# Patient Record
Sex: FEMALE | Race: WHITE | Employment: OTHER | URBAN - METROPOLITAN AREA
[De-identification: names, ages, dates, MRNs, and addresses within clinical notes are randomized per-mention and may not be internally consistent; named-entity substitution may affect disease eponyms.]

---

## 2018-03-20 ENCOUNTER — HOSPITAL ENCOUNTER (INPATIENT)
Age: 83
LOS: 7 days | Discharge: HOME | DRG: 871 | End: 2018-03-27
Attending: EMERGENCY MEDICINE | Admitting: INTERNAL MEDICINE
Payer: MEDICARE

## 2018-03-20 ENCOUNTER — APPOINTMENT (OUTPATIENT)
Dept: GENERAL RADIOLOGY | Age: 83
DRG: 871 | End: 2018-03-20
Payer: MEDICARE

## 2018-03-20 DIAGNOSIS — R06.03 RESPIRATORY DISTRESS: Primary | ICD-10-CM

## 2018-03-20 DIAGNOSIS — L03.119 CELLULITIS OF LOWER EXTREMITY, UNSPECIFIED LATERALITY: ICD-10-CM

## 2018-03-20 DIAGNOSIS — R09.02 HYPOXIA: ICD-10-CM

## 2018-03-20 PROBLEM — J96.01 ACUTE RESPIRATORY FAILURE WITH HYPOXIA (HCC): Status: ACTIVE | Noted: 2018-03-20

## 2018-03-20 PROBLEM — I50.9 CHF (CONGESTIVE HEART FAILURE) (HCC): Status: ACTIVE | Noted: 2018-03-20

## 2018-03-20 PROBLEM — L03.90 CELLULITIS: Status: ACTIVE | Noted: 2018-03-20

## 2018-03-20 PROBLEM — J18.9 PNEUMONIA: Status: ACTIVE | Noted: 2018-03-20

## 2018-03-20 LAB
ALBUMIN SERPL-MCNC: 3.3 G/DL (ref 3.5–5.2)
ALP BLD-CCNC: 79 U/L (ref 35–104)
ALT SERPL-CCNC: 12 U/L (ref 0–32)
ANION GAP SERPL CALCULATED.3IONS-SCNC: 17 MMOL/L (ref 7–16)
AST SERPL-CCNC: 19 U/L (ref 0–31)
BACTERIA: ABNORMAL /HPF
BASOPHILS ABSOLUTE: 0.04 E9/L (ref 0–0.2)
BASOPHILS RELATIVE PERCENT: 0.2 % (ref 0–2)
BILIRUB SERPL-MCNC: 0.2 MG/DL (ref 0–1.2)
BILIRUBIN URINE: NEGATIVE
BLOOD, URINE: ABNORMAL
BUN BLDV-MCNC: 15 MG/DL (ref 8–23)
CALCIUM SERPL-MCNC: 9.3 MG/DL (ref 8.6–10.2)
CHLORIDE BLD-SCNC: 102 MMOL/L (ref 98–107)
CLARITY: CLEAR
CO2: 18 MMOL/L (ref 22–29)
COLOR: YELLOW
CREAT SERPL-MCNC: 0.5 MG/DL (ref 0.5–1)
D DIMER: 609 NG/ML DDU
EKG ATRIAL RATE: 111 BPM
EKG P AXIS: 85 DEGREES
EKG P-R INTERVAL: 166 MS
EKG Q-T INTERVAL: 332 MS
EKG QRS DURATION: 76 MS
EKG QTC CALCULATION (BAZETT): 451 MS
EKG R AXIS: 56 DEGREES
EKG T AXIS: -2 DEGREES
EKG VENTRICULAR RATE: 111 BPM
EOSINOPHILS ABSOLUTE: 0.08 E9/L (ref 0.05–0.5)
EOSINOPHILS RELATIVE PERCENT: 0.5 % (ref 0–6)
FILM ARRAY ADENOVIRUS: NORMAL
FILM ARRAY BORDETELLA PERTUSSIS: NORMAL
FILM ARRAY CHLAMYDOPHILIA PNEUMONIAE: NORMAL
FILM ARRAY CORONAVIRUS 229E: NORMAL
FILM ARRAY CORONAVIRUS HKU1: NORMAL
FILM ARRAY CORONAVIRUS NL63: NORMAL
FILM ARRAY CORONAVIRUS OC43: NORMAL
FILM ARRAY INFLUENZA A VIRUS 09H1: NORMAL
FILM ARRAY INFLUENZA A VIRUS H1: NORMAL
FILM ARRAY INFLUENZA A VIRUS H3: NORMAL
FILM ARRAY INFLUENZA A VIRUS: NORMAL
FILM ARRAY INFLUENZA B: NORMAL
FILM ARRAY METAPNEUMOVIRUS: NORMAL
FILM ARRAY MYCOPLASMA PNEUMONIAE: NORMAL
FILM ARRAY PARAINFLUENZA VIRUS 1: NORMAL
FILM ARRAY PARAINFLUENZA VIRUS 2: NORMAL
FILM ARRAY PARAINFLUENZA VIRUS 3: NORMAL
FILM ARRAY PARAINFLUENZA VIRUS 4: NORMAL
FILM ARRAY RESPIRATORY SYNCITIAL VIRUS: NORMAL
FILM ARRAY RHINOVIRUS/ENTEROVIRUS: NORMAL
FOLATE: 14.3 NG/ML (ref 4.8–24.2)
GFR AFRICAN AMERICAN: >60
GFR NON-AFRICAN AMERICAN: >60 ML/MIN/1.73
GLUCOSE BLD-MCNC: 158 MG/DL (ref 74–109)
GLUCOSE URINE: NEGATIVE MG/DL
HBA1C MFR BLD: 5.4 % (ref 4.8–5.9)
HCT VFR BLD CALC: 35.5 % (ref 34–48)
HEMOGLOBIN: 10.6 G/DL (ref 11.5–15.5)
IMMATURE GRANULOCYTES #: 0.15 E9/L
IMMATURE GRANULOCYTES %: 0.9 % (ref 0–5)
INFLUENZA A BY PCR: NOT DETECTED
INFLUENZA B BY PCR: NOT DETECTED
KETONES, URINE: NEGATIVE MG/DL
LACTIC ACID, SEPSIS: 1.5 MMOL/L (ref 0.5–1.9)
LACTIC ACID: 1.2 MMOL/L (ref 0.5–2.2)
LEUKOCYTE ESTERASE, URINE: ABNORMAL
LV EF: 55 %
LVEF MODALITY: NORMAL
LYMPHOCYTES ABSOLUTE: 1.23 E9/L (ref 1.5–4)
LYMPHOCYTES RELATIVE PERCENT: 7.4 % (ref 20–42)
MCH RBC QN AUTO: 23.8 PG (ref 26–35)
MCHC RBC AUTO-ENTMCNC: 29.9 % (ref 32–34.5)
MCV RBC AUTO: 79.6 FL (ref 80–99.9)
MONOCYTES ABSOLUTE: 0.78 E9/L (ref 0.1–0.95)
MONOCYTES RELATIVE PERCENT: 4.7 % (ref 2–12)
NEUTROPHILS ABSOLUTE: 14.3 E9/L (ref 1.8–7.3)
NEUTROPHILS RELATIVE PERCENT: 86.3 % (ref 43–80)
NITRITE, URINE: NEGATIVE
PDW BLD-RTO: 16.7 FL (ref 11.5–15)
PH UA: 5.5 (ref 5–9)
PHOSPHORUS: 2.8 MG/DL (ref 2.5–4.5)
PLATELET # BLD: 359 E9/L (ref 130–450)
PMV BLD AUTO: 10.7 FL (ref 7–12)
POTASSIUM SERPL-SCNC: 4.3 MMOL/L (ref 3.5–5)
PRO-BNP: 1834 PG/ML (ref 0–450)
PROTEIN UA: NEGATIVE MG/DL
RBC # BLD: 4.46 E12/L (ref 3.5–5.5)
RBC UA: ABNORMAL /HPF (ref 0–2)
SODIUM BLD-SCNC: 137 MMOL/L (ref 132–146)
SPECIFIC GRAVITY UA: 1.01 (ref 1–1.03)
TOTAL PROTEIN: 7.8 G/DL (ref 6.4–8.3)
TROPONIN: 0.02 NG/ML (ref 0–0.03)
TROPONIN: 0.15 NG/ML (ref 0–0.03)
TROPONIN: 0.16 NG/ML (ref 0–0.03)
UROBILINOGEN, URINE: 0.2 E.U./DL
VITAMIN B-12: 259 PG/ML (ref 211–946)
WBC # BLD: 16.6 E9/L (ref 4.5–11.5)
WBC UA: ABNORMAL /HPF (ref 0–5)

## 2018-03-20 PROCEDURE — 82607 VITAMIN B-12: CPT

## 2018-03-20 PROCEDURE — 6360000002 HC RX W HCPCS: Performed by: INTERNAL MEDICINE

## 2018-03-20 PROCEDURE — 87070 CULTURE OTHR SPECIMN AEROBIC: CPT

## 2018-03-20 PROCEDURE — 87040 BLOOD CULTURE FOR BACTERIA: CPT

## 2018-03-20 PROCEDURE — 94760 N-INVAS EAR/PLS OXIMETRY 1: CPT

## 2018-03-20 PROCEDURE — 85025 COMPLETE CBC W/AUTO DIFF WBC: CPT

## 2018-03-20 PROCEDURE — 94664 DEMO&/EVAL PT USE INHALER: CPT

## 2018-03-20 PROCEDURE — 87186 SC STD MICRODIL/AGAR DIL: CPT

## 2018-03-20 PROCEDURE — 87798 DETECT AGENT NOS DNA AMP: CPT

## 2018-03-20 PROCEDURE — 93306 TTE W/DOPPLER COMPLETE: CPT

## 2018-03-20 PROCEDURE — 93005 ELECTROCARDIOGRAM TRACING: CPT | Performed by: PHYSICIAN ASSISTANT

## 2018-03-20 PROCEDURE — 2060000000 HC ICU INTERMEDIATE R&B

## 2018-03-20 PROCEDURE — 87501 INFLUENZA DNA AMP PROB 1+: CPT

## 2018-03-20 PROCEDURE — 96375 TX/PRO/DX INJ NEW DRUG ADDON: CPT

## 2018-03-20 PROCEDURE — 83880 ASSAY OF NATRIURETIC PEPTIDE: CPT

## 2018-03-20 PROCEDURE — 2580000003 HC RX 258: Performed by: PHYSICIAN ASSISTANT

## 2018-03-20 PROCEDURE — 87486 CHLMYD PNEUM DNA AMP PROBE: CPT

## 2018-03-20 PROCEDURE — 87502 INFLUENZA DNA AMP PROBE: CPT

## 2018-03-20 PROCEDURE — 51702 INSERT TEMP BLADDER CATH: CPT

## 2018-03-20 PROCEDURE — 87088 URINE BACTERIA CULTURE: CPT

## 2018-03-20 PROCEDURE — 6360000002 HC RX W HCPCS: Performed by: PHYSICIAN ASSISTANT

## 2018-03-20 PROCEDURE — 6370000000 HC RX 637 (ALT 250 FOR IP): Performed by: INTERNAL MEDICINE

## 2018-03-20 PROCEDURE — 87581 M.PNEUMON DNA AMP PROBE: CPT

## 2018-03-20 PROCEDURE — 2580000003 HC RX 258: Performed by: INTERNAL MEDICINE

## 2018-03-20 PROCEDURE — 82746 ASSAY OF FOLIC ACID SERUM: CPT

## 2018-03-20 PROCEDURE — 2700000000 HC OXYGEN THERAPY PER DAY

## 2018-03-20 PROCEDURE — 36415 COLL VENOUS BLD VENIPUNCTURE: CPT

## 2018-03-20 PROCEDURE — 84484 ASSAY OF TROPONIN QUANT: CPT

## 2018-03-20 PROCEDURE — 87503 INFLUENZA DNA AMP PROB ADDL: CPT

## 2018-03-20 PROCEDURE — 6370000000 HC RX 637 (ALT 250 FOR IP): Performed by: PHYSICIAN ASSISTANT

## 2018-03-20 PROCEDURE — 85378 FIBRIN DEGRADE SEMIQUANT: CPT

## 2018-03-20 PROCEDURE — 83036 HEMOGLOBIN GLYCOSYLATED A1C: CPT

## 2018-03-20 PROCEDURE — 87450 HC DIRECT STREP B ANTIGEN: CPT

## 2018-03-20 PROCEDURE — 71045 X-RAY EXAM CHEST 1 VIEW: CPT

## 2018-03-20 PROCEDURE — 94640 AIRWAY INHALATION TREATMENT: CPT

## 2018-03-20 PROCEDURE — 81001 URINALYSIS AUTO W/SCOPE: CPT

## 2018-03-20 PROCEDURE — 83605 ASSAY OF LACTIC ACID: CPT

## 2018-03-20 PROCEDURE — 84100 ASSAY OF PHOSPHORUS: CPT

## 2018-03-20 PROCEDURE — 80053 COMPREHEN METABOLIC PANEL: CPT

## 2018-03-20 PROCEDURE — 87147 CULTURE TYPE IMMUNOLOGIC: CPT

## 2018-03-20 PROCEDURE — 99285 EMERGENCY DEPT VISIT HI MDM: CPT

## 2018-03-20 PROCEDURE — 96365 THER/PROPH/DIAG IV INF INIT: CPT

## 2018-03-20 RX ORDER — BUDESONIDE 0.25 MG/2ML
250 INHALANT ORAL 2 TIMES DAILY
Status: DISCONTINUED | OUTPATIENT
Start: 2018-03-20 | End: 2018-03-27 | Stop reason: HOSPADM

## 2018-03-20 RX ORDER — TORSEMIDE 10 MG/1
10 TABLET ORAL DAILY
Status: DISCONTINUED | OUTPATIENT
Start: 2018-03-21 | End: 2018-03-27

## 2018-03-20 RX ORDER — ENALAPRIL MALEATE AND HYDROCHLOROTHIAZIDE 10; 25 MG/1; MG/1
1 TABLET ORAL EVERY MORNING
Status: ON HOLD | COMMUNITY
End: 2020-01-01 | Stop reason: HOSPADM

## 2018-03-20 RX ORDER — IPRATROPIUM BROMIDE AND ALBUTEROL SULFATE 2.5; .5 MG/3ML; MG/3ML
1 SOLUTION RESPIRATORY (INHALATION) ONCE
Status: COMPLETED | OUTPATIENT
Start: 2018-03-20 | End: 2018-03-20

## 2018-03-20 RX ORDER — PANTOPRAZOLE SODIUM 40 MG/1
40 TABLET, DELAYED RELEASE ORAL
Status: DISCONTINUED | OUTPATIENT
Start: 2018-03-21 | End: 2018-03-27 | Stop reason: HOSPADM

## 2018-03-20 RX ORDER — ENALAPRIL MALEATE 10 MG/1
10 TABLET ORAL EVERY MORNING
Status: DISCONTINUED | OUTPATIENT
Start: 2018-03-21 | End: 2018-03-27 | Stop reason: HOSPADM

## 2018-03-20 RX ORDER — SODIUM CHLORIDE 0.9 % (FLUSH) 0.9 %
10 SYRINGE (ML) INJECTION EVERY 12 HOURS SCHEDULED
Status: DISCONTINUED | OUTPATIENT
Start: 2018-03-20 | End: 2018-03-27 | Stop reason: HOSPADM

## 2018-03-20 RX ORDER — HYDROCHLOROTHIAZIDE 25 MG/1
25 TABLET ORAL EVERY MORNING
Status: DISCONTINUED | OUTPATIENT
Start: 2018-03-21 | End: 2018-03-20

## 2018-03-20 RX ORDER — FORMOTEROL FUMARATE 20 UG/2ML
20 SOLUTION RESPIRATORY (INHALATION) 2 TIMES DAILY
Status: DISCONTINUED | OUTPATIENT
Start: 2018-03-20 | End: 2018-03-27 | Stop reason: HOSPADM

## 2018-03-20 RX ORDER — SODIUM CHLORIDE 9 MG/ML
INJECTION, SOLUTION INTRAVENOUS CONTINUOUS
Status: DISCONTINUED | OUTPATIENT
Start: 2018-03-20 | End: 2018-03-27 | Stop reason: HOSPADM

## 2018-03-20 RX ORDER — CODEINE PHOSPHATE AND GUAIFENESIN 10; 100 MG/5ML; MG/5ML
5 SOLUTION ORAL EVERY 4 HOURS PRN
Status: DISCONTINUED | OUTPATIENT
Start: 2018-03-20 | End: 2018-03-27 | Stop reason: HOSPADM

## 2018-03-20 RX ORDER — FUROSEMIDE 10 MG/ML
40 INJECTION INTRAMUSCULAR; INTRAVENOUS ONCE
Status: COMPLETED | OUTPATIENT
Start: 2018-03-20 | End: 2018-03-20

## 2018-03-20 RX ORDER — NIFEDIPINE 30 MG/1
30 TABLET, EXTENDED RELEASE ORAL EVERY MORNING
Status: DISCONTINUED | OUTPATIENT
Start: 2018-03-21 | End: 2018-03-20

## 2018-03-20 RX ORDER — ENALAPRIL MALEATE AND HYDROCHLOROTHIAZIDE 10; 25 MG/1; MG/1
1 TABLET ORAL EVERY MORNING
Status: DISCONTINUED | OUTPATIENT
Start: 2018-03-21 | End: 2018-03-20 | Stop reason: CLARIF

## 2018-03-20 RX ORDER — AMLODIPINE BESYLATE 5 MG/1
5 TABLET ORAL DAILY
Status: DISCONTINUED | OUTPATIENT
Start: 2018-03-21 | End: 2018-03-27

## 2018-03-20 RX ORDER — NIFEDIPINE 30 MG/1
30 TABLET, EXTENDED RELEASE ORAL EVERY MORNING
Status: ON HOLD | COMMUNITY
End: 2020-01-01

## 2018-03-20 RX ORDER — SODIUM CHLORIDE 0.9 % (FLUSH) 0.9 %
10 SYRINGE (ML) INJECTION PRN
Status: DISCONTINUED | OUTPATIENT
Start: 2018-03-20 | End: 2018-03-27 | Stop reason: HOSPADM

## 2018-03-20 RX ORDER — ONDANSETRON 2 MG/ML
4 INJECTION INTRAMUSCULAR; INTRAVENOUS EVERY 6 HOURS PRN
Status: DISCONTINUED | OUTPATIENT
Start: 2018-03-20 | End: 2018-03-27 | Stop reason: HOSPADM

## 2018-03-20 RX ORDER — SPIRONOLACTONE 25 MG/1
25 TABLET ORAL DAILY
Status: DISCONTINUED | OUTPATIENT
Start: 2018-03-21 | End: 2018-03-27 | Stop reason: HOSPADM

## 2018-03-20 RX ORDER — IPRATROPIUM BROMIDE AND ALBUTEROL SULFATE 2.5; .5 MG/3ML; MG/3ML
1 SOLUTION RESPIRATORY (INHALATION)
Status: DISCONTINUED | OUTPATIENT
Start: 2018-03-20 | End: 2018-03-27 | Stop reason: HOSPADM

## 2018-03-20 RX ADMIN — PIPERACILLIN SODIUM AND TAZOBACTAM SODIUM 3.38 G: 3; .375 INJECTION, POWDER, LYOPHILIZED, FOR SOLUTION INTRAVENOUS at 09:09

## 2018-03-20 RX ADMIN — FUROSEMIDE 40 MG: 10 INJECTION, SOLUTION INTRAMUSCULAR; INTRAVENOUS at 10:11

## 2018-03-20 RX ADMIN — FORMOTEROL FUMARATE DIHYDRATE 20 MCG: 20 SOLUTION RESPIRATORY (INHALATION) at 18:42

## 2018-03-20 RX ADMIN — SODIUM CHLORIDE: 9 INJECTION, SOLUTION INTRAVENOUS at 16:37

## 2018-03-20 RX ADMIN — CEFTRIAXONE SODIUM 1 G: 1 INJECTION, POWDER, FOR SOLUTION INTRAMUSCULAR; INTRAVENOUS at 20:47

## 2018-03-20 RX ADMIN — IPRATROPIUM BROMIDE AND ALBUTEROL SULFATE 1 AMPULE: 2.5; .5 SOLUTION RESPIRATORY (INHALATION) at 09:20

## 2018-03-20 RX ADMIN — ENOXAPARIN SODIUM 40 MG: 40 INJECTION SUBCUTANEOUS at 16:37

## 2018-03-20 RX ADMIN — VANCOMYCIN HYDROCHLORIDE 1500 MG: 10 INJECTION, POWDER, LYOPHILIZED, FOR SOLUTION INTRAVENOUS at 11:23

## 2018-03-20 RX ADMIN — IPRATROPIUM BROMIDE AND ALBUTEROL SULFATE 1 AMPULE: 2.5; .5 SOLUTION RESPIRATORY (INHALATION) at 18:42

## 2018-03-20 RX ADMIN — BUDESONIDE 250 MCG: 0.25 SUSPENSION RESPIRATORY (INHALATION) at 18:44

## 2018-03-20 ASSESSMENT — PAIN SCALES - GENERAL: PAINLEVEL_OUTOF10: 0

## 2018-03-20 NOTE — PROGRESS NOTES
Call out to  [facility] via answering service regarding nursing request for salinas catheter-patient is incontinent, urine lab work ordered, with administration of Lasix earlier today. Floor awaiting call back/any new orders at this time.

## 2018-03-20 NOTE — CONSULTS
CHEST PORTABLE NUMBER OF IMAGES:  1 INDICATION: Shortness of breath Technique: AP view of the chest obtained portably on 3/20/2018 8:45 AM Comparison:  No prior studies available for comparison. Findings: The cardiomediastinal silhouette is enlarged with calcified lesions of the aortic arch. There are bilateral pleural effusions with diffuse airspace disease involving the left mid to lower lung field and right lower lobe. There is no pneumothorax. The visualized osseous structures demonstrate degenerative changes. There is an age-indeterminate dislocation of the right shoulder with medial displacement of the right humeral head with respect to the glenoid fossa. Lines and Tubes: None. Bilateral pleural effusions with diffuse airspace disease involving the left mid to lower lung field and right lower lobe. Recommend clinical correlation. Assessment:    Principal Problem:    Acute respiratory failure with hypoxia (HCC)  Active Problems:    Pneumonia  Resolved Problems:    * No resolved hospital problems. *      Plan: Will obtain 2D-ECHO and assess significance of valvular disease as well as left ventricular systolic/diastolic function and adjust cardiac medications accordingly. Anion gap may be due to low flow state secondary to poor forward cardiac output. Carefully monitor renal function. Utilize DVT prophylaxis. I have spent more than 45 minutes face to face with Yolette Tavares reviewing notes and laboratory data with greater than 50% of this time instructing and counseling the patient regarding my findings and recommendations and I have answered all questions as posed to me by Ms. Lucius Simon. Thank you, Freddy Hunter MD for allowing me to consult in the care of this patient. Daryl Harper DO, FACP, FACC, FSCAI    NOTE:  This report was transcribed using voice recognition software.   Every effort was made to ensure accuracy; however, inadvertent computerized transcription errors may be present.

## 2018-03-20 NOTE — ED PROVIDER NOTES
ED Attending  CC: No       Department of Emergency Medicine   ED  Provider Note  Admit Date/Time: 3/20/2018  8:27 AM  ED Bed: 23/23   MRN: [id number]  Chief Complaint:   Shortness of Breath (for a couple of days)       History of Present Illness   Source of history provided by:  patient. History/Exam Limitations: none. Elias Fothergill is a 81 y.o. female who has a past medical history of:   Past Medical History:   Diagnosis Date    Hypertension     presents to the ED by private vehicle, ambulatory and accompanied by family member son for shortness of breath, beginning several hours ago and are unchanged since that time. The complaint has been persistent, severe in severity. Patient states several hours ago she became short of breath. States she has had a cough for the last several days. Denies any fever, chills, nausea, vomiting, chest pain. States she does have chronic wounds to the bilateral lower extremities. States increasing drainage from the area. Does not follow with wound care. Denies any medical history. ROS   Pertinent positives and negatives are stated within HPI, all other systems reviewed and are negative. History reviewed. No pertinent surgical history. Social History:  reports that she has never smoked. She has never used smokeless tobacco. She reports that she does not drink alcohol. Family History: family history is not on file. Allergies: Patient has no known allergies. Physical Exam Section   Oxygen Saturation Interpretation: Improved after treatment. ED Triage Vitals [03/20/18 0828]   BP Temp Temp Source Pulse Resp SpO2 Height Weight   (!) 151/97 97.6 °F (36.4 °C) Oral 119 24 (!) 77 % -- --       Physical Exam  · Constitutional/General: Alert and oriented x3, ill appearing. Moderate distress  · HEENT:  NC/NT. PERRLA,  Airway patent. · Neck: Supple, full ROM, non tender to palpation in the midline, no stridor, no crepitus, no meningeal signs  · Respiratory:  Moderate Consultations:             Sera Farley HOSPITALIST  Dr. Dyana Horn spoke with Dr. Savanah Grimes and discussed patient case. He will accept the patient. Procedures:   none    MDM:  Lab work, EKG, CXR ordered. Placed on non re breather mask. O2 sat improved from 77% to 94%. Dr. Dyana Horn evaluated the patient. Patient states she felt mildly improved. CXR shows bilateral pleural effusions. BNP elevated at 1,834. Treated with lasix. Due to the wounds on her legs and leukocytosis, started on vancomycin and zosyn. Patient will be admitted to the hospital.     Counseling:   I have spoken with the patient and discussed todays results, in addition to providing specific details for the plan of care and counseling regarding the diagnosis and prognosis and are agreeable with the plan. Assessment      1. Respiratory distress    2. Hypoxia    3. Cellulitis of lower extremity, unspecified laterality      This patient's ED course included: multiple bedside re-evaluations  This patient has improved during their ED course. Plan   Admit to telemetry. Patient condition is stable. New Medications     New Prescriptions    No medications on file     Electronically signed by BEVERLEY Gandhi   DD: 3/20/18  **This report was transcribed using voice recognition software. Every effort was made to ensure accuracy; however, inadvertent computerized transcription errors may be present.   END OF PROVIDER NOTE       Pako Gandhi  03/20/18 1107

## 2018-03-20 NOTE — PROGRESS NOTES
History and Physical      CHIEF COMPLAINT:  Short of breath    History of Present Illness: 79-year-old female, states she does not ever recall being in the hospital except for childbirth. She states her only significant problem and life has been sinus difficulties. Patient has been short of breath the last 2 weeks became profoundly short of breath yesterday and this morning. She presented to the ED; bilateral pleural effusions with diffuse airspace disease left and right lung. On presentation, oxygen saturation 77%. ProBNP 1834; troponin 0.02. WBC 16.6 hemoglobin 10.6. Glucose 158 calcium 9.3 protein 7.8 albumin 3.3. Urinalysis with trace leukocyte esterase. Influenza PCR done in the ER, negative. --I reviewed her old charts, she did have outpatient 2-D echo 9/26/14 showed 55-60% ejection fraction stage I diastolic dysfunction moderate mitral regurgitation and moderate aortic stenosis moderate aortic regurgitation. Despite that, she's not seen a cardiologist.  --Has never had a colonoscopy; never had EGD. Does not recall any stress test. No pulmonary difficulties; sinus feeling difficulty. --She has had ischemic type ulcers, both lower legs and 30 x 10 cm each. She states that's been there for many years never had any attention drawn to it. Past Medical History:   Diagnosis Date    Acute respiratory failure with hypoxia ([facility] [address] 3/20/2018    Cellulitis 3/20/2018    Right and left lower extremity    CHF (congestive heart failure) ([facility] [address] 3/20/2018    Hypertension          History reviewed. No pertinent surgical history. Medications Prior to Admission:    Prescriptions Prior to Admission: NIFEdipine (NIFEDICAL XL) 30 MG extended release tablet, Take 30 mg by mouth every morning  enalapril-hydrochlorothiazide (VASERETIC) 10-25 MG per tablet, Take 1 tablet by mouth every morning    Allergies:    Patient has no known allergies. Social History:    reports that she has never smoked.  She has never used smokeless tobacco. She reports that she does not drink alcohol. Family History:   family history is not on file. REVIEW OF SYSTEMS:  As above in the HPI, otherwise negative    PHYSICAL EXAM:    VS: BP (!) 149/78   Pulse 104   Temp 98.7 °F (37.1 °C) (Oral)   Resp 18   Ht 5' 1\" (1.549 m)   Wt 220 lb (99.8 kg)   SpO2 95%   BMI 41.57 kg/m²     General appearance: Alert, Awake, Oriented times 3, no distress  Skin: Warm and dry ; no rashes  Head: Normocephalic. No masses, lesions or tenderness noted  Eyes: Conjunctivae pink, sclera white. PERRL,EOM-I  Ears: External ears normal  Nose/Sinuses: Nares normal. Septum midline. Mucosa normal. No drainage  Oropharynx: Oropharynx clear with no exudate seen  Neck: Supple. No jugular venous distension, lymphadenopathy or thyromegaly Trachea midline  Lungs:Intense wheeze rhonchi bilaterally worse on the left. Heart: S1 S2  Regular rate and rhythm. No rub or gallop; grade 2/6 systolic murmur, 2nd right intercostal space grade 1/6 systolic murmur 3rd 4th left intercostal space  Abdomen: Soft, non-tender. BS normal. No masses, organomegaly; no rebound or guarding  Extremities: Erythematous changes both feet. 10 cm x 30 cm ulcerated areas in her surface lower legs extending from mid calf to the medial malleolus bilaterally. Musculoskeletal: Muscular strength appears intact. Neuro:  No focal motor defects ; II-XII grossly intact .  HAY equally  Breast: deferred  Rectal: deferred  Genitalia:  deferred    LABS:  CBC:   Lab Results   Component Value Date    WBC 16.6 03/20/2018    RBC 4.46 03/20/2018    HGB 10.6 03/20/2018    HCT 35.5 03/20/2018    MCV 79.6 03/20/2018    MCH 23.8 03/20/2018    MCHC 29.9 03/20/2018    RDW 16.7 03/20/2018     03/20/2018    MPV 10.7 03/20/2018     CBC with Differential:    Lab Results   Component Value Date    WBC 16.6 03/20/2018    RBC 4.46 03/20/2018    HGB 10.6 03/20/2018    HCT 35.5 03/20/2018     03/20/2018    MCV 79.6 03/20/2018    MCH 23.8 03/20/2018    MCHC 29.9 03/20/2018    RDW 16.7 03/20/2018    LYMPHOPCT 7.4 03/20/2018    MONOPCT 4.7 03/20/2018    BASOPCT 0.2 03/20/2018    MONOSABS 0.78 03/20/2018    LYMPHSABS 1.23 03/20/2018    EOSABS 0.08 03/20/2018    BASOSABS 0.04 03/20/2018     Hemoglobin/Hematocrit:    Lab Results   Component Value Date    HGB 10.6 03/20/2018    HCT 35.5 03/20/2018     CMP:    Lab Results   Component Value Date     03/20/2018    K 4.3 03/20/2018     03/20/2018    CO2 18 03/20/2018    BUN 15 03/20/2018    CREATININE 0.5 03/20/2018    GFRAA >60 03/20/2018    LABGLOM >60 03/20/2018    GLUCOSE 158 03/20/2018    PROT 7.8 03/20/2018    LABALBU 3.3 03/20/2018    CALCIUM 9.3 03/20/2018    BILITOT 0.2 03/20/2018    ALKPHOS 79 03/20/2018    AST 19 03/20/2018    ALT 12 03/20/2018     BMP:    Lab Results   Component Value Date     03/20/2018    K 4.3 03/20/2018     03/20/2018    CO2 18 03/20/2018    BUN 15 03/20/2018    LABALBU 3.3 03/20/2018    CREATININE 0.5 03/20/2018    CALCIUM 9.3 03/20/2018    GFRAA >60 03/20/2018    LABGLOM >60 03/20/2018    GLUCOSE 158 03/20/2018     Magnesium:  No results found for: MG  Phosphorus:  No results found for: PHOS  PT/INR:  No results found for: PROTIME, INR  Warfarin PT/INR:  No components found for: PTPATWAR, PTINRWAR  PTT:  No results found for: APTT, PTT[APTT}  Troponin:    Lab Results   Component Value Date    TROPONINI 0.02 03/20/2018     Last 3 Troponin:    Lab Results   Component Value Date    TROPONINI 0.02 03/20/2018     U/A:    Lab Results   Component Value Date    COLORU Yellow 03/20/2018    PROTEINU Negative 03/20/2018    PHUR 5.5 03/20/2018    WBCUA 1-3 03/20/2018    RBCUA 1-3 03/20/2018    BACTERIA RARE 03/20/2018    CLARITYU Clear 03/20/2018    SPECGRAV 1.010 03/20/2018    LEUKOCYTESUR TRACE 03/20/2018    UROBILINOGEN 0.2 03/20/2018    BILIRUBINUR Negative 03/20/2018    BLOODU TRACE 03/20/2018    GLUCOSEU Negative 03/20/2018     HgBA1c:

## 2018-03-21 PROBLEM — I50.33 ACUTE ON CHRONIC DIASTOLIC CONGESTIVE HEART FAILURE (HCC): Status: ACTIVE | Noted: 2018-03-20

## 2018-03-21 PROBLEM — I35.0 AORTIC STENOSIS, SEVERE: Status: ACTIVE | Noted: 2018-03-01

## 2018-03-21 PROBLEM — I34.0 MITRAL REGURGITATION: Status: ACTIVE | Noted: 2018-03-01

## 2018-03-21 PROBLEM — D62 ACUTE BLOOD LOSS ANEMIA: Status: ACTIVE | Noted: 2018-03-21

## 2018-03-21 PROBLEM — E87.6 HYPOKALEMIA: Status: ACTIVE | Noted: 2018-03-21

## 2018-03-21 PROBLEM — I35.1 MODERATE AORTIC REGURGITATION: Status: ACTIVE | Noted: 2018-03-01

## 2018-03-21 LAB
ABO/RH: NORMAL
ALBUMIN SERPL-MCNC: 3.2 G/DL (ref 3.5–5.2)
ALP BLD-CCNC: 64 U/L (ref 35–104)
ALT SERPL-CCNC: 9 U/L (ref 0–32)
ANION GAP SERPL CALCULATED.3IONS-SCNC: 14 MMOL/L (ref 7–16)
ANTIBODY SCREEN: NORMAL
AST SERPL-CCNC: 16 U/L (ref 0–31)
BASOPHILS ABSOLUTE: 0.01 E9/L (ref 0–0.2)
BASOPHILS RELATIVE PERCENT: 0.1 % (ref 0–2)
BILIRUB SERPL-MCNC: 0.3 MG/DL (ref 0–1.2)
BUN BLDV-MCNC: 13 MG/DL (ref 8–23)
C-REACTIVE PROTEIN: 4.2 MG/DL (ref 0–0.4)
CALCIUM SERPL-MCNC: 8.8 MG/DL (ref 8.6–10.2)
CHLORIDE BLD-SCNC: 101 MMOL/L (ref 98–107)
CHOLESTEROL, TOTAL: 161 MG/DL (ref 0–199)
CO2: 25 MMOL/L (ref 22–29)
CREAT SERPL-MCNC: 0.7 MG/DL (ref 0.5–1)
EOSINOPHILS ABSOLUTE: 0.05 E9/L (ref 0.05–0.5)
EOSINOPHILS RELATIVE PERCENT: 0.6 % (ref 0–6)
FERRITIN: 78 NG/ML
GFR AFRICAN AMERICAN: >60
GFR NON-AFRICAN AMERICAN: >60 ML/MIN/1.73
GLUCOSE BLD-MCNC: 93 MG/DL (ref 74–109)
HCT VFR BLD CALC: 27.8 % (ref 34–48)
HCT VFR BLD CALC: 31.2 % (ref 34–48)
HDLC SERPL-MCNC: 45 MG/DL
HEMOGLOBIN: 8.1 G/DL (ref 11.5–15.5)
IMMATURE GRANULOCYTES #: 0.05 E9/L
IMMATURE GRANULOCYTES %: 0.6 % (ref 0–5)
IMMATURE RETIC FRACT: 27.6 % (ref 3–15.9)
IRON SATURATION: 12 % (ref 15–50)
IRON: 33 MCG/DL (ref 37–145)
L. PNEUMOPHILA SEROGP 1 UR AG: NORMAL
LDL CHOLESTEROL CALCULATED: 85 MG/DL (ref 0–99)
LYMPHOCYTES ABSOLUTE: 1.39 E9/L (ref 1.5–4)
LYMPHOCYTES RELATIVE PERCENT: 16.3 % (ref 20–42)
MAGNESIUM: 1.6 MG/DL (ref 1.6–2.6)
MCH RBC QN AUTO: 22.9 PG (ref 26–35)
MCHC RBC AUTO-ENTMCNC: 29.1 % (ref 32–34.5)
MCV RBC AUTO: 78.5 FL (ref 80–99.9)
MONOCYTES ABSOLUTE: 0.85 E9/L (ref 0.1–0.95)
MONOCYTES RELATIVE PERCENT: 10 % (ref 2–12)
NEUTROPHILS ABSOLUTE: 6.18 E9/L (ref 1.8–7.3)
NEUTROPHILS RELATIVE PERCENT: 72.4 % (ref 43–80)
PDW BLD-RTO: 16.3 FL (ref 11.5–15)
PLATELET # BLD: 395 E9/L (ref 130–450)
PMV BLD AUTO: 10 FL (ref 7–12)
POTASSIUM SERPL-SCNC: 3.2 MMOL/L (ref 3.5–5)
PRO-BNP: 6102 PG/ML (ref 0–450)
PROCALCITONIN: 0.14 NG/ML (ref 0–0.08)
RBC # BLD: 3.54 E12/L (ref 3.5–5.5)
RETIC HGB EQUIVALENT: 26.7 PG (ref 28.2–36.6)
RETICULOCYTE ABSOLUTE COUNT: 0.08 E12/L
RETICULOCYTE COUNT PCT: 2 % (ref 0.4–1.9)
SODIUM BLD-SCNC: 140 MMOL/L (ref 132–146)
STREP PNEUMONIAE ANTIGEN, URINE: NORMAL
TOTAL IRON BINDING CAPACITY: 270 MCG/DL (ref 250–450)
TOTAL PROTEIN: 6.5 G/DL (ref 6.4–8.3)
TRIGL SERPL-MCNC: 157 MG/DL (ref 0–149)
TSH SERPL DL<=0.05 MIU/L-ACNC: 1.29 UIU/ML (ref 0.27–4.2)
VLDLC SERPL CALC-MCNC: 31 MG/DL
WBC # BLD: 8.5 E9/L (ref 4.5–11.5)

## 2018-03-21 PROCEDURE — 86901 BLOOD TYPING SEROLOGIC RH(D): CPT

## 2018-03-21 PROCEDURE — 85045 AUTOMATED RETICULOCYTE COUNT: CPT

## 2018-03-21 PROCEDURE — 80061 LIPID PANEL: CPT

## 2018-03-21 PROCEDURE — 2500000003 HC RX 250 WO HCPCS: Performed by: INTERNAL MEDICINE

## 2018-03-21 PROCEDURE — 83880 ASSAY OF NATRIURETIC PEPTIDE: CPT

## 2018-03-21 PROCEDURE — 85025 COMPLETE CBC W/AUTO DIFF WBC: CPT

## 2018-03-21 PROCEDURE — 82728 ASSAY OF FERRITIN: CPT

## 2018-03-21 PROCEDURE — 97535 SELF CARE MNGMENT TRAINING: CPT

## 2018-03-21 PROCEDURE — 36415 COLL VENOUS BLD VENIPUNCTURE: CPT

## 2018-03-21 PROCEDURE — 83540 ASSAY OF IRON: CPT

## 2018-03-21 PROCEDURE — 6370000000 HC RX 637 (ALT 250 FOR IP): Performed by: INTERNAL MEDICINE

## 2018-03-21 PROCEDURE — 84443 ASSAY THYROID STIM HORMONE: CPT

## 2018-03-21 PROCEDURE — 84145 PROCALCITONIN (PCT): CPT

## 2018-03-21 PROCEDURE — 86900 BLOOD TYPING SEROLOGIC ABO: CPT

## 2018-03-21 PROCEDURE — G8988 SELF CARE GOAL STATUS: HCPCS

## 2018-03-21 PROCEDURE — 86920 COMPATIBILITY TEST SPIN: CPT

## 2018-03-21 PROCEDURE — G8987 SELF CARE CURRENT STATUS: HCPCS

## 2018-03-21 PROCEDURE — 97165 OT EVAL LOW COMPLEX 30 MIN: CPT

## 2018-03-21 PROCEDURE — 2580000003 HC RX 258: Performed by: INTERNAL MEDICINE

## 2018-03-21 PROCEDURE — 83550 IRON BINDING TEST: CPT

## 2018-03-21 PROCEDURE — 83735 ASSAY OF MAGNESIUM: CPT

## 2018-03-21 PROCEDURE — 6360000002 HC RX W HCPCS: Performed by: INTERNAL MEDICINE

## 2018-03-21 PROCEDURE — 80053 COMPREHEN METABOLIC PANEL: CPT

## 2018-03-21 PROCEDURE — 2700000000 HC OXYGEN THERAPY PER DAY

## 2018-03-21 PROCEDURE — 94640 AIRWAY INHALATION TREATMENT: CPT

## 2018-03-21 PROCEDURE — 2060000000 HC ICU INTERMEDIATE R&B

## 2018-03-21 PROCEDURE — 2580000003 HC RX 258: Performed by: PHYSICIAN ASSISTANT

## 2018-03-21 PROCEDURE — 94760 N-INVAS EAR/PLS OXIMETRY 1: CPT

## 2018-03-21 PROCEDURE — 86850 RBC ANTIBODY SCREEN: CPT

## 2018-03-21 PROCEDURE — 86140 C-REACTIVE PROTEIN: CPT

## 2018-03-21 RX ORDER — MAGNESIUM SULFATE IN WATER 40 MG/ML
2 INJECTION, SOLUTION INTRAVENOUS ONCE
Status: COMPLETED | OUTPATIENT
Start: 2018-03-21 | End: 2018-03-21

## 2018-03-21 RX ADMIN — ENALAPRIL MALEATE 10 MG: 10 TABLET ORAL at 08:41

## 2018-03-21 RX ADMIN — BUDESONIDE 250 MCG: 0.25 SUSPENSION RESPIRATORY (INHALATION) at 21:37

## 2018-03-21 RX ADMIN — POTASSIUM PHOSPHATE, MONOBASIC AND POTASSIUM PHOSPHATE, DIBASIC 15 MMOL: 224; 236 INJECTION, SOLUTION INTRAVENOUS at 11:15

## 2018-03-21 RX ADMIN — AMLODIPINE BESYLATE 5 MG: 5 TABLET ORAL at 12:43

## 2018-03-21 RX ADMIN — IPRATROPIUM BROMIDE AND ALBUTEROL SULFATE 1 AMPULE: 2.5; .5 SOLUTION RESPIRATORY (INHALATION) at 11:21

## 2018-03-21 RX ADMIN — ENOXAPARIN SODIUM 40 MG: 40 INJECTION SUBCUTANEOUS at 08:41

## 2018-03-21 RX ADMIN — MAGNESIUM SULFATE HEPTAHYDRATE 2 G: 40 INJECTION, SOLUTION INTRAVENOUS at 08:44

## 2018-03-21 RX ADMIN — FORMOTEROL FUMARATE DIHYDRATE 20 MCG: 20 SOLUTION RESPIRATORY (INHALATION) at 21:35

## 2018-03-21 RX ADMIN — SODIUM CHLORIDE: 9 INJECTION, SOLUTION INTRAVENOUS at 19:55

## 2018-03-21 RX ADMIN — CEFEPIME HYDROCHLORIDE 1 G: 1 INJECTION, POWDER, FOR SOLUTION INTRAMUSCULAR; INTRAVENOUS at 18:27

## 2018-03-21 RX ADMIN — SPIRONOLACTONE 25 MG: 25 TABLET ORAL at 12:44

## 2018-03-21 RX ADMIN — SODIUM CHLORIDE: 9 INJECTION, SOLUTION INTRAVENOUS at 18:27

## 2018-03-21 RX ADMIN — IPRATROPIUM BROMIDE AND ALBUTEROL SULFATE 1 AMPULE: 2.5; .5 SOLUTION RESPIRATORY (INHALATION) at 17:50

## 2018-03-21 RX ADMIN — BUDESONIDE 250 MCG: 0.25 SUSPENSION RESPIRATORY (INHALATION) at 11:21

## 2018-03-21 RX ADMIN — Medication 10 ML: at 08:42

## 2018-03-21 RX ADMIN — TORSEMIDE 10 MG: 10 TABLET ORAL at 12:43

## 2018-03-21 ASSESSMENT — PAIN SCALES - GENERAL
PAINLEVEL_OUTOF10: 0

## 2018-03-21 NOTE — PROGRESS NOTES
Nutrition Assessment    Type and Reason for Visit: Initial, Positive Nutrition Screen    Nutrition Recommendations: Continue current diet, Start Sim BID    Malnutrition Assessment:  · Malnutrition Status: No malnutrition  · Context: Acute illness or injury  · Findings of the 6 clinical characteristics of malnutrition (Minimum of 2 out of 6 clinical characteristics is required to make the diagnosis of moderate or severe Protein Calorie Malnutrition based on AND/ASPEN Guidelines):  1. Energy Intake-Greater than 75%,  (x1 day)    2. Weight Loss-Unable to assess, unable to assess  3. Fat Loss-No significant subcutaneous fat loss    4. Muscle Loss-No significant muscle mass loss    5. Fluid Accumulation-No significant fluid accumulation    6.  Strength-Not measured    Nutrition Diagnosis:   · Problem: Increased nutrient needs  · Etiology: related to Increased demand for energy/nutrients due to (wound healing)     Signs and symptoms:  as evidenced by Presence of wounds    Nutrition Assessment:  · Nutrition-Focused Physical Findings: pt alert, missing teeth, abd WDL, +1 edema, -I/Os  · Wound Type:  Wound Consult Pending (noted areas to BLE)  · Current Nutrition Therapies:  · Oral Diet Orders: No Added Salt (3-4gm)   · Oral Diet intake: %  · Oral Nutrition Supplement (ONS) Orders: None  · Anthropometric Measures:  · Ht: 5' 1\" (154.9 cm)   · Current Body Wt: 220 lb (99.8 kg) (stated, UTO updated wt pt seated at bedside)  · Usual Body Wt: 220 lb (99.8 kg) (stated per pt, no wt hx per EMR)  · % Weight Change: UTO wt changes at this time, pt reports stable wt     · Ideal Body Wt: 105 lb (47.6 kg), % Ideal Body 210%  · Adjusted Body Wt: 134 lb (60.8 kg), body weight adjusted for Obesity  · BMI Classification: BMI > or equal to 40.0 Obese Class III  · Comparative Standards (Estimated Nutrition Needs):  · Estimated Daily Total Kcal:  (MSJ 1408 x 1.1 SF)  · Estimated Daily Protein (g): 75-85    Estimated

## 2018-03-21 NOTE — PROGRESS NOTES
Occupational Therapy  Occupational Therapy Initial Assessment    Date:3/21/2018  Patient Name: Sammie Tinajero  MRN: [id number]  : 1935  ROOM #: [id number]       AM-[facility] Daily Activity Inpatient   How much help for putting on and taking off regular lower body clothing?: A Little  How much help for Bathing?: A Little  How much help for Toileting?: A Little  How much help for putting on and taking off regular upper body clothing?: None  How much help for taking care of personal grooming?: None  How much help for eating meals?: None  AM-[facility] Inpatient Daily Activity Raw Score: 21  AM-[facility] Inpatient ADL T-Scale Score : 44.27  ADL Inpatient CMS 0-100% Score: 32.79  ADL Inpatient CMS G-Code Modifier : CJ      Diagnosis / Problem List:   1. Respiratory distress    2. Hypoxia    3. Cellulitis of lower extremity, unspecified laterality       Patient Active Problem List   Diagnosis    Pneumonia    Acute respiratory failure with hypoxia ([address]    Acute on chronic diastolic congestive heart failure (HCC)    Cellulitis    Hypokalemia    Acute blood loss anemia    Aortic stenosis, severe    Moderate aortic regurgitation    Mitral regurgitation      Past Medical History:   Past Medical History:   Diagnosis Date    Acute blood loss anemia 3/21/2018    Acute on chronic diastolic congestive heart failure ([address] 3/20/2018    Acute respiratory failure with hypoxia ([address] 3/20/2018    Aortic stenosis, severe 2018    Cellulitis 3/20/2018    Right and left lower extremity    Hypertension     Hypokalemia 3/21/2018    Mitral regurgitation 2018    Moderate aortic regurgitation 2018         The admitting diagnosis and active problem list as listed above have been reviewed prior to the initiation of this evaluation. Nursing cleared the patient for evaluation. Patient is agreeable to evaluation.     Precautions: falls 02 6 L / min   Pain Scale: Rate:  No pain   Social history: with family ( daughter  Who works in

## 2018-03-21 NOTE — PROGRESS NOTES
25   BUN  15  13   CREATININE  0.5  0.7   LABGLOM  >60  >60     ABGs: No results found for: PH, PO2, PCO2  INR: No results for input(s): INR in the last 72 hours. PRO-BNP:   Lab Results   Component Value Date    PROBNP 6,102 (H) 2018    PROBNP 1,834 (H) 2018      TSH:   Lab Results   Component Value Date    TSH 1.290 2018      Cardiac Injury Profile: Recent Labs      18   0855  18   1700  18   2019   TROPONINI  0.02  0.16*  0.15*      Lipid Profile: Lab Results   Component Value Date    TRIG 157 2018    HDL 45 2018    LDLCALC 85 2018    CHOL 161 2018      Hemoglobin A1C: No components found for: HGBA1C     RAD:   Xr Chest Portable    Result Date: 3/20/2018  Patient MRN:  [id number] : 1935 Age: 81 years Gender: Female Order Date:  3/20/2018 8:45 AM EXAM: XR CHEST PORTABLE NUMBER OF IMAGES:  1 INDICATION: Shortness of breath Technique: AP view of the chest obtained portably on 3/20/2018 8:45 AM Comparison:  No prior studies available for comparison. Findings: The cardiomediastinal silhouette is enlarged with calcified lesions of the aortic arch. There are bilateral pleural effusions with diffuse airspace disease involving the left mid to lower lung field and right lower lobe. There is no pneumothorax. The visualized osseous structures demonstrate degenerative changes. There is an age-indeterminate dislocation of the right shoulder with medial displacement of the right humeral head with respect to the glenoid fossa. Lines and Tubes: None. Bilateral pleural effusions with diffuse airspace disease involving the left mid to lower lung field and right lower lobe. Recommend clinical correlation.       EKG: See Report  Echo: See Report      IMPRESSIONS:  Principal Problem:    Acute respiratory failure with hypoxia (HCC)  Active Problems:    Pneumonia    Acute on chronic diastolic congestive heart failure (HCC)    Cellulitis    Hypokalemia    Acute blood

## 2018-03-21 NOTE — PROGRESS NOTES
PROGRESS  NOTE --                                                          INTERNAL  MEDICINE                                                                              I  PERSONALLY SAW , EXAMINED, AND CARED [id number] [facility], 3/76/2060     LABS, XRAY ,CHART, AND MEDICATIONS  REVIEWED BY ME .        SUBJECTIVE: Jose E Alvarado is alert awake oriented ×3 sitting in bedside chair. Family present. She states she feels much better; was having a hard time laying in bed. Much less coughing; dyspnea less. No chest pain appetite good. 2-D echo reviewed with patient and family, ejection fraction 55%; mild mitral regurgitation mild to moderate aortic regurgitation moderate to severe aortic stenosis. I advised her she will need the aortic stenosis followed closely, may need procedure in the future, possible TAVR. The valvular problems may have contributed to this admission. Viral respiratory panel negative. Blood cultures negative thus far urine culture negative; wound culture pending. She believes her legs are feeling and looking better. I agree. Still has significant atrophy left ankle more than the right from loss of tissue. However today, the area is much less inflamed and currently try compared to weeping yesterday. Sodium 140 potassium 3.2 chloride 101 CO2 25 BUN 13 creatinine 0.7 magnesium 1.6 glucose 93 calcium 8.8 protein 6.5 albumin 3.2. Pro-calcitonin 0.14. C-reactive protein 4.2 liver functions normal. LDL 85; proBNP 6100, 1800 yesterday. Thyroid normal. Hemoglobin 8.1 (had been 10.6 yesterday). WBC 8.5 platelets 290,519. ROS:  Negative to a 10 system review except that mentioned in the HPI.           Objective:     PHYSICAL EXAM:    VS: /64   Pulse 95   Temp 98.9 °F (37.2 °C) (Temporal)   Resp 20   Ht 5' 1\" (1.549 m)   Wt 220 lb (99.8 kg)   SpO2 95%   BMI 41.57 kg/m²   General appearance: Alert, Awake, Oriented Hypokalemia [E87.6]     Priority: High     Class: Acute    Acute blood loss anemia [D62]     Priority: High     Class: Acute    Acute respiratory failure with hypoxia (HCC) [J96.01]     Priority: High     Class: Acute    Acute on chronic diastolic congestive heart failure (HCC) [I50.33]     Priority: High     Class: Acute    Cellulitis [L03.90]     Priority: High     Class: Chronic    Pneumonia [J18.9]    Aortic stenosis, severe [I35.0]    Moderate aortic regurgitation [I35.1]    Mitral regurgitation [I34.0]                 ------------  INFORMATION  -----------      DIET:DIET NO SALT ADDED (3-4 GM);       No Known Allergies      MEDICATION SIDE EFFECTS:none       SCHEDULED MEDS:                                 Current Facility-Administered Medications   Medication Dose Route Frequency Provider Last Rate Last Dose    potassium phosphate 15 mmol in dextrose 5 % 250 mL IVPB  15 mmol Intravenous Once Isidoro Romero, DO 62.5 mL/hr at 03/21/18 1115 15 mmol at 03/21/18 1115    sodium chloride flush 0.9 % injection 10 mL  10 mL Intravenous 2 times per day BEVERLEY Montes   10 mL at 03/21/18 0842    sodium chloride flush 0.9 % injection 10 mL  10 mL Intravenous PRN BEVERLEY Montes        0.9 % sodium chloride infusion   Intravenous Continuous Merlyn Workman DO 50 mL/hr at 03/20/18 1637      enoxaparin (LOVENOX) injection 40 mg  40 mg Subcutaneous Daily Ghassan Workman DO   40 mg at 03/21/18 0841    guaiFENesin-codeine (GUAIFENESIN AC) 100-10 MG/5ML liquid 5 mL  5 mL Oral Q4H PRN Merlyn Workman, DO        sodium chloride (OCEAN, BABY AYR) 0.65 % nasal spray 1 spray  1 spray Each Nare PRN Merlyn Workman, DO        phenylephrine (KUNAL-SYNEPHRINE) 1 % nasal spray 1 spray  1 spray Each Nare Q6H PRN Merlyn Workman, DO        pantoprazole (PROTONIX) tablet 40 mg  40 mg Oral QAM AC Ghassan Workman DO        ondansetron (ZOFRAN) injection 4 mg  4 mg Intravenous Q6H PRN Merlyn Workman, DO        16.3*   MONOPCT  4.7  10.0   BASOPCT  0.2  0.1   MONOSABS  0.78  0.85   LYMPHSABS  1.23*  1.39*   EOSABS  0.08  0.05   BASOSABS  0.04  0.01          H & H :  Recent Labs      03/20/18   0855  03/21/18   0505   HGB  10.6*  8.1*       TSH:  Recent Labs      03/21/18   0505   TSH  1.290       GLUCOSE:No results for input(s): POCGLU in the last 72 hours. CMP:  Recent Labs      03/20/18   0855  03/21/18   0505   NA  137  140   K  4.3  3.2*   CL  102  101   CO2  18*  25   BUN  15  13   CREATININE  0.5  0.7   GFRAA  >60  >60   LABGLOM  >60  >60   GLUCOSE  158*  93   PROT  7.8  6.5   LABALBU  3.3*  3.2*   CALCIUM  9.3  8.8   BILITOT  0.2  0.3   ALKPHOS  79  64   AST  19  16   ALT  12  9        BNP:No results found for: BNP    PROTIME/INR:No results for input(s): PROTIME, INR in the last 72 hours. CRP:   Recent Labs      03/21/18   0505   CRP  4.2*       ESR:No results for input(s): SEDRATE in the last 72 hours. LIPASE , AMYLASE:  No results found for: LIPASE   No results found for: AMYLASE    ABGs: No results found for: PH, PO2, PCO2    CARDIAC: Recent Labs      03/20/18   0855  03/20/18   1700  03/20/18   2019   TROPONINI  0.02  0.16*  0.15*       Lipid Profile: Lab Results   Component Value Date    TRIG 157 03/21/2018    HDL 45 03/21/2018    LDLCALC 85 03/21/2018    CHOL 161 03/21/2018        Lab Results   Component Value Date    LABA1C 5.4 03/20/2018            RADIOLOGY: REVIEWED AVAILABLE REPORT  XR CHEST PORTABLE   Final Result   Bilateral pleural effusions with diffuse airspace disease   involving the left mid to lower lung field and right lower lobe. Recommend clinical correlation.             Active Hospital Problems    Diagnosis    Hypokalemia [E87.6]     Priority: High     Class: Acute    Acute blood loss anemia [D62]     Priority: High     Class: Acute    Acute respiratory failure with hypoxia (HCC) [J96.01]     Priority: High     Class: Acute    Acute on chronic diastolic congestive heart failure ([facility] [I50.33]     Priority: High     Class: Acute    Cellulitis [L03.90]     Priority: High     Class: Chronic    Pneumonia [J18.9]    Aortic stenosis, severe [I35.0]    Moderate aortic regurgitation [I35.1]    Mitral regurgitation [I34.0]         Janet Workman DO   12:47 PM     3/21/2018

## 2018-03-22 ENCOUNTER — APPOINTMENT (OUTPATIENT)
Dept: GENERAL RADIOLOGY | Age: 83
DRG: 871 | End: 2018-03-22
Payer: MEDICARE

## 2018-03-22 PROBLEM — A41.01 SEPSIS DUE TO METHICILLIN SUSCEPTIBLE STAPHYLOCOCCUS AUREUS (HCC): Status: ACTIVE | Noted: 2018-03-22

## 2018-03-22 LAB
ALBUMIN SERPL-MCNC: 3.2 G/DL (ref 3.5–5.2)
ALP BLD-CCNC: 60 U/L (ref 35–104)
ALT SERPL-CCNC: 10 U/L (ref 0–32)
ANION GAP SERPL CALCULATED.3IONS-SCNC: 18 MMOL/L (ref 7–16)
AST SERPL-CCNC: 12 U/L (ref 0–31)
BILIRUB SERPL-MCNC: 0.3 MG/DL (ref 0–1.2)
BLOOD BANK DISPENSE STATUS: NORMAL
BLOOD BANK PRODUCT CODE: NORMAL
BPU ID: NORMAL
BUN BLDV-MCNC: 13 MG/DL (ref 8–23)
CALCIUM SERPL-MCNC: 8.7 MG/DL (ref 8.6–10.2)
CHLORIDE BLD-SCNC: 102 MMOL/L (ref 98–107)
CO2: 21 MMOL/L (ref 22–29)
CREAT SERPL-MCNC: 0.7 MG/DL (ref 0.5–1)
DESCRIPTION BLOOD BANK: NORMAL
GFR AFRICAN AMERICAN: >60
GFR NON-AFRICAN AMERICAN: >60 ML/MIN/1.73
GLUCOSE BLD-MCNC: 108 MG/DL (ref 74–109)
HCT VFR BLD CALC: 26.2 % (ref 34–48)
HEMOGLOBIN: 7.9 G/DL (ref 11.5–15.5)
MAGNESIUM: 2 MG/DL (ref 1.6–2.6)
PHOSPHORUS: 3.2 MG/DL (ref 2.5–4.5)
POTASSIUM SERPL-SCNC: 3.6 MMOL/L (ref 3.5–5)
SODIUM BLD-SCNC: 141 MMOL/L (ref 132–146)
TOTAL PROTEIN: 6.7 G/DL (ref 6.4–8.3)
URINE CULTURE, ROUTINE: NORMAL

## 2018-03-22 PROCEDURE — 6370000000 HC RX 637 (ALT 250 FOR IP): Performed by: INTERNAL MEDICINE

## 2018-03-22 PROCEDURE — P9016 RBC LEUKOCYTES REDUCED: HCPCS

## 2018-03-22 PROCEDURE — 2580000003 HC RX 258: Performed by: INTERNAL MEDICINE

## 2018-03-22 PROCEDURE — 6360000002 HC RX W HCPCS: Performed by: INTERNAL MEDICINE

## 2018-03-22 PROCEDURE — 85018 HEMOGLOBIN: CPT

## 2018-03-22 PROCEDURE — 97530 THERAPEUTIC ACTIVITIES: CPT

## 2018-03-22 PROCEDURE — G0328 FECAL BLOOD SCRN IMMUNOASSAY: HCPCS

## 2018-03-22 PROCEDURE — 80053 COMPREHEN METABOLIC PANEL: CPT

## 2018-03-22 PROCEDURE — 2700000000 HC OXYGEN THERAPY PER DAY

## 2018-03-22 PROCEDURE — 83735 ASSAY OF MAGNESIUM: CPT

## 2018-03-22 PROCEDURE — 84100 ASSAY OF PHOSPHORUS: CPT

## 2018-03-22 PROCEDURE — 36430 TRANSFUSION BLD/BLD COMPNT: CPT

## 2018-03-22 PROCEDURE — 2580000003 HC RX 258: Performed by: PHYSICIAN ASSISTANT

## 2018-03-22 PROCEDURE — 85014 HEMATOCRIT: CPT

## 2018-03-22 PROCEDURE — 97162 PT EVAL MOD COMPLEX 30 MIN: CPT

## 2018-03-22 PROCEDURE — 94640 AIRWAY INHALATION TREATMENT: CPT

## 2018-03-22 PROCEDURE — 36415 COLL VENOUS BLD VENIPUNCTURE: CPT

## 2018-03-22 PROCEDURE — 71045 X-RAY EXAM CHEST 1 VIEW: CPT

## 2018-03-22 PROCEDURE — 2060000000 HC ICU INTERMEDIATE R&B

## 2018-03-22 RX ORDER — PETROLATUM 42 G/100G
OINTMENT TOPICAL DAILY
Status: DISCONTINUED | OUTPATIENT
Start: 2018-03-22 | End: 2018-03-27 | Stop reason: HOSPADM

## 2018-03-22 RX ORDER — FUROSEMIDE 10 MG/ML
20 INJECTION INTRAMUSCULAR; INTRAVENOUS ONCE
Status: COMPLETED | OUTPATIENT
Start: 2018-03-22 | End: 2018-03-22

## 2018-03-22 RX ORDER — 0.9 % SODIUM CHLORIDE 0.9 %
250 INTRAVENOUS SOLUTION INTRAVENOUS ONCE
Status: COMPLETED | OUTPATIENT
Start: 2018-03-22 | End: 2018-03-23

## 2018-03-22 RX ORDER — ACETAMINOPHEN 325 MG/1
650 TABLET ORAL EVERY 4 HOURS PRN
Status: DISCONTINUED | OUTPATIENT
Start: 2018-03-22 | End: 2018-03-27 | Stop reason: HOSPADM

## 2018-03-22 RX ORDER — PETROLATUM 42 G/100G
OINTMENT TOPICAL 3 TIMES DAILY PRN
Status: DISCONTINUED | OUTPATIENT
Start: 2018-03-22 | End: 2018-03-27 | Stop reason: HOSPADM

## 2018-03-22 RX ORDER — DOXYCYCLINE HYCLATE 100 MG/1
100 CAPSULE ORAL EVERY 12 HOURS SCHEDULED
Status: DISCONTINUED | OUTPATIENT
Start: 2018-03-22 | End: 2018-03-23

## 2018-03-22 RX ADMIN — SODIUM CHLORIDE: 9 INJECTION, SOLUTION INTRAVENOUS at 14:19

## 2018-03-22 RX ADMIN — AMLODIPINE BESYLATE 5 MG: 5 TABLET ORAL at 09:18

## 2018-03-22 RX ADMIN — CEFEPIME HYDROCHLORIDE 1 G: 1 INJECTION, POWDER, FOR SOLUTION INTRAMUSCULAR; INTRAVENOUS at 01:36

## 2018-03-22 RX ADMIN — TORSEMIDE 10 MG: 10 TABLET ORAL at 09:17

## 2018-03-22 RX ADMIN — ACETAMINOPHEN 650 MG: 325 TABLET, FILM COATED ORAL at 13:50

## 2018-03-22 RX ADMIN — FORMOTEROL FUMARATE DIHYDRATE 20 MCG: 20 SOLUTION RESPIRATORY (INHALATION) at 08:18

## 2018-03-22 RX ADMIN — BUDESONIDE 250 MCG: 0.25 SUSPENSION RESPIRATORY (INHALATION) at 08:18

## 2018-03-22 RX ADMIN — BUDESONIDE 250 MCG: 0.25 SUSPENSION RESPIRATORY (INHALATION) at 20:27

## 2018-03-22 RX ADMIN — PETROLATUM: 42 OINTMENT TOPICAL at 13:50

## 2018-03-22 RX ADMIN — ACETAMINOPHEN 650 MG: 325 TABLET, FILM COATED ORAL at 19:56

## 2018-03-22 RX ADMIN — CEFEPIME HYDROCHLORIDE 1 G: 1 INJECTION, POWDER, FOR SOLUTION INTRAMUSCULAR; INTRAVENOUS at 09:20

## 2018-03-22 RX ADMIN — SPIRONOLACTONE 25 MG: 25 TABLET ORAL at 09:18

## 2018-03-22 RX ADMIN — FUROSEMIDE 20 MG: 10 INJECTION, SOLUTION INTRAVENOUS at 19:51

## 2018-03-22 RX ADMIN — CEFEPIME HYDROCHLORIDE 1 G: 1 INJECTION, POWDER, FOR SOLUTION INTRAMUSCULAR; INTRAVENOUS at 19:17

## 2018-03-22 RX ADMIN — SODIUM CHLORIDE 250 ML: 9 INJECTION, SOLUTION INTRAVENOUS at 16:39

## 2018-03-22 RX ADMIN — PANTOPRAZOLE SODIUM 40 MG: 40 TABLET, DELAYED RELEASE ORAL at 06:09

## 2018-03-22 RX ADMIN — IPRATROPIUM BROMIDE AND ALBUTEROL SULFATE 1 AMPULE: 2.5; .5 SOLUTION RESPIRATORY (INHALATION) at 12:11

## 2018-03-22 RX ADMIN — DOXYCYCLINE HYCLATE 100 MG: 100 CAPSULE, GELATIN COATED ORAL at 19:51

## 2018-03-22 RX ADMIN — FORMOTEROL FUMARATE DIHYDRATE 20 MCG: 20 SOLUTION RESPIRATORY (INHALATION) at 20:27

## 2018-03-22 RX ADMIN — IPRATROPIUM BROMIDE AND ALBUTEROL SULFATE 1 AMPULE: 2.5; .5 SOLUTION RESPIRATORY (INHALATION) at 16:48

## 2018-03-22 RX ADMIN — ENOXAPARIN SODIUM 40 MG: 40 INJECTION SUBCUTANEOUS at 09:17

## 2018-03-22 RX ADMIN — ENALAPRIL MALEATE 10 MG: 10 TABLET ORAL at 09:17

## 2018-03-22 RX ADMIN — Medication 10 ML: at 19:50

## 2018-03-22 ASSESSMENT — PAIN SCALES - GENERAL
PAINLEVEL_OUTOF10: 6
PAINLEVEL_OUTOF10: 0

## 2018-03-22 ASSESSMENT — PAIN DESCRIPTION - PAIN TYPE: TYPE: CHRONIC PAIN

## 2018-03-22 ASSESSMENT — PAIN DESCRIPTION - LOCATION: LOCATION: KNEE

## 2018-03-22 ASSESSMENT — PAIN DESCRIPTION - FREQUENCY: FREQUENCY: INTERMITTENT

## 2018-03-22 ASSESSMENT — PAIN DESCRIPTION - ORIENTATION: ORIENTATION: RIGHT

## 2018-03-22 ASSESSMENT — PAIN DESCRIPTION - DESCRIPTORS: DESCRIPTORS: ACHING;DISCOMFORT

## 2018-03-22 NOTE — PROGRESS NOTES
CBC:   Recent Labs      18   0855  18   0505  18   1352  18   0504   WBC  16.6*  8.5   --    --    HGB  10.6*  8.1*   --   7.9*   HCT  35.5  27.8*  31.2*  26.2*   PLT  359  395   --    --      BMP:  Recent Labs      18   0855  18   0505  18   0504   NA  137  140  141   K  4.3  3.2*  3.6   CL  102  101  102   CO2  18*  25  21*   BUN  15  13  13   CREATININE  0.5  0.7  0.7   LABGLOM  >60  >60  >60     ABGs: No results found for: PH, PO2, PCO2  INR: No results for input(s): INR in the last 72 hours. PRO-BNP:   Lab Results   Component Value Date    PROBNP 6,102 (H) 2018    PROBNP 1,834 (H) 2018      TSH:   Lab Results   Component Value Date    TSH 1.290 2018      Cardiac Injury Profile:   Recent Labs      18   0855  18   1700  18   2019   TROPONINI  0.02  0.16*  0.15*      Lipid Profile:   Lab Results   Component Value Date    TRIG 157 2018    HDL 45 2018    LDLCALC 85 2018    CHOL 161 2018      Hemoglobin A1C: No components found for: HGBA1C     RAD:   Xr Chest Portable    Result Date: 3/20/2018  Patient MRN:  [id number] : 1935 Age: 81 years Gender: Female Order Date:  3/20/2018 8:45 AM EXAM: XR CHEST PORTABLE NUMBER OF IMAGES:  1 INDICATION: Shortness of breath Technique: AP view of the chest obtained portably on 3/20/2018 8:45 AM Comparison:  No prior studies available for comparison. Findings: The cardiomediastinal silhouette is enlarged with calcified lesions of the aortic arch. There are bilateral pleural effusions with diffuse airspace disease involving the left mid to lower lung field and right lower lobe. There is no pneumothorax. The visualized osseous structures demonstrate degenerative changes. There is an age-indeterminate dislocation of the right shoulder with medial displacement of the right humeral head with respect to the glenoid fossa. Lines and Tubes: None.      Bilateral pleural effusions with diffuse airspace disease involving the left mid to lower lung field and right lower lobe. Recommend clinical correlation. EKG: See Report  Echo: See Report      IMPRESSIONS:  Principal Problem:    Acute respiratory failure with hypoxia (HCC)  Active Problems:    Pneumonia    Acute on chronic diastolic congestive heart failure (HCC)    Cellulitis    Hypokalemia    Acute blood loss anemia    Aortic stenosis, severe    Moderate aortic regurgitation    Mitral regurgitation    Sepsis due to methicillin susceptible Staphylococcus aureus ([address]    Class 3 obesity in adult  Resolved Problems:    * No resolved hospital problems. *      RECOMMENDATIONS:  It appears that Mrs. Dominic Leigh has active gastrointestinal bleeding and perhaps secondary to angiodysplasia is associated with underlying bone dynamically significant aortic stenosis. She is currently being transfused with a unit of packed RBCs. Will follow. Aortic stenosis is hemodynamically significant but not quite critical up to this point. 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 May need to intervene with aortic valve sooner rather than later. I have spent more than 25 minutes face to face with Татьяна Juarez and reviewing notes and laboratory data, with greater than 50% of this time instructing and counseling the patient and her daughter face to face regarding my findings and recommendations and I have answered all questions as posed to me by Ms. Dominic Leigh. Tara Ortiz, DO FACP,FACC,FSCAI      NOTE:  This report was transcribed using voice recognition software.   Every effort was made to ensure accuracy; however, inadvertent computerized transcription errors may be present

## 2018-03-22 NOTE — PROGRESS NOTES
CC- sob    Subjective: The patient is awake and alert. Tolerating medications. Reports no side effects. Afebrile. RLE feels better  10 ROS otherwise negative unless otherwise specified above. Objective:    Vitals:    03/22/18 0745   BP: 110/74   Pulse: 102   Resp: 20   Temp: 99.4 °F (37.4 °C)   SpO2: 97%       General Appearance:    Awake, alert , no acute distress.    HEENT:    Normocephalic,PERRL,neck supple, no JVD, mucosa moist, no thrush   Lungs:     Clear to auscultation bilaterally, no wheeze , crackles   Heart:    Regular rate and rhythm, no murmur   Abdomen:     Soft, non-tender, not distended  bowel sounds present,     Extremities:  B/L LE venous stasis ulcer, R LE ulcers with more yellow drainage and exudate at the base of shallow ulcers than left   Pulses:   2+ and symmetric all extremities   Skin:   Skin color, texture, turgor normal, no rashes or lesions         CBC+dif:  Reviewed and trend followed     Radiology:  Noted     Microbiology:  Wound cx- GNB OX +, Staph aureus     Assessment:  · Complicated right lower extremity cellulitis and infected venous stasis ulcers  · Sepsis from above     Plan:    · cont IV cefepime, add doxycycline  · Wound cultures  · Monitor labs  · Will follow with you          Electronically signed by Brendon Hatch MD on 3/22/2018 at 1:14 PM

## 2018-03-22 NOTE — PROGRESS NOTES
Physical Therapy  Initial Assessment     Name: Ally Fonseca  :   MRN: [id number]    Date of Service: 3/22/2018    Evaluating PT:  Princesseduardo Vidal, PT [id number]    Room #:  0858/7379-T  Diagnosis:  pneumonia  PMHx:        Diagnosis Date    Acute blood loss anemia 3/21/2018    Acute on chronic diastolic congestive heart failure ([facility] [address] 3/20/2018    Acute respiratory failure with hypoxia ([facility] [address] 3/20/2018    Aortic stenosis, severe 2018    Cellulitis 3/20/2018    Right and left lower extremity    Hypertension     Hypokalemia 3/21/2018    Mitral regurgitation 2018    Moderate aortic regurgitation 2018     Precautions:  Skin integrity, fall risk, O2 4L/min  Equipment Needs: [facility] may need fww depending on progress. Pt lives with daughter who works in [employer] in a 2 story home with 5 stairs to enter and no rail(contemplating rail installation). .  Bed is on 1 floor and bath is on 1 floor. Pt ambulated with Ind PTA. Initial Evaluation  Date: 3/22/18 Treatment Short Term/ Long Term   Goals   AM-PAC 6 Clicks      Was pt agreeable to Eval/treatment? yes     Does pt have pain? Yes R knee pain this date. Bed Mobility  Rolling: NT  Supine to sit: NT  Sit to supine: NT  Scooting: NT  Was up in bedside chair already. Rolling: ind  Supine to sit: Ind  Sit to supine: Ind  Scooting: Ind   Transfers Sit to stand: Min  A  Stand to sit: min A  Stand pivot: Min A  Declined from previus date status she feels due to feeling stiff and R knee pain. Sit to stand: ind  Stand to sit: Ind  Stand pivot: Ind   Ambulation    3 feet with fww  CGA. Reported R knee pain \"stiffness\"  20 feet with no device or fww if needed Mod Ind   Stair negotiation: ascended and descended  NT  4 steps with 1 rail Min A   ROM BUE:  WFL  BLE:  Decreased R knee due to discomfort. Strength BUE:  WFl  BLE:  WFL  5/5   Balance Sitting EOB:  Ind  Dynamic Standing:  SBA  Sitting EOB:  Ind  Dynamic Standing:   Mod Ind

## 2018-03-22 NOTE — PROGRESS NOTES
space  Abdomen: Soft, non-tender. BS normal. No masses, organomegaly; no rebound or guarding  Extremities: Dressing on both legs from toes to the knee area. Musculoskeletal: Muscular strength appears intact. Neuro:  No focal motor defects ; II-XII grossly intact . HAY equally    TELEMETRY: REVIEWED--Telemetry: Sinus    ASSESSMENT:   Principal Problem:    Acute respiratory failure with hypoxia (HCC)  Active Problems:    Acute on chronic diastolic congestive heart failure (HCC)    Cellulitis    Hypokalemia    Acute blood loss anemia    Pneumonia    Aortic stenosis, severe    Moderate aortic regurgitation    Mitral regurgitation    SEPSIS DUE TO STAPH    OBESITY  Resolved Problems:    * No resolved hospital problems. *      PLAN:  SEE ORDERS      RE  CHANGES AND FINDINGS   Medications reviewed with patient  GI prophylaxis  DVT prophylaxis  Replace potassium IV bolus 10 mEq  Recheck labs in a.m. Magnesium sulfate bolus 2 g  Stool for occult blood  Ferritin iron reticulocyte count TIBC  Type and screen  Seaman catheter  Wound care consult  Continue ceftriaxone for now  Transfuse 1 unit packed red cells  20 mg Lasix IV after transfusion  Stop nifedipine in view of leg edema lymphedema  Ceftriaxone has been stopped  Cefepime IV started  Oral doxycycline  Follow H&H  Continue Lovenox for now  I spoke with patient's son, Amalia Lopez (radiologist). Abhijeet Nickerson He is concerned-- patient has been complaining of worsening knee pain for the last 5 years seldom gets out of the house. He is convinced she will not follow up outpatient with orthopedics; he would like orthopedic notified and consulted. I spoke with orthopedics regarding same        Discussed with patient and family and nursing.       Eliceo Troy Jacinta       2:23 PM     3/22/2018    TIME >35 MINUTES    >  50 %  OF  TIME  DISCUSSION     Active Hospital Problems    Diagnosis    Hypokalemia [E87.6]     Priority: High     Class: Acute    Acute blood loss anemia [D62]     Priority: Temperature:  Current - Temp: 99.4 °F (37.4 °C); Max - Temp  Av.1 °F (37.3 °C)  Min: 97.8 °F (36.6 °C)  Max: 100.1 °F (37.8 °C)    Respiratory Rate : Resp  Av.3  Min: 18  Max: 20    Pulse Range: Pulse  Av.5  Min: 81  Max: 110    Blood Presuure Range:  Systolic (04GRQ), YVB:127 , Min:110 , SDO:817   ; Diastolic (43XXU), BYS:55, Min:49, Max:82      Pulse ox Range: SpO2  Av.7 %  Min: 96 %  Max: 100 %    Patient Vitals for the past 8 hrs:   BP Temp Pulse Resp SpO2   18 0745 110/74 99.4 °F (37.4 °C) 102 20 97 %         Intake/Output Summary (Last 24 hours) at 18 1423  Last data filed at 18 1419   Gross per 24 hour   Intake           2341.2 ml   Output              750 ml   Net           1591.2 ml       I/O last 3 completed shifts: In: 1853.2 [P.O.:480; I.V.:1173.2; IV Piggyback:200]  Out: 750 [Urine:750]    I/O this shift:  In: 1108 [P. O.:600;  I.V.:508]  Out: -     Wt Readings from Last 3 Encounters:   18 220 lb (99.8 kg)       Labs:   CBC:   Lab Results   Component Value Date    WBC 8.5 2018    RBC 3.54 2018    HGB 7.9 2018    HCT 26.2 2018    MCV 78.5 2018    MCH 22.9 2018    MCHC 29.1 2018    RDW 16.3 2018     2018    MPV 10.0 2018     CBC with Differential:    Lab Results   Component Value Date    WBC 8.5 2018    RBC 3.54 2018    HGB 7.9 2018    HCT 26.2 2018     2018    MCV 78.5 2018    MCH 22.9 2018    MCHC 29.1 2018    RDW 16.3 2018    LYMPHOPCT 16.3 2018    MONOPCT 10.0 2018    BASOPCT 0.1 2018    MONOSABS 0.85 2018    LYMPHSABS 1.39 2018    EOSABS 0.05 2018    BASOSABS 0.01 2018     Hemoglobin/Hematocrit:    Lab Results   Component Value Date    HGB 7.9 2018    HCT 26.2 2018     CMP:    Lab Results   Component Value Date     2018    K 3.6 2018     2018 CO2 21 03/22/2018    BUN 13 03/22/2018    CREATININE 0.7 03/22/2018    GFRAA >60 03/22/2018    LABGLOM >60 03/22/2018    GLUCOSE 108 03/22/2018    PROT 6.7 03/22/2018    LABALBU 3.2 03/22/2018    CALCIUM 8.7 03/22/2018    BILITOT 0.3 03/22/2018    ALKPHOS 60 03/22/2018    AST 12 03/22/2018    ALT 10 03/22/2018     BMP:    Lab Results   Component Value Date     03/22/2018    K 3.6 03/22/2018     03/22/2018    CO2 21 03/22/2018    BUN 13 03/22/2018    LABALBU 3.2 03/22/2018    CREATININE 0.7 03/22/2018    CALCIUM 8.7 03/22/2018    GFRAA >60 03/22/2018    LABGLOM >60 03/22/2018    GLUCOSE 108 03/22/2018     Magnesium:    Lab Results   Component Value Date    MG 2.0 03/22/2018     Phosphorus:    Lab Results   Component Value Date    PHOS 3.2 03/22/2018     PT/INR:  No results found for: PROTIME, INR  Warfarin PT/INR:  No components found for: PTPATWAR, PTINRWAR  PTT:  No results found for: APTT, PTT[APTT}  Troponin:    Lab Results   Component Value Date    TROPONINI 0.15 03/20/2018     Last 3 Troponin:    Lab Results   Component Value Date    TROPONINI 0.15 03/20/2018    TROPONINI 0.16 03/20/2018    TROPONINI 0.02 03/20/2018     U/A:    Lab Results   Component Value Date    COLORU Yellow 03/20/2018    PROTEINU Negative 03/20/2018    PHUR 5.5 03/20/2018    WBCUA 1-3 03/20/2018    RBCUA 1-3 03/20/2018    BACTERIA RARE 03/20/2018    CLARITYU Clear 03/20/2018    SPECGRAV 1.010 03/20/2018    LEUKOCYTESUR TRACE 03/20/2018    UROBILINOGEN 0.2 03/20/2018    BILIRUBINUR Negative 03/20/2018    BLOODU TRACE 03/20/2018    GLUCOSEU Negative 03/20/2018     HgBA1c:    Lab Results   Component Value Date    LABA1C 5.4 03/20/2018     FLP:    Lab Results   Component Value Date    TRIG 157 03/21/2018    HDL 45 03/21/2018    LDLCALC 85 03/21/2018    LABVLDL 31 03/21/2018     TSH:    Lab Results   Component Value Date    TSH 1.290 03/21/2018     VITAMIN B12: No components found for: B12  FOLATE:    Lab Results   Component Value Lab Results   Component Value Date    LABA1C 5.4 03/20/2018            RADIOLOGY: REVIEWED AVAILABLE REPORT  XR CHEST PORTABLE   Final Result   Bilateral pleural effusions with diffuse airspace disease   involving the left mid to lower lung field and right lower lobe. Recommend clinical correlation.                   Jhonatan Nicholas  DO   2:23 PM     3/22/2018

## 2018-03-22 NOTE — CARE COORDINATION
Spoke with patient and daughter, who was at bedside. We discussed transition of care and patient states she plans to return home on discharge. She lives with her daughter. Daughter also agrees plan is home. We discussed hhc and neither feel it is necessary at this time. Patient states she is likely going to be going to the wound clinic after discharge and isn't sure she will be home enough for home therapy. Explained that if they decide they want it let sw know and we can ask for orders.

## 2018-03-22 NOTE — FLOWSHEET NOTE
Inpatient Wound Care(Initial [id number]    Admit Date: 3/20/2018  8:27 AM    Reason for consult:  BLE    Significant history:  Admitted from home for SOB. Acute respiratory failure with hypoxia (HCC)    Cellulitis        CHF (congestive heart failure) ([facility] [address]    Hypertension       Findings:       03/22/18 0926   Wound 03/20/18 Leg Right;Medial   Date First Assessed/Time First Assessed: 03/20/18 1022   Location: Leg  Wound Location Orientation: Right;Medial  Pre-existing: Yes   Wound Type Wound   Dressing/Treatment Xeroform;4x4;Other (Comment)  (kerlix)   Wound Cleansed Rinsed/Irrigated with saline   Wound Length (cm) 12.8 cm   Wound Width (cm) 10 cm   Wound Depth (cm)  0.2   Calculated Wound Size (cm^2) (l*w) 128 cm^2   Wound Assessment Pink;Yellow   Drainage Amount None   Marisela-wound Assessment Other (Comment)  (dry crusred areas,vascular discoloration)   Non-staged Wound Description Partial thickness   Pink%Wound Bed 50   Yellow%Wound Bed 50   Wound 03/22/18 Leg Lower;Medial;Left   Date First Assessed/Time First Assessed: 03/22/18 4144   Location: Leg  Wound Location Orientation: Lower;Medial;Left  Pre-existing: Yes   Wound Type Wound   Dressing/Treatment Xeroform;4x4;Other (Comment)  (kerlix)   Wound Cleansed Rinsed/Irrigated with saline   Wound Length (cm) 11 cm   Wound Width (cm) 10 cm   Wound Depth (cm)  0.2   Calculated Wound Size (cm^2) (l*w) 110 cm^2   Wound Assessment Dry;Pink;Yellow   Drainage Amount None   Marisela-wound Assessment Fragile; Other (Comment)  (dry crusted areas,vascular discoloration)   Non-staged Wound Description Partial thickness   Pink%Wound Bed 50   Yellow%Wound Bed 50        03/22/18 0937   Skin Integrity Site 2   Skin Integrity Location 2 Bruising   Location 2 BUE  (left > right)   Skin Integrity Site 3   Skin Integrity Location 3 Other (Comment)  (blanchable erythema)   Location 3 bilateral heels   Skin Integrity Site 4   Skin Integrity Location 4 Other (Comment)  (intact free of

## 2018-03-23 ENCOUNTER — APPOINTMENT (OUTPATIENT)
Dept: GENERAL RADIOLOGY | Age: 83
DRG: 871 | End: 2018-03-23
Payer: MEDICARE

## 2018-03-23 LAB
HCT VFR BLD CALC: 28.2 % (ref 34–48)
HEMOGLOBIN: 8.7 G/DL (ref 11.5–15.5)
OCCULT BLOOD SCREENING: NORMAL
ORGANISM: ABNORMAL
WOUND/ABSCESS: ABNORMAL
WOUND/ABSCESS: ABNORMAL

## 2018-03-23 PROCEDURE — 6370000000 HC RX 637 (ALT 250 FOR IP): Performed by: INTERNAL MEDICINE

## 2018-03-23 PROCEDURE — 85018 HEMOGLOBIN: CPT

## 2018-03-23 PROCEDURE — 2060000000 HC ICU INTERMEDIATE R&B

## 2018-03-23 PROCEDURE — 94760 N-INVAS EAR/PLS OXIMETRY 1: CPT

## 2018-03-23 PROCEDURE — 6360000002 HC RX W HCPCS: Performed by: INTERNAL MEDICINE

## 2018-03-23 PROCEDURE — 85014 HEMATOCRIT: CPT

## 2018-03-23 PROCEDURE — 94640 AIRWAY INHALATION TREATMENT: CPT

## 2018-03-23 PROCEDURE — 99221 1ST HOSP IP/OBS SF/LOW 40: CPT | Performed by: ORTHOPAEDIC SURGERY

## 2018-03-23 PROCEDURE — 36415 COLL VENOUS BLD VENIPUNCTURE: CPT

## 2018-03-23 PROCEDURE — 2580000003 HC RX 258: Performed by: INTERNAL MEDICINE

## 2018-03-23 PROCEDURE — 2700000000 HC OXYGEN THERAPY PER DAY

## 2018-03-23 PROCEDURE — 73562 X-RAY EXAM OF KNEE 3: CPT

## 2018-03-23 RX ADMIN — TORSEMIDE 10 MG: 10 TABLET ORAL at 10:06

## 2018-03-23 RX ADMIN — FORMOTEROL FUMARATE DIHYDRATE 20 MCG: 20 SOLUTION RESPIRATORY (INHALATION) at 20:00

## 2018-03-23 RX ADMIN — DOXYCYCLINE HYCLATE 100 MG: 100 CAPSULE, GELATIN COATED ORAL at 10:06

## 2018-03-23 RX ADMIN — AMLODIPINE BESYLATE 5 MG: 5 TABLET ORAL at 10:04

## 2018-03-23 RX ADMIN — FORMOTEROL FUMARATE DIHYDRATE 20 MCG: 20 SOLUTION RESPIRATORY (INHALATION) at 11:40

## 2018-03-23 RX ADMIN — ENOXAPARIN SODIUM 40 MG: 40 INJECTION SUBCUTANEOUS at 10:07

## 2018-03-23 RX ADMIN — SODIUM CHLORIDE: 9 INJECTION, SOLUTION INTRAVENOUS at 17:26

## 2018-03-23 RX ADMIN — IPRATROPIUM BROMIDE AND ALBUTEROL SULFATE 1 AMPULE: 2.5; .5 SOLUTION RESPIRATORY (INHALATION) at 14:54

## 2018-03-23 RX ADMIN — CEFEPIME HYDROCHLORIDE 1 G: 1 INJECTION, POWDER, FOR SOLUTION INTRAMUSCULAR; INTRAVENOUS at 02:23

## 2018-03-23 RX ADMIN — SODIUM CHLORIDE: 9 INJECTION, SOLUTION INTRAVENOUS at 02:23

## 2018-03-23 RX ADMIN — BUDESONIDE 250 MCG: 0.25 SUSPENSION RESPIRATORY (INHALATION) at 20:00

## 2018-03-23 RX ADMIN — CEFEPIME HYDROCHLORIDE 1 G: 1 INJECTION, POWDER, FOR SOLUTION INTRAMUSCULAR; INTRAVENOUS at 10:24

## 2018-03-23 RX ADMIN — SPIRONOLACTONE 25 MG: 25 TABLET ORAL at 10:06

## 2018-03-23 RX ADMIN — PETROLATUM: 42 OINTMENT TOPICAL at 10:21

## 2018-03-23 RX ADMIN — ENALAPRIL MALEATE 10 MG: 10 TABLET ORAL at 10:05

## 2018-03-23 RX ADMIN — BUDESONIDE 250 MCG: 0.25 SUSPENSION RESPIRATORY (INHALATION) at 11:44

## 2018-03-23 RX ADMIN — CEFEPIME HYDROCHLORIDE 1 G: 1 INJECTION, POWDER, FOR SOLUTION INTRAMUSCULAR; INTRAVENOUS at 17:26

## 2018-03-23 ASSESSMENT — PAIN SCALES - GENERAL
PAINLEVEL_OUTOF10: 0

## 2018-03-23 NOTE — PROGRESS NOTES
PROGRESS  NOTE --                                                          INTERNAL  MEDICINE                                                                              I  PERSONALLY SAW , EXAMINED, AND CARED [id number] [facility] [place], [id number]69     LABS, XRAY ,CHART, AND MEDICATIONS  REVIEWED BY ME .        SUBJECTIVE: Milon Boeck is alert awake oriented ×3 sitting in bedside chair. Family present. She states she feels much better; was having a hard time laying in bed. Much less coughing; dyspnea less. No chest pain appetite good. 2-D echo reviewed with patient and family, ejection fraction 55%; mild mitral regurgitation mild to moderate aortic regurgitation moderate to severe aortic stenosis. I advised her she will need the aortic stenosis followed closely, may need procedure in the future, possible TAVR. The valvular problems may have contributed to this admission. Viral respiratory panel negative. Blood cultures negative thus far urine culture negative; wound culture pending. She believes her legs are feeling and looking better. I agree. Still has significant atrophy left ankle more than the right from loss of tissue. However today, the area is much less inflamed and currently try compared to weeping yesterday. Sodium 140 potassium 3.2 chloride 101 CO2 25 BUN 13 creatinine 0.7 magnesium 1.6 glucose 93 calcium 8.8 protein 6.5 albumin 3.2. Pro-calcitonin 0.14. C-reactive protein 4.2 liver functions normal. LDL 85; proBNP 6100, 1800 yesterday. Thyroid normal. Hemoglobin 8.1 (had been 10.6 yesterday). WBC 8.5 platelets 792,211.    3/22/18- infectious disease believes the patient did have sepsis due to leg wounds; patient sitting in bedside chair; family present. She was seen by wound care, her legs are now wrapped. She states it feels tight but not unbearable.  Discussion regarding need for the above; help reduce lymphedema etc. I spoke with infectious disease, definite staph aureus; infectious disease believes MRSA. Doxycycline has been added. 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 I discussed the above with patient and family; she eventually may need endoscopies but not at this time all the other problems. However, should she continue to bleed and may need done sooner rather than later. Patient agrees to blood transfusion. Stool positive for occult blood. Breathing much improved no chest pain appetite good. Highest temperature last 24 hours 100.1 Fahrenheit. She requested acetaminophen for minor aches and pains; she states is helping. Sodium 141 potassium 3.6 chloride 102 CO2 21 BUN 13 creatinine 0.7 magnesium 2.0 glucose 108 calcium 8.7 phosphorus 3.2 protein 6.7 albumin 3.2 liver functions normal. Hemoglobin 7.9. Hematocrit 26.2. Ferritin low at 78 iron low at 33 iron saturation low at 12% TIBC 270. Immature reticulocytes elevated mature reticulocytes suppressed; reticulocyte count elevated 2.0    3/23/18-patient sitting in bedside chair; daughter present through the exam.  I spoke with nursing, they report therapy believes patient needs rehabilitation; very weak upon standing. Patient admits she was very weak yesterday, does not know about today. I discussed the above; daughter thinks patient belongs in therapy. I suggest acute rehabilitation, patient agrees. She received packed red cell transfusion last evening with no difficulty. Family requests handicap parking sticker; she will be visiting wound care center. I spoke with wound care nurse, the above has been set up for the patient upon discharge. Breathing much improved no chest pain appetite good. Hemoglobin today 8. 7. Patient was seen by orthopedic this morning; x-rays done; right knee with degenerative changes unlikely but cannot exclude avulsion fracture.       ROS: intake/output data recorded.     Wt Readings from Last 3 Encounters:   03/20/18 220 lb (99.8 kg)       Labs:   CBC:   Lab Results   Component Value Date    WBC 8.5 03/21/2018    RBC 3.54 03/21/2018    HGB 8.7 03/23/2018    HCT 28.2 03/23/2018    MCV 78.5 03/21/2018    MCH 22.9 03/21/2018    MCHC 29.1 03/21/2018    RDW 16.3 03/21/2018     03/21/2018    MPV 10.0 03/21/2018     CBC with Differential:    Lab Results   Component Value Date    WBC 8.5 03/21/2018    RBC 3.54 03/21/2018    HGB 8.7 03/23/2018    HCT 28.2 03/23/2018     03/21/2018    MCV 78.5 03/21/2018    MCH 22.9 03/21/2018    MCHC 29.1 03/21/2018    RDW 16.3 03/21/2018    LYMPHOPCT 16.3 03/21/2018    MONOPCT 10.0 03/21/2018    BASOPCT 0.1 03/21/2018    MONOSABS 0.85 03/21/2018    LYMPHSABS 1.39 03/21/2018    EOSABS 0.05 03/21/2018    BASOSABS 0.01 03/21/2018     Hemoglobin/Hematocrit:    Lab Results   Component Value Date    HGB 8.7 03/23/2018    HCT 28.2 03/23/2018     CMP:    Lab Results   Component Value Date     03/22/2018    K 3.6 03/22/2018     03/22/2018    CO2 21 03/22/2018    BUN 13 03/22/2018    CREATININE 0.7 03/22/2018    GFRAA >60 03/22/2018    LABGLOM >60 03/22/2018    GLUCOSE 108 03/22/2018    PROT 6.7 03/22/2018    LABALBU 3.2 03/22/2018    CALCIUM 8.7 03/22/2018    BILITOT 0.3 03/22/2018    ALKPHOS 60 03/22/2018    AST 12 03/22/2018    ALT 10 03/22/2018     BMP:    Lab Results   Component Value Date     03/22/2018    K 3.6 03/22/2018     03/22/2018    CO2 21 03/22/2018    BUN 13 03/22/2018    LABALBU 3.2 03/22/2018    CREATININE 0.7 03/22/2018    CALCIUM 8.7 03/22/2018    GFRAA >60 03/22/2018    LABGLOM >60 03/22/2018    GLUCOSE 108 03/22/2018     Magnesium:    Lab Results   Component Value Date    MG 2.0 03/22/2018     Phosphorus:    Lab Results   Component Value Date    PHOS 3.2 03/22/2018     PT/INR:  No results found for: PROTIME, INR  Warfarin PT/INR:  No components found for: PTPATWAR, 72 hours. CMP:  Recent Labs      03/21/18   0505  03/22/18   0504   NA  140  141   K  3.2*  3.6   CL  101  102   CO2  25  21*   BUN  13  13   CREATININE  0.7  0.7   GFRAA  >60  >60   LABGLOM  >60  >60   GLUCOSE  93  108   PROT  6.5  6.7   LABALBU  3.2*  3.2*   CALCIUM  8.8  8.7   BILITOT  0.3  0.3   ALKPHOS  64  60   AST  16  12   ALT  9  10        BNP:No results found for: BNP    PROTIME/INR:No results for input(s): PROTIME, INR in the last 72 hours. CRP:   Recent Labs      03/21/18   0505   CRP  4.2*       ESR:No results for input(s): SEDRATE in the last 72 hours. LIPASE , AMYLASE:  No results found for: LIPASE   No results found for: AMYLASE    ABGs: No results found for: PH, PO2, PCO2    CARDIAC:   Recent Labs      03/20/18   1700  03/20/18   2019   TROPONINI  0.16*  0.15*       Lipid Profile:   Lab Results   Component Value Date    TRIG 157 03/21/2018    HDL 45 03/21/2018    LDLCALC 85 03/21/2018    CHOL 161 03/21/2018        Lab Results   Component Value Date    LABA1C 5.4 03/20/2018            RADIOLOGY: REVIEWED AVAILABLE REPORT  XR KNEE RIGHT (3 VIEWS)   Final Result   No displaced fracture or dislocation. Prominence of the tibial eminence, incompletely visualized. This may   be a chronic finding, but in the absence of old studies for   comparison, acute avulsion injury be entirely excluded. Degenerative changes. Small joint effusion. XR CHEST PORTABLE   Final Result   Marked regression signs of vascular congestion or decompensated   congestive heart failure since March 20 but not fully resolved. XR CHEST PORTABLE   Final Result   Bilateral pleural effusions with diffuse airspace disease   involving the left mid to lower lung field and right lower lobe. Recommend clinical correlation.                   [address]   10:33 AM     3/23/2018

## 2018-03-23 NOTE — CONSULTS
Neurologic is negative. PHYSICAL EXAMINATION  GENERAL:  Morbidly obese white female in no acute distress. HEAD:  Normocephalic, atraumatic. EYES. Pupils equal, round, and reactive to light. PHARYNX:  Normal.  LUNGS:  Few scattered rales. HEART:  Regular rate and rhythm. S1, S2 normal.  Grade 3 systolic murmur. ABDOMEN:  Bowel sounds normal, soft, nontender. No masses. EXTREMITIES:  There is edema both legs with wraps. MENTAL STATUS:  Alert and oriented. SENSATION:  Intact. NEUROMUSCULAR:  4+/5 in both uppers, 4- in the lowers. PROBLEM LIST:  1.  Osteoarthritis of the right knee. 2.  Cellulitis of both legs. 3.  Aortic stenosis. 4.  Hypertension. 5.  Congestive heart failure. 6.  Recent sepsis. 7.  Morbid obesity. RECOMMENDATIONS:  At this point the patient really seems to be able to  function within the limits of all of her other problems from what I see  from the therapist's notes. She would not meet criteria for acute rehab  because she is she is able to do all of her basic activities. She needs to  build up her endurance to be ready for surgery and also it is going to take  some time before she is recovered enough in terms of the infections. She  might be more an appropriate candidate for LTAC or skilled nursing  facility.         Myles Gillespie MD    D: 03/23/2018 14:11:15       T: 03/23/2018 14:15:47     [email or web address]  Job#: [id number]     Doc#: [id number]    CC:

## 2018-03-24 LAB
ANION GAP SERPL CALCULATED.3IONS-SCNC: 17 MMOL/L (ref 7–16)
BUN BLDV-MCNC: 27 MG/DL (ref 8–23)
CALCIUM SERPL-MCNC: 9 MG/DL (ref 8.6–10.2)
CHLORIDE BLD-SCNC: 103 MMOL/L (ref 98–107)
CO2: 20 MMOL/L (ref 22–29)
CREAT SERPL-MCNC: 0.8 MG/DL (ref 0.5–1)
GFR AFRICAN AMERICAN: >60
GFR NON-AFRICAN AMERICAN: >60 ML/MIN/1.73
GLUCOSE BLD-MCNC: 103 MG/DL (ref 74–109)
HCT VFR BLD CALC: 27.3 % (ref 34–48)
HEMOGLOBIN: 8.5 G/DL (ref 11.5–15.5)
POTASSIUM SERPL-SCNC: 3.8 MMOL/L (ref 3.5–5)
SODIUM BLD-SCNC: 140 MMOL/L (ref 132–146)

## 2018-03-24 PROCEDURE — 2580000003 HC RX 258: Performed by: INTERNAL MEDICINE

## 2018-03-24 PROCEDURE — 6360000002 HC RX W HCPCS: Performed by: INTERNAL MEDICINE

## 2018-03-24 PROCEDURE — 94640 AIRWAY INHALATION TREATMENT: CPT

## 2018-03-24 PROCEDURE — 36415 COLL VENOUS BLD VENIPUNCTURE: CPT

## 2018-03-24 PROCEDURE — 85018 HEMOGLOBIN: CPT

## 2018-03-24 PROCEDURE — 6370000000 HC RX 637 (ALT 250 FOR IP): Performed by: INTERNAL MEDICINE

## 2018-03-24 PROCEDURE — 80048 BASIC METABOLIC PNL TOTAL CA: CPT

## 2018-03-24 PROCEDURE — 2700000000 HC OXYGEN THERAPY PER DAY

## 2018-03-24 PROCEDURE — 2060000000 HC ICU INTERMEDIATE R&B

## 2018-03-24 PROCEDURE — 85014 HEMATOCRIT: CPT

## 2018-03-24 RX ORDER — COLCHICINE 0.6 MG/1
0.6 TABLET ORAL DAILY
Status: DISCONTINUED | OUTPATIENT
Start: 2018-03-24 | End: 2018-03-27 | Stop reason: HOSPADM

## 2018-03-24 RX ORDER — METHYLPREDNISOLONE SODIUM SUCCINATE 125 MG/2ML
125 INJECTION, POWDER, LYOPHILIZED, FOR SOLUTION INTRAMUSCULAR; INTRAVENOUS ONCE
Status: COMPLETED | OUTPATIENT
Start: 2018-03-24 | End: 2018-03-24

## 2018-03-24 RX ORDER — METOCLOPRAMIDE 5 MG/1
5 TABLET ORAL
Status: DISCONTINUED | OUTPATIENT
Start: 2018-03-24 | End: 2018-03-27 | Stop reason: HOSPADM

## 2018-03-24 RX ORDER — TRAMADOL HYDROCHLORIDE 50 MG/1
50 TABLET ORAL EVERY 6 HOURS PRN
Status: DISCONTINUED | OUTPATIENT
Start: 2018-03-24 | End: 2018-03-27 | Stop reason: HOSPADM

## 2018-03-24 RX ORDER — TRAMADOL HYDROCHLORIDE 50 MG/1
100 TABLET ORAL EVERY 6 HOURS PRN
Status: DISCONTINUED | OUTPATIENT
Start: 2018-03-24 | End: 2018-03-27 | Stop reason: HOSPADM

## 2018-03-24 RX ADMIN — TORSEMIDE 10 MG: 10 TABLET ORAL at 08:18

## 2018-03-24 RX ADMIN — CEFEPIME HYDROCHLORIDE 1 G: 1 INJECTION, POWDER, FOR SOLUTION INTRAMUSCULAR; INTRAVENOUS at 18:18

## 2018-03-24 RX ADMIN — COLCHICINE 0.6 MG: 0.6 TABLET, FILM COATED ORAL at 13:41

## 2018-03-24 RX ADMIN — FORMOTEROL FUMARATE DIHYDRATE 20 MCG: 20 SOLUTION RESPIRATORY (INHALATION) at 21:51

## 2018-03-24 RX ADMIN — BUDESONIDE 250 MCG: 0.25 SUSPENSION RESPIRATORY (INHALATION) at 09:43

## 2018-03-24 RX ADMIN — CEFEPIME HYDROCHLORIDE 1 G: 1 INJECTION, POWDER, FOR SOLUTION INTRAMUSCULAR; INTRAVENOUS at 01:48

## 2018-03-24 RX ADMIN — ENALAPRIL MALEATE 10 MG: 10 TABLET ORAL at 08:18

## 2018-03-24 RX ADMIN — FORMOTEROL FUMARATE DIHYDRATE 20 MCG: 20 SOLUTION RESPIRATORY (INHALATION) at 09:43

## 2018-03-24 RX ADMIN — METHYLPREDNISOLONE SODIUM SUCCINATE 125 MG: 125 INJECTION, POWDER, FOR SOLUTION INTRAMUSCULAR; INTRAVENOUS at 13:41

## 2018-03-24 RX ADMIN — AMLODIPINE BESYLATE 5 MG: 5 TABLET ORAL at 08:18

## 2018-03-24 RX ADMIN — BUDESONIDE 250 MCG: 0.25 SUSPENSION RESPIRATORY (INHALATION) at 21:55

## 2018-03-24 RX ADMIN — SPIRONOLACTONE 25 MG: 25 TABLET ORAL at 08:18

## 2018-03-24 RX ADMIN — PANTOPRAZOLE SODIUM 40 MG: 40 TABLET, DELAYED RELEASE ORAL at 05:54

## 2018-03-24 RX ADMIN — ENOXAPARIN SODIUM 40 MG: 40 INJECTION SUBCUTANEOUS at 08:18

## 2018-03-24 RX ADMIN — METOCLOPRAMIDE HYDROCHLORIDE 5 MG: 5 TABLET ORAL at 18:01

## 2018-03-24 RX ADMIN — SODIUM CHLORIDE: 9 INJECTION, SOLUTION INTRAVENOUS at 19:32

## 2018-03-24 RX ADMIN — PETROLATUM: 42 OINTMENT TOPICAL at 08:23

## 2018-03-24 ASSESSMENT — PAIN SCALES - GENERAL
PAINLEVEL_OUTOF10: 0

## 2018-03-24 NOTE — PROGRESS NOTES
PROGRESS  NOTE --                                                          INTERNAL  MEDICINE                                                                              I  PERSONALLY SAW , EXAMINED, AND CARED [id number] [facility] [place], [id number]     LABS, XRAY ,CHART, AND MEDICATIONS  REVIEWED BY ME .        SUBJECTIVE: Nicole Domingo is alert awake oriented ×3 sitting in bedside chair. Family present. She states she feels much better; was having a hard time laying in bed. Much less coughing; dyspnea less. No chest pain appetite good. 2-D echo reviewed with patient and family, ejection fraction 55%; mild mitral regurgitation mild to moderate aortic regurgitation moderate to severe aortic stenosis. I advised her she will need the aortic stenosis followed closely, may need procedure in the future, possible TAVR. The valvular problems may have contributed to this admission. Viral respiratory panel negative. Blood cultures negative thus far urine culture negative; wound culture pending. She believes her legs are feeling and looking better. I agree. Still has significant atrophy left ankle more than the right from loss of tissue. However today, the area is much less inflamed and currently try compared to weeping yesterday. Sodium 140 potassium 3.2 chloride 101 CO2 25 BUN 13 creatinine 0.7 magnesium 1.6 glucose 93 calcium 8.8 protein 6.5 albumin 3.2. Pro-calcitonin 0.14. C-reactive protein 4.2 liver functions normal. LDL 85; proBNP 6100, 1800 yesterday. Thyroid normal. Hemoglobin 8.1 (had been 10.6 yesterday). WBC 8.5 platelets 280,201.    3/22/18- infectious disease believes the patient did have sepsis due to leg wounds; patient sitting in bedside chair; family present. She was seen by wound care, her legs are now wrapped. She states it feels tight but not unbearable.  Discussion regarding need for the above; help reduce lymphedema etc. I spoke blood loss anemia    Pneumonia    Aortic stenosis, severe    Moderate aortic regurgitation    Mitral regurgitation    SEPSIS DUE TO STAPH    OBESITY    Chondrocalcinosis right knee, chronic    Chondrocalcinosis right hand right wrist left hand, acute  Resolved Problems:    * No resolved hospital problems. *      PLAN:  SEE ORDERS      RE  CHANGES AND FINDINGS   Medications reviewed with patient  GI prophylaxis  DVT prophylaxis  Replace potassium IV bolus 10 mEq  Recheck labs in a.m. Magnesium sulfate bolus 2 g  Stool for occult blood  Ferritin iron reticulocyte count TIBC  Type and screen  Seaman catheter  Wound care consult  Continue ceftriaxone for now  Transfuse 1 unit packed red cells  20 mg Lasix IV after transfusion  Stop nifedipine in view of leg edema lymphedema  Ceftriaxone has been stopped  Cefepime IV started  Oral doxycycline  Follow H&H  Continue Lovenox for now  I spoke with patient's son, Ana M Grande (radiologist). Benjamen Lies He is concerned-- patient has been complaining of worsening knee pain for the last 5 years seldom gets out of the house. He is convinced she will not follow up outpatient with orthopedics; he would like orthopedic notified and consulted. I spoke with orthopedics regarding same  Continue aerosol treatments  Continue Maxipime IV  Disability handicap parking permit completed  Physical medicine consult acute rehabilitation  Doxycycline has been stopped  Continue cefepime  Solu-Medrol 125 mg IV once  Colchicine 0.6 daily  Tramadol as needed for pain  Reglan 5 mg 3 times a day for nausea  Patient has been declined for acute rehab        Discussed with patient and family and nursing.       Ebony Workman  DO     11:15 AM     3/24/2018    TIME >25 MINUTES    >  50 %  OF  TIME  DISCUSSION     Active Hospital Problems    Diagnosis    Hypokalemia [E87.6]     Priority: High     Class: Acute    Acute blood loss anemia [D62]     Priority: High     Class: Acute    Acute respiratory failure with hypoxia 1 spray Each Nare Q6H PRN Ladell Nipper Mihok, DO        pantoprazole (PROTONIX) tablet 40 mg  40 mg Oral QAM AC Ghassan A Mihok, DO   40 mg at 18 0554    ondansetron (ZOFRAN) injection 4 mg  4 mg Intravenous Q6H PRN Ladell Nipper Mihok, DO        budesonide (PULMICORT) nebulizer suspension 250 mcg  250 mcg Nebulization BID Ladell Nipper Mihok, DO   250 mcg at 18 9016    magnesium hydroxide (MILK OF MAGNESIA) 400 MG/5ML suspension 30 mL  30 mL Oral Daily PRN Ladell Nipper Mihok, DO        ipratropium-albuterol (DUONEB) nebulizer solution 1 ampule  1 ampule Inhalation Q4H WA Ghassan A Mihok, DO   1 ampule at 18 1454    formoterol (PERFOROMIST) nebulizer solution 20 mcg  20 mcg Nebulization BID Ladell Nipper Mihok, DO   20 mcg at 18 0943    enalapril (VASOTEC) tablet 10 mg  10 mg Oral QAM Ghassan A Mihok, DO   10 mg at 18 0818    amLODIPine (NORVASC) tablet 5 mg  5 mg Oral Daily Roetta Don, DO   5 mg at 18 0818    torsemide (DEMADEX) tablet 10 mg  10 mg Oral Daily Roetta Don, DO   10 mg at 18 0818    spironolactone (ALDACTONE) tablet 25 mg  25 mg Oral Daily Roetta Don, DO   25 mg at 18 0818     Scheduled Meds:   mineral oil-hydrophilic petrolatum   Topical Daily    cefepime  1 g Intravenous Q8H    sodium chloride flush  10 mL Intravenous 2 times per day    enoxaparin  40 mg Subcutaneous Daily    pantoprazole  40 mg Oral QAM AC    budesonide  250 mcg Nebulization BID    ipratropium-albuterol  1 ampule Inhalation Q4H WA    formoterol  20 mcg Nebulization BID    enalapril  10 mg Oral QAM    amLODIPine  5 mg Oral Daily    torsemide  10 mg Oral Daily    spironolactone  25 mg Oral Daily       Continuous Infusions:   sodium chloride 50 mL/hr at 18 1726         Data:   Temperature:  Current - Temp: 98.5 °F (36.9 °C);  Max - Temp  Av.3 °F (37.4 °C)  Min: 98.5 °F (36.9 °C)  Max: 99.8 °F (37.7 °C)    Respiratory Rate : Resp  Av.3  Min: 18  Max: 20    Pulse Range: Pulse  Av  Min: 91  Max: 100    Blood Presuure Range:  Systolic (97XMU), CIB:193 , Min:100 , UDU:992   ; Diastolic (86LWM), HFP:52, Min:52, Max:76      Pulse ox Range: SpO2  Av %  Min: 95 %  Max: 100 %    Patient Vitals for the past 8 hrs:   BP Temp Temp src Pulse Resp SpO2   18 0744 134/60 98.5 °F (36.9 °C) Temporal 92 18 95 %   18 0345 (!) 106/52 98.8 °F (37.1 °C) Oral 91 18 -         Intake/Output Summary (Last 24 hours) at 18 1115  Last data filed at 18 0602   Gross per 24 hour   Intake             1942 ml   Output             1500 ml   Net              442 ml       I/O last 3 completed shifts: In: 2302 [P. O.:960; I.V.:1142; IV Piggyback:200]  Out: 1500 [Urine:1500]    No intake/output data recorded.     Wt Readings from Last 3 Encounters:   18 220 lb (99.8 kg)       Labs:   CBC:   Lab Results   Component Value Date    WBC 8.5 2018    RBC 3.54 2018    HGB 8.5 2018    HCT 27.3 2018    MCV 78.5 2018    MCH 22.9 2018    MCHC 29.1 2018    RDW 16.3 2018     2018    MPV 10.0 2018     CBC with Differential:    Lab Results   Component Value Date    WBC 8.5 2018    RBC 3.54 2018    HGB 8.5 2018    HCT 27.3 2018     2018    MCV 78.5 2018    MCH 22.9 2018    MCHC 29.1 2018    RDW 16.3 2018    LYMPHOPCT 16.3 2018    MONOPCT 10.0 2018    BASOPCT 0.1 2018    MONOSABS 0.85 2018    LYMPHSABS 1.39 2018    EOSABS 0.05 2018    BASOSABS 0.01 2018     Hemoglobin/Hematocrit:    Lab Results   Component Value Date    HGB 8.5 2018    HCT 27.3 2018     CMP:    Lab Results   Component Value Date     2018    K 3.8 2018     2018    CO2 20 2018    BUN 27 2018    CREATININE 0.8 2018    GFRAA >60 2018    LABGLOM >60 2018    GLUCOSE 103 2018    PROT 6.7 03/22/2018    LABALBU 3.2 03/22/2018    CALCIUM 9.0 03/24/2018    BILITOT 0.3 03/22/2018    ALKPHOS 60 03/22/2018    AST 12 03/22/2018    ALT 10 03/22/2018     BMP:    Lab Results   Component Value Date     03/24/2018    K 3.8 03/24/2018     03/24/2018    CO2 20 03/24/2018    BUN 27 03/24/2018    LABALBU 3.2 03/22/2018    CREATININE 0.8 03/24/2018    CALCIUM 9.0 03/24/2018    GFRAA >60 03/24/2018    LABGLOM >60 03/24/2018    GLUCOSE 103 03/24/2018     Magnesium:    Lab Results   Component Value Date    MG 2.0 03/22/2018     Phosphorus:    Lab Results   Component Value Date    PHOS 3.2 03/22/2018     PT/INR:  No results found for: PROTIME, INR  Warfarin PT/INR:  No components found for: PTPATWAR, PTINRWAR  PTT:  No results found for: APTT, PTT[APTT}  Troponin:    Lab Results   Component Value Date    TROPONINI 0.15 03/20/2018     Last 3 Troponin:    Lab Results   Component Value Date    TROPONINI 0.15 03/20/2018    TROPONINI 0.16 03/20/2018    TROPONINI 0.02 03/20/2018     U/A:    Lab Results   Component Value Date    COLORU Yellow 03/20/2018    PROTEINU Negative 03/20/2018    PHUR 5.5 03/20/2018    WBCUA 1-3 03/20/2018    RBCUA 1-3 03/20/2018    BACTERIA RARE 03/20/2018    CLARITYU Clear 03/20/2018    SPECGRAV 1.010 03/20/2018    LEUKOCYTESUR TRACE 03/20/2018    UROBILINOGEN 0.2 03/20/2018    BILIRUBINUR Negative 03/20/2018    BLOODU TRACE 03/20/2018    GLUCOSEU Negative 03/20/2018     HgBA1c:    Lab Results   Component Value Date    LABA1C 5.4 03/20/2018     FLP:    Lab Results   Component Value Date    TRIG 157 03/21/2018    HDL 45 03/21/2018    LDLCALC 85 03/21/2018    LABVLDL 31 03/21/2018     TSH:    Lab Results   Component Value Date    TSH 1.290 03/21/2018     VITAMIN B12: No components found for: B12  FOLATE:    Lab Results   Component Value Date    FOLATE 14.3 03/20/2018        CBC:  Recent Labs      03/22/18   0504  03/23/18   0631  03/24/18   0617   HGB  7.9*  8.7*  8.5*   HCT  26.2*  28.2* 27.3*          H & H :  Recent Labs      03/22/18   0504  03/23/18   0631  03/24/18   0617   HGB  7.9*  8.7*  8.5*       TSH:  No results for input(s): TSH in the last 72 hours. GLUCOSE:No results for input(s): POCGLU in the last 72 hours. CMP:  Recent Labs      03/22/18   0504  03/24/18   0617   NA  141  140   K  3.6  3.8   CL  102  103   CO2  21*  20*   BUN  13  27*   CREATININE  0.7  0.8   GFRAA  >60  >60   LABGLOM  >60  >60   GLUCOSE  108  103   PROT  6.7   --    LABALBU  3.2*   --    CALCIUM  8.7  9.0   BILITOT  0.3   --    ALKPHOS  60   --    AST  12   --    ALT  10   --         BNP:No results found for: BNP    PROTIME/INR:No results for input(s): PROTIME, INR in the last 72 hours. CRP:   No results for input(s): CRP in the last 72 hours. ESR:No results for input(s): SEDRATE in the last 72 hours. LIPASE , AMYLASE:  No results found for: LIPASE   No results found for: AMYLASE    ABGs: No results found for: PH, PO2, PCO2    CARDIAC:   No results for input(s): CKTOTAL, CKMB, CKMBINDEX, TROPONINI in the last 72 hours. Lipid Profile:   Lab Results   Component Value Date    TRIG 157 03/21/2018    HDL 45 03/21/2018    LDLCALC 85 03/21/2018    CHOL 161 03/21/2018        Lab Results   Component Value Date    LABA1C 5.4 03/20/2018            RADIOLOGY: REVIEWED AVAILABLE REPORT  XR KNEE RIGHT (3 VIEWS)   Final Result   No displaced fracture or dislocation. Prominence of the tibial eminence, incompletely visualized. This may   be a chronic finding, but in the absence of old studies for   comparison, acute avulsion injury be entirely excluded. Degenerative changes. Small joint effusion. XR CHEST PORTABLE   Final Result   Marked regression signs of vascular congestion or decompensated   congestive heart failure since March 20 but not fully resolved.       XR CHEST PORTABLE   Final Result   Bilateral pleural effusions with diffuse airspace disease   involving the left mid to lower lung field and

## 2018-03-24 NOTE — PLAN OF CARE
Problem: Falls - Risk of  Goal: Absence of falls  Outcome: Met This Shift      Problem: Breathing Pattern - Ineffective:  Goal: Ability to achieve and maintain a regular respiratory rate will improve  Ability to achieve and maintain a regular respiratory rate will improve   Outcome: Met This Shift

## 2018-03-25 ENCOUNTER — APPOINTMENT (OUTPATIENT)
Dept: GENERAL RADIOLOGY | Age: 83
DRG: 871 | End: 2018-03-25
Payer: MEDICARE

## 2018-03-25 LAB
BLOOD CULTURE, ROUTINE: NORMAL
CULTURE, BLOOD 2: NORMAL
HCT VFR BLD CALC: 30.4 % (ref 34–48)
HEMOGLOBIN: 9.2 G/DL (ref 11.5–15.5)

## 2018-03-25 PROCEDURE — 2580000003 HC RX 258: Performed by: INTERNAL MEDICINE

## 2018-03-25 PROCEDURE — 71045 X-RAY EXAM CHEST 1 VIEW: CPT

## 2018-03-25 PROCEDURE — 85018 HEMOGLOBIN: CPT

## 2018-03-25 PROCEDURE — 2700000000 HC OXYGEN THERAPY PER DAY

## 2018-03-25 PROCEDURE — 2060000000 HC ICU INTERMEDIATE R&B

## 2018-03-25 PROCEDURE — 94760 N-INVAS EAR/PLS OXIMETRY 1: CPT

## 2018-03-25 PROCEDURE — 6360000002 HC RX W HCPCS: Performed by: INTERNAL MEDICINE

## 2018-03-25 PROCEDURE — 6370000000 HC RX 637 (ALT 250 FOR IP): Performed by: INTERNAL MEDICINE

## 2018-03-25 PROCEDURE — 94640 AIRWAY INHALATION TREATMENT: CPT

## 2018-03-25 PROCEDURE — 85014 HEMATOCRIT: CPT

## 2018-03-25 PROCEDURE — 36415 COLL VENOUS BLD VENIPUNCTURE: CPT

## 2018-03-25 RX ORDER — METHYLPREDNISOLONE SODIUM SUCCINATE 125 MG/2ML
125 INJECTION, POWDER, LYOPHILIZED, FOR SOLUTION INTRAMUSCULAR; INTRAVENOUS ONCE
Status: COMPLETED | OUTPATIENT
Start: 2018-03-25 | End: 2018-03-25

## 2018-03-25 RX ADMIN — TORSEMIDE 10 MG: 10 TABLET ORAL at 08:45

## 2018-03-25 RX ADMIN — METOCLOPRAMIDE HYDROCHLORIDE 5 MG: 5 TABLET ORAL at 08:46

## 2018-03-25 RX ADMIN — AMLODIPINE BESYLATE 5 MG: 5 TABLET ORAL at 08:45

## 2018-03-25 RX ADMIN — PANTOPRAZOLE SODIUM 40 MG: 40 TABLET, DELAYED RELEASE ORAL at 05:52

## 2018-03-25 RX ADMIN — COLCHICINE 0.6 MG: 0.6 TABLET, FILM COATED ORAL at 08:45

## 2018-03-25 RX ADMIN — CEFEPIME HYDROCHLORIDE 1 G: 1 INJECTION, POWDER, FOR SOLUTION INTRAMUSCULAR; INTRAVENOUS at 17:17

## 2018-03-25 RX ADMIN — ENALAPRIL MALEATE 10 MG: 10 TABLET ORAL at 08:46

## 2018-03-25 RX ADMIN — BUDESONIDE 250 MCG: 0.25 SUSPENSION RESPIRATORY (INHALATION) at 11:41

## 2018-03-25 RX ADMIN — SPIRONOLACTONE 25 MG: 25 TABLET ORAL at 08:47

## 2018-03-25 RX ADMIN — METHYLPREDNISOLONE SODIUM SUCCINATE 125 MG: 125 INJECTION, POWDER, FOR SOLUTION INTRAMUSCULAR; INTRAVENOUS at 17:15

## 2018-03-25 RX ADMIN — IPRATROPIUM BROMIDE AND ALBUTEROL SULFATE 1 AMPULE: 2.5; .5 SOLUTION RESPIRATORY (INHALATION) at 18:05

## 2018-03-25 RX ADMIN — ACETAMINOPHEN 650 MG: 325 TABLET, FILM COATED ORAL at 22:02

## 2018-03-25 RX ADMIN — METOCLOPRAMIDE HYDROCHLORIDE 5 MG: 5 TABLET ORAL at 17:14

## 2018-03-25 RX ADMIN — PETROLATUM: 42 OINTMENT TOPICAL at 08:47

## 2018-03-25 RX ADMIN — METOCLOPRAMIDE HYDROCHLORIDE 5 MG: 5 TABLET ORAL at 12:39

## 2018-03-25 RX ADMIN — FORMOTEROL FUMARATE DIHYDRATE 20 MCG: 20 SOLUTION RESPIRATORY (INHALATION) at 21:20

## 2018-03-25 RX ADMIN — CEFEPIME HYDROCHLORIDE 1 G: 1 INJECTION, POWDER, FOR SOLUTION INTRAMUSCULAR; INTRAVENOUS at 02:03

## 2018-03-25 RX ADMIN — BUDESONIDE 250 MCG: 0.25 SUSPENSION RESPIRATORY (INHALATION) at 21:23

## 2018-03-25 RX ADMIN — ENOXAPARIN SODIUM 40 MG: 40 INJECTION SUBCUTANEOUS at 08:46

## 2018-03-25 RX ADMIN — IPRATROPIUM BROMIDE AND ALBUTEROL SULFATE 1 AMPULE: 2.5; .5 SOLUTION RESPIRATORY (INHALATION) at 11:41

## 2018-03-25 ASSESSMENT — PAIN SCALES - GENERAL
PAINLEVEL_OUTOF10: 0
PAINLEVEL_OUTOF10: 0
PAINLEVEL_OUTOF10: 5
PAINLEVEL_OUTOF10: 0
PAINLEVEL_OUTOF10: 0

## 2018-03-25 NOTE — PROGRESS NOTES
PROGRESS NOTE       PATIENT PROBLEM LIST:  Principal Problem:    Acute respiratory failure with hypoxia (HCC)  Active Problems:    Pneumonia    Acute on chronic diastolic congestive heart failure (HCC)    Cellulitis    Hypokalemia    Acute blood loss anemia    Aortic stenosis, severe    Moderate aortic regurgitation    Mitral regurgitation    Sepsis due to methicillin susceptible Staphylococcus aureus ([facility])    Class 3 obesity in adult    Primary osteoarthritis of right knee  Resolved Problems:    * No resolved hospital problems. *      SUBJECTIVE:  Xavier Reddy states She became suddenly nauseated and weak after ingestion of colchicine and potential steroid. She denies any chest discomfort or significant shortness of breath presently. REVIEW OF SYSTEMS:  General ROS: positive for fatigue  Psychological ROS: negative for - anxiety or depression  Ophthalmic ROS: positive for - decreased vision and utilizes corrective lenses for visual acuity. ENT ROS: negative  Allergy and Immunology ROS: negative  Hematological and Lymphatic ROS: negative  Endocrine ROS: negative  Respiratory ROS: positive for - shortness of breath  Cardiovascular ROS: positive for - dyspnea on exertion, edema and murmur, no claudication. Gastrointestinal ROS: no abdominal pain, change in bowel habits, or black or bloody stools  Genito-Urinary ROS: no dysuria, trouble voiding, or hematuria  Musculoskeletal ROS: negative  Neurological ROS: no TIA or stroke symptoms otherwise no significant change in symptoms or problems since yesterday as documented in previous progress notes.     SCHEDULED MEDICATIONS:   metoclopramide  5 mg Oral TID AC    colchicine  0.6 mg Oral Daily    mineral oil-hydrophilic petrolatum   Topical Daily    cefepime  1 g Intravenous Q8H    sodium chloride flush  10 mL Intravenous 2 times per day    enoxaparin  40 mg Subcutaneous Daily    pantoprazole  40 mg Oral QAM AC    budesonide  250 mcg Nebulization BID   

## 2018-03-25 NOTE — PROGRESS NOTES
CC- sob    Subjective: The patient is awake and alert. Tolerating medications. Reports no side effects. Some cough,   Afebrile. Joints feel ebtter      Objective:    Vitals:    03/25/18 0838   BP: (!) 165/72   Pulse: 67   Resp: 18   Temp: 97.4 °F (36.3 °C)   SpO2: 100%       General Appearance:    Awake, alert , no acute distress.    HEENT:    Normocephalic,PERRL,neck supple, no JVD, mucosa moist, no thrush   Lungs:     Clear to auscultation bilaterally, no wheeze , crackles   Heart:    Regular rate and rhythm, no murmur   Abdomen:     Soft, non-tender, not distended  bowel sounds present,     Extremities:  B/L LE venous stasis ulcer, R LE ulcers with more yellow drainage and exudate at the base of shallow ulcers than left   Pulses:   2+ and symmetric all extremities   Skin:   Skin color, texture, turgor normal, no rashes or lesions         CBC+dif:  Reviewed and trend followed     Radiology:  Noted     Microbiology:  Wound cx- GNB OX +, mssa   Assessment:  · Complicated right lower extremity cellulitis and infected venous stasis ulcers  · Sepsis from above  · plyarthralgia-? OA      Plan:    · cont IV cefepime  · For discharge can switch to po levaquin,Doxy  · f/u CXR  · Monitor labs  · Will follow with you          Electronically signed by Veronica Roque MD on 3/25/2018 at 1:15 PM

## 2018-03-25 NOTE — PROGRESS NOTES
LESS Erythema swelling right wrist right hand minimal left hand  Musculoskeletal: Muscular strength appears intact. Neuro:  No focal motor defects ; II-XII grossly intact . HAY equally    TELEMETRY: REVIEWED--Telemetry: Sinus    ASSESSMENT:   Principal Problem:    Acute respiratory failure with hypoxia (HCC)  Active Problems:    Acute on chronic diastolic congestive heart failure (HCC)    Cellulitis    Hypokalemia    Acute blood loss anemia    Pneumonia    Aortic stenosis, severe    Moderate aortic regurgitation    Mitral regurgitation    SEPSIS DUE TO STAPH    OBESITY    Chondrocalcinosis right knee, chronic    Chondrocalcinosis right hand right wrist left hand, acute  Resolved Problems:    * No resolved hospital problems. *      PLAN:  SEE ORDERS      RE  CHANGES AND FINDINGS   Medications reviewed with patient  GI prophylaxis  DVT prophylaxis  Replace potassium IV bolus 10 mEq  Recheck labs in a.m. Magnesium sulfate bolus 2 g  Stool for occult blood  Ferritin iron reticulocyte count TIBC  Type and screen  Seaman catheter  Wound care consult  Continue ceftriaxone for now  Transfuse 1 unit packed red cells  20 mg Lasix IV after transfusion  Stop nifedipine in view of leg edema lymphedema  Ceftriaxone has been stopped  Cefepime IV started  Oral doxycycline  Follow H&H  Continue Lovenox for now  I spoke with patient's son, Taniya Hair (radiologist). Sal Mac He is concerned-- patient has been complaining of worsening knee pain for the last 5 years seldom gets out of the house. He is convinced she will not follow up outpatient with orthopedics; he would like orthopedic notified and consulted.  I spoke with orthopedics regarding same  Continue aerosol treatments  Continue Maxipime IV  Disability handicap parking permit completed  Physical medicine consult acute rehabilitation  Doxycycline has been stopped  Continue cefepime  Solu-Medrol 125 mg IV once  Colchicine 0.6 daily  Tramadol as needed for pain  Reglan 5 mg 3 times a 78.5 03/21/2018    MCH 22.9 03/21/2018    MCHC 29.1 03/21/2018    RDW 16.3 03/21/2018     03/21/2018    MPV 10.0 03/21/2018     CBC with Differential:    Lab Results   Component Value Date    WBC 8.5 03/21/2018    RBC 3.54 03/21/2018    HGB 9.2 03/25/2018    HCT 30.4 03/25/2018     03/21/2018    MCV 78.5 03/21/2018    MCH 22.9 03/21/2018    MCHC 29.1 03/21/2018    RDW 16.3 03/21/2018    LYMPHOPCT 16.3 03/21/2018    MONOPCT 10.0 03/21/2018    BASOPCT 0.1 03/21/2018    MONOSABS 0.85 03/21/2018    LYMPHSABS 1.39 03/21/2018    EOSABS 0.05 03/21/2018    BASOSABS 0.01 03/21/2018     Hemoglobin/Hematocrit:    Lab Results   Component Value Date    HGB 9.2 03/25/2018    HCT 30.4 03/25/2018     CMP:    Lab Results   Component Value Date     03/24/2018    K 3.8 03/24/2018     03/24/2018    CO2 20 03/24/2018    BUN 27 03/24/2018    CREATININE 0.8 03/24/2018    GFRAA >60 03/24/2018    LABGLOM >60 03/24/2018    GLUCOSE 103 03/24/2018    PROT 6.7 03/22/2018    LABALBU 3.2 03/22/2018    CALCIUM 9.0 03/24/2018    BILITOT 0.3 03/22/2018    ALKPHOS 60 03/22/2018    AST 12 03/22/2018    ALT 10 03/22/2018     BMP:    Lab Results   Component Value Date     03/24/2018    K 3.8 03/24/2018     03/24/2018    CO2 20 03/24/2018    BUN 27 03/24/2018    LABALBU 3.2 03/22/2018    CREATININE 0.8 03/24/2018    CALCIUM 9.0 03/24/2018    GFRAA >60 03/24/2018    LABGLOM >60 03/24/2018    GLUCOSE 103 03/24/2018     Magnesium:    Lab Results   Component Value Date    MG 2.0 03/22/2018     Phosphorus:    Lab Results   Component Value Date    PHOS 3.2 03/22/2018     PT/INR:  No results found for: PROTIME, INR  Warfarin PT/INR:  No components found for: PTPATWAR, PTINRWAR  PTT:  No results found for: APTT, PTT[APTT}  Troponin:    Lab Results   Component Value Date    TROPONINI 0.15 03/20/2018     Last 3 Troponin:    Lab Results   Component Value Date    TROPONINI 0.15 03/20/2018    TROPONINI 0.16 03/20/2018

## 2018-03-26 LAB
HCT VFR BLD CALC: 28.2 % (ref 34–48)
HEMOGLOBIN: 8.4 G/DL (ref 11.5–15.5)

## 2018-03-26 PROCEDURE — 6360000002 HC RX W HCPCS: Performed by: INTERNAL MEDICINE

## 2018-03-26 PROCEDURE — 2580000003 HC RX 258: Performed by: INTERNAL MEDICINE

## 2018-03-26 PROCEDURE — 6370000000 HC RX 637 (ALT 250 FOR IP): Performed by: INTERNAL MEDICINE

## 2018-03-26 PROCEDURE — 94640 AIRWAY INHALATION TREATMENT: CPT

## 2018-03-26 PROCEDURE — 2700000000 HC OXYGEN THERAPY PER DAY

## 2018-03-26 PROCEDURE — 36415 COLL VENOUS BLD VENIPUNCTURE: CPT

## 2018-03-26 PROCEDURE — 94760 N-INVAS EAR/PLS OXIMETRY 1: CPT

## 2018-03-26 PROCEDURE — 2580000003 HC RX 258

## 2018-03-26 PROCEDURE — 85018 HEMOGLOBIN: CPT

## 2018-03-26 PROCEDURE — 85014 HEMATOCRIT: CPT

## 2018-03-26 PROCEDURE — 2060000000 HC ICU INTERMEDIATE R&B

## 2018-03-26 RX ORDER — LEVOFLOXACIN 500 MG/1
500 TABLET, FILM COATED ORAL DAILY
Status: DISCONTINUED | OUTPATIENT
Start: 2018-03-26 | End: 2018-03-27 | Stop reason: HOSPADM

## 2018-03-26 RX ORDER — DOXYCYCLINE HYCLATE 100 MG/1
100 CAPSULE ORAL EVERY 12 HOURS SCHEDULED
Status: DISCONTINUED | OUTPATIENT
Start: 2018-03-26 | End: 2018-03-27 | Stop reason: HOSPADM

## 2018-03-26 RX ADMIN — METOCLOPRAMIDE HYDROCHLORIDE 5 MG: 5 TABLET ORAL at 15:09

## 2018-03-26 RX ADMIN — TORSEMIDE 10 MG: 10 TABLET ORAL at 08:02

## 2018-03-26 RX ADMIN — BUDESONIDE 250 MCG: 0.25 SUSPENSION RESPIRATORY (INHALATION) at 23:00

## 2018-03-26 RX ADMIN — IPRATROPIUM BROMIDE AND ALBUTEROL SULFATE 1 AMPULE: 2.5; .5 SOLUTION RESPIRATORY (INHALATION) at 15:39

## 2018-03-26 RX ADMIN — SPIRONOLACTONE 25 MG: 25 TABLET ORAL at 08:02

## 2018-03-26 RX ADMIN — AMLODIPINE BESYLATE 5 MG: 5 TABLET ORAL at 08:02

## 2018-03-26 RX ADMIN — COLCHICINE 0.6 MG: 0.6 TABLET, FILM COATED ORAL at 08:02

## 2018-03-26 RX ADMIN — ENALAPRIL MALEATE 10 MG: 10 TABLET ORAL at 08:02

## 2018-03-26 RX ADMIN — DOXYCYCLINE HYCLATE 100 MG: 100 CAPSULE, GELATIN COATED ORAL at 20:43

## 2018-03-26 RX ADMIN — ENOXAPARIN SODIUM 40 MG: 40 INJECTION SUBCUTANEOUS at 08:02

## 2018-03-26 RX ADMIN — ONDANSETRON 4 MG: 2 INJECTION INTRAMUSCULAR; INTRAVENOUS at 15:09

## 2018-03-26 RX ADMIN — CEFEPIME HYDROCHLORIDE 1 G: 1 INJECTION, POWDER, FOR SOLUTION INTRAMUSCULAR; INTRAVENOUS at 09:37

## 2018-03-26 RX ADMIN — METOCLOPRAMIDE HYDROCHLORIDE 5 MG: 5 TABLET ORAL at 10:46

## 2018-03-26 RX ADMIN — LEVOFLOXACIN 500 MG: 500 TABLET, FILM COATED ORAL at 15:09

## 2018-03-26 RX ADMIN — SODIUM CHLORIDE: 9 INJECTION, SOLUTION INTRAVENOUS at 09:36

## 2018-03-26 RX ADMIN — PANTOPRAZOLE SODIUM 40 MG: 40 TABLET, DELAYED RELEASE ORAL at 05:22

## 2018-03-26 RX ADMIN — PETROLATUM: 42 OINTMENT TOPICAL at 08:04

## 2018-03-26 RX ADMIN — METOCLOPRAMIDE HYDROCHLORIDE 5 MG: 5 TABLET ORAL at 05:22

## 2018-03-26 RX ADMIN — FORMOTEROL FUMARATE DIHYDRATE 20 MCG: 20 SOLUTION RESPIRATORY (INHALATION) at 22:56

## 2018-03-26 ASSESSMENT — PAIN SCALES - GENERAL
PAINLEVEL_OUTOF10: 0

## 2018-03-26 NOTE — PROGRESS NOTES
with infectious disease, definite staph aureus; infectious disease believes MRSA. Doxycycline has been added. 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 I discussed the above with patient and family; she eventually may need endoscopies but not at this time all the other problems. However, should she continue to bleed and may need done sooner rather than later. Patient agrees to blood transfusion. Stool positive for occult blood. Breathing much improved no chest pain appetite good. Highest temperature last 24 hours 100.1 Fahrenheit. She requested acetaminophen for minor aches and pains; she states is helping. Sodium 141 potassium 3.6 chloride 102 CO2 21 BUN 13 creatinine 0.7 magnesium 2.0 glucose 108 calcium 8.7 phosphorus 3.2 protein 6.7 albumin 3.2 liver functions normal. Hemoglobin 7.9. Hematocrit 26.2. Ferritin low at 78 iron low at 33 iron saturation low at 12% TIBC 270. Immature reticulocytes elevated mature reticulocytes suppressed; reticulocyte count elevated 2.0    3/23/18-patient sitting in bedside chair; daughter present through the exam.  I spoke with nursing, they report therapy believes patient needs rehabilitation; very weak upon standing. Patient admits she was very weak yesterday, does not know about today. I discussed the above; daughter thinks patient belongs in therapy. I suggest acute rehabilitation, patient agrees. She received packed red cell transfusion last evening with no difficulty. Family requests handicap parking sticker; she will be visiting wound care center. I spoke with wound care nurse, the above has been set up for the patient upon discharge. Breathing much improved no chest pain appetite good. Hemoglobin today 8. 7. Patient was seen by orthopedic this morning; x-rays done; right knee with degenerative changes unlikely but cannot exclude avulsion fracture. 3/24/18-patient sitting in bedside chair, states she feels miserable. Starting last evening, severe pain and swelling of right wrist somewhat right hand now beginning in the left hand. X-rays of right knee revealed chondrocalcinosis; likely what's happening in the other joints. Breathing stable; no dyspnea no chest pains appetite poor, ongoing nausea. Zofran does help but does not last very long. Sodium 140 potassium 3.8 chloride 103 CO2 20 BUN 27 creatinine 0.8 glucose 103 calcium 9.0 hemoglobin 8.5.      3/25/18-patient having slightly more cough, more productive. No dyspnea no chest pain. Chondrocalcinosis symptoms of right and left wrist much improved but not resolved. Nausea better but not gone. She has been eating better today. No diarrhea. She did notice her strength was much improved today, she couldn't get out of bed by herself. Still declines any home health care, home physical therapy etc. Hemoglobin 9.2.    3/26/18-patient upset today, during the night IV became disconnected she became so started to sugar. She feels much better this time. No change in breathing difficulty; has been standing walking around the room without any dyspnea. She has been afebrile last 24 hours. Her chondrocalcinosis symptoms, right wrist right hand left wrist have all dramatically improved and virtually gone. Nausea has resolved. Chest x-ray reveals air space disease, does not appear significantly different since 3/22/18. Sodium 140 potassium 3.8 chloride 103 CO2 20 BUN 27 creatinine 0.8 glucose 103 calcium 9.0. Hemoglobin stable, 8.4.      ROS:  Negative to a 10 system review except that mentioned in the HPI. Objective:     PHYSICAL EXAM:    VS: BP (!) 187/80   Pulse 81   Temp 97.4 °F (36.3 °C) (Oral)   Resp 18   Ht 5' 1\" (1.549 m)   Wt 220 lb (99.8 kg)   SpO2 100%   BMI 41.57 kg/m²   General appearance: Alert, Awake, Oriented times 3, no distress  Skin: Warm and dry; see below.   Head: Normocephalic. No masses, lesions or tenderness noted  Eyes: Conjunctivae pale sclera white. PERRL,EOM-I  Ears: External ears normal  Nose/Sinuses: Nares normal. Septum midline. Mucosa normal. No drainage  Oropharynx: Oropharynx clear with no exudate seen  Neck: Supple. No jugular venous distension, lymphadenopathy or thyromegaly Trachea midline  Lungs: Nearly clear  Heart: S1 S2  Regular rate and rhythm. No rub or gallop; grade 2/6 systolic murmur 2nd right intercostal space  Abdomen: Soft, non-tender. BS normal. No masses, organomegaly; no rebound or guarding  Extremities: Dressing on both legs from toes to the knee area. Erythema of wrist and hands resolved  Musculoskeletal: Muscular strength appears intact. Neuro:  No focal motor defects ; II-XII grossly intact . HAY equally    TELEMETRY: REVIEWED--Telemetry: Sinus    ASSESSMENT:   Principal Problem:    Acute respiratory failure with hypoxia (HCC)  Active Problems:    Acute on chronic diastolic congestive heart failure (HCC)    Cellulitis    Hypokalemia    Acute blood loss anemia    Pneumonia    Aortic stenosis, severe    Moderate aortic regurgitation    Mitral regurgitation    SEPSIS DUE TO STAPH    OBESITY    Chondrocalcinosis right knee, chronic    Chondrocalcinosis right hand right wrist left hand, acute  Resolved Problems:    * No resolved hospital problems. *      PLAN:  SEE ORDERS      RE  CHANGES AND FINDINGS   Medications reviewed with patient  GI prophylaxis  DVT prophylaxis  Replace potassium IV bolus 10 mEq  Recheck labs in a.m.   Magnesium sulfate bolus 2 g  Stool for occult blood  Ferritin iron reticulocyte count TIBC  Type and screen  Seaman catheter  Wound care consult  Continue ceftriaxone for now  Transfuse 1 unit packed red cells  20 mg Lasix IV after transfusion  Stop nifedipine in view of leg edema lymphedema  Ceftriaxone has been stopped  Cefepime IV started  Oral doxycycline  Follow H&H  Continue Lovenox for now  I spoke with 03/25/18   0513  03/26/18   0947   HGB  8.5*  9.2*  8.4*       TSH:  No results for input(s): TSH in the last 72 hours. GLUCOSE:No results for input(s): POCGLU in the last 72 hours. CMP:  Recent Labs      03/24/18   0617   NA  140   K  3.8   CL  103   CO2  20*   BUN  27*   CREATININE  0.8   GFRAA  >60   LABGLOM  >60   GLUCOSE  103   CALCIUM  9.0        BNP:No results found for: BNP    PROTIME/INR:No results for input(s): PROTIME, INR in the last 72 hours. CRP:   No results for input(s): CRP in the last 72 hours. ESR:No results for input(s): SEDRATE in the last 72 hours. LIPASE , AMYLASE:  No results found for: LIPASE   No results found for: AMYLASE    ABGs: No results found for: PH, PO2, PCO2    CARDIAC:   No results for input(s): CKTOTAL, CKMB, CKMBINDEX, TROPONINI in the last 72 hours. Lipid Profile:   Lab Results   Component Value Date    TRIG 157 03/21/2018    HDL 45 03/21/2018    LDLCALC 85 03/21/2018    CHOL 161 03/21/2018        Lab Results   Component Value Date    LABA1C 5.4 03/20/2018            RADIOLOGY: REVIEWED AVAILABLE REPORT  XR CHEST PORTABLE   Final Result   Tortuous ectatic aorta   Cardiomegaly    Airspace disease compatible with atelectasis or pneumonia   There are patchy infiltrates seen throughout both the lung fields. Underlying edema could also have this appearance   The chest does not appear to be significantly changed in the interval               XR KNEE RIGHT (3 VIEWS)   Final Result   No displaced fracture or dislocation. Prominence of the tibial eminence, incompletely visualized. This may   be a chronic finding, but in the absence of old studies for   comparison, acute avulsion injury be entirely excluded. Degenerative changes. Small joint effusion. XR CHEST PORTABLE   Final Result   Marked regression signs of vascular congestion or decompensated   congestive heart failure since March 20 but not fully resolved.       XR CHEST PORTABLE   Final Result

## 2018-03-27 VITALS
TEMPERATURE: 98.3 F | BODY MASS INDEX: 41.54 KG/M2 | HEIGHT: 61 IN | WEIGHT: 220 LBS | DIASTOLIC BLOOD PRESSURE: 55 MMHG | OXYGEN SATURATION: 98 % | RESPIRATION RATE: 16 BRPM | SYSTOLIC BLOOD PRESSURE: 107 MMHG | HEART RATE: 91 BPM

## 2018-03-27 LAB
ANION GAP SERPL CALCULATED.3IONS-SCNC: 17 MMOL/L (ref 7–16)
BUN BLDV-MCNC: 47 MG/DL (ref 8–23)
CALCIUM SERPL-MCNC: 9 MG/DL (ref 8.6–10.2)
CHLORIDE BLD-SCNC: 105 MMOL/L (ref 98–107)
CO2: 20 MMOL/L (ref 22–29)
CREAT SERPL-MCNC: 0.8 MG/DL (ref 0.5–1)
GFR AFRICAN AMERICAN: >60
GFR NON-AFRICAN AMERICAN: >60 ML/MIN/1.73
GLUCOSE BLD-MCNC: 86 MG/DL (ref 74–109)
HCT VFR BLD CALC: 28.8 % (ref 34–48)
HEMOGLOBIN: 8.8 G/DL (ref 11.5–15.5)
MAGNESIUM: 1.9 MG/DL (ref 1.6–2.6)
PHOSPHORUS: 2.2 MG/DL (ref 2.5–4.5)
POTASSIUM SERPL-SCNC: 3.6 MMOL/L (ref 3.5–5)
PRO-BNP: 6550 PG/ML (ref 0–450)
SODIUM BLD-SCNC: 142 MMOL/L (ref 132–146)

## 2018-03-27 PROCEDURE — 83880 ASSAY OF NATRIURETIC PEPTIDE: CPT

## 2018-03-27 PROCEDURE — 85018 HEMOGLOBIN: CPT

## 2018-03-27 PROCEDURE — 6370000000 HC RX 637 (ALT 250 FOR IP): Performed by: INTERNAL MEDICINE

## 2018-03-27 PROCEDURE — 2700000000 HC OXYGEN THERAPY PER DAY

## 2018-03-27 PROCEDURE — 84100 ASSAY OF PHOSPHORUS: CPT

## 2018-03-27 PROCEDURE — 94640 AIRWAY INHALATION TREATMENT: CPT

## 2018-03-27 PROCEDURE — 2580000003 HC RX 258: Performed by: INTERNAL MEDICINE

## 2018-03-27 PROCEDURE — 36415 COLL VENOUS BLD VENIPUNCTURE: CPT

## 2018-03-27 PROCEDURE — 94760 N-INVAS EAR/PLS OXIMETRY 1: CPT

## 2018-03-27 PROCEDURE — 6360000002 HC RX W HCPCS: Performed by: INTERNAL MEDICINE

## 2018-03-27 PROCEDURE — 83735 ASSAY OF MAGNESIUM: CPT

## 2018-03-27 PROCEDURE — 85014 HEMATOCRIT: CPT

## 2018-03-27 PROCEDURE — 80048 BASIC METABOLIC PNL TOTAL CA: CPT

## 2018-03-27 RX ORDER — BUMETANIDE 0.25 MG/ML
0.5 INJECTION, SOLUTION INTRAMUSCULAR; INTRAVENOUS ONCE
Status: DISCONTINUED | OUTPATIENT
Start: 2018-03-27 | End: 2018-03-27

## 2018-03-27 RX ORDER — SPIRONOLACTONE 25 MG/1
25 TABLET ORAL DAILY
Qty: 30 TABLET | Refills: 3 | Status: ON HOLD | OUTPATIENT
Start: 2018-03-28 | End: 2020-01-01

## 2018-03-27 RX ORDER — TORSEMIDE 20 MG/1
20 TABLET ORAL DAILY
Qty: 30 TABLET | Refills: 3 | Status: SHIPPED | OUTPATIENT
Start: 2018-03-28

## 2018-03-27 RX ORDER — METOCLOPRAMIDE 5 MG/1
5 TABLET ORAL
Qty: 120 TABLET | Refills: 3 | Status: SHIPPED | OUTPATIENT
Start: 2018-03-27 | End: 2018-04-11 | Stop reason: ALTCHOICE

## 2018-03-27 RX ORDER — LANOLIN ALCOHOL/MO/W.PET/CERES
800 CREAM (GRAM) TOPICAL ONCE
Status: COMPLETED | OUTPATIENT
Start: 2018-03-27 | End: 2018-03-27

## 2018-03-27 RX ORDER — LEVOFLOXACIN 500 MG/1
500 TABLET, FILM COATED ORAL DAILY
Qty: 10 TABLET | Refills: 0 | Status: SHIPPED | OUTPATIENT
Start: 2018-03-28 | End: 2018-04-07

## 2018-03-27 RX ORDER — TORSEMIDE 20 MG/1
20 TABLET ORAL DAILY
Status: DISCONTINUED | OUTPATIENT
Start: 2018-03-28 | End: 2018-03-27 | Stop reason: HOSPADM

## 2018-03-27 RX ORDER — DOXYCYCLINE HYCLATE 100 MG/1
100 CAPSULE ORAL EVERY 12 HOURS SCHEDULED
Qty: 20 CAPSULE | Refills: 0 | Status: SHIPPED | OUTPATIENT
Start: 2018-03-27 | End: 2018-04-06

## 2018-03-27 RX ORDER — AMLODIPINE BESYLATE 2.5 MG/1
2.5 TABLET ORAL DAILY
Status: DISCONTINUED | OUTPATIENT
Start: 2018-03-28 | End: 2018-03-27 | Stop reason: HOSPADM

## 2018-03-27 RX ORDER — PETROLATUM 42 G/100G
OINTMENT TOPICAL
Qty: 1 TUBE | Refills: 2 | Status: SHIPPED | OUTPATIENT
Start: 2018-03-28 | End: 2018-04-11 | Stop reason: ALTCHOICE

## 2018-03-27 RX ORDER — PANTOPRAZOLE SODIUM 40 MG/1
40 TABLET, DELAYED RELEASE ORAL
Qty: 30 TABLET | Refills: 3 | Status: SHIPPED | OUTPATIENT
Start: 2018-03-28 | End: 2018-06-27 | Stop reason: ALTCHOICE

## 2018-03-27 RX ADMIN — DOXYCYCLINE HYCLATE 100 MG: 100 CAPSULE, GELATIN COATED ORAL at 08:59

## 2018-03-27 RX ADMIN — ACETAMINOPHEN 650 MG: 325 TABLET, FILM COATED ORAL at 00:36

## 2018-03-27 RX ADMIN — COLCHICINE 0.6 MG: 0.6 TABLET, FILM COATED ORAL at 08:59

## 2018-03-27 RX ADMIN — IPRATROPIUM BROMIDE AND ALBUTEROL SULFATE 1 AMPULE: 2.5; .5 SOLUTION RESPIRATORY (INHALATION) at 12:59

## 2018-03-27 RX ADMIN — IPRATROPIUM BROMIDE AND ALBUTEROL SULFATE 1 AMPULE: 2.5; .5 SOLUTION RESPIRATORY (INHALATION) at 17:24

## 2018-03-27 RX ADMIN — TORSEMIDE 10 MG: 10 TABLET ORAL at 09:00

## 2018-03-27 RX ADMIN — LEVOFLOXACIN 500 MG: 500 TABLET, FILM COATED ORAL at 14:26

## 2018-03-27 RX ADMIN — ENALAPRIL MALEATE 10 MG: 10 TABLET ORAL at 08:59

## 2018-03-27 RX ADMIN — SPIRONOLACTONE 25 MG: 25 TABLET ORAL at 09:00

## 2018-03-27 RX ADMIN — AMLODIPINE BESYLATE 5 MG: 5 TABLET ORAL at 08:59

## 2018-03-27 RX ADMIN — ENOXAPARIN SODIUM 40 MG: 40 INJECTION SUBCUTANEOUS at 08:58

## 2018-03-27 RX ADMIN — SODIUM CHLORIDE: 9 INJECTION, SOLUTION INTRAVENOUS at 06:16

## 2018-03-27 RX ADMIN — PANTOPRAZOLE SODIUM 40 MG: 40 TABLET, DELAYED RELEASE ORAL at 06:22

## 2018-03-27 RX ADMIN — BUDESONIDE 250 MCG: 0.25 SUSPENSION RESPIRATORY (INHALATION) at 12:58

## 2018-03-27 RX ADMIN — METOCLOPRAMIDE HYDROCHLORIDE 5 MG: 5 TABLET ORAL at 11:28

## 2018-03-27 RX ADMIN — ONDANSETRON 4 MG: 2 INJECTION INTRAMUSCULAR; INTRAVENOUS at 00:36

## 2018-03-27 RX ADMIN — PETROLATUM: 42 OINTMENT TOPICAL at 09:01

## 2018-03-27 RX ADMIN — MAGNESIUM GLUCONATE 500 MG ORAL TABLET 800 MG: 500 TABLET ORAL at 17:28

## 2018-03-27 RX ADMIN — METOCLOPRAMIDE HYDROCHLORIDE 5 MG: 5 TABLET ORAL at 17:28

## 2018-03-27 RX ADMIN — FORMOTEROL FUMARATE DIHYDRATE 20 MCG: 20 SOLUTION RESPIRATORY (INHALATION) at 12:57

## 2018-03-27 RX ADMIN — METOCLOPRAMIDE HYDROCHLORIDE 5 MG: 5 TABLET ORAL at 06:22

## 2018-03-27 ASSESSMENT — PAIN SCALES - GENERAL
PAINLEVEL_OUTOF10: 0

## 2018-03-27 NOTE — PROGRESS NOTES
ABGs: No results found for: PH, PO2, PCO2  INR: No results for input(s): INR in the last 72 hours. PRO-BNP:   Lab Results   Component Value Date    PROBNP 6,102 (H) 2018    PROBNP 1,834 (H) 2018      TSH:   Lab Results   Component Value Date    TSH 1.290 2018      Cardiac Injury Profile: No results for input(s): CKTOTAL, CKMB, TROPONINI in the last 72 hours. Lipid Profile:   Lab Results   Component Value Date    TRIG 157 2018    HDL 45 2018    LDLCALC 85 2018    CHOL 161 2018      Hemoglobin A1C: No components found for: HGBA1C     RAD:   Xr Knee Right (3 Views)    Result Date: 3/23/2018  Patient MRN:  [id number] : 1935 Age: 81 years Gender: Female Order Date:  3/23/2018 7:00 AM Exam: XR KNEE RIGHT (3 VIEWS) Number of views:  4 views of the right knee Indication: Acute on chronic knee pain. Comparison: No prior study is available for comparison. FINDINGS: No obvious displaced fracture is seen. No dislocation is seen. There is mild femorotibial and moderate patellofemoral joint space narrowing. Enthesopathic changes are noted in the patella. Mild chondrocalcinosis is noted in the femorotibial joint space. The tibial eminence appears prominent and is not completely visualized at its superior aspect. There is a small joint effusion. No displaced fracture or dislocation. Prominence of the tibial eminence, incompletely visualized. This may be a chronic finding, but in the absence of old studies for comparison, acute avulsion injury be entirely excluded. Degenerative changes. Small joint effusion. Xr Chest Portable    Result Date: 3/22/2018  Patient MRN:  [id number] : 1935 Age: 81 years Gender: Female Order Date:  3/22/2018 3:15 PM TECHNIQUE/NUMBER OF IMAGES/COMPARISON/CLINICAL HISTORY: Chest. AP. NUMBER OF IMAGES/VIEWS: One image, one view. COMPARISON STUDY: . HISTORY: Pneumonia and congestive heart failure.  FINDINGS: Significant regression of regurgitation   Physiologic and/or trace pulmonic regurgitation present.   Technically good quality study. IMPRESSIONS:  Principal Problem:    Acute respiratory failure with hypoxia (HCC)  Active Problems:    Pneumonia    Acute on chronic diastolic congestive heart failure (HCC)    Cellulitis    Hypokalemia    Acute blood loss anemia    Aortic stenosis, severe    Moderate aortic regurgitation    Mitral regurgitation    Sepsis due to methicillin susceptible Staphylococcus aureus (HCC)    Class 3 obesity in adult    Primary osteoarthritis of right knee  Resolved Problems:    * No resolved hospital problems. *      RECOMMENDATIONS:   notes that she once again has become suddenly nauseated Apparently after swallowing a few pills. Thus would consider holding colchicine and continue utilizing steroid for her acute gout. Continue to carefully monitor electrolytes, renal function At it just results as needed. PureI have spent more than 25 minutes face to face with Renard Xie and reviewing notes and laboratory data, with greater than 50% of this time instructing and counseling the patient And her daughter face to face regarding my findings and recommendations and I have answered all questions as posed to me by Ms. Gwyn Johnson and her daughter. Teodora Valderrama, DO FACP,FACC,FSCAI      NOTE:  This report was transcribed using voice recognition software.   Every effort was made to ensure accuracy; however, inadvertent computerized transcription errors may be present

## 2018-03-27 NOTE — PROGRESS NOTES
fracture. 3/24/18-patient sitting in bedside chair, states she feels miserable. Starting last evening, severe pain and swelling of right wrist somewhat right hand now beginning in the left hand. X-rays of right knee revealed chondrocalcinosis; likely what's happening in the other joints. Breathing stable; no dyspnea no chest pains appetite poor, ongoing nausea. Zofran does help but does not last very long. Sodium 140 potassium 3.8 chloride 103 CO2 20 BUN 27 creatinine 0.8 glucose 103 calcium 9.0 hemoglobin 8.5.      3/25/18-patient having slightly more cough, more productive. No dyspnea no chest pain. Chondrocalcinosis symptoms of right and left wrist much improved but not resolved. Nausea better but not gone. She has been eating better today. No diarrhea. She did notice her strength was much improved today, she couldn't get out of bed by herself. Still declines any home health care, home physical therapy etc. Hemoglobin 9.2.    3/26/18-patient upset today, during the night IV became disconnected she became so started to sugar. She feels much better this time. No change in breathing difficulty; has been standing walking around the room without any dyspnea. She has been afebrile last 24 hours. Her chondrocalcinosis symptoms, right wrist right hand left wrist have all dramatically improved and virtually gone. Nausea has resolved. Chest x-ray reveals air space disease, does not appear significantly different since 3/22/18. Sodium 140 potassium 3.8 chloride 103 CO2 20 BUN 27 creatinine 0.8 glucose 103 calcium 9.0. Hemoglobin stable, 8.4.    3/27/18-patient sitting in bedside chair, states she feels good. She is hoping for discharge today. Infectious disease stopped IV antibiotics, currently oral antibiotics. Patient advised again, needs to follow-up wound care center regarding her legs. SPO2 has been 92 or above last 24 hours. She is currently off oxygen, 98% saturation. Chondrocalcinosis completely resolved.  No further nausea. Sodium 142 potassium 3.6 chloride 105 CO2 20 BUN 47 creatinine 0.8 magnesium 1.9 phosphorus 2.2 glucose 86 proBNP 6500. Hemoglobin 8.8 hematocrit 28.8, stable      ROS:  Negative to a 10 system review except that mentioned in the HPI. Objective:     PHYSICAL EXAM:    VS: BP (!) 144/72   Pulse 100   Temp 98.1 °F (36.7 °C) (Oral)   Resp 24   Ht 5' 1\" (1.549 m)   Wt 220 lb (99.8 kg)   SpO2 92%   BMI 41.57 kg/m²   General appearance: Alert, Awake, Oriented times 3, no distress  Skin: Warm and dry; see below. Head: Normocephalic. No masses, lesions or tenderness noted  Eyes: Conjunctivae pale sclera white. PERRL,EOM-I  Ears: External ears normal  Nose/Sinuses: Nares normal. Septum midline. Mucosa normal. No drainage  Oropharynx: Oropharynx clear with no exudate seen  Neck: Supple. No jugular venous distension, lymphadenopathy or thyromegaly Trachea midline  Lungs: Rare rhonchi mostly clear  Heart: S1 S2  Regular rate and rhythm. No rub or gallop; grade 2/6 systolic murmur 2nd right intercostal space  Abdomen: Soft, non-tender. BS normal. No masses, organomegaly; no rebound or guarding  Extremities: Dressing on both legs from toes to the knee area. Erythema of wrist and hands resolved  Musculoskeletal: Muscular strength appears intact. Neuro:  No focal motor defects ; II-XII grossly intact . HAY equally    TELEMETRY: REVIEWED--Telemetry: Sinus    ASSESSMENT:   Principal Problem:    Acute respiratory failure with hypoxia (HCC)  Active Problems:    Acute on chronic diastolic congestive heart failure (HCC)    Cellulitis    Hypokalemia    Acute blood loss anemia    Pneumonia    Aortic stenosis, severe    Moderate aortic regurgitation    Mitral regurgitation    SEPSIS DUE TO STAPH    OBESITY    Chondrocalcinosis right knee, chronic    Chondrocalcinosis right hand right wrist left hand, acute  Resolved Problems:    * No resolved hospital problems.  *      PLAN:  SEE ORDERS      RE CHANGES AND FINDINGS   Medications reviewed with patient  GI prophylaxis  DVT prophylaxis  Replace potassium IV bolus 10 mEq  Recheck labs in a.m. Magnesium sulfate bolus 2 g  Stool for occult blood  Ferritin iron reticulocyte count TIBC  Type and screen  Seaman catheter  Wound care consult  Continue ceftriaxone for now  Transfuse 1 unit packed red cells  20 mg Lasix IV after transfusion  Stop nifedipine in view of leg edema lymphedema  Ceftriaxone has been stopped  Cefepime IV started  Oral doxycycline  Follow H&H  Continue Lovenox for now  I spoke with patient's son, Soraya Matos (radiologist). Leighann Trevino He is concerned-- patient has been complaining of worsening knee pain for the last 5 years seldom gets out of the house. He is convinced she will not follow up outpatient with orthopedics; he would like orthopedic notified and consulted. I spoke with orthopedics regarding same  Continue aerosol treatments  Continue Maxipime IV  Disability handicap parking permit completed  Physical medicine consult acute rehabilitation  Doxycycline has been stopped  Continue cefepime  Solu-Medrol 125 mg IV once  Colchicine 0.6 daily  Tramadol as needed for pain  Reglan 5 mg 3 times a day for nausea  Patient has been declined for acute rehab  Continue oral Reglan  Follow H&H  Recheck chest x-ray  Repeat Solu-Medrol 125 mg IV today  Continue physical therapy  Continue colchicine  Continue aerosol treatments  Continue Reglan  Continue oral Demadex  Continue Aldactone  Continue Maxipime  I hopefully patient will be ready for discharge, home, in the a.m. She still declines rehab as well as home health care  Recheck labs in a.m. Stop colchicine  Oral Levaquin  Oral doxycycline  Med reconciliation completed  Medications discussed with patient  Discharge home, she refuses home health  Wound care appointment  PCP 1 week          Discussed with patient and nursing.       Edgar Workman  DO     9:48 AM     3/27/2018    TIME >25 MINUTES    >  50 %  OF  TIME 0.9 % injection 10 mL  10 mL Intravenous 2 times per day BEVERLEY Hart   10 mL at 03/22/18 1950    sodium chloride flush 0.9 % injection 10 mL  10 mL Intravenous PRN BEVERLEY Hart        0.9 % sodium chloride infusion   Intravenous Continuous Christine Workman, DO 50 mL/hr at 03/27/18 0616      enoxaparin (LOVENOX) injection 40 mg  40 mg Subcutaneous Daily Christine Workman, DO   40 mg at 03/27/18 0858    guaiFENesin-codeine (GUAIFENESIN AC) 100-10 MG/5ML liquid 5 mL  5 mL Oral Q4H PRN Christine Shulztk, DO        sodium chloride (OCEAN, BABY AYR) 0.65 % nasal spray 1 spray  1 spray Each Nare PRN Christine Workman, DO        phenylephrine (KUNAL-SYNEPHRINE) 1 % nasal spray 1 spray  1 spray Each Nare Q6H PRN Christine Workman, DO        pantoprazole (PROTONIX) tablet 40 mg  40 mg Oral QAM AC Ghassan Workman, DO   40 mg at 03/27/18 0622    ondansetron (ZOFRAN) injection 4 mg  4 mg Intravenous Q6H PRN Christine Workman, DO   4 mg at 03/27/18 0036    budesonide (PULMICORT) nebulizer suspension 250 mcg  250 mcg Nebulization BID Christine Workman, DO   250 mcg at 03/26/18 2300    magnesium hydroxide (MILK OF MAGNESIA) 400 MG/5ML suspension 30 mL  30 mL Oral Daily PRN Christine Workman, DO        ipratropium-albuterol (DUONEB) nebulizer solution 1 ampule  1 ampule Inhalation Q4H WA Ghassan Workman, DO   1 ampule at 03/26/18 1539    formoterol (PERFOROMIST) nebulizer solution 20 mcg  20 mcg Nebulization BID Christine Workman, DO   20 mcg at 03/26/18 2256    enalapril (VASOTEC) tablet 10 mg  10 mg Oral QAM Ghassan Workman, DO   10 mg at 03/27/18 0859    amLODIPine (NORVASC) tablet 5 mg  5 mg Oral Daily Ada Guillaume, DO   5 mg at 03/27/18 0859    torsemide (DEMADEX) tablet 10 mg  10 mg Oral Daily Ada Guillaume, DO   10 mg at 03/27/18 0900    spironolactone (ALDACTONE) tablet 25 mg  25 mg Oral Daily Ada Guillaume, DO   25 mg at 03/27/18 0900     Scheduled Meds:   levofloxacin  500 mg Oral Daily    doxycycline hyclate  100 mg Oral 2 times per day    metoclopramide  5 mg Oral TID AC    colchicine  0.6 mg Oral Daily    mineral oil-hydrophilic petrolatum   Topical Daily    sodium chloride flush  10 mL Intravenous 2 times per day    enoxaparin  40 mg Subcutaneous Daily    pantoprazole  40 mg Oral QAM AC    budesonide  250 mcg Nebulization BID    ipratropium-albuterol  1 ampule Inhalation Q4H WA    formoterol  20 mcg Nebulization BID    enalapril  10 mg Oral QAM    amLODIPine  5 mg Oral Daily    torsemide  10 mg Oral Daily    spironolactone  25 mg Oral Daily       Continuous Infusions:   sodium chloride 50 mL/hr at 18 0616         Data:   Temperature:  Current - Temp: 98.1 °F (36.7 °C); Max - Temp  Av.9 °F (36.6 °C)  Min: 97.7 °F (36.5 °C)  Max: 98.1 °F (36.7 °C)    Respiratory Rate : Resp  Av.2  Min: 18  Max: 24    Pulse Range: Pulse  Av.8  Min: 75  Max: 100    Blood Presuure Range:  Systolic (28VMW), KVS:328 , Min:109 , XF   ; Diastolic (02HDH), ZVW:30, Min:65, Max:75      Pulse ox Range: SpO2  Av.2 %  Min: 92 %  Max: 100 %    Patient Vitals for the past 8 hrs:   BP Temp Temp src Pulse Resp SpO2   18 0815 (!) 144/72 98.1 °F (36.7 °C) Oral 100 24 92 %         Intake/Output Summary (Last 24 hours) at 18 0948  Last data filed at 18 3697   Gross per 24 hour   Intake          1796.83 ml   Output             1750 ml   Net            46.83 ml       I/O last 3 completed shifts: In: 1976.8 [P.O.:640; I.V.:1336.8]  Out: 1750 [Urine:1450; Emesis/NG output:300]    No intake/output data recorded.     Wt Readings from Last 3 Encounters:   18 220 lb (99.8 kg)       Labs:   CBC:   Lab Results   Component Value Date    WBC 8.5 2018    RBC 3.54 2018    HGB 8.8 2018    HCT 28.8 2018    MCV 78.5 2018    MCH 22.9 2018    MCHC 29.1 2018    RDW 16.3 2018     2018    MPV 10.0 2018     CBC with Differential:    Lab

## 2018-03-27 NOTE — PROGRESS NOTES
signs of volume overload is observed since . The findings are not resolved. Still signs for vascular congestion towards the base. Minimal pleural effusion is observed on the left. Marked regression signs of vascular congestion or decompensated congestive heart failure since  but not fully resolved. Xr Chest Portable    Result Date: 3/20/2018  Patient MRN:  [id number] : 1935 Age: 81 years Gender: Female Order Date:  3/20/2018 8:45 AM EXAM: XR CHEST PORTABLE NUMBER OF IMAGES:  1 INDICATION: Shortness of breath Technique: AP view of the chest obtained portably on 3/20/2018 8:45 AM Comparison:  No prior studies available for comparison. Findings: The cardiomediastinal silhouette is enlarged with calcified lesions of the aortic arch. There are bilateral pleural effusions with diffuse airspace disease involving the left mid to lower lung field and right lower lobe. There is no pneumothorax. The visualized osseous structures demonstrate degenerative changes. There is an age-indeterminate dislocation of the right shoulder with medial displacement of the right humeral head with respect to the glenoid fossa. Lines and Tubes: None. Bilateral pleural effusions with diffuse airspace disease involving the left mid to lower lung field and right lower lobe. Recommend clinical correlation.       EKG: See Report  Echo: 3/20/2018  Summary   Left ventricle mildly dilated.   Proximal septal thickening.   Normal LV segmental wall motion.   Estimated left ventricular ejection fraction is 55±5%.   <50% criteria for diastolic dysfunction.   The LAESV Index is <34ml/m2.   Normal right ventricular size and function.   Mild mitral regurgitation is present.   Mild - moderate aortic regurgitation is noted.   Moderate to severe aortic stenosis is present.   The aortic valve dimensionless index is 0.31 .   Unable to accurately assess RV systolic pressure.   Physiologic and/or trace tricuspid regurgitation   Physiologic and/or trace pulmonic regurgitation present.   Technically good quality study. IMPRESSIONS:  Principal Problem:    Acute respiratory failure with hypoxia (HCC)  Active Problems:    Pneumonia    Acute on chronic diastolic congestive heart failure (HCC)    Cellulitis    Hypokalemia    Acute blood loss anemia    Aortic stenosis, severe    Moderate aortic regurgitation    Mitral regurgitation    Sepsis due to methicillin susceptible Staphylococcus aureus (HCC)    Class 3 obesity in adult    Primary osteoarthritis of right knee  Resolved Problems:    * No resolved hospital problems. *      RECOMMENDATIONS:   notes that she once again has become suddenly nauseated Apparently after swallowing a few pills. Thus would consider holding colchicine and continue utilizing steroid for her acute gout. Continue to carefully monitor electrolytes, renal function At it just results as needed. PureI have spent more than 25 minutes face to face with Oneita Liner and reviewing notes and laboratory data, with greater than 50% of this time instructing and counseling the patient And her daughter face to face regarding my findings and recommendations and I have answered all questions as posed to me by Ms. Corona Gonzalez and her daughter. Dewayne Christiansen, DO FACP,FACC,FSCAI      NOTE:  This report was transcribed using voice recognition software.   Every effort was made to ensure accuracy; however, inadvertent computerized transcription errors may be present

## 2018-03-28 NOTE — DISCHARGE SUMMARY
DISCHARGE SUMMARY  Patient ID:  Nikita Benson  [id number]  81 y.o.  1935    Admit date: 3/20/2018      Discharge date and time: 3/27/2018  6:31 PM    Admission Diagnoses: Pneumonia [J18.9]  Pneumonia [J18.9]    Discharge Diagnosis  Principal Problem:    Acute respiratory failure with hypoxia ([address]  Active Problems:    Acute on chronic diastolic congestive heart failure (HCC)    Cellulitis    Hypokalemia    Acute blood loss anemia    Sepsis due to methicillin susceptible Staphylococcus aureus (HCC)    Class 3 obesity in adult    Pneumonia    Aortic stenosis, severe    Moderate aortic regurgitation    Mitral regurgitation    Primary osteoarthritis of right knee  Resolved Problems:    * No resolved hospital problems. [facility] Course: 52-year-old female presented to the ED complaints of worsening shortness of breath x2 weeks, worse the day of admission. Bilateral pleural effusions as well as diffuse airspace disease right and left lung on chest x-ray. Oxygen saturation 77%, pro BNP 1800, WBC 16.6. Ischemic ulcers lower medial surface of both legs from mid calf to the medial malleolus with significant tissue wasting. Patient seen by wound care with appropriate dressings applied cultures obtained. 2-D echo revealed ejection fraction 55% and mild mitral regurgitation mild to moderate aortic regurgitation and moderate to severe aortic stenosis. She was advised she needs to follow with cardiology, may need aortic valve repair in the future. Viral respiratory panel negative blood cultures negative. Hemoglobin dropped to 8.1 on 3/21/18. Culture from leg wounds grew methicillin sensitive staph aureus placed on cefepime and doxycycline until final results were available. Hemoglobin continued to drop, stool was positive for blood. She agrees to transfusion as such was carried out. Family requested orthopedic consultation regarding chronic right knee pain. Chondrocalcinosis degenerative changes found.   On 03/21/2018    MONOPCT 10.0 03/21/2018    BASOPCT 0.1 03/21/2018    MONOSABS 0.85 03/21/2018    LYMPHSABS 1.39 03/21/2018    EOSABS 0.05 03/21/2018    BASOSABS 0.01 03/21/2018     Hemoglobin/Hematocrit:    Lab Results   Component Value Date    HGB 8.8 03/27/2018    HCT 28.8 03/27/2018     CMP:    Lab Results   Component Value Date     03/27/2018    K 3.6 03/27/2018     03/27/2018    CO2 20 03/27/2018    BUN 47 03/27/2018    CREATININE 0.8 03/27/2018    GFRAA >60 03/27/2018    LABGLOM >60 03/27/2018    GLUCOSE 86 03/27/2018    PROT 6.7 03/22/2018    LABALBU 3.2 03/22/2018    CALCIUM 9.0 03/27/2018    BILITOT 0.3 03/22/2018    ALKPHOS 60 03/22/2018    AST 12 03/22/2018    ALT 10 03/22/2018     BMP:    Lab Results   Component Value Date     03/27/2018    K 3.6 03/27/2018     03/27/2018    CO2 20 03/27/2018    BUN 47 03/27/2018    LABALBU 3.2 03/22/2018    CREATININE 0.8 03/27/2018    CALCIUM 9.0 03/27/2018    GFRAA >60 03/27/2018    LABGLOM >60 03/27/2018    GLUCOSE 86 03/27/2018     Magnesium:    Lab Results   Component Value Date    MG 1.9 03/27/2018     Phosphorus:    Lab Results   Component Value Date    PHOS 2.2 03/27/2018     LDH:  No results found for: LDH  Uric Acid:  No results found for: LABURIC, URICACID  PT/INR:  No results found for: PROTIME, INR  Warfarin PT/INR:  No components found for: PTPATWAR, PTINRWAR  PTT:  No results found for: APTT, PTT[APTT}  Troponin:    Lab Results   Component Value Date    TROPONINI 0.15 03/20/2018     Last 3 Troponin:    Lab Results   Component Value Date    TROPONINI 0.15 03/20/2018    TROPONINI 0.16 03/20/2018    TROPONINI 0.02 03/20/2018     U/A:    Lab Results   Component Value Date    COLORU Yellow 03/20/2018    PROTEINU Negative 03/20/2018    PHUR 5.5 03/20/2018    WBCUA 1-3 03/20/2018    RBCUA 1-3 03/20/2018    BACTERIA RARE 03/20/2018    CLARITYU Clear 03/20/2018    SPECGRAV 1.010 03/20/2018    LEUKOCYTESUR TRACE 03/20/2018    UROBILINOGEN 0.2 2018    BILIRUBINUR Negative 2018    BLOODU TRACE 2018    GLUCOSEU Negative 2018     ABG:  No results found for: PH, PCO2, PO2, HCO3, BE, THGB, TCO2, O2SAT  HgBA1c:    Lab Results   Component Value Date    LABA1C 5.4 2018     FLP:    Lab Results   Component Value Date    TRIG 157 2018    HDL 45 2018    LDLCALC 85 2018    LABVLDL 31 2018     TSH:    Lab Results   Component Value Date    TSH 1.290 2018     VITAMIN B12: No components found for: B12  FOLATE:    Lab Results   Component Value Date    FOLATE 14.3 2018          CBC:  Recent Labs      18   0947  18   0524   HGB  8.4*  8.8*   HCT  28.2*  28.8*          H & H :  Recent Labs      18   0947  18   0524   HGB  8.4*  8.8*       TSH:No results for input(s): TSH in the last 72 hours. GLUCOSE:No results for input(s): POCGLU in the last 72 hours. CMP:  Recent Labs      18   0524   NA  142   K  3.6   CL  105   CO2  20*   BUN  47*   CREATININE  0.8   GFRAA  >60   LABGLOM  >60   GLUCOSE  86   CALCIUM  9.0         BNP:No results found for: BNP    PROTIME/INR:No results for input(s): PROTIME, INR in the last 72 hours. CRP: No results for input(s): CRP in the last 72 hours. ESR:No results for input(s): SEDRATE in the last 72 hours. LIPASE , AMYLASE:  No results found for: LIPASE   No results found for: AMYLASE    ABGs: No results found for: PHART, PO2ART, OPP3XXG    CARDIAC: No results for input(s): CKTOTAL, CKMB, CKMBINDEX, TROPONINI in the last 72 hours. Lipid Profile:   Lab Results   Component Value Date    TRIG 157 2018    HDL 45 2018    LDLCALC 85 2018    CHOL 161 2018        Xr Knee Right (3 Views)    Result Date: 3/23/2018  Patient MRN:  [id number] : 1935 Age: 81 years Gender: Female Order Date:  3/23/2018 7:00 AM Exam: XR KNEE RIGHT (3 VIEWS) Number of views:  4 views of the right knee Indication: Acute on chronic knee pain. observed on the left. Marked regression signs of vascular congestion or decompensated congestive heart failure since  but not fully resolved. Xr Chest Portable    Result Date: 3/20/2018  Patient MRN:  [id number] : 1935 Age: 81 years Gender: Female Order Date:  3/20/2018 8:45 AM EXAM: XR CHEST PORTABLE NUMBER OF IMAGES:  1 INDICATION: Shortness of breath Technique: AP view of the chest obtained portably on 3/20/2018 8:45 AM Comparison:  No prior studies available for comparison. Findings: The cardiomediastinal silhouette is enlarged with calcified lesions of the aortic arch. There are bilateral pleural effusions with diffuse airspace disease involving the left mid to lower lung field and right lower lobe. There is no pneumothorax. The visualized osseous structures demonstrate degenerative changes. There is an age-indeterminate dislocation of the right shoulder with medial displacement of the right humeral head with respect to the glenoid fossa. Lines and Tubes: None. Bilateral pleural effusions with diffuse airspace disease involving the left mid to lower lung field and right lower lobe. Recommend clinical correlation. Discharge Exam:  See progress note from today    Discharge Medication List as of 3/27/2018  5:21 PM      START taking these medications    Details   mineral oil-hydrophilic petrolatum (HYDROPHOR) ointment Apply topically as needed. LEGS, Disp-1 Tube, R-2, Normal      spironolactone (ALDACTONE) 25 MG tablet Take 1 tablet by mouth daily, Disp-30 tablet, R-3Normal      torsemide (DEMADEX) 20 MG tablet Take 1 tablet by mouth daily, Disp-30 tablet, R-3Normal      metoclopramide (REGLAN) 5 MG tablet Take 1 tablet by mouth 3 times daily (before meals), Disp-120 tablet, R-3Normal      doxycycline hyclate (VIBRAMYCIN) 100 MG capsule Take 1 capsule by mouth every 12 hours for 10 days, Disp-20 capsule, R-0Normal      pantoprazole (PROTONIX) 40 MG tablet Take 1 tablet by mouth every

## 2018-04-03 ENCOUNTER — HOSPITAL ENCOUNTER (OUTPATIENT)
Dept: WOUND CARE | Age: 83
Discharge: HOME | End: 2018-04-03
Payer: MEDICARE

## 2018-04-03 VITALS
TEMPERATURE: 97.8 F | BODY MASS INDEX: 41.54 KG/M2 | SYSTOLIC BLOOD PRESSURE: 114 MMHG | HEART RATE: 80 BPM | WEIGHT: 220 LBS | DIASTOLIC BLOOD PRESSURE: 60 MMHG | RESPIRATION RATE: 16 BRPM | HEIGHT: 61 IN

## 2018-04-03 PROCEDURE — 99204 OFFICE O/P NEW MOD 45 MIN: CPT

## 2018-04-03 PROCEDURE — 11045 DBRDMT SUBQ TISS EACH ADDL: CPT

## 2018-04-03 PROCEDURE — 11042 DBRDMT SUBQ TIS 1ST 20SQCM/<: CPT

## 2018-04-09 NOTE — PROGRESS NOTES
S:  Marek is here with her  to discuss her irregular cycles and fertility. She does not get regular cycles, they are around 34 days or longer apart and she will get a positive OPK on day 19-24. They have been trying for over a year and are wanting to know what they can do to increase their chances. Her  had a SA but she has not had any blood work done or a pelvic US done yet. Neither of them have ever had children before. Neither are smokers or use drugs. They have not traveled to any known ZIKA infested location.    O:  MA notes reviewed and agree.  Visit Vitals  BP 98/72   Wt 65.4 kg   LMP 03/26/2018 (Exact Date)   BMI 29.12 kg/m²   General: No apparent distress, well-kept, good historian, alert and orientated x 3.  Reviewed allergies, medical, surgical, family, obstetrical, and social hx.   Husbands total motile normal count is 1.2 million    A:  Irregular cycles  Pre-conceptual counseling    P:  US-day 3-5  Day 3 labs  Letrozole ordered for next cycle providing labs look okay.  Decide on if they want to do IUI. The process was discussed  Discussed SA results.  All of their questions were answered.  They will contact us on Day 1 of next cycle to schedule US and let Lana know what they want to do this cycle.  Also discussed using Pre-seed.  Greater than 50% of the visit was spent counseling regarding above issues; total time spent 30 minutes.         Stool hematests positive for occult blood

## 2018-04-11 ENCOUNTER — HOSPITAL ENCOUNTER (OUTPATIENT)
Dept: WOUND CARE | Age: 83
Discharge: HOME | End: 2018-04-11
Payer: MEDICARE

## 2018-04-11 VITALS
HEIGHT: 61 IN | TEMPERATURE: 98.4 F | SYSTOLIC BLOOD PRESSURE: 130 MMHG | WEIGHT: 180 LBS | BODY MASS INDEX: 33.99 KG/M2 | DIASTOLIC BLOOD PRESSURE: 56 MMHG | RESPIRATION RATE: 24 BRPM | HEART RATE: 84 BPM

## 2018-04-11 DIAGNOSIS — L97.312 NON-PRESSURE CHRONIC ULCER OF RIGHT ANKLE WITH FAT LAYER EXPOSED (HCC): Chronic | ICD-10-CM

## 2018-04-11 DIAGNOSIS — L97.322 NON-PRESSURE CHRONIC ULCER OF LEFT ANKLE WITH FAT LAYER EXPOSED (HCC): Chronic | ICD-10-CM

## 2018-04-11 DIAGNOSIS — I89.0 LYMPHEDEMA OF BOTH LOWER EXTREMITIES: Chronic | ICD-10-CM

## 2018-04-11 PROCEDURE — 11045 DBRDMT SUBQ TISS EACH ADDL: CPT | Performed by: SURGERY

## 2018-04-11 PROCEDURE — 11045 DBRDMT SUBQ TISS EACH ADDL: CPT

## 2018-04-11 PROCEDURE — 11042 DBRDMT SUBQ TIS 1ST 20SQCM/<: CPT

## 2018-04-11 PROCEDURE — 11042 DBRDMT SUBQ TIS 1ST 20SQCM/<: CPT | Performed by: SURGERY

## 2018-04-11 RX ORDER — GENTAMICIN SULFATE 1 MG/G
OINTMENT TOPICAL
Qty: 30 G | Refills: 1 | Status: SHIPPED | OUTPATIENT
Start: 2018-04-11 | End: 2018-04-18

## 2018-04-18 ENCOUNTER — HOSPITAL ENCOUNTER (OUTPATIENT)
Dept: WOUND CARE | Age: 83
Discharge: HOME | End: 2018-04-18
Payer: MEDICARE

## 2018-04-18 VITALS
DIASTOLIC BLOOD PRESSURE: 72 MMHG | RESPIRATION RATE: 20 BRPM | HEIGHT: 61 IN | BODY MASS INDEX: 33.99 KG/M2 | TEMPERATURE: 98 F | HEART RATE: 84 BPM | SYSTOLIC BLOOD PRESSURE: 128 MMHG | WEIGHT: 180 LBS

## 2018-04-18 PROCEDURE — 11045 DBRDMT SUBQ TISS EACH ADDL: CPT

## 2018-04-18 PROCEDURE — 11042 DBRDMT SUBQ TIS 1ST 20SQCM/<: CPT

## 2018-04-18 PROCEDURE — 11045 DBRDMT SUBQ TISS EACH ADDL: CPT | Performed by: SURGERY

## 2018-04-18 PROCEDURE — 11042 DBRDMT SUBQ TIS 1ST 20SQCM/<: CPT | Performed by: SURGERY

## 2018-04-25 ENCOUNTER — HOSPITAL ENCOUNTER (OUTPATIENT)
Dept: WOUND CARE | Age: 83
Discharge: HOME | End: 2018-04-25
Payer: MEDICARE

## 2018-04-25 VITALS
BODY MASS INDEX: 33.99 KG/M2 | HEART RATE: 88 BPM | HEIGHT: 61 IN | WEIGHT: 180 LBS | DIASTOLIC BLOOD PRESSURE: 72 MMHG | SYSTOLIC BLOOD PRESSURE: 110 MMHG | RESPIRATION RATE: 16 BRPM | TEMPERATURE: 98.6 F

## 2018-04-25 PROCEDURE — 11045 DBRDMT SUBQ TISS EACH ADDL: CPT | Performed by: SURGERY

## 2018-04-25 PROCEDURE — 11042 DBRDMT SUBQ TIS 1ST 20SQCM/<: CPT

## 2018-04-25 PROCEDURE — 11042 DBRDMT SUBQ TIS 1ST 20SQCM/<: CPT | Performed by: SURGERY

## 2018-04-25 PROCEDURE — 11045 DBRDMT SUBQ TISS EACH ADDL: CPT

## 2018-05-02 ENCOUNTER — HOSPITAL ENCOUNTER (OUTPATIENT)
Dept: WOUND CARE | Age: 83
Discharge: HOME | End: 2018-05-02
Payer: MEDICARE

## 2018-05-02 VITALS
HEIGHT: 61 IN | RESPIRATION RATE: 20 BRPM | HEART RATE: 80 BPM | TEMPERATURE: 98.2 F | DIASTOLIC BLOOD PRESSURE: 58 MMHG | SYSTOLIC BLOOD PRESSURE: 102 MMHG | BODY MASS INDEX: 33.99 KG/M2 | WEIGHT: 180 LBS

## 2018-05-02 PROCEDURE — 11045 DBRDMT SUBQ TISS EACH ADDL: CPT | Performed by: SURGERY

## 2018-05-02 PROCEDURE — 11042 DBRDMT SUBQ TIS 1ST 20SQCM/<: CPT

## 2018-05-02 PROCEDURE — 11042 DBRDMT SUBQ TIS 1ST 20SQCM/<: CPT | Performed by: SURGERY

## 2018-05-02 PROCEDURE — 11045 DBRDMT SUBQ TISS EACH ADDL: CPT

## 2018-05-09 ENCOUNTER — HOSPITAL ENCOUNTER (OUTPATIENT)
Dept: WOUND CARE | Age: 83
Discharge: HOME | End: 2018-05-09
Payer: MEDICARE

## 2018-05-09 VITALS
RESPIRATION RATE: 18 BRPM | HEART RATE: 88 BPM | DIASTOLIC BLOOD PRESSURE: 50 MMHG | TEMPERATURE: 98.5 F | WEIGHT: 180 LBS | SYSTOLIC BLOOD PRESSURE: 120 MMHG | BODY MASS INDEX: 33.99 KG/M2 | HEIGHT: 61 IN

## 2018-05-09 PROCEDURE — 11042 DBRDMT SUBQ TIS 1ST 20SQCM/<: CPT | Performed by: NURSE PRACTITIONER

## 2018-05-09 PROCEDURE — 11045 DBRDMT SUBQ TISS EACH ADDL: CPT

## 2018-05-09 PROCEDURE — 11045 DBRDMT SUBQ TISS EACH ADDL: CPT | Performed by: NURSE PRACTITIONER

## 2018-05-09 PROCEDURE — 11042 DBRDMT SUBQ TIS 1ST 20SQCM/<: CPT

## 2018-05-16 ENCOUNTER — HOSPITAL ENCOUNTER (OUTPATIENT)
Dept: WOUND CARE | Age: 83
Discharge: HOME | End: 2018-05-16
Payer: MEDICARE

## 2018-05-16 VITALS
WEIGHT: 180 LBS | TEMPERATURE: 97.7 F | HEART RATE: 72 BPM | BODY MASS INDEX: 33.99 KG/M2 | RESPIRATION RATE: 16 BRPM | HEIGHT: 61 IN | SYSTOLIC BLOOD PRESSURE: 134 MMHG | DIASTOLIC BLOOD PRESSURE: 56 MMHG

## 2018-05-16 PROCEDURE — 11042 DBRDMT SUBQ TIS 1ST 20SQCM/<: CPT | Performed by: SURGERY

## 2018-05-16 PROCEDURE — 11042 DBRDMT SUBQ TIS 1ST 20SQCM/<: CPT

## 2018-05-16 PROCEDURE — 11045 DBRDMT SUBQ TISS EACH ADDL: CPT

## 2018-05-16 PROCEDURE — 11045 DBRDMT SUBQ TISS EACH ADDL: CPT | Performed by: SURGERY

## 2018-05-23 ENCOUNTER — HOSPITAL ENCOUNTER (OUTPATIENT)
Dept: WOUND CARE | Age: 83
Discharge: HOME | End: 2018-05-23
Payer: MEDICARE

## 2018-05-23 VITALS
HEART RATE: 74 BPM | SYSTOLIC BLOOD PRESSURE: 130 MMHG | HEIGHT: 61 IN | BODY MASS INDEX: 33.99 KG/M2 | DIASTOLIC BLOOD PRESSURE: 62 MMHG | TEMPERATURE: 98.2 F | RESPIRATION RATE: 16 BRPM | WEIGHT: 180 LBS

## 2018-05-23 PROCEDURE — 11042 DBRDMT SUBQ TIS 1ST 20SQCM/<: CPT

## 2018-05-23 PROCEDURE — 11042 DBRDMT SUBQ TIS 1ST 20SQCM/<: CPT | Performed by: NURSE PRACTITIONER

## 2018-05-23 PROCEDURE — 11045 DBRDMT SUBQ TISS EACH ADDL: CPT

## 2018-05-23 PROCEDURE — 11045 DBRDMT SUBQ TISS EACH ADDL: CPT | Performed by: NURSE PRACTITIONER

## 2018-05-23 ASSESSMENT — PAIN SCALES - GENERAL: PAINLEVEL_OUTOF10: 0

## 2018-06-06 ENCOUNTER — HOSPITAL ENCOUNTER (OUTPATIENT)
Dept: WOUND CARE | Age: 83
Discharge: HOME | End: 2018-06-06
Payer: MEDICARE

## 2018-06-06 VITALS
DIASTOLIC BLOOD PRESSURE: 68 MMHG | WEIGHT: 180 LBS | SYSTOLIC BLOOD PRESSURE: 126 MMHG | HEART RATE: 74 BPM | RESPIRATION RATE: 16 BRPM | HEIGHT: 61 IN | TEMPERATURE: 98 F | BODY MASS INDEX: 33.99 KG/M2

## 2018-06-06 DIAGNOSIS — I83.029 VENOUS ULCERS OF BOTH LOWER EXTREMITIES (HCC): ICD-10-CM

## 2018-06-06 DIAGNOSIS — L97.929 VENOUS ULCERS OF BOTH LOWER EXTREMITIES (HCC): ICD-10-CM

## 2018-06-06 DIAGNOSIS — I83.019 VENOUS ULCERS OF BOTH LOWER EXTREMITIES (HCC): ICD-10-CM

## 2018-06-06 DIAGNOSIS — L97.919 VENOUS ULCERS OF BOTH LOWER EXTREMITIES (HCC): ICD-10-CM

## 2018-06-06 PROCEDURE — 11042 DBRDMT SUBQ TIS 1ST 20SQCM/<: CPT | Performed by: NURSE PRACTITIONER

## 2018-06-06 PROCEDURE — 11042 DBRDMT SUBQ TIS 1ST 20SQCM/<: CPT

## 2018-06-06 PROCEDURE — 11045 DBRDMT SUBQ TISS EACH ADDL: CPT

## 2018-06-06 PROCEDURE — 11045 DBRDMT SUBQ TISS EACH ADDL: CPT | Performed by: NURSE PRACTITIONER

## 2018-06-20 ENCOUNTER — HOSPITAL ENCOUNTER (OUTPATIENT)
Dept: WOUND CARE | Age: 83
Discharge: HOME | End: 2018-06-20
Payer: MEDICARE

## 2018-06-20 VITALS
DIASTOLIC BLOOD PRESSURE: 50 MMHG | HEIGHT: 61 IN | SYSTOLIC BLOOD PRESSURE: 124 MMHG | WEIGHT: 180 LBS | HEART RATE: 72 BPM | RESPIRATION RATE: 16 BRPM | TEMPERATURE: 98.1 F | BODY MASS INDEX: 33.99 KG/M2

## 2018-06-20 DIAGNOSIS — L97.929 VENOUS ULCERS OF BOTH LOWER EXTREMITIES (HCC): ICD-10-CM

## 2018-06-20 DIAGNOSIS — L97.919 VENOUS ULCERS OF BOTH LOWER EXTREMITIES (HCC): ICD-10-CM

## 2018-06-20 DIAGNOSIS — I83.019 VENOUS ULCERS OF BOTH LOWER EXTREMITIES (HCC): ICD-10-CM

## 2018-06-20 DIAGNOSIS — I83.029 VENOUS ULCERS OF BOTH LOWER EXTREMITIES (HCC): ICD-10-CM

## 2018-06-20 PROCEDURE — 11042 DBRDMT SUBQ TIS 1ST 20SQCM/<: CPT | Performed by: SURGERY

## 2018-06-20 PROCEDURE — 11045 DBRDMT SUBQ TISS EACH ADDL: CPT | Performed by: SURGERY

## 2018-06-20 PROCEDURE — 11045 DBRDMT SUBQ TISS EACH ADDL: CPT

## 2018-06-20 PROCEDURE — 11042 DBRDMT SUBQ TIS 1ST 20SQCM/<: CPT

## 2018-06-20 RX ORDER — GENTAMICIN SULFATE 1 MG/G
OINTMENT TOPICAL
Qty: 15 G | Refills: 2 | Status: SHIPPED | OUTPATIENT
Start: 2018-06-20 | End: 2018-06-27

## 2018-06-20 RX ORDER — DOXYCYCLINE HYCLATE 100 MG
100 TABLET ORAL 2 TIMES DAILY
Qty: 20 TABLET | Refills: 0 | Status: SHIPPED | OUTPATIENT
Start: 2018-06-20 | End: 2018-06-30

## 2018-06-27 ENCOUNTER — HOSPITAL ENCOUNTER (OUTPATIENT)
Dept: WOUND CARE | Age: 83
Discharge: HOME | End: 2018-06-27
Payer: MEDICARE

## 2018-06-27 VITALS
BODY MASS INDEX: 33.99 KG/M2 | TEMPERATURE: 97.6 F | HEART RATE: 80 BPM | HEIGHT: 61 IN | SYSTOLIC BLOOD PRESSURE: 136 MMHG | RESPIRATION RATE: 18 BRPM | DIASTOLIC BLOOD PRESSURE: 66 MMHG | WEIGHT: 180 LBS

## 2018-06-27 PROCEDURE — 11045 DBRDMT SUBQ TISS EACH ADDL: CPT | Performed by: NURSE PRACTITIONER

## 2018-06-27 PROCEDURE — 11045 DBRDMT SUBQ TISS EACH ADDL: CPT

## 2018-06-27 PROCEDURE — 11042 DBRDMT SUBQ TIS 1ST 20SQCM/<: CPT | Performed by: NURSE PRACTITIONER

## 2018-06-27 PROCEDURE — 11042 DBRDMT SUBQ TIS 1ST 20SQCM/<: CPT

## 2018-07-18 ENCOUNTER — HOSPITAL ENCOUNTER (OUTPATIENT)
Dept: WOUND CARE | Age: 83
Discharge: HOME | End: 2018-07-18
Payer: MEDICARE

## 2018-07-18 VITALS
HEIGHT: 61 IN | RESPIRATION RATE: 18 BRPM | TEMPERATURE: 98 F | WEIGHT: 180 LBS | BODY MASS INDEX: 33.99 KG/M2 | DIASTOLIC BLOOD PRESSURE: 66 MMHG | HEART RATE: 76 BPM | SYSTOLIC BLOOD PRESSURE: 122 MMHG

## 2018-07-18 PROCEDURE — 11045 DBRDMT SUBQ TISS EACH ADDL: CPT | Performed by: NURSE PRACTITIONER

## 2018-07-18 PROCEDURE — 11045 DBRDMT SUBQ TISS EACH ADDL: CPT

## 2018-07-18 PROCEDURE — 11042 DBRDMT SUBQ TIS 1ST 20SQCM/<: CPT | Performed by: NURSE PRACTITIONER

## 2018-07-18 PROCEDURE — 11042 DBRDMT SUBQ TIS 1ST 20SQCM/<: CPT

## 2018-07-18 RX ORDER — GENTAMICIN SULFATE 1 MG/G
OINTMENT TOPICAL
Qty: 30 G | Refills: 2 | Status: SHIPPED | OUTPATIENT
Start: 2018-07-18 | End: 2018-07-25

## 2018-07-18 NOTE — PROGRESS NOTES
[facility] Followup Visit Note    Referring Physician : MD Andra العلي [id number] RECORD NUMBER:  [id number]  AGE: 81 y.o. GENDER: female  : 1935  EPISODE DATE:  2018    Subjective:     Chief Complaint   Patient presents with    Wound Check     bilateral lower legs      HISTORY of PRESENT ILLNESS HPI   Nikolay Taylor is a 81 y.o. female who presents today for wound/ulcer evaluation. History of Wound Context:  The patient has had wounds of the bilateral medial ankles since approximately 2017. She has a long history of recurrent venous stasis ulcerations.      Wound/Ulcer Pain Timing/Severity: intermittent  Quality of pain: aching, burning  Severity:  3 / 10   Modifying Factors: Pain worsens with debridement  Associated Signs/Symptoms: drainage and pain     Ulcer Identification:  Ulcer Type: venous  Contributing Factors: lymphedema     Diabetic/Pressure/Non Pressure Ulcers only:  Ulcer: Non-Pressure ulcer, fat layer exposed     Wound: N/A        PAST MEDICAL HISTORY      Diagnosis Date    Acute blood loss anemia 3/21/2018    Acute on chronic diastolic congestive heart failure ([address] 3/20/2018    Acute respiratory failure with hypoxia ([address] 3/20/2018    Aortic stenosis, severe 2018    Cellulitis 3/20/2018    Right and left lower extremity    Class 3 obesity in adult 3/22/2018    History of blood transfusion     Hypertension     Hypokalemia 3/21/2018    Mitral regurgitation 2018    Moderate aortic regurgitation 2018    Sepsis due to methicillin susceptible Staphylococcus aureus ([address] 3/22/2018     No past surgical history on file. No family history on file.   Social History   Substance Use Topics    Smoking status: Never Smoker    Smokeless tobacco: Never Used    Alcohol use No     No Known Allergies  Current Outpatient Prescriptions on File Prior to Encounter   Medication Sig Dispense Refill    spironolactone (ALDACTONE) 25 MG tablet Take 1 signature:__________________________        If you experience any of the following, please call the [address] during business hours:     * Increase in Pain  * Temperature over 101  * Increase in drainage from your wound  * Drainage with a foul odor  * Bleeding  * Increase in swelling  * Need for compression bandage changes due to slippage, breakthrough drainage.     If you need medical attention outside of the business hours of the [address] please contact your PCP or go to the nearest emergency room.         Electronically signed by KULDIP Lau CNP on 7/18/2018 at 3:51 PM

## 2018-08-01 ENCOUNTER — HOSPITAL ENCOUNTER (OUTPATIENT)
Dept: WOUND CARE | Age: 83
Discharge: HOME | End: 2018-08-01
Payer: MEDICARE

## 2018-08-08 ENCOUNTER — HOSPITAL ENCOUNTER (OUTPATIENT)
Dept: WOUND CARE | Age: 83
Discharge: HOME | End: 2018-08-08
Payer: MEDICARE

## 2018-08-08 VITALS
DIASTOLIC BLOOD PRESSURE: 60 MMHG | HEIGHT: 61 IN | TEMPERATURE: 98.1 F | BODY MASS INDEX: 33.99 KG/M2 | SYSTOLIC BLOOD PRESSURE: 112 MMHG | WEIGHT: 180 LBS | RESPIRATION RATE: 20 BRPM | HEART RATE: 82 BPM

## 2018-08-08 DIAGNOSIS — L97.929 VENOUS ULCERS OF BOTH LOWER EXTREMITIES (HCC): ICD-10-CM

## 2018-08-08 DIAGNOSIS — L97.919 VENOUS ULCERS OF BOTH LOWER EXTREMITIES (HCC): ICD-10-CM

## 2018-08-08 DIAGNOSIS — I83.019 VENOUS ULCERS OF BOTH LOWER EXTREMITIES (HCC): ICD-10-CM

## 2018-08-08 DIAGNOSIS — I83.029 VENOUS ULCERS OF BOTH LOWER EXTREMITIES (HCC): ICD-10-CM

## 2018-08-08 PROBLEM — I87.2 VENOUS ULCER OF LEFT LOWER EXTREMITY WITHOUT VARICOSE VEINS (HCC): Status: ACTIVE | Noted: 2018-06-06

## 2018-08-08 PROCEDURE — 11042 DBRDMT SUBQ TIS 1ST 20SQCM/<: CPT | Performed by: SURGERY

## 2018-08-08 PROCEDURE — 11045 DBRDMT SUBQ TISS EACH ADDL: CPT

## 2018-08-08 PROCEDURE — 11045 DBRDMT SUBQ TISS EACH ADDL: CPT | Performed by: SURGERY

## 2018-08-08 PROCEDURE — 11042 DBRDMT SUBQ TIS 1ST 20SQCM/<: CPT

## 2018-08-08 ASSESSMENT — PAIN SCALES - GENERAL: PAINLEVEL_OUTOF10: 0

## 2018-08-08 NOTE — PROGRESS NOTES
[facility]aling Center Followup Visit Note    Referring Physician : MD Andra Woods [id number] RECORD NUMBER:  [id number]  AGE: 81 y.o. GENDER: female  : 1935  EPISODE DATE:  2018    Subjective:     Chief Complaint   Patient presents with    Wound Check     MULTI      HISTORY of PRESENT ILLNESS HPI   Nahed Estes is a 81 y.o. female who presents today for wound/ulcer evaluation. History of Wound Context:  edema     Wound/Ulcer Pain Timing/Severity: intermittent  Quality of pain: burning  Severity:  2 / 10   Modifying Factors: Pain worsens with walking and Pain is relieved/improved with rest  Associated Signs/Symptoms: edema    Ulcer Identification:  Ulcer Type: venous and lymphedema  Contributing Factors: decreased mobility    Diabetic/Pressure/Non Pressure Ulcers only:  Ulcer: Non-Pressure ulcer, fat layer exposed    Wound: N/A        PAST MEDICAL HISTORY      Diagnosis Date    Acute blood loss anemia 3/21/2018    Acute on chronic diastolic congestive heart failure ([address] 3/20/2018    Acute respiratory failure with hypoxia ([address] 3/20/2018    Aortic stenosis, severe 2018    Cellulitis 3/20/2018    Right and left lower extremity    Class 3 obesity in adult 3/22/2018    History of blood transfusion     Hypertension     Hypokalemia 3/21/2018    Mitral regurgitation 2018    Moderate aortic regurgitation 2018    Sepsis due to methicillin susceptible Staphylococcus aureus ([address] 3/22/2018     History reviewed. No pertinent surgical history. History reviewed. No pertinent family history.   Social History   Substance Use Topics    Smoking status: Never Smoker    Smokeless tobacco: Never Used    Alcohol use No     No Known Allergies  Current Outpatient Prescriptions on File Prior to Encounter   Medication Sig Dispense Refill    spironolactone (ALDACTONE) 25 MG tablet Take 1 tablet by mouth daily 30 tablet 3    torsemide (DEMADEX) 20 MG tablet Take 1 tablet by mouth daily 30 tablet 3    NIFEdipine (NIFEDICAL XL) 30 MG extended release tablet Take 30 mg by mouth every morning      enalapril-hydrochlorothiazide (VASERETIC) 10-25 MG per tablet Take 1 tablet by mouth every morning       No current facility-administered medications on file prior to encounter. REVIEW OF SYSTEMS See HPI    Objective:    /60   Pulse 82   Temp 98.1 °F (36.7 °C) (Oral)   Resp 20   Ht 5' 1\" (1.549 m)   Wt 180 lb (81.6 kg)   BMI 34.01 kg/m²   Wt Readings from Last 3 Encounters:   08/08/18 180 lb (81.6 kg)   07/18/18 180 lb (81.6 kg)   06/27/18 180 lb (81.6 kg)     PHYSICAL EXAM  CONSTITUTIONAL:   Awake, alert, cooperative   EYES:  lids and lashes normal   ENT: external ears and nose without lesions   NECK:  supple, symmetrical, trachea midline   SKIN:  Open wound Present    Assessment:      Active Hospital Problems    Diagnosis    Venous ulcer of left lower extremity without varicose veins (HCC) [I87.2, L97.929]    Non-pressure chronic ulcer of left ankle with fat layer exposed ([address] [L97.322]    Non-pressure chronic ulcer of right ankle with fat layer exposed ([address] [L97.312]     Procedure Note  Indications:  Based on my examination of this patient's wound(s)/ulcer(s) today, debridement is required to promote healing and evaluate the wound base. Performed by: Jessica Negron MD    Consent obtained:  Yes    Time out taken:  Yes    Pain Control: Anesthetic  Anesthetic: 2% Lidocaine Gel Topical     Debridement:Excisional Debridement    Using curette the wound(s)/ulcer(s) was/were sharply debrided down through and including the removal of subcutaneous tissue.         Devitalized Tissue Debrided:  fibrin and biofilm to stimulate bleeding to promote healing, post debridement good bleeding base and wound edges noted    Pre Debridement Measurements:  Are located in the Wound/Ulcer Documentation Flow Sheet    Wound/Ulcer #: 1 and 2    Post Debridement Measurements:  Wound/Ulcer Descriptions are Pre Debridement except measurements:    Wound 03/20/18 Leg Right;Medial (Active)   Number of days: 141       Wound 03/22/18 Leg Lower;Medial;Left (Active)   Number of days: 139       Wound 04/03/18 Venous ulcer Leg Right;Medial wound # 1 acquired 12/15/17 (Active)   Wound Image   7/18/2018  3:34 PM   Wound Type Wound 4/3/2018  2:04 PM   Wound Venous 4/3/2018  2:04 PM   Dressing Status Clean;Dry; Intact 6/27/2018  4:29 PM   Dressing Changed Changed/New 6/27/2018  4:29 PM   Dressing/Treatment ABD; Alginate 6/27/2018  4:29 PM   Wound Cleansed Rinsed/Irrigated with saline; Wound cleanser 6/27/2018  4:29 PM   Wound Length (cm) 2.5 cm 8/8/2018  3:49 PM   Wound Width (cm) 3.6 cm 8/8/2018  3:49 PM   Wound Depth (cm)  0.1 8/8/2018  3:49 PM   Calculated Wound Size (cm^2) (l*w) 9 cm^2 8/8/2018  3:49 PM   Change in Wound Size % (l*w) 93.33 8/8/2018  3:49 PM   Wound Assessment Pink;Tan;Yellow 8/8/2018  3:34 PM   Drainage Amount Moderate 8/8/2018  3:34 PM   Drainage Description Serous; Yellow 8/8/2018  3:34 PM   Odor None 8/8/2018  3:34 PM   Marisela-wound Assessment Intact 8/8/2018  3:34 PM   Time out Yes 8/8/2018  3:49 PM   Procedural Pain 5 6/6/2018  4:15 PM   Post procedural Pain 0 6/6/2018  4:15 PM   Number of days: 127       Wound 04/03/18 Venous ulcer Leg Left;Medial wound # 2 acquired 12/15/17 (Active)   Wound Image   7/18/2018  3:34 PM   Wound Type Wound 4/3/2018  2:04 PM   Wound Venous 4/3/2018  2:04 PM   Dressing Status Clean;Dry; Intact 6/27/2018  4:29 PM   Dressing Changed Changed/New 6/27/2018  4:29 PM   Dressing/Treatment ABD; Alginate 6/27/2018  4:29 PM   Wound Cleansed Wound cleanser;Rinsed/Irrigated with saline 6/27/2018  4:29 PM   Wound Length (cm) 7.2 cm 8/8/2018  3:49 PM   Wound Width (cm) 4.5 cm 8/8/2018  3:49 PM   Wound Depth (cm)  0.2 8/8/2018  3:49 PM   Calculated Wound Size (cm^2) (l*w) 32.4 cm^2 8/8/2018  3:49 PM   Change in Wound Size % (l*w) 73.93 8/8/2018  3:49 PM   Wound Assessment Pink;Tan;Yellow 8/8/2018  3:34 PM   Drainage Amount Moderate 8/8/2018  3:34 PM   Drainage Description Serous; Yellow 8/8/2018  3:34 PM   Odor None 8/8/2018  3:34 PM   Marisela-wound Assessment Calloused 8/8/2018  3:34 PM   Time out Yes 8/8/2018  3:49 PM   Procedural Pain 6 6/6/2018  4:15 PM   Post procedural Pain 0 6/6/2018  4:15 PM   Number of days: 127     Percent of Wound/Ulcer Debrided: 100%    Total Surface Area Debrided:  40 sq cm     Estimated Blood Loss:  None  Hemostasis Achieved:  by pressure    Procedural Pain:  3  / 10   Post Procedural Pain:  1 / 10     Response to treatment:  Well tolerated by patient. Plan:   Treatment Note please see attached Discharge Instructions    Written patient dismissal instructions given to patient and signed by patient or POA. Discharge Instructions         Visit Discharge/Physician Orders     Discharge condition: Stable     Assessment of pain at discharge: NONE     Anesthetic used: 2% LIDOCAINE      Discharge to: Home     Left via:Private automobile     Accompanied by: DAUGHTER     [facility]F/[facility]: [facility]     Dressing Orders:  RUBY LOWER LEGS: CLEANSE WOUND WITH NORMAL STERILE SALINE.  APPLY GENTAMYCIN OINTMENT, COVER WITH ALGINATE AND DRY DRESSING,  PAD WELL AND CHANGE DAILY. APPLY DOUBLE TUBIGRIP OVER SECURED DRESSING, WEAR DURING THE DAY AND MAY REMOVE AT NIGHT. ELEVATE LEGS OVER THE LEVEL OF THE HEART AS MUCH AS POSSIBLE.        Treatment Orders: FOLLOW A NUTRITIOUS DIET HIGH IN PROTEIN AND VITAMIN C TO PROMOTE WOUND HEALING. TAKE A MULTIVITAMIN DAILY.     ELEVATE LEGS ABOVE THE LEVEL OF THE HEART AS MUCH AS POSSIBLE.     KEEP PRESSURE OFF WOUNDS AT ALL TIMES     OK TO SHOWER     START DOCYCYCLINE     SUPPLIES FOR WOUND CARE ORDERED FROM [employer], TO BE DELIVERED WITHIN 3 DAYS. CAN Via [employer] ([phone]) OR [employer] ([phone]) WITH ANY PROBLEMS OR IF SUPPLIES NOT RECEIVED.            [facility] followup visit __________2 WEEK _________________  (Please note

## 2018-08-22 ENCOUNTER — HOSPITAL ENCOUNTER (OUTPATIENT)
Dept: WOUND CARE | Age: 83
Discharge: HOME | End: 2018-08-22
Payer: MEDICARE

## 2018-08-22 VITALS
HEIGHT: 61 IN | BODY MASS INDEX: 33.99 KG/M2 | DIASTOLIC BLOOD PRESSURE: 70 MMHG | WEIGHT: 180 LBS | TEMPERATURE: 98 F | RESPIRATION RATE: 18 BRPM | HEART RATE: 76 BPM | SYSTOLIC BLOOD PRESSURE: 128 MMHG

## 2018-08-22 PROCEDURE — 11042 DBRDMT SUBQ TIS 1ST 20SQCM/<: CPT | Performed by: SURGERY

## 2018-08-22 PROCEDURE — 11042 DBRDMT SUBQ TIS 1ST 20SQCM/<: CPT

## 2018-08-22 NOTE — PROGRESS NOTES
[facility]aling Center Followup Visit Note    Referring Physician : MD Andra Goff [id number] RECORD NUMBER:  [id number]  AGE: 81 y.o. GENDER: female  : 1935  EPISODE DATE:  2018    Subjective:     Chief Complaint   Patient presents with    Wound Check     right and left lower leg      HISTORY of PRESENT ILLNESS HPI   Zain Bhakta is a 81 y.o. female who presents today for wound/ulcer evaluation. History of Wound Context:  edema     Wound/Ulcer Pain Timing/Severity: intermittent  Quality of pain: burning  Severity:  3 / 10   Modifying Factors: Pain worsens with walking  Associated Signs/Symptoms: none    Ulcer Identification:  Ulcer Type: lymphedema  Contributing Factors: edema    Diabetic/Pressure/Non Pressure Ulcers only:  Ulcer: Non-Pressure ulcer, fat layer exposed    Wound: N/A        PAST MEDICAL HISTORY      Diagnosis Date    Acute blood loss anemia 3/21/2018    Acute on chronic diastolic congestive heart failure ([address] 3/20/2018    Acute respiratory failure with hypoxia ([address] 3/20/2018    Aortic stenosis, severe 2018    Cellulitis 3/20/2018    Right and left lower extremity    Class 3 obesity in adult 3/22/2018    History of blood transfusion     Hypertension     Hypokalemia 3/21/2018    Mitral regurgitation 2018    Moderate aortic regurgitation 2018    Sepsis due to methicillin susceptible Staphylococcus aureus ([address] 3/22/2018     History reviewed. No pertinent surgical history. History reviewed. No pertinent family history.   Social History   Substance Use Topics    Smoking status: Never Smoker    Smokeless tobacco: Never Used    Alcohol use No     No Known Allergies  Current Outpatient Prescriptions on File Prior to Encounter   Medication Sig Dispense Refill    spironolactone (ALDACTONE) 25 MG tablet Take 1 tablet by mouth daily 30 tablet 3    torsemide (DEMADEX) 20 MG tablet Take 1 tablet by mouth daily 30 tablet 3    NIFEdipine (NIFEDICAL XL) 30 MG extended release tablet Take 30 mg by mouth every morning      enalapril-hydrochlorothiazide (VASERETIC) 10-25 MG per tablet Take 1 tablet by mouth every morning       No current facility-administered medications on file prior to encounter. REVIEW OF SYSTEMS See HPI    Objective:    /70   Pulse 76   Temp 98 °F (36.7 °C) (Oral)   Resp 18   Ht 5' 1\" (1.549 m)   Wt 180 lb (81.6 kg)   BMI 34.01 kg/m²   Wt Readings from Last 3 Encounters:   08/22/18 180 lb (81.6 kg)   08/08/18 180 lb (81.6 kg)   07/18/18 180 lb (81.6 kg)     PHYSICAL EXAM  CONSTITUTIONAL:   Awake, alert, cooperative   EYES:  lids and lashes normal   ENT: external ears and nose without lesions   NECK:  supple, symmetrical, trachea midline   SKIN:  Open wound Present    Assessment:      Active Hospital Problems    Diagnosis    Non-pressure chronic ulcer of right ankle with fat layer exposed ([address] [L97.312]    Non-pressure chronic ulcer of left ankle with fat layer exposed ([address] [L97.322]     Procedure Note  Indications:  Based on my examination of this patient's wound(s)/ulcer(s) today, debridement is required to promote healing and evaluate the wound base. Performed by: Hassie Goldmann, MD    Consent obtained:  Yes    Time out taken:  Yes    Pain Control: Anesthetic  Anesthetic: 2% Lidocaine Gel Topical     Debridement:Excisional Debridement    Using curette the wound(s)/ulcer(s) was/were sharply debrided down through and including the removal of subcutaneous tissue.         Devitalized Tissue Debrided:  fibrin and biofilm to stimulate bleeding to promote healing, post debridement good bleeding base and wound edges noted    Pre Debridement Measurements:  Are located in the [place]  Documentation Flow Sheet    Wound/Ulcer #: 1 and 2    Post Debridement Measurements:  Wound/Ulcer Descriptions are Pre Debridement except measurements:    Wound 03/20/18 Leg Right;Medial (Active)   Number of days: 155       Wound 03/22/18 please call the [facility] during business hours:     * Increase in Pain  * Temperature over 101  * Increase in drainage from your wound  * Drainage with a foul odor  * Bleeding  * Increase in swelling  * Need for compression bandage changes due to slippage, breakthrough drainage.     If you need medical attention outside of the business hours of the [facility] please contact your PCP or go to the nearest emergency room.         Electronically signed by Janna Carranza MD on 8/22/2018 at 4:13 PM

## 2018-09-12 ENCOUNTER — HOSPITAL ENCOUNTER (OUTPATIENT)
Dept: WOUND CARE | Age: 83
Discharge: HOME | End: 2018-09-12
Payer: MEDICARE

## 2018-09-12 VITALS
WEIGHT: 180 LBS | BODY MASS INDEX: 33.99 KG/M2 | HEIGHT: 61 IN | RESPIRATION RATE: 18 BRPM | DIASTOLIC BLOOD PRESSURE: 70 MMHG | HEART RATE: 72 BPM | TEMPERATURE: 98.7 F | SYSTOLIC BLOOD PRESSURE: 104 MMHG

## 2018-09-12 DIAGNOSIS — L97.929 VENOUS ULCER OF LEFT LOWER EXTREMITY WITHOUT VARICOSE VEINS (HCC): ICD-10-CM

## 2018-09-12 DIAGNOSIS — I87.2 VENOUS ULCER OF LEFT LOWER EXTREMITY WITHOUT VARICOSE VEINS (HCC): ICD-10-CM

## 2018-09-12 PROCEDURE — 11042 DBRDMT SUBQ TIS 1ST 20SQCM/<: CPT | Performed by: SURGERY

## 2018-09-12 PROCEDURE — 11042 DBRDMT SUBQ TIS 1ST 20SQCM/<: CPT

## 2018-10-03 ENCOUNTER — HOSPITAL ENCOUNTER (OUTPATIENT)
Dept: WOUND CARE | Age: 83
Discharge: HOME | End: 2018-10-03
Payer: MEDICARE

## 2018-10-03 VITALS
TEMPERATURE: 98.6 F | SYSTOLIC BLOOD PRESSURE: 112 MMHG | HEART RATE: 72 BPM | RESPIRATION RATE: 20 BRPM | DIASTOLIC BLOOD PRESSURE: 60 MMHG

## 2018-10-03 PROCEDURE — 11042 DBRDMT SUBQ TIS 1ST 20SQCM/<: CPT | Performed by: SURGERY

## 2018-10-03 PROCEDURE — 11042 DBRDMT SUBQ TIS 1ST 20SQCM/<: CPT

## 2018-10-03 RX ORDER — GENTAMICIN SULFATE 1 MG/G
OINTMENT TOPICAL
Qty: 2 TUBE | Refills: 1 | Status: SHIPPED | OUTPATIENT
Start: 2018-10-03 | End: 2018-10-10

## 2018-10-03 ASSESSMENT — PAIN SCALES - GENERAL: PAINLEVEL_OUTOF10: 0

## 2018-10-03 NOTE — PROGRESS NOTES
Wound Healing Center Followup Visit Note    Referring Physician : MD Andra Dunham 6802 RECORD NUMBER:  [id number]  AGE: 81 y.o. GENDER: female  : 1935  EPISODE DATE:  10/3/2018    Subjective:     Chief Complaint   Patient presents with    Wound Check      HISTORY of PRESENT ILLNESS REJI Flores is a 81 y.o. female who presents today for wound/ulcer evaluation. History of Wound Context:  edema     Wound/Ulcer Pain Timing/Severity: intermittent  Quality of pain: burning  Severity:  5 / 10   Modifying Factors: Pain worsens with debridement  Associated Signs/Symptoms: edema    Ulcer Identification:  Ulcer Type: venous and arterial  Contributing Factors: edema    Diabetic/Pressure/Non Pressure Ulcers only:  Ulcer: Non-Pressure ulcer, fat layer exposed    Wound: N/A        PAST MEDICAL HISTORY      Diagnosis Date    Acute blood loss anemia 3/21/2018    Acute on chronic diastolic congestive heart failure ([address] 3/20/2018    Acute respiratory failure with hypoxia ([address] 3/20/2018    Aortic stenosis, severe 2018    Cellulitis 3/20/2018    Right and left lower extremity    Class 3 obesity in adult 3/22/2018    History of blood transfusion     Hypertension     Hypokalemia 3/21/2018    Mitral regurgitation 2018    Moderate aortic regurgitation 2018    Sepsis due to methicillin susceptible Staphylococcus aureus ([address] 3/22/2018     No past surgical history on file. No family history on file.   Social History   Substance Use Topics    Smoking status: Never Smoker    Smokeless tobacco: Never Used    Alcohol use No     No Known Allergies  Current Outpatient Prescriptions on File Prior to Encounter   Medication Sig Dispense Refill    spironolactone (ALDACTONE) 25 MG tablet Take 1 tablet by mouth daily 30 tablet 3    torsemide (DEMADEX) 20 MG tablet Take 1 tablet by mouth daily 30 tablet 3    NIFEdipine (NIFEDICAL XL) 30 MG extended release tablet Take 30 mg by mouth every morning      enalapril-hydrochlorothiazide (VASERETIC) 10-25 MG per tablet Take 1 tablet by mouth every morning       No current facility-administered medications on file prior to encounter. REVIEW OF SYSTEMS See HPI    Objective:    /60   Pulse 72   Temp 98.6 °F (37 °C) (Oral)   Resp 20   Wt Readings from Last 3 Encounters:   09/12/18 180 lb (81.6 kg)   08/22/18 180 lb (81.6 kg)   08/08/18 180 lb (81.6 kg)     PHYSICAL EXAM  CONSTITUTIONAL:   Awake, alert, cooperative   EYES:  lids and lashes normal   ENT: external ears and nose without lesions   NECK:  supple, symmetrical, trachea midline   SKIN:  Open wound Present    Assessment:      Active Hospital Problems    Diagnosis    Non-pressure chronic ulcer of right ankle with fat layer exposed ([address] [L97.312]    Non-pressure chronic ulcer of left ankle with fat layer exposed ([address] [L97.322]     Procedure Note  Indications:  Based on my examination of this patient's wound(s)/ulcer(s) today, debridement is required to promote healing and evaluate the wound base. Performed by: Nahomy Redman MD    Consent obtained:  Yes    Time out taken:  Yes    Pain Control: Anesthetic  Anesthetic: 2% Lidocaine Gel Topical     Debridement:Excisional Debridement    Using curette the wound(s)/ulcer(s) was/were sharply debrided down through and including the removal of subcutaneous tissue.         Devitalized Tissue Debrided:  fibrin and biofilm to stimulate bleeding to promote healing, post debridement good bleeding base and wound edges noted    Pre Debridement Measurements:  Are located in the [place]  Documentation Flow Sheet    Wound/Ulcer #: 1 and 2    Post Debridement Measurements:  Wound/Ulcer Descriptions are Pre Debridement except measurements:    Wound 03/20/18 Leg Right;Medial (Active)   Number of days: 197       Wound 03/22/18 Leg Lower;Medial;Left (Active)   Number of days: 195       Wound 04/03/18 Venous ulcer Leg Right;Medial wound # 1 Procedural Pain 6 6/6/2018  4:15 PM   Post procedural Pain 0 6/6/2018  4:15 PM   Number of days: 183     Percent of Wound/Ulcer Debrided: 90%    Total Surface Area Debrided:  20 sq cm     Estimated Blood Loss:  None  Hemostasis Achieved:  by pressure    Procedural Pain:  7  / 10   Post Procedural Pain:  2 / 10     Response to treatment:  Well tolerated by patient. Plan:   Treatment Note please see attached Discharge Instructions    Written patient dismissal instructions given to patient and signed by patient or POA. Discharge Instructions       Visit Discharge/Physician Orders     Discharge condition: Stable     Assessment of pain at discharge: NONE     Anesthetic used: 2% LIDOCAINE      Discharge to: Home     Left via:Private automobile     Accompanied by: DAUGHTER     ECF/HHA: na     Dressing Orders:  RUBY LOWER LEGS: CLEANSE WOUND WITH NORMAL STERILE SALINE.  APPLY GENTAMYCIN OINTMENT, COVER WITH ALGINATE AND DRY DRESSING,  PAD WELL AND CHANGE DAILY. APPLY DOUBLE TUBIGRIP OVER SECURED DRESSING, WEAR DURING THE DAY AND MAY REMOVE AT NIGHT. ELEVATE LEGS OVER THE LEVEL OF THE HEART AS MUCH AS POSSIBLE.        Treatment Orders: FOLLOW A NUTRITIOUS DIET HIGH IN PROTEIN AND VITAMIN C TO PROMOTE WOUND HEALING. TAKE A MULTIVITAMIN DAILY.     ELEVATE LEGS ABOVE THE LEVEL OF THE HEART AS MUCH AS POSSIBLE.     KEEP PRESSURE OFF WOUNDS AT ALL TIMES     OK TO SHOWER        SUPPLIES FOR WOUND CARE ORDERED FROM [facility], TO BE DELIVERED WITHIN 3 DAYS. CAN [address] ([phone]) OR [employer] ([phone]) WITH ANY PROBLEMS OR IF SUPPLIES NOT RECEIVED.          [facility] followup visit __________2 WEEK _________________  (Please note your next appointment above and if you are unable to keep, kindly give a 24 hour notice.  Thank you.)     Physician signature:__________________________        If you experience any of the following, please call the [address] during business hours:     * Increase in

## 2018-10-17 ENCOUNTER — HOSPITAL ENCOUNTER (OUTPATIENT)
Dept: WOUND CARE | Age: 83
Discharge: HOME | End: 2018-10-17
Payer: MEDICARE

## 2018-10-17 VITALS
RESPIRATION RATE: 18 BRPM | WEIGHT: 180 LBS | TEMPERATURE: 97.5 F | BODY MASS INDEX: 33.99 KG/M2 | DIASTOLIC BLOOD PRESSURE: 70 MMHG | HEART RATE: 80 BPM | HEIGHT: 61 IN | SYSTOLIC BLOOD PRESSURE: 146 MMHG

## 2018-10-17 PROCEDURE — 11042 DBRDMT SUBQ TIS 1ST 20SQCM/<: CPT | Performed by: NURSE PRACTITIONER

## 2018-10-17 PROCEDURE — 11042 DBRDMT SUBQ TIS 1ST 20SQCM/<: CPT

## 2018-10-17 PROCEDURE — 11045 DBRDMT SUBQ TISS EACH ADDL: CPT | Performed by: NURSE PRACTITIONER

## 2018-10-17 PROCEDURE — 11045 DBRDMT SUBQ TISS EACH ADDL: CPT

## 2018-10-31 ENCOUNTER — HOSPITAL ENCOUNTER (OUTPATIENT)
Dept: WOUND CARE | Age: 83
Discharge: HOME | End: 2018-10-31
Payer: MEDICARE

## 2018-10-31 VITALS
TEMPERATURE: 98.1 F | SYSTOLIC BLOOD PRESSURE: 136 MMHG | DIASTOLIC BLOOD PRESSURE: 70 MMHG | BODY MASS INDEX: 33.99 KG/M2 | HEART RATE: 80 BPM | WEIGHT: 180 LBS | HEIGHT: 61 IN | RESPIRATION RATE: 16 BRPM

## 2018-10-31 PROCEDURE — 11042 DBRDMT SUBQ TIS 1ST 20SQCM/<: CPT | Performed by: SURGERY

## 2018-10-31 PROCEDURE — 11042 DBRDMT SUBQ TIS 1ST 20SQCM/<: CPT

## 2018-11-14 ENCOUNTER — HOSPITAL ENCOUNTER (OUTPATIENT)
Dept: WOUND CARE | Age: 83
Discharge: HOME | End: 2018-11-14
Payer: MEDICARE

## 2018-11-21 ENCOUNTER — HOSPITAL ENCOUNTER (OUTPATIENT)
Dept: WOUND CARE | Age: 83
Discharge: HOME | End: 2018-11-21
Payer: MEDICARE

## 2018-11-28 ENCOUNTER — HOSPITAL ENCOUNTER (OUTPATIENT)
Dept: WOUND CARE | Age: 83
Discharge: HOME | End: 2018-11-28
Payer: MEDICARE

## 2018-11-28 VITALS
TEMPERATURE: 97.9 F | SYSTOLIC BLOOD PRESSURE: 110 MMHG | WEIGHT: 180 LBS | HEIGHT: 61 IN | BODY MASS INDEX: 33.99 KG/M2 | RESPIRATION RATE: 18 BRPM | DIASTOLIC BLOOD PRESSURE: 60 MMHG | HEART RATE: 86 BPM

## 2018-11-28 DIAGNOSIS — L97.929 VENOUS ULCER OF LEFT LOWER EXTREMITY WITHOUT VARICOSE VEINS (HCC): ICD-10-CM

## 2018-11-28 DIAGNOSIS — I87.2 VENOUS ULCER OF LEFT LOWER EXTREMITY WITHOUT VARICOSE VEINS (HCC): ICD-10-CM

## 2018-11-28 PROCEDURE — 11042 DBRDMT SUBQ TIS 1ST 20SQCM/<: CPT | Performed by: SURGERY

## 2018-11-28 PROCEDURE — 11042 DBRDMT SUBQ TIS 1ST 20SQCM/<: CPT

## 2018-11-28 NOTE — PROGRESS NOTES
you.)     Physician signature:__________________________        If you experience any of the following, please call the [facility] [address] during business hours:     * Increase in Pain  * Temperature over 101  * Increase in drainage from your wound  * Drainage with a foul odor  * Bleeding  * Increase in swelling  * Need for compression bandage changes due to slippage, breakthrough drainage.     If you need medical attention outside of the business hours of the [facility] [address] please contact your PCP or go to the nearest emergency room.              Electronically signed by Whitney Pérez MD on 11/28/2018 at 4:21 PM No

## 2018-12-12 ENCOUNTER — HOSPITAL ENCOUNTER (OUTPATIENT)
Dept: WOUND CARE | Age: 83
Discharge: HOME | End: 2018-12-12
Payer: MEDICARE

## 2018-12-12 VITALS
TEMPERATURE: 97 F | HEIGHT: 61 IN | DIASTOLIC BLOOD PRESSURE: 80 MMHG | BODY MASS INDEX: 33.99 KG/M2 | SYSTOLIC BLOOD PRESSURE: 120 MMHG | HEART RATE: 80 BPM | RESPIRATION RATE: 20 BRPM | WEIGHT: 180 LBS

## 2018-12-12 DIAGNOSIS — I87.2 VENOUS ULCER OF LEFT LOWER EXTREMITY WITHOUT VARICOSE VEINS (HCC): ICD-10-CM

## 2018-12-12 DIAGNOSIS — L97.929 VENOUS ULCER OF LEFT LOWER EXTREMITY WITHOUT VARICOSE VEINS (HCC): ICD-10-CM

## 2018-12-12 PROCEDURE — 11042 DBRDMT SUBQ TIS 1ST 20SQCM/<: CPT | Performed by: SURGERY

## 2018-12-12 PROCEDURE — 11042 DBRDMT SUBQ TIS 1ST 20SQCM/<: CPT

## 2018-12-12 ASSESSMENT — PAIN SCALES - GENERAL: PAINLEVEL_OUTOF10: 0

## 2018-12-12 NOTE — PROGRESS NOTES
morning      enalapril-hydrochlorothiazide (VASERETIC) 10-25 MG per tablet Take 1 tablet by mouth every morning       No current facility-administered medications on file prior to encounter. REVIEW OF SYSTEMS See HPI    Objective:    /80   Pulse 80   Temp 97 °F (36.1 °C) (Oral)   Resp 20   Ht 5' 1\" (1.549 m)   Wt 180 lb (81.6 kg)   BMI 34.01 kg/m²   Wt Readings from Last 3 Encounters:   12/12/18 180 lb (81.6 kg)   11/28/18 180 lb (81.6 kg)   10/31/18 180 lb (81.6 kg)     PHYSICAL EXAM  CONSTITUTIONAL:   Awake, alert, cooperative   EYES:  lids and lashes normal   ENT: external ears and nose without lesions   NECK:  supple, symmetrical, trachea midline   SKIN:  Open wound Present    Assessment:      Active Hospital Problems    Diagnosis    Non-pressure chronic ulcer of right ankle with fat layer exposed ([address] [L97.312]    Non-pressure chronic ulcer of left ankle with fat layer exposed ([address] [L97.322]     Procedure Note  Indications:  Based on my examination of this patient's wound(s)/ulcer(s) today, debridement is required to promote healing and evaluate the wound base. Performed by: Kerline Liu MD    Consent obtained:  Yes    Time out taken:  Yes    Pain Control: Anesthetic  Anesthetic: 2% Lidocaine Gel Topical     Debridement:Excisional Debridement    Using curette the wound(s)/ulcer(s) was/were sharply debrided down through and including the removal of subcutaneous tissue.         Devitalized Tissue Debrided:  slough to stimulate bleeding to promote healing, post debridement good bleeding base and wound edges noted    Pre Debridement Measurements:  Are located in the [place]  Documentation Flow Sheet    Wound/Ulcer #: 1 and 2    Post Debridement Measurements:  Wound/Ulcer Descriptions are Pre Debridement except measurements:    Wound 03/20/18 Leg Right;Medial (Active)   Number of days: 267       Wound 03/22/18 Leg Lower;Medial;Left (Active)   Number of days: 265       Wound 04/13/18

## 2018-12-14 ENCOUNTER — APPOINTMENT (OUTPATIENT)
Dept: GENERAL RADIOLOGY | Age: 83
End: 2018-12-14
Payer: MEDICARE

## 2018-12-14 ENCOUNTER — APPOINTMENT (OUTPATIENT)
Dept: CT IMAGING | Age: 83
End: 2018-12-14
Payer: MEDICARE

## 2018-12-14 ENCOUNTER — HOSPITAL ENCOUNTER (EMERGENCY)
Age: 83
Discharge: HOME | End: 2018-12-14
Attending: EMERGENCY MEDICINE
Payer: MEDICARE

## 2018-12-14 VITALS
TEMPERATURE: 97.6 F | WEIGHT: 180 LBS | OXYGEN SATURATION: 99 % | HEIGHT: 65 IN | RESPIRATION RATE: 15 BRPM | SYSTOLIC BLOOD PRESSURE: 139 MMHG | DIASTOLIC BLOOD PRESSURE: 70 MMHG | BODY MASS INDEX: 29.99 KG/M2 | HEART RATE: 82 BPM

## 2018-12-14 DIAGNOSIS — R10.9 FLANK PAIN: Primary | ICD-10-CM

## 2018-12-14 LAB
ALBUMIN SERPL-MCNC: 4.2 G/DL (ref 3.5–5.2)
ALP BLD-CCNC: 80 U/L (ref 35–104)
ALT SERPL-CCNC: 8 U/L (ref 0–32)
ANION GAP SERPL CALCULATED.3IONS-SCNC: 15 MMOL/L (ref 7–16)
AST SERPL-CCNC: 12 U/L (ref 0–31)
BACTERIA: ABNORMAL /HPF
BASOPHILS ABSOLUTE: 0.04 E9/L (ref 0–0.2)
BASOPHILS RELATIVE PERCENT: 0.5 % (ref 0–2)
BILIRUB SERPL-MCNC: 0.3 MG/DL (ref 0–1.2)
BILIRUBIN URINE: NEGATIVE
BLOOD, URINE: NEGATIVE
BUN BLDV-MCNC: 32 MG/DL (ref 8–23)
CALCIUM SERPL-MCNC: 10 MG/DL (ref 8.6–10.2)
CASTS: ABNORMAL /LPF
CHLORIDE BLD-SCNC: 100 MMOL/L (ref 98–107)
CLARITY: CLEAR
CO2: 23 MMOL/L (ref 22–29)
COLOR: YELLOW
CREAT SERPL-MCNC: 1.1 MG/DL (ref 0.5–1)
EKG ATRIAL RATE: 86 BPM
EKG P AXIS: 42 DEGREES
EKG P-R INTERVAL: 180 MS
EKG Q-T INTERVAL: 370 MS
EKG QRS DURATION: 76 MS
EKG QTC CALCULATION (BAZETT): 442 MS
EKG R AXIS: 37 DEGREES
EKG T AXIS: 76 DEGREES
EKG VENTRICULAR RATE: 86 BPM
EOSINOPHILS ABSOLUTE: 0.08 E9/L (ref 0.05–0.5)
EOSINOPHILS RELATIVE PERCENT: 1 % (ref 0–6)
GFR AFRICAN AMERICAN: 57
GFR NON-AFRICAN AMERICAN: 47 ML/MIN/1.73
GLUCOSE BLD-MCNC: 102 MG/DL (ref 74–99)
GLUCOSE URINE: NEGATIVE MG/DL
HCT VFR BLD CALC: 34.5 % (ref 34–48)
HEMOGLOBIN: 10.8 G/DL (ref 11.5–15.5)
IMMATURE GRANULOCYTES #: 0.04 E9/L
IMMATURE GRANULOCYTES %: 0.5 % (ref 0–5)
KETONES, URINE: ABNORMAL MG/DL
LACTIC ACID: 1.1 MMOL/L (ref 0.5–2.2)
LEUKOCYTE ESTERASE, URINE: ABNORMAL
LYMPHOCYTES ABSOLUTE: 1.44 E9/L (ref 1.5–4)
LYMPHOCYTES RELATIVE PERCENT: 18.3 % (ref 20–42)
MCH RBC QN AUTO: 27.6 PG (ref 26–35)
MCHC RBC AUTO-ENTMCNC: 31.3 % (ref 32–34.5)
MCV RBC AUTO: 88 FL (ref 80–99.9)
MONOCYTES ABSOLUTE: 0.63 E9/L (ref 0.1–0.95)
MONOCYTES RELATIVE PERCENT: 8 % (ref 2–12)
NEUTROPHILS ABSOLUTE: 5.63 E9/L (ref 1.8–7.3)
NEUTROPHILS RELATIVE PERCENT: 71.7 % (ref 43–80)
NITRITE, URINE: NEGATIVE
PDW BLD-RTO: 14.2 FL (ref 11.5–15)
PH UA: 5 (ref 5–9)
PLATELET # BLD: 333 E9/L (ref 130–450)
PMV BLD AUTO: 10.3 FL (ref 7–12)
POTASSIUM SERPL-SCNC: 4.2 MMOL/L (ref 3.5–5)
PROTEIN UA: NEGATIVE MG/DL
RBC # BLD: 3.92 E12/L (ref 3.5–5.5)
RBC UA: ABNORMAL /HPF (ref 0–2)
SODIUM BLD-SCNC: 138 MMOL/L (ref 132–146)
SPECIFIC GRAVITY UA: 1.02 (ref 1–1.03)
TOTAL PROTEIN: 7.8 G/DL (ref 6.4–8.3)
TROPONIN: <0.01 NG/ML (ref 0–0.03)
UROBILINOGEN, URINE: 0.2 E.U./DL
WBC # BLD: 7.9 E9/L (ref 4.5–11.5)
WBC UA: ABNORMAL /HPF (ref 0–5)

## 2018-12-14 PROCEDURE — 84484 ASSAY OF TROPONIN QUANT: CPT

## 2018-12-14 PROCEDURE — 87088 URINE BACTERIA CULTURE: CPT

## 2018-12-14 PROCEDURE — 6370000000 HC RX 637 (ALT 250 FOR IP): Performed by: EMERGENCY MEDICINE

## 2018-12-14 PROCEDURE — 83605 ASSAY OF LACTIC ACID: CPT

## 2018-12-14 PROCEDURE — 71046 X-RAY EXAM CHEST 2 VIEWS: CPT

## 2018-12-14 PROCEDURE — 85025 COMPLETE CBC W/AUTO DIFF WBC: CPT

## 2018-12-14 PROCEDURE — 81001 URINALYSIS AUTO W/SCOPE: CPT

## 2018-12-14 PROCEDURE — 99284 EMERGENCY DEPT VISIT MOD MDM: CPT

## 2018-12-14 PROCEDURE — 36415 COLL VENOUS BLD VENIPUNCTURE: CPT

## 2018-12-14 PROCEDURE — 80053 COMPREHEN METABOLIC PANEL: CPT

## 2018-12-14 PROCEDURE — 73502 X-RAY EXAM HIP UNI 2-3 VIEWS: CPT

## 2018-12-14 PROCEDURE — 74176 CT ABD & PELVIS W/O CONTRAST: CPT

## 2018-12-14 RX ORDER — HYDROCODONE BITARTRATE AND ACETAMINOPHEN 5; 325 MG/1; MG/1
1 TABLET ORAL EVERY 6 HOURS PRN
Qty: 12 TABLET | Refills: 0 | Status: SHIPPED | OUTPATIENT
Start: 2018-12-14 | End: 2018-12-17

## 2018-12-14 RX ORDER — CEPHALEXIN 250 MG/1
250 CAPSULE ORAL 3 TIMES DAILY
Qty: 15 CAPSULE | Refills: 0 | Status: SHIPPED | OUTPATIENT
Start: 2018-12-14 | End: 2018-12-14 | Stop reason: CLARIF

## 2018-12-14 RX ORDER — HYDROCODONE BITARTRATE AND ACETAMINOPHEN 5; 325 MG/1; MG/1
1 TABLET ORAL ONCE
Status: COMPLETED | OUTPATIENT
Start: 2018-12-14 | End: 2018-12-14

## 2018-12-14 RX ADMIN — HYDROCODONE BITARTRATE AND ACETAMINOPHEN 1 TABLET: 5; 325 TABLET ORAL at 19:17

## 2018-12-14 ASSESSMENT — PAIN DESCRIPTION - PAIN TYPE: TYPE: ACUTE PAIN

## 2018-12-14 ASSESSMENT — PAIN DESCRIPTION - LOCATION: LOCATION: FLANK

## 2018-12-14 ASSESSMENT — PAIN SCALES - GENERAL
PAINLEVEL_OUTOF10: 3
PAINLEVEL_OUTOF10: 1

## 2018-12-14 ASSESSMENT — PAIN DESCRIPTION - ORIENTATION: ORIENTATION: LEFT

## 2018-12-14 ASSESSMENT — PAIN DESCRIPTION - DESCRIPTORS: DESCRIPTORS: SHARP

## 2018-12-14 ASSESSMENT — PAIN DESCRIPTION - ONSET: ONSET: ON-GOING

## 2018-12-14 ASSESSMENT — PAIN DESCRIPTION - FREQUENCY: FREQUENCY: INTERMITTENT

## 2018-12-17 LAB — URINE CULTURE, ROUTINE: NORMAL

## 2018-12-19 ENCOUNTER — HOSPITAL ENCOUNTER (OUTPATIENT)
Dept: WOUND CARE | Age: 83
Discharge: HOME | End: 2018-12-19
Payer: MEDICARE

## 2018-12-19 VITALS
RESPIRATION RATE: 16 BRPM | SYSTOLIC BLOOD PRESSURE: 128 MMHG | WEIGHT: 180 LBS | HEIGHT: 65 IN | DIASTOLIC BLOOD PRESSURE: 66 MMHG | BODY MASS INDEX: 29.99 KG/M2 | HEART RATE: 76 BPM | TEMPERATURE: 98 F

## 2018-12-19 PROCEDURE — 11042 DBRDMT SUBQ TIS 1ST 20SQCM/<: CPT | Performed by: SURGERY

## 2018-12-19 PROCEDURE — 11042 DBRDMT SUBQ TIS 1ST 20SQCM/<: CPT

## 2018-12-19 NOTE — PROGRESS NOTES
tablet Take 30 mg by mouth every morning      enalapril-hydrochlorothiazide (VASERETIC) 10-25 MG per tablet Take 1 tablet by mouth every morning       No current facility-administered medications on file prior to encounter. REVIEW OF SYSTEMS See HPI    Objective:    /66   Pulse 76   Temp 98 °F (36.7 °C) (Oral)   Resp 16   Ht 5' 5\" (1.651 m)   Wt 180 lb (81.6 kg)   BMI 29.95 kg/m²   Wt Readings from Last 3 Encounters:   12/19/18 180 lb (81.6 kg)   12/14/18 180 lb (81.6 kg)   12/12/18 180 lb (81.6 kg)     PHYSICAL EXAM  CONSTITUTIONAL:   Awake, alert, cooperative   EYES:  lids and lashes normal   ENT: external ears and nose without lesions   NECK:  supple, symmetrical, trachea midline   SKIN:  Open wound Present    Assessment:      Active Hospital Problems    Diagnosis    Non-pressure chronic ulcer of right ankle with fat layer exposed ([address] [L97.312]    Non-pressure chronic ulcer of left ankle with fat layer exposed ([address] [L97.322]     Procedure Note  Indications:  Based on my examination of this patient's wound(s)/ulcer(s) today, debridement is required to promote healing and evaluate the wound base. Performed by: Frank Mendez MD    Consent obtained:  Yes    Time out taken:  Yes    Pain Control: Anesthetic  Anesthetic: 2% Lidocaine Gel Topical     Debridement:Excisional Debridement    Using curette the wound(s)/ulcer(s) was/were sharply debrided down through and including the removal of subcutaneous tissue.         Devitalized Tissue Debrided:  biofilm to stimulate bleeding to promote healing, post debridement good bleeding base and wound edges noted    Pre Debridement Measurements:  Are located in the [place]  Documentation Flow Sheet    Wound/Ulcer #: 1 and 2    Post Debridement Measurements:  Wound/Ulcer Descriptions are Pre Debridement except measurements:    Wound 04/13/18 Leg Medial;Lower;Right wound # 1 acquired 12/15/17 (Active)   Dressing/Treatment Alginate;Dry dressing Bleeding  * Increase in swelling  * Need for compression bandage changes due to slippage, breakthrough drainage.     If you need medical attention outside of the business hours of the [address] [place] [address] Road please contact your PCP or go to the nearest emergency room.              Electronically signed by Gianfranco Benitez MD on 12/19/2018 at 4:17 PM

## 2018-12-30 NOTE — ED PROVIDER NOTES
Supple, full ROM,   Pulmonary: Lungs clear to auscultation bilaterally, no wheezes, rales, or rhonchi. Not in respiratory distress  Cardiovascular:  Regular rate and rhythm, no murmurs, gallops, or rubs. 2+ distal pulses  Abdomen: Soft, left flank tender, non distended, no rebound or guarding. Back: No cva tenderness. No spinal tenderness. Extremities: Moves all extremities x 4. Warm and well perfused. Minimal tenderness left hip. Full AROM. Skin: warm and dry without rash  Neurologic: GCS 15,  Psych: Normal Affect      ------------------------------ ED COURSE/MEDICAL DECISION MAKING----------------------  Medications   HYDROcodone-acetaminophen (NORCO) 5-325 MG per tablet 1 tablet (1 tablet Oral Given 12/14/18 1917)         ED COURSE:       Medical Decision Making:    Patient has left flank pain which seems musculoskeletal. Also has chronic hip pain and does have some tenderness to palpation so imaging pursued but this is unrelated to the flank pain. Workup reassuring. Discussed importance of outpatient followup and signs and symptoms for which to return. Exam unchanged on reevaluation. Counseling: The emergency provider has spoken with the patient and family member patient and daughter and discussed todays results, in addition to providing specific details for the plan of care and counseling regarding the diagnosis and prognosis. Questions are answered at this time and they are agreeable with the plan.      --------------------------------- IMPRESSION AND DISPOSITION ---------------------------------    IMPRESSION  1. Flank pain        DISPOSITION  Disposition: Discharge to home  Patient condition is good      NOTE: This report was transcribed using voice recognition software.  Every effort was made to ensure accuracy; however, inadvertent computerized transcription errors may be present       Kendy Mc MD  12/30/18 [id number]

## 2019-01-02 ENCOUNTER — HOSPITAL ENCOUNTER (OUTPATIENT)
Dept: WOUND CARE | Age: 84
Discharge: HOME | End: 2019-01-02
Payer: MEDICARE

## 2019-01-09 ENCOUNTER — HOSPITAL ENCOUNTER (OUTPATIENT)
Dept: WOUND CARE | Age: 84
Discharge: HOME | End: 2019-01-09
Payer: MEDICARE

## 2019-01-09 VITALS
RESPIRATION RATE: 16 BRPM | TEMPERATURE: 98.1 F | DIASTOLIC BLOOD PRESSURE: 68 MMHG | HEART RATE: 84 BPM | SYSTOLIC BLOOD PRESSURE: 126 MMHG | HEIGHT: 65 IN | BODY MASS INDEX: 29.99 KG/M2 | WEIGHT: 180 LBS

## 2019-01-09 DIAGNOSIS — L97.929 VENOUS ULCER OF LEFT LOWER EXTREMITY WITHOUT VARICOSE VEINS (HCC): ICD-10-CM

## 2019-01-09 DIAGNOSIS — I87.2 VENOUS ULCER OF LEFT LOWER EXTREMITY WITHOUT VARICOSE VEINS (HCC): ICD-10-CM

## 2019-01-09 PROCEDURE — 11042 DBRDMT SUBQ TIS 1ST 20SQCM/<: CPT | Performed by: SURGERY

## 2019-01-09 PROCEDURE — 11042 DBRDMT SUBQ TIS 1ST 20SQCM/<: CPT

## 2019-01-30 ENCOUNTER — HOSPITAL ENCOUNTER (OUTPATIENT)
Dept: WOUND CARE | Age: 84
Discharge: HOME | End: 2019-01-30
Payer: MEDICARE

## 2019-02-06 ENCOUNTER — HOSPITAL ENCOUNTER (OUTPATIENT)
Dept: WOUND CARE | Age: 84
Discharge: HOME | End: 2019-02-06
Payer: MEDICARE

## 2019-02-06 VITALS
SYSTOLIC BLOOD PRESSURE: 130 MMHG | BODY MASS INDEX: 29.99 KG/M2 | WEIGHT: 180 LBS | DIASTOLIC BLOOD PRESSURE: 70 MMHG | RESPIRATION RATE: 18 BRPM | HEART RATE: 88 BPM | TEMPERATURE: 98.2 F | HEIGHT: 65 IN

## 2019-02-06 DIAGNOSIS — I87.2 VENOUS ULCER OF LEFT LOWER EXTREMITY WITHOUT VARICOSE VEINS (HCC): ICD-10-CM

## 2019-02-06 DIAGNOSIS — L97.929 VENOUS ULCER OF LEFT LOWER EXTREMITY WITHOUT VARICOSE VEINS (HCC): ICD-10-CM

## 2019-02-06 PROBLEM — L97.311 NON-PRESSURE CHRONIC ULCER OF RIGHT ANKLE, LIMITED TO BREAKDOWN OF SKIN (HCC): Status: ACTIVE | Noted: 2018-04-11

## 2019-02-06 PROCEDURE — 11042 DBRDMT SUBQ TIS 1ST 20SQCM/<: CPT | Performed by: SURGERY

## 2019-02-06 PROCEDURE — 11042 DBRDMT SUBQ TIS 1ST 20SQCM/<: CPT

## 2019-02-06 ASSESSMENT — PAIN SCALES - GENERAL: PAINLEVEL_OUTOF10: 0

## 2019-02-27 ENCOUNTER — HOSPITAL ENCOUNTER (OUTPATIENT)
Dept: WOUND CARE | Age: 84
Discharge: HOME | End: 2019-02-27
Payer: MEDICARE

## 2019-02-27 VITALS
RESPIRATION RATE: 18 BRPM | DIASTOLIC BLOOD PRESSURE: 70 MMHG | TEMPERATURE: 98.3 F | SYSTOLIC BLOOD PRESSURE: 122 MMHG | WEIGHT: 180 LBS | HEART RATE: 72 BPM | HEIGHT: 65 IN | BODY MASS INDEX: 29.99 KG/M2

## 2019-02-27 DIAGNOSIS — L97.929 VENOUS ULCER OF LEFT LOWER EXTREMITY WITHOUT VARICOSE VEINS (HCC): ICD-10-CM

## 2019-02-27 DIAGNOSIS — I87.2 VENOUS ULCER OF LEFT LOWER EXTREMITY WITHOUT VARICOSE VEINS (HCC): ICD-10-CM

## 2019-02-27 PROCEDURE — 11042 DBRDMT SUBQ TIS 1ST 20SQCM/<: CPT

## 2019-02-27 PROCEDURE — 11042 DBRDMT SUBQ TIS 1ST 20SQCM/<: CPT | Performed by: SURGERY

## 2019-02-27 RX ORDER — DOXYCYCLINE HYCLATE 100 MG
100 TABLET ORAL 2 TIMES DAILY
Qty: 20 TABLET | Refills: 0 | Status: SHIPPED | OUTPATIENT
Start: 2019-02-27 | End: 2019-03-09

## 2019-03-13 ENCOUNTER — HOSPITAL ENCOUNTER (OUTPATIENT)
Dept: WOUND CARE | Age: 84
Discharge: HOME | End: 2019-03-13
Payer: MEDICARE

## 2019-03-13 VITALS
DIASTOLIC BLOOD PRESSURE: 78 MMHG | HEART RATE: 60 BPM | WEIGHT: 187 LBS | TEMPERATURE: 98.7 F | SYSTOLIC BLOOD PRESSURE: 120 MMHG | HEIGHT: 65 IN | RESPIRATION RATE: 18 BRPM | BODY MASS INDEX: 31.16 KG/M2

## 2019-03-13 PROCEDURE — 11042 DBRDMT SUBQ TIS 1ST 20SQCM/<: CPT | Performed by: SURGERY

## 2019-03-13 PROCEDURE — 11042 DBRDMT SUBQ TIS 1ST 20SQCM/<: CPT

## 2019-03-27 ENCOUNTER — HOSPITAL ENCOUNTER (OUTPATIENT)
Dept: WOUND CARE | Age: 84
Discharge: HOME | End: 2019-03-27
Payer: MEDICARE

## 2019-04-17 ENCOUNTER — HOSPITAL ENCOUNTER (OUTPATIENT)
Dept: WOUND CARE | Age: 84
Discharge: HOME | End: 2019-04-17
Payer: MEDICARE

## 2019-04-17 VITALS
RESPIRATION RATE: 20 BRPM | SYSTOLIC BLOOD PRESSURE: 120 MMHG | WEIGHT: 183 LBS | TEMPERATURE: 98.1 F | HEIGHT: 65 IN | BODY MASS INDEX: 30.49 KG/M2 | HEART RATE: 72 BPM | DIASTOLIC BLOOD PRESSURE: 78 MMHG

## 2019-04-17 DIAGNOSIS — I87.2 VENOUS ULCER OF LEFT LOWER EXTREMITY WITHOUT VARICOSE VEINS (HCC): ICD-10-CM

## 2019-04-17 DIAGNOSIS — L97.929 VENOUS ULCER OF LEFT LOWER EXTREMITY WITHOUT VARICOSE VEINS (HCC): ICD-10-CM

## 2019-04-17 PROCEDURE — 11042 DBRDMT SUBQ TIS 1ST 20SQCM/<: CPT | Performed by: SURGERY

## 2019-04-17 PROCEDURE — 11042 DBRDMT SUBQ TIS 1ST 20SQCM/<: CPT

## 2019-04-17 RX ORDER — LIDOCAINE HYDROCHLORIDE 20 MG/ML
JELLY TOPICAL ONCE
Status: DISCONTINUED | OUTPATIENT
Start: 2019-04-17 | End: 2019-04-18 | Stop reason: HOSPADM

## 2019-04-17 NOTE — PROGRESS NOTES
Wound Healing Center Followup Visit Note    Referring Physician : MD Andra Bradshaw 6802 RECORD NUMBER:  [id number]  AGE: 81 y.o. GENDER: female  : 1935  EPISODE DATE:  2019    Subjective:     Chief Complaint   Patient presents with    Wound Check     lt leg       HISTORY of PRESENT ILLNESS HPI   Nav Spangler is a 81 y.o. female who presents today for wound/ulcer evaluation. History of Wound Context:  venous     Wound/Ulcer Pain Timing/Severity: constant  Quality of pain: burning  Severity:  4 / 10   Modifying Factors: Pain worsens with walking  Associated Signs/Symptoms: edema    Ulcer Identification:  Ulcer Type: venous  Contributing Factors: venous stasis    Diabetic/Pressure/Non Pressure Ulcers only:  Ulcer: Non-Pressure ulcer, fat layer exposed    Wound: N/A        PAST MEDICAL HISTORY      Diagnosis Date    Acute blood loss anemia 3/21/2018    Acute on chronic diastolic congestive heart failure ([address] 3/20/2018    Acute respiratory failure with hypoxia ([address] 3/20/2018    Aortic stenosis, severe 2018    Cellulitis 3/20/2018    Right and left lower extremity    Class 3 obesity in adult 3/22/2018    History of blood transfusion     Hypertension     Hypokalemia 3/21/2018    Mitral regurgitation 2018    Moderate aortic regurgitation 2018    Sepsis due to methicillin susceptible Staphylococcus aureus ([address] 3/22/2018     History reviewed. No pertinent surgical history. History reviewed. No pertinent family history.   Social History     Tobacco Use    Smoking status: Never Smoker    Smokeless tobacco: Never Used   Substance Use Topics    Alcohol use: No    Drug use: No     No Known Allergies  Current Outpatient Medications on File Prior to Encounter   Medication Sig Dispense Refill    spironolactone (ALDACTONE) 25 MG tablet Take 1 tablet by mouth daily 30 tablet 3    torsemide (DEMADEX) 20 MG tablet Take 1 tablet by mouth daily 30 tablet 3    NIFEdipine (NIFEDICAL XL) 30 MG extended release tablet Take 30 mg by mouth every morning      enalapril-hydrochlorothiazide (VASERETIC) 10-25 MG per tablet Take 1 tablet by mouth every morning       No current facility-administered medications on file prior to encounter. REVIEW OF SYSTEMS See HPI    Objective:    /78   Pulse 72   Temp 98.1 °F (36.7 °C) (Oral)   Resp 20   Ht 5' 5\" (1.651 m)   Wt 183 lb (83 kg)   BMI 30.45 kg/m²   Wt Readings from Last 3 Encounters:   04/17/19 183 lb (83 kg)   03/13/19 187 lb (84.8 kg)   02/27/19 180 lb (81.6 kg)     PHYSICAL EXAM  CONSTITUTIONAL:   Awake, alert, cooperative   EYES:  lids and lashes normal   ENT: external ears and nose without lesions   NECK:  supple, symmetrical, trachea midline   SKIN:  Open wound Present    Assessment:      Active Hospital Problems    Diagnosis    Venous ulcer of left lower extremity without varicose veins (HCC) [I87.2, L97.929]    Non-pressure chronic ulcer of left ankle with fat layer exposed ([address] [L97.322]     Procedure Note  Indications:  Based on my examination of this patient's wound(s)/ulcer(s) today, debridement is required to promote healing and evaluate the wound base. Performed by: Eleno Damon MD    Consent obtained:  Yes    Time out taken:  Yes    Pain Control:       Debridement:Excisional Debridement    Using curette the wound(s)/ulcer(s) was/were sharply debrided down through and including the removal of subcutaneous tissue. Devitalized Tissue Debrided:  fibrin to stimulate bleeding to promote healing, post debridement good bleeding base and wound edges noted    Pre Debridement Measurements:  Are located in the [place]  Documentation Flow Sheet    Wound/Ulcer #: 2    Post Debridement Measurements:  Wound/Ulcer Descriptions are Pre Debridement except measurements:    Wound 04/03/18 Leg Medial;Lower; Left wound # 2 acquired 12/15/17 (Active)   Dressing Status Clean;Dry; Intact 2/27/2019  3:33 PM Dressing Changed Changed/New 2/27/2019  3:33 PM   Dressing/Treatment Alginate;Dry Dressing 2/27/2019  3:33 PM   Wound Cleansed Rinsed/Irrigated with saline 2/27/2019  3:33 PM   Wound Length (cm) 4.6 cm 4/17/2019  3:05 PM   Wound Width (cm) 4 cm 4/17/2019  3:05 PM   Wound Depth (cm) 0.1 cm 4/17/2019  3:05 PM   Wound Surface Area (cm^2) 18.4 cm^2 4/17/2019  3:05 PM   Change in Wound Size % (l*w) 30.3 4/17/2019  3:05 PM   Wound Volume (cm^3) 1.84 cm^3 4/17/2019  3:05 PM   Wound Healing % 65 4/17/2019  3:05 PM   Post-Procedure Length (cm) 4.6 cm 4/17/2019  3:17 PM   Post-Procedure Width (cm) 4.2 cm 4/17/2019  3:17 PM   Post-Procedure Depth (cm) 0.1 cm 4/17/2019  3:17 PM   Post-Procedure Surface Area (cm^2) 19.32 cm^2 4/17/2019  3:17 PM   Post-Procedure Volume (cm^3) 1.93 cm^3 4/17/2019  3:17 PM   Wound Assessment Yellow;Pink;Red 4/17/2019  3:05 PM   Drainage Amount Moderate 4/17/2019  3:05 PM   Drainage Description Yellow;Serous 4/17/2019  3:05 PM   Odor None 4/17/2019  3:05 PM   Marisela-wound Assessment Dry; Intact 4/17/2019  3:05 PM   Number of days: 378        Percent of Wound/Ulcer Debrided: 100%    Total Surface Area Debrided:  18 sq cm     Estimated Blood Loss:  None  Hemostasis Achieved:  by pressure    Procedural Pain:  6  / 10   Post Procedural Pain:  3 / 10     Response to treatment:  Well tolerated by patient. Plan:   Treatment Note please see attached Discharge Instructions    Written patient dismissal instructions given to patient and signed by patient or POA. Discharge Instructions        Visit Discharge/Physician Orders     Discharge condition: Stable     Assessment of pain at discharge: NONE     Anesthetic used: 2% LIDOCAINE      Discharge to: Home     Left via:Private automobile     Accompanied by: DAUGHTER     ECF/HHA: na     Dressing Orders: LEFT LOWER LEG: CLEANSE WOUND WITH BETADINE , COVER WITH ALGINATE AND DRY DRESSING,  PAD WELL AND CHANGE DAILY.  APPLY DOUBLE TUBIGRIP OVER SECURED DRESSING, WEAR DURING THE DAY AND MAY REMOVE AT NIGHT. ELEVATE LEGS OVER THE LEVEL OF THE HEART AS MUCH AS POSSIBLE. CHANGE DAILY        Treatment Orders: FOLLOW A NUTRITIOUS DIET HIGH IN PROTEIN AND VITAMIN C TO PROMOTE WOUND HEALING. TAKE A MULTIVITAMIN DAILY.     ELEVATE LEGS ABOVE THE LEVEL OF THE HEART AS MUCH AS POSSIBLE.     KEEP PRESSURE OFF WOUNDS AT ALL TIMES     OK TO SHOWER        SUPPLIES FOR WOUND CARE ORDERED FROM [employer], TO BE DELIVERED WITHIN 3 DAYS. CAN Via [employer] ([phone]) OR [employer] ([phone]) WITH ANY PROBLEMS OR IF SUPPLIES NOT RECEIVED.          [facility] followup visit _________2 WEEK _________________  (Please note your next appointment above and if you are unable to keep, kindly give a 24 hour notice. Thank you.)     Physician signature:__________________________        If you experience any of the following, please call the [facility] during business hours:     * Increase in Pain  * Temperature over 101  * Increase in drainage from your wound  * Drainage with a foul odor  * Bleeding  * Increase in swelling  * Need for compression bandage changes due to slippage, breakthrough drainage.     If you need medical attention outside of the business hours of the [facility] please contact your PCP or go to the nearest emergency room.             Electronically signed by Kita Santiago MD on 4/17/2019 at 3:21 PM

## 2019-05-01 ENCOUNTER — HOSPITAL ENCOUNTER (OUTPATIENT)
Dept: WOUND CARE | Age: 84
Discharge: HOME | End: 2019-05-01
Payer: MEDICARE

## 2019-05-01 VITALS
TEMPERATURE: 97.9 F | SYSTOLIC BLOOD PRESSURE: 108 MMHG | HEART RATE: 72 BPM | DIASTOLIC BLOOD PRESSURE: 60 MMHG | RESPIRATION RATE: 18 BRPM

## 2019-05-01 PROCEDURE — 11042 DBRDMT SUBQ TIS 1ST 20SQCM/<: CPT

## 2019-05-01 PROCEDURE — 11042 DBRDMT SUBQ TIS 1ST 20SQCM/<: CPT | Performed by: SURGERY

## 2019-05-01 ASSESSMENT — PAIN SCALES - GENERAL: PAINLEVEL_OUTOF10: 0

## 2019-05-01 NOTE — PROGRESS NOTES
Wound Healing Center Followup Visit Note    Referring Physician : MD Ruth Kidds 6802 RECORD NUMBER:  [id number]  AGE: 81 y.o. GENDER: female  : 1935  EPISODE DATE:  2019    Subjective:     Chief Complaint   Patient presents with    Wound Check      HISTORY of PRESENT ILLNESS HPI   Quan Persaud is a 81 y.o. female who presents today for wound/ulcer evaluation. History of Wound Context:  Venous, edema     Wound/Ulcer Pain Timing/Severity: constant  Quality of pain: burning  Severity:  4 / 10   Modifying Factors: Pain worsens with walking  Associated Signs/Symptoms: edema    Ulcer Identification:  Ulcer Type: venous  Contributing Factors: edema    Diabetic/Pressure/Non Pressure Ulcers only:  Ulcer: Non-Pressure ulcer, fat layer exposed    Wound: N/A        PAST MEDICAL HISTORY      Diagnosis Date    Acute blood loss anemia 3/21/2018    Acute on chronic diastolic congestive heart failure ([address] 3/20/2018    Acute respiratory failure with hypoxia ([address] 3/20/2018    Aortic stenosis, severe 2018    Cellulitis 3/20/2018    Right and left lower extremity    Class 3 obesity in adult 3/22/2018    History of blood transfusion     Hypertension     Hypokalemia 3/21/2018    Mitral regurgitation 2018    Moderate aortic regurgitation 2018    Sepsis due to methicillin susceptible Staphylococcus aureus ([address] 3/22/2018     No past surgical history on file. No family history on file.   Social History     Tobacco Use    Smoking status: Never Smoker    Smokeless tobacco: Never Used   Substance Use Topics    Alcohol use: No    Drug use: No     No Known Allergies  Current Outpatient Medications on File Prior to Encounter   Medication Sig Dispense Refill    spironolactone (ALDACTONE) 25 MG tablet Take 1 tablet by mouth daily 30 tablet 3    torsemide (DEMADEX) 20 MG tablet Take 1 tablet by mouth daily 30 tablet 3    NIFEdipine (NIFEDICAL XL) 30 MG extended release tablet Take 30 mg by mouth every morning      enalapril-hydrochlorothiazide (VASERETIC) 10-25 MG per tablet Take 1 tablet by mouth every morning       No current facility-administered medications on file prior to encounter. REVIEW OF SYSTEMS See HPI    Objective:    /60   Pulse 72   Temp 97.9 °F (36.6 °C) (Oral)   Resp 18   Wt Readings from Last 3 Encounters:   04/17/19 183 lb (83 kg)   03/13/19 187 lb (84.8 kg)   02/27/19 180 lb (81.6 kg)     PHYSICAL EXAM  CONSTITUTIONAL:   Awake, alert, cooperative   EYES:  lids and lashes normal   ENT: external ears and nose without lesions   NECK:  supple, symmetrical, trachea midline   SKIN:  Open wound Present    Assessment:      Active Hospital Problems    Diagnosis    Non-pressure chronic ulcer of left ankle with fat layer exposed ([facility] [L97.322]     Procedure Note  Indications:  Based on my examination of this patient's wound(s)/ulcer(s) today, debridement is required to promote healing and evaluate the wound base. Performed by: Tabby Taveras MD    Consent obtained:  Yes    Time out taken:  Yes    Pain Control:       Debridement:Excisional Debridement    Using curette the wound(s)/ulcer(s) was/were sharply debrided down through and including the removal of subcutaneous tissue. Devitalized Tissue Debrided:  fibrin to stimulate bleeding to promote healing, post debridement good bleeding base and wound edges noted    Pre Debridement Measurements:  Are located in the [place]  Documentation Flow Sheet    Wound/Ulcer #: 2    Post Debridement Measurements:  Wound/Ulcer Descriptions are Pre Debridement except measurements:    Wound 04/03/18 Leg Medial;Lower; Left wound # 2 acquired 12/15/17 (Active)   Dressing Status Intact; New drainage 5/1/2019  3:06 PM   Dressing Changed Changed/New 5/1/2019  3:06 PM   Dressing/Treatment Alginate;Dry Dressing 4/17/2019  3:28 PM   Wound Cleansed Wound cleanser 5/1/2019  3:06 PM   Wound Length (cm) 4.5 cm 5/1/2019  3:06 PM   Wound Width (cm) 4 cm 5/1/2019  3:06 PM   Wound Depth (cm) 0.1 cm 5/1/2019  3:06 PM   Wound Surface Area (cm^2) 18 cm^2 5/1/2019  3:06 PM   Change in Wound Size % (l*w) 31.82 5/1/2019  3:06 PM   Wound Volume (cm^3) 1.8 cm^3 5/1/2019  3:06 PM   Wound Healing % 66 5/1/2019  3:06 PM   Post-Procedure Length (cm) 4.7 cm 5/1/2019  3:52 PM   Post-Procedure Width (cm) 4.1 cm 5/1/2019  3:52 PM   Post-Procedure Depth (cm) 0.1 cm 5/1/2019  3:52 PM   Post-Procedure Surface Area (cm^2) 19.27 cm^2 5/1/2019  3:52 PM   Post-Procedure Volume (cm^3) 1.93 cm^3 5/1/2019  3:52 PM   Wound Assessment Yellow;Pink; Tan 5/1/2019  3:06 PM   Drainage Amount Moderate 5/1/2019  3:06 PM   Drainage Description Green;Serosanguinous 5/1/2019  3:06 PM   Odor None 5/1/2019  3:06 PM   Marisela-wound Assessment Dry 5/1/2019  3:06 PM   Number of days: 393        Percent of Wound/Ulcer Debrided: 100%    Total Surface Area Debrided:  18 sq cm     Estimated Blood Loss:  None  Hemostasis Achieved:  by pressure    Procedural Pain:  5  / 10   Post Procedural Pain:  2 / 10     Response to treatment:  Well tolerated by patient. Plan:   Treatment Note please see attached Discharge Instructions    Written patient dismissal instructions given to patient and signed by patient or POA. Discharge Instructions        Visit Discharge/Physician Orders     Discharge condition: Stable     Assessment of pain at discharge: NONE     Anesthetic used: 2% LIDOCAINE      Discharge to: Home     Left via:Private automobile     Accompanied by: DAUGHTER     ECF/YOSEF: [facility]     Dressing Orders: LEFT LOWER LEG: CLEANSE WOUND WITH BETADINE , COVER WITH ALGINATE AND DRY DRESSING,  PAD WELL AND CHANGE DAILY. APPLY DOUBLE TUBIGRIP OVER SECURED DRESSING, WEAR DURING THE DAY AND MAY REMOVE AT NIGHT. ELEVATE LEGS OVER THE LEVEL OF THE HEART AS MUCH AS POSSIBLE. CHANGE DAILY        Treatment Orders: FOLLOW A NUTRITIOUS DIET HIGH IN PROTEIN AND VITAMIN C TO PROMOTE WOUND HEALING.  TAKE A MULTIVITAMIN DAILY.     ELEVATE LEGS ABOVE THE LEVEL OF THE HEART AS MUCH AS POSSIBLE.     KEEP PRESSURE OFF WOUNDS AT ALL TIMES     OK TO SHOWER        SUPPLIES FOR WOUND CARE ORDERED FROM [employer], TO BE DELIVERED WITHIN 3 DAYS. CAN Via [employer] ([phone]) OR [employer] ([phone]) WITH ANY PROBLEMS OR IF SUPPLIES NOT RECEIVED.          [facility] followup visit _________2 WEEK _________________  (Please note your next appointment above and if you are unable to keep, kindly give a 24 hour notice. Thank you.)     Physician signature:__________________________        If you experience any of the following, please call the [facility] during business hours:     * Increase in Pain  * Temperature over 101  * Increase in drainage from your wound  * Drainage with a foul odor  * Bleeding  * Increase in swelling  * Need for compression bandage changes due to slippage, breakthrough drainage.     If you need medical attention outside of the business hours of the [facility] please contact your PCP or go to the nearest emergency room.         Electronically signed by Mark Negron MD on 5/1/2019 at 3:53 PM

## 2019-05-22 ENCOUNTER — HOSPITAL ENCOUNTER (OUTPATIENT)
Dept: WOUND CARE | Age: 84
Discharge: HOME | End: 2019-05-22
Payer: MEDICARE

## 2019-05-22 VITALS
HEART RATE: 84 BPM | DIASTOLIC BLOOD PRESSURE: 56 MMHG | BODY MASS INDEX: 30.49 KG/M2 | RESPIRATION RATE: 20 BRPM | SYSTOLIC BLOOD PRESSURE: 108 MMHG | HEIGHT: 65 IN | WEIGHT: 183 LBS | TEMPERATURE: 97.9 F

## 2019-05-22 DIAGNOSIS — L97.929 VENOUS ULCER OF LEFT LOWER EXTREMITY WITHOUT VARICOSE VEINS (HCC): ICD-10-CM

## 2019-05-22 DIAGNOSIS — I87.2 VENOUS ULCER OF LEFT LOWER EXTREMITY WITHOUT VARICOSE VEINS (HCC): ICD-10-CM

## 2019-05-22 PROCEDURE — 11042 DBRDMT SUBQ TIS 1ST 20SQCM/<: CPT | Performed by: SURGERY

## 2019-05-22 PROCEDURE — 11042 DBRDMT SUBQ TIS 1ST 20SQCM/<: CPT

## 2019-05-22 RX ORDER — LIDOCAINE HYDROCHLORIDE 20 MG/ML
JELLY TOPICAL ONCE
Status: DISCONTINUED | OUTPATIENT
Start: 2019-05-22 | End: 2019-05-23 | Stop reason: HOSPADM

## 2019-05-22 NOTE — PROGRESS NOTES
Wound Healing Center Followup Visit Note    Referring Physician : MD Andra Davis 6802 RECORD NUMBER:  [id number]  AGE: 81 y.o. GENDER: female  : 1935  EPISODE DATE:  2019    Subjective:     Chief Complaint   Patient presents with    Wound Check     LT LEG      HISTORY of PRESENT ILLNESS HPI   Nelda Dudley is a 81 y.o. female who presents today for wound/ulcer evaluation. History of Wound Context:  venous     Wound/Ulcer Pain Timing/Severity: constant  Quality of pain: burning  Severity:  2 / 10   Modifying Factors: Pain worsens with walking  Associated Signs/Symptoms: edema    Ulcer Identification:  Ulcer Type: venous  Contributing Factors: edema    Diabetic/Pressure/Non Pressure Ulcers only:  Ulcer: Non-Pressure ulcer, fat layer exposed    Wound: N/A        PAST MEDICAL HISTORY      Diagnosis Date    Acute blood loss anemia 3/21/2018    Acute on chronic diastolic congestive heart failure ([address] 3/20/2018    Acute respiratory failure with hypoxia ([address] 3/20/2018    Aortic stenosis, severe 2018    Cellulitis 3/20/2018    Right and left lower extremity    Class 3 obesity in adult 3/22/2018    History of blood transfusion     Hypertension     Hypokalemia 3/21/2018    Mitral regurgitation 2018    Moderate aortic regurgitation 2018    Sepsis due to methicillin susceptible Staphylococcus aureus ([address] 3/22/2018     History reviewed. No pertinent surgical history. History reviewed. No pertinent family history.   Social History     Tobacco Use    Smoking status: Never Smoker    Smokeless tobacco: Never Used   Substance Use Topics    Alcohol use: No    Drug use: No     No Known Allergies  Current Outpatient Medications on File Prior to Encounter   Medication Sig Dispense Refill    spironolactone (ALDACTONE) 25 MG tablet Take 1 tablet by mouth daily 30 tablet 3    torsemide (DEMADEX) 20 MG tablet Take 1 tablet by mouth daily 30 tablet 3    NIFEdipine (NIFEDICAL XL) 30 MG extended release tablet Take 30 mg by mouth every morning      enalapril-hydrochlorothiazide (VASERETIC) 10-25 MG per tablet Take 1 tablet by mouth every morning       No current facility-administered medications on file prior to encounter. REVIEW OF SYSTEMS See HPI    Objective:    BP (!) 108/56   Pulse 84   Temp 97.9 °F (36.6 °C) (Oral)   Resp 20   Ht 5' 5\" (1.651 m)   Wt 183 lb (83 kg)   BMI 30.45 kg/m²   Wt Readings from Last 3 Encounters:   05/22/19 183 lb (83 kg)   04/17/19 183 lb (83 kg)   03/13/19 187 lb (84.8 kg)     PHYSICAL EXAM  CONSTITUTIONAL:   Awake, alert, cooperative   EYES:  lids and lashes normal   ENT: external ears and nose without lesions   NECK:  supple, symmetrical, trachea midline   SKIN:  Open wound Present    Assessment:      Active Hospital Problems    Diagnosis    Venous ulcer of left lower extremity without varicose veins (HCC) [I87.2, L97.929]    Non-pressure chronic ulcer of left ankle with fat layer exposed ([address] [L97.322]     Procedure Note  Indications:  Based on my examination of this patient's wound(s)/ulcer(s) today, debridement is required to promote healing and evaluate the wound base. Performed by: Jasper Olivo MD    Consent obtained:  Yes    Time out taken:  Yes    Pain Control: Anesthetic  Anesthetic: 2% Lidocaine Gel Topical     Debridement:Excisional Debridement    Using curette the wound(s)/ulcer(s) was/were sharply debrided down through and including the removal of subcutaneous tissue. Devitalized Tissue Debrided:  slough to stimulate bleeding to promote healing, post debridement good bleeding base and wound edges noted    Pre Debridement Measurements:  Are located in the [place]  Documentation Flow Sheet    Wound/Ulcer #: 2    Post Debridement Measurements:  Wound/Ulcer Descriptions are Pre Debridement except measurements:    Wound 04/03/18 Leg Medial;Lower; Left wound # 2 acquired 12/15/17 (Active)   Dressing TUBIGRIP OVER SECURED DRESSING, WEAR DURING THE DAY AND MAY REMOVE AT NIGHT. ELEVATE LEGS OVER THE LEVEL OF THE HEART AS MUCH AS POSSIBLE. CHANGE DAILY        Treatment Orders: FOLLOW A NUTRITIOUS DIET HIGH IN PROTEIN AND VITAMIN C TO PROMOTE WOUND HEALING. TAKE A MULTIVITAMIN DAILY.     ELEVATE LEGS ABOVE THE LEVEL OF THE HEART AS MUCH AS POSSIBLE.     KEEP PRESSURE OFF WOUNDS AT ALL TIMES     OK TO SHOWER        SUPPLIES FOR WOUND CARE ORDERED FROM [employer], TO BE DELIVERED WITHIN 3 DAYS. CAN Via [employer] ([phone]) OR [employer] ([phone]) WITH ANY PROBLEMS OR IF SUPPLIES NOT RECEIVED.          [facility] followup visit _________2 WEEK _________________  (Please note your next appointment above and if you are unable to keep, kindly give a 24 hour notice.  Thank you.)     Physician signature:__________________________        If you experience any of the following, please call the [facility] during business hours:     * Increase in Pain  * Temperature over 101  * Increase in drainage from your wound  * Drainage with a foul odor  * Bleeding  * Increase in swelling  * Need for compression bandage changes due to slippage, breakthrough drainage.     If you need medical attention outside of the business hours of the [facility] please contact your PCP or go to the nearest emergency room.                 Electronically signed by Olvin Mendez MD on 5/22/2019 at 3:18 PM

## 2019-06-05 ENCOUNTER — HOSPITAL ENCOUNTER (OUTPATIENT)
Dept: WOUND CARE | Age: 84
Discharge: HOME | End: 2019-06-05
Payer: MEDICARE

## 2019-06-12 ENCOUNTER — HOSPITAL ENCOUNTER (OUTPATIENT)
Dept: WOUND CARE | Age: 84
Discharge: HOME | End: 2019-06-12
Payer: MEDICARE

## 2019-06-12 VITALS
RESPIRATION RATE: 20 BRPM | BODY MASS INDEX: 30.49 KG/M2 | WEIGHT: 183 LBS | HEART RATE: 68 BPM | TEMPERATURE: 98 F | SYSTOLIC BLOOD PRESSURE: 142 MMHG | DIASTOLIC BLOOD PRESSURE: 62 MMHG | HEIGHT: 65 IN

## 2019-06-12 PROCEDURE — 11042 DBRDMT SUBQ TIS 1ST 20SQCM/<: CPT

## 2019-06-12 PROCEDURE — 11042 DBRDMT SUBQ TIS 1ST 20SQCM/<: CPT | Performed by: SURGERY

## 2019-06-12 RX ORDER — LIDOCAINE HYDROCHLORIDE 20 MG/ML
JELLY TOPICAL ONCE
Status: DISCONTINUED | OUTPATIENT
Start: 2019-06-12 | End: 2019-06-13 | Stop reason: HOSPADM

## 2019-06-12 NOTE — PROGRESS NOTES
[facility] Followup Visit Note    Referring Physician : MD Ruth Cabans [id number] RECORD NUMBER:  [id number]  AGE: 81 y.o. GENDER: female  : 1935  EPISODE DATE:  2019    Subjective:     Chief Complaint   Patient presents with    Wound Check     left lower leg      HISTORY of PRESENT ILLNESS HPI   Miki Tobias is a 81 y.o. female who presents today for wound/ulcer evaluation. History of Wound Context:  venous     Wound/Ulcer Pain Timing/Severity: intermittent  Quality of pain: burning  Severity:  1 / 10   Modifying Factors: Pain worsens with walking  Associated Signs/Symptoms: edema    Ulcer Identification:  Ulcer Type: dependency  Contributing Factors: edema    Diabetic/Pressure/Non Pressure Ulcers only:  Ulcer: Non-Pressure ulcer, fat layer exposed    Wound: N/A        PAST MEDICAL HISTORY      Diagnosis Date    Acute blood loss anemia 3/21/2018    Acute on chronic diastolic congestive heart failure ([address] 3/20/2018    Acute respiratory failure with hypoxia ([address] 3/20/2018    Aortic stenosis, severe 2018    Cellulitis 3/20/2018    Right and left lower extremity    Class 3 obesity in adult 3/22/2018    History of blood transfusion     Hypertension     Hypokalemia 3/21/2018    Mitral regurgitation 2018    Moderate aortic regurgitation 2018    Sepsis due to methicillin susceptible Staphylococcus aureus ([address] 3/22/2018     History reviewed. No pertinent surgical history. History reviewed. No pertinent family history.   Social History     Tobacco Use    Smoking status: Never Smoker    Smokeless tobacco: Never Used   Substance Use Topics    Alcohol use: No    Drug use: No     No Known Allergies  Current Outpatient Medications on File Prior to Encounter   Medication Sig Dispense Refill    spironolactone (ALDACTONE) 25 MG tablet Take 1 tablet by mouth daily 30 tablet 3    torsemide (DEMADEX) 20 MG tablet Take 1 tablet by mouth daily 30 tablet 3    NIFEdipine (NIFEDICAL XL) 30 MG extended release tablet Take 30 mg by mouth every morning      enalapril-hydrochlorothiazide (VASERETIC) 10-25 MG per tablet Take 1 tablet by mouth every morning       No current facility-administered medications on file prior to encounter. REVIEW OF SYSTEMS See HPI    Objective:    BP (!) 142/62   Pulse 68   Temp 98 °F (36.7 °C) (Oral)   Resp 20   Ht 5' 5\" (1.651 m)   Wt 183 lb (83 kg)   BMI 30.45 kg/m²   Wt Readings from Last 3 Encounters:   06/12/19 183 lb (83 kg)   05/22/19 183 lb (83 kg)   04/17/19 183 lb (83 kg)     PHYSICAL EXAM  CONSTITUTIONAL:   Awake, alert, cooperative   EYES:  lids and lashes normal   ENT: external ears and nose without lesions   NECK:  supple, symmetrical, trachea midline   SKIN:  Open wound Present    Assessment:      Active Hospital Problems    Diagnosis    Non-pressure chronic ulcer of left ankle with fat layer exposed ([facility] [L97.322]     Procedure Note  Indications:  Based on my examination of this patient's wound(s)/ulcer(s) today, debridement is required to promote healing and evaluate the wound base. Performed by: Matthew Rueda MD    Consent obtained:  Yes    Time out taken:  Yes    Pain Control: Anesthetic  Anesthetic: 2% Lidocaine Gel Topical     Debridement:Excisional Debridement    Using curette the wound(s)/ulcer(s) was/were sharply debrided down through and including the removal of subcutaneous tissue. Devitalized Tissue Debrided:  biofilm to stimulate bleeding to promote healing, post debridement good bleeding base and wound edges noted    Pre Debridement Measurements:  Are located in the [place]  Documentation Flow Sheet    Wound/Ulcer #: 2    Post Debridement Measurements:  Wound/Ulcer Descriptions are Pre Debridement except measurements:    Wound 04/03/18 Leg Medial;Lower; Left wound # 2 acquired 12/15/17 (Active)   Wound Image   6/12/2019  2:45 PM   Dressing Status Intact; New drainage 5/1/2019  3:06 PM DRESSING, WEAR DURING THE DAY AND MAY REMOVE AT NIGHT. ELEVATE LEGS OVER THE LEVEL OF THE HEART AS MUCH AS POSSIBLE. CHANGE DAILY        Treatment Orders: FOLLOW A NUTRITIOUS DIET HIGH IN PROTEIN AND VITAMIN C TO PROMOTE WOUND HEALING. TAKE A MULTIVITAMIN DAILY.     ELEVATE LEGS ABOVE THE LEVEL OF THE HEART AS MUCH AS POSSIBLE.     KEEP PRESSURE OFF WOUNDS AT ALL TIMES     OK TO SHOWER        SUPPLIES FOR WOUND CARE ORDERED FROM [employer], TO BE DELIVERED WITHIN 3 DAYS. CAN [facility] [employer] ([phone]) OR [employer] ([phone]) WITH ANY PROBLEMS OR IF SUPPLIES NOT RECEIVED.          [facility] followup visit _________2 WEEK _________________  (Please note your next appointment above and if you are unable to keep, kindly give a 24 hour notice.  Thank you.)     Physician signature:__________________________        If you experience any of the following, please call the [facility] during business hours:     * Increase in Pain  * Temperature over 101  * Increase in drainage from your wound  * Drainage with a foul odor  * Bleeding  * Increase in swelling  * Need for compression bandage changes due to slippage, breakthrough drainage.     If you need medical attention outside of the business hours of the [facility] please contact your PCP or go to the nearest emergency room.                                Electronically signed by Bill Cook MD on 6/12/2019 at 2:55 PM

## 2019-06-26 ENCOUNTER — HOSPITAL ENCOUNTER (OUTPATIENT)
Dept: WOUND CARE | Age: 84
Discharge: HOME | End: 2019-06-26
Payer: MEDICARE

## 2019-07-03 ENCOUNTER — HOSPITAL ENCOUNTER (OUTPATIENT)
Dept: WOUND CARE | Age: 84
Discharge: HOME | End: 2019-07-03
Payer: MEDICARE

## 2019-07-03 VITALS
HEART RATE: 72 BPM | TEMPERATURE: 97.6 F | SYSTOLIC BLOOD PRESSURE: 100 MMHG | WEIGHT: 183 LBS | DIASTOLIC BLOOD PRESSURE: 60 MMHG | BODY MASS INDEX: 30.45 KG/M2 | RESPIRATION RATE: 18 BRPM

## 2019-07-03 PROCEDURE — 97597 DBRDMT OPN WND 1ST 20 CM/<: CPT

## 2019-07-03 PROCEDURE — 97597 DBRDMT OPN WND 1ST 20 CM/<: CPT | Performed by: NURSE PRACTITIONER

## 2019-07-03 ASSESSMENT — PAIN SCALES - GENERAL: PAINLEVEL_OUTOF10: 0

## 2019-07-03 NOTE — PROGRESS NOTES
[place];Dry Dressing 6/12/2019  3:00 PM   Wound Cleansed Rinsed/Irrigated with saline 7/3/2019  1:53 PM   Wound Length (cm) 2.8 cm 7/3/2019  1:53 PM   Wound Width (cm) 1.8 cm 7/3/2019  1:53 PM   Wound Depth (cm) 0.1 cm 7/3/2019  1:53 PM   Wound Surface Area (cm^2) 5.04 cm^2 7/3/2019  1:53 PM   Change in Wound Size % (l*w) 80.91 7/3/2019  1:53 PM   Wound Volume (cm^3) 0.5 cm^3 7/3/2019  1:53 PM   Wound Healing % 91 7/3/2019  1:53 PM   Post-Procedure Length (cm) 2.9 cm 7/3/2019  2:54 PM   Post-Procedure Width (cm) 2 cm 7/3/2019  2:54 PM   Post-Procedure Depth (cm) 0.1 cm 7/3/2019  2:54 PM   Post-Procedure Surface Area (cm^2) 5.8 cm^2 7/3/2019  2:54 PM   Post-Procedure Volume (cm^3) 0.58 cm^3 7/3/2019  2:54 PM   Wound Assessment Pink;Yellow; White 7/3/2019  1:53 PM   Drainage Amount Moderate 7/3/2019  1:53 PM   Drainage Description Yellow 7/3/2019  1:53 PM   Odor None 7/3/2019  1:53 PM   Marisela-wound Assessment Pink; White 7/3/2019  1:53 PM   Number of days: 455     Percent of Wound/Ulcer Debrided: 90%    Total Surface Area Debrided:  4.5 sq cm     Estimated Blood Loss:  Minimal  Hemostasis Achieved:  by pressure    Procedural Pain:  3  / 10   Post Procedural Pain:  0 / 10     Response to treatment:  Well tolerated by patient. Plan:   Treatment Note please see attached Discharge Instructions    Written patient dismissal instructions given to patient and signed by patient or POA. Discharge Instructions          Discharge Instructions      Visit Discharge/Physician Orders     Discharge condition: Stable     Assessment of pain at discharge: NONE     Anesthetic used: 2% LIDOCAINE      Discharge to: Home     Left via:Private automobile     Accompanied by: DAUGHTER     ECF/HHA: na     Dressing Orders: LEFT LOWER LEG: CLEANSE WOUND WITH BETADINE , COVER WITH ALGINATE AND DRY DRESSING,  PAD WELL AND CHANGE DAILY. APPLY DOUBLE TUBIGRIP OVER SECURED DRESSING, WEAR DURING THE DAY AND MAY REMOVE AT NIGHT.  ELEVATE LEGS

## 2019-07-24 ENCOUNTER — HOSPITAL ENCOUNTER (OUTPATIENT)
Dept: WOUND CARE | Age: 84
Discharge: HOME | End: 2019-07-24
Payer: MEDICARE

## 2019-07-31 ENCOUNTER — HOSPITAL ENCOUNTER (OUTPATIENT)
Dept: WOUND CARE | Age: 84
Discharge: HOME | End: 2019-07-31
Payer: MEDICARE

## 2019-07-31 VITALS
SYSTOLIC BLOOD PRESSURE: 142 MMHG | TEMPERATURE: 98.5 F | HEIGHT: 65 IN | WEIGHT: 183 LBS | RESPIRATION RATE: 18 BRPM | DIASTOLIC BLOOD PRESSURE: 68 MMHG | BODY MASS INDEX: 30.49 KG/M2 | HEART RATE: 84 BPM

## 2019-07-31 PROBLEM — L97.321 NON-PRESSURE CHRONIC ULCER OF LEFT ANKLE, LIMITED TO BREAKDOWN OF SKIN (HCC): Status: ACTIVE | Noted: 2018-04-11

## 2019-07-31 PROCEDURE — 97597 DBRDMT OPN WND 1ST 20 CM/<: CPT | Performed by: SURGERY

## 2019-07-31 PROCEDURE — 97597 DBRDMT OPN WND 1ST 20 CM/<: CPT

## 2019-07-31 RX ORDER — LIDOCAINE HYDROCHLORIDE 20 MG/ML
JELLY TOPICAL ONCE
Status: DISCONTINUED | OUTPATIENT
Start: 2019-07-31 | End: 2019-08-01 | Stop reason: HOSPADM

## 2019-08-14 ENCOUNTER — HOSPITAL ENCOUNTER (OUTPATIENT)
Dept: WOUND CARE | Age: 84
Discharge: HOME | End: 2019-08-14
Payer: MEDICARE

## 2019-08-14 VITALS
TEMPERATURE: 98.2 F | HEART RATE: 88 BPM | BODY MASS INDEX: 30.49 KG/M2 | WEIGHT: 183 LBS | DIASTOLIC BLOOD PRESSURE: 78 MMHG | RESPIRATION RATE: 20 BRPM | HEIGHT: 65 IN | SYSTOLIC BLOOD PRESSURE: 132 MMHG

## 2019-08-14 PROCEDURE — 99212 OFFICE O/P EST SF 10 MIN: CPT | Performed by: NURSE PRACTITIONER

## 2019-08-14 PROCEDURE — 99212 OFFICE O/P EST SF 10 MIN: CPT

## 2020-01-01 ENCOUNTER — APPOINTMENT (OUTPATIENT)
Dept: GENERAL RADIOLOGY | Age: 85
DRG: 853 | End: 2020-01-01
Payer: MEDICARE

## 2020-01-01 ENCOUNTER — APPOINTMENT (OUTPATIENT)
Dept: CT IMAGING | Age: 85
DRG: 683 | End: 2020-01-01
Payer: MEDICARE

## 2020-01-01 ENCOUNTER — TELEPHONE (OUTPATIENT)
Dept: PRIMARY CARE CLINIC | Age: 85
End: 2020-01-01

## 2020-01-01 ENCOUNTER — HOSPITAL ENCOUNTER (INPATIENT)
Age: 85
LOS: 4 days | Discharge: HOME | DRG: 683 | End: 2020-03-08
Attending: EMERGENCY MEDICINE | Admitting: INTERNAL MEDICINE
Payer: MEDICARE

## 2020-01-01 ENCOUNTER — HOSPITAL ENCOUNTER (OUTPATIENT)
Age: 85
Discharge: HOME | End: 2020-07-10
Payer: MEDICARE

## 2020-01-01 ENCOUNTER — HOSPITAL ENCOUNTER (OUTPATIENT)
Age: 85
Discharge: HOME | End: 2020-07-28
Payer: MEDICARE

## 2020-01-01 ENCOUNTER — APPOINTMENT (OUTPATIENT)
Dept: NUCLEAR MEDICINE | Age: 85
DRG: 683 | End: 2020-01-01
Payer: MEDICARE

## 2020-01-01 ENCOUNTER — APPOINTMENT (OUTPATIENT)
Dept: CT IMAGING | Age: 85
DRG: 853 | End: 2020-01-01
Payer: MEDICARE

## 2020-01-01 ENCOUNTER — APPOINTMENT (OUTPATIENT)
Dept: GENERAL RADIOLOGY | Age: 85
DRG: 683 | End: 2020-01-01
Payer: MEDICARE

## 2020-01-01 ENCOUNTER — HOSPITAL ENCOUNTER (OUTPATIENT)
Age: 85
Discharge: HOME | End: 2020-07-17
Payer: MEDICARE

## 2020-01-01 ENCOUNTER — ANESTHESIA (OUTPATIENT)
Dept: OPERATING ROOM | Age: 85
DRG: 853 | End: 2020-01-01
Payer: MEDICARE

## 2020-01-01 ENCOUNTER — ANESTHESIA EVENT (OUTPATIENT)
Dept: ENDOSCOPY | Age: 85
DRG: 853 | End: 2020-01-01
Payer: MEDICARE

## 2020-01-01 ENCOUNTER — HOSPITAL ENCOUNTER (OUTPATIENT)
Age: 85
Discharge: HOME | End: 2020-06-10
Payer: MEDICARE

## 2020-01-01 ENCOUNTER — HOSPITAL ENCOUNTER (OUTPATIENT)
Age: 85
Discharge: HOME | End: 2020-06-26
Payer: MEDICARE

## 2020-01-01 ENCOUNTER — ANESTHESIA (OUTPATIENT)
Dept: ENDOSCOPY | Age: 85
DRG: 853 | End: 2020-01-01
Payer: MEDICARE

## 2020-01-01 ENCOUNTER — ANESTHESIA EVENT (OUTPATIENT)
Dept: OPERATING ROOM | Age: 85
DRG: 853 | End: 2020-01-01
Payer: MEDICARE

## 2020-01-01 ENCOUNTER — HOSPITAL ENCOUNTER (INPATIENT)
Age: 85
LOS: 20 days | Discharge: HOME HEALTH | DRG: 853 | End: 2020-08-22
Attending: EMERGENCY MEDICINE | Admitting: SURGERY
Payer: MEDICARE

## 2020-01-01 ENCOUNTER — HOSPITAL ENCOUNTER (OUTPATIENT)
Dept: CARDIAC CATH/INVASIVE PROCEDURES | Age: 85
Discharge: HOME | End: 2020-01-10
Payer: MEDICARE

## 2020-01-01 ENCOUNTER — HOSPITAL ENCOUNTER (INPATIENT)
Age: 85
LOS: 8 days | DRG: 853 | End: 2020-08-30
Attending: EMERGENCY MEDICINE | Admitting: INTERNAL MEDICINE
Payer: MEDICARE

## 2020-01-01 ENCOUNTER — HOSPITAL ENCOUNTER (OUTPATIENT)
Age: 85
Discharge: HOME | End: 2020-07-22
Payer: MEDICARE

## 2020-01-01 VITALS — SYSTOLIC BLOOD PRESSURE: 104 MMHG | OXYGEN SATURATION: 100 % | DIASTOLIC BLOOD PRESSURE: 83 MMHG | TEMPERATURE: 96.6 F

## 2020-01-01 VITALS
RESPIRATION RATE: 16 BRPM | SYSTOLIC BLOOD PRESSURE: 142 MMHG | HEIGHT: 65 IN | BODY MASS INDEX: 30.17 KG/M2 | DIASTOLIC BLOOD PRESSURE: 41 MMHG | HEART RATE: 114 BPM | TEMPERATURE: 95 F | WEIGHT: 181.1 LBS | OXYGEN SATURATION: 100 %

## 2020-01-01 VITALS
RESPIRATION RATE: 20 BRPM | TEMPERATURE: 99 F | HEART RATE: 99 BPM | DIASTOLIC BLOOD PRESSURE: 64 MMHG | WEIGHT: 162 LBS | OXYGEN SATURATION: 99 % | HEIGHT: 65 IN | BODY MASS INDEX: 26.99 KG/M2 | SYSTOLIC BLOOD PRESSURE: 131 MMHG

## 2020-01-01 VITALS
OXYGEN SATURATION: 100 % | SYSTOLIC BLOOD PRESSURE: 132 MMHG | RESPIRATION RATE: 28 BRPM | DIASTOLIC BLOOD PRESSURE: 61 MMHG

## 2020-01-01 VITALS
DIASTOLIC BLOOD PRESSURE: 77 MMHG | SYSTOLIC BLOOD PRESSURE: 178 MMHG | WEIGHT: 177 LBS | OXYGEN SATURATION: 94 % | BODY MASS INDEX: 29.49 KG/M2 | RESPIRATION RATE: 18 BRPM | HEIGHT: 65 IN | HEART RATE: 85 BPM | TEMPERATURE: 98.3 F

## 2020-01-01 VITALS — OXYGEN SATURATION: 100 % | TEMPERATURE: 93.4 F | RESPIRATION RATE: 3 BRPM

## 2020-01-01 VITALS
RESPIRATION RATE: 16 BRPM | HEART RATE: 91 BPM | BODY MASS INDEX: 29.54 KG/M2 | WEIGHT: 177.3 LBS | OXYGEN SATURATION: 99 % | SYSTOLIC BLOOD PRESSURE: 121 MMHG | HEIGHT: 65 IN | TEMPERATURE: 98 F | DIASTOLIC BLOOD PRESSURE: 58 MMHG

## 2020-01-01 LAB
AADO2: 351 MMHG
AADO2: 445.5 MMHG
AADO2: 546.3 MMHG
AADO2: 563.4 MMHG
AADO2: 565.7 MMHG
AADO2: 566.3 MMHG
AADO2: 573.4 MMHG
AADO2: 597.8 MMHG
ABO/RH: NORMAL
ACETAMINOPHEN LEVEL: <5 MCG/ML (ref 10–30)
ADDENDUM ELECTROPHORESIS URINE RANDOM: NORMAL
ALBUMIN SERPL-MCNC: 1.3 G/DL (ref 3.5–5.2)
ALBUMIN SERPL-MCNC: 1.4 G/DL (ref 3.5–5.2)
ALBUMIN SERPL-MCNC: 1.6 G/DL (ref 3.5–5.2)
ALBUMIN SERPL-MCNC: 1.6 G/DL (ref 3.5–5.2)
ALBUMIN SERPL-MCNC: 1.8 G/DL (ref 3.5–5.2)
ALBUMIN SERPL-MCNC: 1.8 G/DL (ref 3.5–5.2)
ALBUMIN SERPL-MCNC: 2.2 G/DL (ref 3.5–5.2)
ALBUMIN SERPL-MCNC: 2.3 G/DL (ref 3.5–5.2)
ALBUMIN SERPL-MCNC: 2.4 G/DL (ref 3.5–5.2)
ALBUMIN SERPL-MCNC: 2.5 G/DL (ref 3.5–5.2)
ALBUMIN SERPL-MCNC: 2.5 G/DL (ref 3.5–5.2)
ALBUMIN SERPL-MCNC: 2.6 G/DL (ref 3.5–5.2)
ALBUMIN SERPL-MCNC: 2.7 G/DL (ref 3.5–4.7)
ALBUMIN SERPL-MCNC: 2.7 G/DL (ref 3.5–5.2)
ALBUMIN SERPL-MCNC: 2.8 G/DL (ref 3.5–5.2)
ALBUMIN SERPL-MCNC: 2.9 G/DL (ref 3.5–5.2)
ALBUMIN SERPL-MCNC: 3.1 G/DL (ref 3.5–5.2)
ALBUMIN SERPL-MCNC: 3.9 G/DL (ref 3.5–5.2)
ALBUMIN SERPL-MCNC: 3.9 G/DL (ref 3.5–5.2)
ALP BLD-CCNC: 106 U/L (ref 35–104)
ALP BLD-CCNC: 109 U/L (ref 35–104)
ALP BLD-CCNC: 117 U/L (ref 35–104)
ALP BLD-CCNC: 145 U/L (ref 35–104)
ALP BLD-CCNC: 160 U/L (ref 35–104)
ALP BLD-CCNC: 180 U/L (ref 35–104)
ALP BLD-CCNC: 184 U/L (ref 35–104)
ALP BLD-CCNC: 188 U/L (ref 35–104)
ALP BLD-CCNC: 188 U/L (ref 35–104)
ALP BLD-CCNC: 218 U/L (ref 35–104)
ALP BLD-CCNC: 226 U/L (ref 35–104)
ALP BLD-CCNC: 234 U/L (ref 35–104)
ALP BLD-CCNC: 249 U/L (ref 35–104)
ALP BLD-CCNC: 250 U/L (ref 35–104)
ALP BLD-CCNC: 263 U/L (ref 35–104)
ALP BLD-CCNC: 273 U/L (ref 35–104)
ALP BLD-CCNC: 274 U/L (ref 35–104)
ALP BLD-CCNC: 278 U/L (ref 35–104)
ALP BLD-CCNC: 280 U/L (ref 35–104)
ALP BLD-CCNC: 287 U/L (ref 35–104)
ALP BLD-CCNC: 73 U/L (ref 35–104)
ALP BLD-CCNC: 77 U/L (ref 35–104)
ALP BLD-CCNC: 78 U/L (ref 35–104)
ALP BLD-CCNC: 81 U/L (ref 35–104)
ALP BLD-CCNC: 81 U/L (ref 35–104)
ALP BLD-CCNC: 83 U/L (ref 35–104)
ALP BLD-CCNC: 83 U/L (ref 35–104)
ALP BLD-CCNC: 84 U/L (ref 35–104)
ALP BLD-CCNC: 85 U/L (ref 35–104)
ALP BLD-CCNC: 88 U/L (ref 35–104)
ALP BLD-CCNC: 92 U/L (ref 35–104)
ALP BLD-CCNC: 94 U/L (ref 35–104)
ALP BLD-CCNC: 99 U/L (ref 35–104)
ALPHA-1-GLOBULIN: 0.4 G/DL (ref 0.2–0.4)
ALPHA-2-GLOBULIN: 1.2 G/FL (ref 0.5–1)
ALT SERPL-CCNC: 10 U/L (ref 0–32)
ALT SERPL-CCNC: 1036 U/L (ref 0–32)
ALT SERPL-CCNC: 1196 U/L (ref 0–32)
ALT SERPL-CCNC: 1453 U/L (ref 0–32)
ALT SERPL-CCNC: 1459 U/L (ref 0–32)
ALT SERPL-CCNC: 1470 U/L (ref 0–32)
ALT SERPL-CCNC: 1665 U/L (ref 0–32)
ALT SERPL-CCNC: 1698 U/L (ref 0–32)
ALT SERPL-CCNC: 17 U/L (ref 0–32)
ALT SERPL-CCNC: 1841 U/L (ref 0–32)
ALT SERPL-CCNC: 19 U/L (ref 0–32)
ALT SERPL-CCNC: 22 U/L (ref 0–32)
ALT SERPL-CCNC: 32 U/L (ref 0–32)
ALT SERPL-CCNC: 40 U/L (ref 0–32)
ALT SERPL-CCNC: 41 U/L (ref 0–32)
ALT SERPL-CCNC: 53 U/L (ref 0–32)
ALT SERPL-CCNC: 6 U/L (ref 0–32)
ALT SERPL-CCNC: 643 U/L (ref 0–32)
ALT SERPL-CCNC: 7 U/L (ref 0–32)
ALT SERPL-CCNC: 8 U/L (ref 0–32)
ALT SERPL-CCNC: 893 U/L (ref 0–32)
ALT SERPL-CCNC: 9 U/L (ref 0–32)
AMMONIA: 64 UMOL/L (ref 11–51)
ANAEROBIC CULTURE: ABNORMAL
ANGLE (CLOT STRENGTH): 81.1 DEGREE (ref 59–74)
ANION GAP SERPL CALCULATED.3IONS-SCNC: 10 MMOL/L (ref 7–16)
ANION GAP SERPL CALCULATED.3IONS-SCNC: 11 MMOL/L (ref 7–16)
ANION GAP SERPL CALCULATED.3IONS-SCNC: 12 MMOL/L (ref 7–16)
ANION GAP SERPL CALCULATED.3IONS-SCNC: 13 MMOL/L (ref 7–16)
ANION GAP SERPL CALCULATED.3IONS-SCNC: 14 MMOL/L (ref 7–16)
ANION GAP SERPL CALCULATED.3IONS-SCNC: 15 MMOL/L (ref 7–16)
ANION GAP SERPL CALCULATED.3IONS-SCNC: 16 MMOL/L (ref 7–16)
ANION GAP SERPL CALCULATED.3IONS-SCNC: 17 MMOL/L (ref 7–16)
ANION GAP SERPL CALCULATED.3IONS-SCNC: 18 MMOL/L (ref 7–16)
ANION GAP SERPL CALCULATED.3IONS-SCNC: 18 MMOL/L (ref 7–16)
ANION GAP SERPL CALCULATED.3IONS-SCNC: 21 MMOL/L (ref 7–16)
ANION GAP SERPL CALCULATED.3IONS-SCNC: 23 MMOL/L (ref 7–16)
ANION GAP SERPL CALCULATED.3IONS-SCNC: 24 MMOL/L (ref 7–16)
ANION GAP SERPL CALCULATED.3IONS-SCNC: 25 MMOL/L (ref 7–16)
ANION GAP SERPL CALCULATED.3IONS-SCNC: 27 MMOL/L (ref 7–16)
ANION GAP SERPL CALCULATED.3IONS-SCNC: 27 MMOL/L (ref 7–16)
ANION GAP SERPL CALCULATED.3IONS-SCNC: 28 MMOL/L (ref 7–16)
ANION GAP SERPL CALCULATED.3IONS-SCNC: 33 MMOL/L (ref 7–16)
ANION GAP SERPL CALCULATED.3IONS-SCNC: 33 MMOL/L (ref 7–16)
ANION GAP SERPL CALCULATED.3IONS-SCNC: 34 MMOL/L (ref 7–16)
ANION GAP SERPL CALCULATED.3IONS-SCNC: 35 MMOL/L (ref 7–16)
ANISOCYTOSIS: ABNORMAL
ANTIBODY SCREEN: NORMAL
APTT: 106.4 SEC (ref 24.5–35.1)
APTT: 118.1 SEC (ref 24.5–35.1)
APTT: 118.1 SEC (ref 24.5–35.1)
APTT: 125.9 SEC (ref 24.5–35.1)
APTT: 132.9 SEC (ref 24.5–35.1)
APTT: 175.7 SEC (ref 24.5–35.1)
APTT: 203.5 SEC (ref 24.5–35.1)
APTT: 28.8 SEC (ref 24.5–35.1)
APTT: 64.2 SEC (ref 24.5–35.1)
AST SERPL-CCNC: 10 U/L (ref 0–31)
AST SERPL-CCNC: 10 U/L (ref 0–31)
AST SERPL-CCNC: 104 U/L (ref 0–31)
AST SERPL-CCNC: 11 U/L (ref 0–31)
AST SERPL-CCNC: 12 U/L (ref 0–31)
AST SERPL-CCNC: 13 U/L (ref 0–31)
AST SERPL-CCNC: 14 U/L (ref 0–31)
AST SERPL-CCNC: 15 U/L (ref 0–31)
AST SERPL-CCNC: 16 U/L (ref 0–31)
AST SERPL-CCNC: 17 U/L (ref 0–31)
AST SERPL-CCNC: 19 U/L (ref 0–31)
AST SERPL-CCNC: 19 U/L (ref 0–31)
AST SERPL-CCNC: 1935 U/L (ref 0–31)
AST SERPL-CCNC: 1954 U/L (ref 0–31)
AST SERPL-CCNC: 2228 U/L (ref 0–31)
AST SERPL-CCNC: 2770 U/L (ref 0–31)
AST SERPL-CCNC: 3645 U/L (ref 0–31)
AST SERPL-CCNC: 42 U/L (ref 0–31)
AST SERPL-CCNC: 4269 U/L (ref 0–31)
AST SERPL-CCNC: 4320 U/L (ref 0–31)
AST SERPL-CCNC: 4582 U/L (ref 0–31)
AST SERPL-CCNC: 48 U/L (ref 0–31)
AST SERPL-CCNC: 5356 U/L (ref 0–31)
AST SERPL-CCNC: 5390 U/L (ref 0–31)
AST SERPL-CCNC: 54 U/L (ref 0–31)
AST SERPL-CCNC: 9 U/L (ref 0–31)
AST SERPL-CCNC: 9 U/L (ref 0–31)
B.E.: -11.3 MMOL/L (ref -3–3)
B.E.: -11.5 MMOL/L (ref -3–3)
B.E.: -13.6 MMOL/L (ref -3–0)
B.E.: -13.8 MMOL/L (ref -3–3)
B.E.: -13.8 MMOL/L (ref -3–3)
B.E.: -13.9 MMOL/L (ref -3–3)
B.E.: -14.9 MMOL/L (ref -3–3)
B.E.: -15.8 MMOL/L (ref -3–3)
B.E.: -16.3 MMOL/L (ref -3–3)
B.E.: -17.4 MMOL/L (ref -3–3)
B.E.: -18.4 MMOL/L (ref -3–0)
B.E.: -5.7 MMOL/L (ref -3–0)
B.E.: -6.9 MMOL/L (ref -3–3)
BACTERIA: ABNORMAL /HPF
BACTERIA: ABNORMAL /HPF
BASOPHILS ABSOLUTE: 0 E9/L (ref 0–0.2)
BASOPHILS ABSOLUTE: 0.01 E9/L (ref 0–0.2)
BASOPHILS ABSOLUTE: 0.02 E9/L (ref 0–0.2)
BASOPHILS ABSOLUTE: 0.03 E9/L (ref 0–0.2)
BASOPHILS ABSOLUTE: 0.04 E9/L (ref 0–0.2)
BASOPHILS ABSOLUTE: 0.05 E9/L (ref 0–0.2)
BASOPHILS ABSOLUTE: 0.05 E9/L (ref 0–0.2)
BASOPHILS ABSOLUTE: 0.06 E9/L (ref 0–0.2)
BASOPHILS ABSOLUTE: 0.11 E9/L (ref 0–0.2)
BASOPHILS RELATIVE PERCENT: 0 % (ref 0–2)
BASOPHILS RELATIVE PERCENT: 0.1 % (ref 0–2)
BASOPHILS RELATIVE PERCENT: 0.2 % (ref 0–2)
BASOPHILS RELATIVE PERCENT: 0.3 % (ref 0–2)
BASOPHILS RELATIVE PERCENT: 0.4 % (ref 0–2)
BASOPHILS RELATIVE PERCENT: 0.4 % (ref 0–2)
BASOPHILS RELATIVE PERCENT: 0.6 % (ref 0–2)
BASOPHILS RELATIVE PERCENT: 0.7 % (ref 0–2)
BETA GLOBULIN: 1.1 G/DL (ref 0.8–1.3)
BILIRUB SERPL-MCNC: 0.2 MG/DL (ref 0–1.2)
BILIRUB SERPL-MCNC: 0.3 MG/DL (ref 0–1.2)
BILIRUB SERPL-MCNC: 0.4 MG/DL (ref 0–1.2)
BILIRUB SERPL-MCNC: 0.4 MG/DL (ref 0–1.2)
BILIRUB SERPL-MCNC: 0.5 MG/DL (ref 0–1.2)
BILIRUB SERPL-MCNC: 0.5 MG/DL (ref 0–1.2)
BILIRUB SERPL-MCNC: 0.6 MG/DL (ref 0–1.2)
BILIRUB SERPL-MCNC: 0.7 MG/DL (ref 0–1.2)
BILIRUB SERPL-MCNC: 0.7 MG/DL (ref 0–1.2)
BILIRUB SERPL-MCNC: 1.2 MG/DL (ref 0–1.2)
BILIRUB SERPL-MCNC: 1.6 MG/DL (ref 0–1.2)
BILIRUB SERPL-MCNC: 2 MG/DL (ref 0–1.2)
BILIRUB SERPL-MCNC: 2.2 MG/DL (ref 0–1.2)
BILIRUB SERPL-MCNC: 2.3 MG/DL (ref 0–1.2)
BILIRUB SERPL-MCNC: 2.6 MG/DL (ref 0–1.2)
BILIRUB SERPL-MCNC: <0.2 MG/DL (ref 0–1.2)
BILIRUBIN URINE: NEGATIVE
BILIRUBIN URINE: NEGATIVE
BLOOD BANK DISPENSE STATUS: NORMAL
BLOOD BANK PRODUCT CODE: NORMAL
BLOOD CULTURE, ROUTINE: NORMAL
BLOOD, URINE: ABNORMAL
BLOOD, URINE: ABNORMAL
BPU ID: NORMAL
BUN BLDV-MCNC: 10 MG/DL (ref 8–23)
BUN BLDV-MCNC: 11 MG/DL (ref 8–23)
BUN BLDV-MCNC: 11 MG/DL (ref 8–23)
BUN BLDV-MCNC: 12 MG/DL (ref 8–23)
BUN BLDV-MCNC: 13 MG/DL (ref 8–23)
BUN BLDV-MCNC: 14 MG/DL (ref 8–23)
BUN BLDV-MCNC: 17 MG/DL (ref 8–23)
BUN BLDV-MCNC: 18 MG/DL (ref 8–23)
BUN BLDV-MCNC: 19 MG/DL (ref 8–23)
BUN BLDV-MCNC: 20 MG/DL (ref 8–23)
BUN BLDV-MCNC: 21 MG/DL (ref 8–23)
BUN BLDV-MCNC: 22 MG/DL (ref 8–23)
BUN BLDV-MCNC: 23 MG/DL (ref 8–23)
BUN BLDV-MCNC: 25 MG/DL (ref 8–23)
BUN BLDV-MCNC: 26 MG/DL (ref 8–23)
BUN BLDV-MCNC: 27 MG/DL (ref 8–23)
BUN BLDV-MCNC: 28 MG/DL (ref 8–23)
BUN BLDV-MCNC: 29 MG/DL (ref 8–23)
BUN BLDV-MCNC: 29 MG/DL (ref 8–23)
BUN BLDV-MCNC: 30 MG/DL (ref 8–23)
BUN BLDV-MCNC: 31 MG/DL (ref 8–23)
BUN BLDV-MCNC: 32 MG/DL (ref 8–23)
BUN BLDV-MCNC: 33 MG/DL (ref 8–23)
BUN BLDV-MCNC: 33 MG/DL (ref 8–23)
BUN BLDV-MCNC: 35 MG/DL (ref 8–23)
BUN BLDV-MCNC: 37 MG/DL (ref 8–23)
BUN BLDV-MCNC: 37 MG/DL (ref 8–23)
BUN BLDV-MCNC: 4 MG/DL (ref 8–23)
BUN BLDV-MCNC: 42 MG/DL (ref 8–23)
BUN BLDV-MCNC: 5 MG/DL (ref 8–23)
BUN BLDV-MCNC: 51 MG/DL (ref 8–23)
BUN BLDV-MCNC: 6 MG/DL (ref 8–23)
BUN BLDV-MCNC: 7 MG/DL (ref 8–23)
BUN BLDV-MCNC: 70 MG/DL (ref 8–23)
BUN BLDV-MCNC: 8 MG/DL (ref 8–23)
BUN BLDV-MCNC: 86 MG/DL (ref 8–23)
BUN BLDV-MCNC: 89 MG/DL (ref 8–23)
BURR CELLS: ABNORMAL
C-REACTIVE PROTEIN: 17.1 MG/DL (ref 0–0.4)
CALCIUM IONIZED: 0.94 MMOL/L (ref 1.15–1.33)
CALCIUM IONIZED: 1 MMOL/L (ref 1.15–1.33)
CALCIUM IONIZED: 1 MMOL/L (ref 1.15–1.33)
CALCIUM IONIZED: 1.07 MMOL/L (ref 1.15–1.33)
CALCIUM IONIZED: 1.09 MMOL/L (ref 1.15–1.33)
CALCIUM IONIZED: 1.1 MMOL/L (ref 1.15–1.33)
CALCIUM IONIZED: 1.11 MMOL/L (ref 1.15–1.33)
CALCIUM IONIZED: 1.12 MMOL/L (ref 1.15–1.33)
CALCIUM IONIZED: 1.18 MMOL/L (ref 1.15–1.33)
CALCIUM IONIZED: 1.22 MMOL/L (ref 1.15–1.33)
CALCIUM IONIZED: 1.28 MMOL/L (ref 1.15–1.33)
CALCIUM SERPL-MCNC: 10.1 MG/DL (ref 8.6–10.2)
CALCIUM SERPL-MCNC: 6.5 MG/DL (ref 8.6–10.2)
CALCIUM SERPL-MCNC: 6.7 MG/DL (ref 8.6–10.2)
CALCIUM SERPL-MCNC: 6.9 MG/DL (ref 8.6–10.2)
CALCIUM SERPL-MCNC: 7 MG/DL (ref 8.6–10.2)
CALCIUM SERPL-MCNC: 7.4 MG/DL (ref 8.6–10.2)
CALCIUM SERPL-MCNC: 7.5 MG/DL (ref 8.6–10.2)
CALCIUM SERPL-MCNC: 7.9 MG/DL (ref 8.6–10.2)
CALCIUM SERPL-MCNC: 8 MG/DL (ref 8.6–10.2)
CALCIUM SERPL-MCNC: 8.1 MG/DL (ref 8.6–10.2)
CALCIUM SERPL-MCNC: 8.2 MG/DL (ref 8.6–10.2)
CALCIUM SERPL-MCNC: 8.3 MG/DL (ref 8.6–10.2)
CALCIUM SERPL-MCNC: 8.4 MG/DL (ref 8.6–10.2)
CALCIUM SERPL-MCNC: 8.5 MG/DL (ref 8.6–10.2)
CALCIUM SERPL-MCNC: 8.6 MG/DL (ref 8.6–10.2)
CALCIUM SERPL-MCNC: 8.7 MG/DL (ref 8.6–10.2)
CALCIUM SERPL-MCNC: 8.8 MG/DL (ref 8.6–10.2)
CALCIUM SERPL-MCNC: 8.9 MG/DL (ref 8.6–10.2)
CALCIUM SERPL-MCNC: 8.9 MG/DL (ref 8.6–10.2)
CALCIUM SERPL-MCNC: 9 MG/DL (ref 8.6–10.2)
CALCIUM SERPL-MCNC: 9.1 MG/DL (ref 8.6–10.2)
CALCIUM SERPL-MCNC: 9.2 MG/DL (ref 8.6–10.2)
CALCIUM SERPL-MCNC: 9.4 MG/DL (ref 8.6–10.2)
CALCIUM SERPL-MCNC: 9.4 MG/DL (ref 8.6–10.2)
CALCIUM SERPL-MCNC: 9.6 MG/DL (ref 8.6–10.2)
CALCIUM SERPL-MCNC: 9.6 MG/DL (ref 8.6–10.2)
CARDIOPULMONARY BYPASS: NO
CARDIOPULMONARY BYPASS: NO
CARDIOPULMONARY BYPASS: YES
CHLORIDE BLD-SCNC: 100 MMOL/L (ref 98–107)
CHLORIDE BLD-SCNC: 101 MMOL/L (ref 98–107)
CHLORIDE BLD-SCNC: 102 MMOL/L (ref 98–107)
CHLORIDE BLD-SCNC: 103 MMOL/L (ref 98–107)
CHLORIDE BLD-SCNC: 104 MMOL/L (ref 98–107)
CHLORIDE BLD-SCNC: 105 MMOL/L (ref 98–107)
CHLORIDE BLD-SCNC: 106 MMOL/L (ref 98–107)
CHLORIDE BLD-SCNC: 106 MMOL/L (ref 98–107)
CHLORIDE BLD-SCNC: 107 MMOL/L (ref 98–107)
CHLORIDE BLD-SCNC: 108 MMOL/L (ref 98–107)
CHLORIDE BLD-SCNC: 109 MMOL/L (ref 98–107)
CHLORIDE BLD-SCNC: 110 MMOL/L (ref 98–107)
CHLORIDE BLD-SCNC: 111 MMOL/L (ref 98–107)
CHLORIDE BLD-SCNC: 111 MMOL/L (ref 98–107)
CHLORIDE BLD-SCNC: 90 MMOL/L (ref 98–107)
CHLORIDE BLD-SCNC: 91 MMOL/L (ref 98–107)
CHLORIDE BLD-SCNC: 92 MMOL/L (ref 98–107)
CHLORIDE BLD-SCNC: 93 MMOL/L (ref 98–107)
CHLORIDE BLD-SCNC: 94 MMOL/L (ref 98–107)
CHLORIDE BLD-SCNC: 95 MMOL/L (ref 98–107)
CHLORIDE BLD-SCNC: 96 MMOL/L (ref 98–107)
CHLORIDE BLD-SCNC: 97 MMOL/L (ref 98–107)
CHLORIDE BLD-SCNC: 98 MMOL/L (ref 98–107)
CLARITY: ABNORMAL
CLARITY: CLEAR
CO2: 12 MMOL/L (ref 22–29)
CO2: 13 MMOL/L (ref 22–29)
CO2: 14 MMOL/L (ref 22–29)
CO2: 15 MMOL/L (ref 22–29)
CO2: 16 MMOL/L (ref 22–29)
CO2: 17 MMOL/L (ref 22–29)
CO2: 18 MMOL/L (ref 22–29)
CO2: 18 MMOL/L (ref 22–29)
CO2: 19 MMOL/L (ref 22–29)
CO2: 20 MMOL/L (ref 22–29)
CO2: 21 MMOL/L (ref 22–29)
CO2: 22 MMOL/L (ref 22–29)
CO2: 23 MMOL/L (ref 22–29)
CO2: 23 MMOL/L (ref 22–29)
CO2: 24 MMOL/L (ref 22–29)
CO2: 24 MMOL/L (ref 22–29)
CO2: 25 MMOL/L (ref 22–29)
CO2: 26 MMOL/L (ref 22–29)
CO2: 27 MMOL/L (ref 22–29)
CO2: 27 MMOL/L (ref 22–29)
CO2: 29 MMOL/L (ref 22–29)
CO2: 8 MMOL/L (ref 22–29)
CO2: 9 MMOL/L (ref 22–29)
COHB: 0 % (ref 0–1.5)
COHB: 0.1 % (ref 0–1.5)
COHB: 0.3 % (ref 0–1.5)
COHB: 0.3 % (ref 0–1.5)
COHB: 0.5 % (ref 0–1.5)
COHB: 0.5 % (ref 0–1.5)
COHB: 0.6 % (ref 0–1.5)
COHB: 0.7 % (ref 0–1.5)
COHB: 0.8 % (ref 0–1.5)
COHB: 1 % (ref 0–1.5)
COLOR: YELLOW
COLOR: YELLOW
COMMENT: ABNORMAL
COMMENT: ABNORMAL
CORTISOL TOTAL: 90 MCG/DL (ref 2.68–18.4)
CREAT SERPL-MCNC: 0.4 MG/DL (ref 0.5–1)
CREAT SERPL-MCNC: 0.4 MG/DL (ref 0.5–1)
CREAT SERPL-MCNC: 0.5 MG/DL (ref 0.5–1)
CREAT SERPL-MCNC: 0.6 MG/DL (ref 0.5–1)
CREAT SERPL-MCNC: 0.7 MG/DL (ref 0.5–1)
CREAT SERPL-MCNC: 0.8 MG/DL (ref 0.5–1)
CREAT SERPL-MCNC: 0.9 MG/DL (ref 0.5–1)
CREAT SERPL-MCNC: 0.9 MG/DL (ref 0.5–1)
CREAT SERPL-MCNC: 1 MG/DL (ref 0.5–1)
CREAT SERPL-MCNC: 1.1 MG/DL (ref 0.5–1)
CREAT SERPL-MCNC: 1.3 MG/DL (ref 0.5–1)
CREAT SERPL-MCNC: 1.6 MG/DL (ref 0.5–1)
CREAT SERPL-MCNC: 1.7 MG/DL (ref 0.5–1)
CREAT SERPL-MCNC: 1.7 MG/DL (ref 0.5–1)
CREAT SERPL-MCNC: 2.1 MG/DL (ref 0.5–1)
CREAT SERPL-MCNC: 2.2 MG/DL (ref 0.5–1)
CRITICAL NOTIFICATION: YES
CRITICAL NOTIFICATION: YES
CRITICAL: ABNORMAL
CULTURE SURGICAL: ABNORMAL
CULTURE SURGICAL: ABNORMAL
CULTURE, BLOOD 2: NORMAL
DATE ANALYZED: ABNORMAL
DATE OF COLLECTION: ABNORMAL
DESCRIPTION BLOOD BANK: NORMAL
DEVICE: ABNORMAL
DOHLE BODIES: ABNORMAL
EKG ATRIAL RATE: 119 BPM
EKG ATRIAL RATE: 79 BPM
EKG ATRIAL RATE: 89 BPM
EKG ATRIAL RATE: 98 BPM
EKG P AXIS: 31 DEGREES
EKG P AXIS: 41 DEGREES
EKG P AXIS: 45 DEGREES
EKG P-R INTERVAL: 154 MS
EKG P-R INTERVAL: 166 MS
EKG P-R INTERVAL: 168 MS
EKG Q-T INTERVAL: 360 MS
EKG Q-T INTERVAL: 394 MS
EKG Q-T INTERVAL: 402 MS
EKG Q-T INTERVAL: 442 MS
EKG QRS DURATION: 138 MS
EKG QRS DURATION: 138 MS
EKG QRS DURATION: 144 MS
EKG QRS DURATION: 76 MS
EKG QTC CALCULATION (BAZETT): 438 MS
EKG QTC CALCULATION (BAZETT): 506 MS
EKG QTC CALCULATION (BAZETT): 513 MS
EKG QTC CALCULATION (BAZETT): 554 MS
EKG R AXIS: -7 DEGREES
EKG R AXIS: 26 DEGREES
EKG R AXIS: 26 DEGREES
EKG R AXIS: 68 DEGREES
EKG T AXIS: -146 DEGREES
EKG T AXIS: -83 DEGREES
EKG T AXIS: 112 DEGREES
EKG T AXIS: 84 DEGREES
EKG VENTRICULAR RATE: 119 BPM
EKG VENTRICULAR RATE: 79 BPM
EKG VENTRICULAR RATE: 89 BPM
EKG VENTRICULAR RATE: 98 BPM
ELECTROPHORESIS: ABNORMAL
EOSINOPHILS ABSOLUTE: 0 E9/L (ref 0.05–0.5)
EOSINOPHILS ABSOLUTE: 0.01 E9/L (ref 0.05–0.5)
EOSINOPHILS ABSOLUTE: 0.02 E9/L (ref 0.05–0.5)
EOSINOPHILS ABSOLUTE: 0.04 E9/L (ref 0.05–0.5)
EOSINOPHILS ABSOLUTE: 0.04 E9/L (ref 0.05–0.5)
EOSINOPHILS ABSOLUTE: 0.05 E9/L (ref 0.05–0.5)
EOSINOPHILS ABSOLUTE: 0.05 E9/L (ref 0.05–0.5)
EOSINOPHILS ABSOLUTE: 0.06 E9/L (ref 0.05–0.5)
EOSINOPHILS ABSOLUTE: 0.07 E9/L (ref 0.05–0.5)
EOSINOPHILS ABSOLUTE: 0.09 E9/L (ref 0.05–0.5)
EOSINOPHILS ABSOLUTE: 0.09 E9/L (ref 0.05–0.5)
EOSINOPHILS ABSOLUTE: 0.1 E9/L (ref 0.05–0.5)
EOSINOPHILS ABSOLUTE: 0.14 E9/L (ref 0.05–0.5)
EOSINOPHILS ABSOLUTE: 0.15 E9/L (ref 0.05–0.5)
EOSINOPHILS ABSOLUTE: 0.15 E9/L (ref 0.05–0.5)
EOSINOPHILS ABSOLUTE: 0.18 E9/L (ref 0.05–0.5)
EOSINOPHILS ABSOLUTE: 0.18 E9/L (ref 0.05–0.5)
EOSINOPHILS ABSOLUTE: 0.19 E9/L (ref 0.05–0.5)
EOSINOPHILS ABSOLUTE: 0.2 E9/L (ref 0.05–0.5)
EOSINOPHILS ABSOLUTE: 0.22 E9/L (ref 0.05–0.5)
EOSINOPHILS ABSOLUTE: 0.22 E9/L (ref 0.05–0.5)
EOSINOPHILS ABSOLUTE: 0.25 E9/L (ref 0.05–0.5)
EOSINOPHILS ABSOLUTE: 0.29 E9/L (ref 0.05–0.5)
EOSINOPHILS ABSOLUTE: 0.3 E9/L (ref 0.05–0.5)
EOSINOPHILS ABSOLUTE: 0.38 E9/L (ref 0.05–0.5)
EOSINOPHILS RELATIVE PERCENT: 0 % (ref 0–6)
EOSINOPHILS RELATIVE PERCENT: 0.1 % (ref 0–6)
EOSINOPHILS RELATIVE PERCENT: 0.2 % (ref 0–6)
EOSINOPHILS RELATIVE PERCENT: 0.3 % (ref 0–6)
EOSINOPHILS RELATIVE PERCENT: 0.5 % (ref 0–6)
EOSINOPHILS RELATIVE PERCENT: 0.6 % (ref 0–6)
EOSINOPHILS RELATIVE PERCENT: 0.6 % (ref 0–6)
EOSINOPHILS RELATIVE PERCENT: 0.7 % (ref 0–6)
EOSINOPHILS RELATIVE PERCENT: 0.7 % (ref 0–6)
EOSINOPHILS RELATIVE PERCENT: 0.8 % (ref 0–6)
EOSINOPHILS RELATIVE PERCENT: 0.8 % (ref 0–6)
EOSINOPHILS RELATIVE PERCENT: 1 % (ref 0–6)
EOSINOPHILS RELATIVE PERCENT: 1 % (ref 0–6)
EOSINOPHILS RELATIVE PERCENT: 1.2 % (ref 0–6)
EOSINOPHILS RELATIVE PERCENT: 1.4 % (ref 0–6)
EOSINOPHILS RELATIVE PERCENT: 1.5 % (ref 0–6)
EOSINOPHILS RELATIVE PERCENT: 1.8 % (ref 0–6)
EOSINOPHILS RELATIVE PERCENT: 2 % (ref 0–6)
EOSINOPHILS RELATIVE PERCENT: 2.1 % (ref 0–6)
EOSINOPHILS RELATIVE PERCENT: 2.2 % (ref 0–6)
EOSINOPHILS RELATIVE PERCENT: 2.4 % (ref 0–6)
EOSINOPHILS RELATIVE PERCENT: 2.6 % (ref 0–6)
EOSINOPHILS RELATIVE PERCENT: 2.7 % (ref 0–6)
EOSINOPHILS RELATIVE PERCENT: 2.9 % (ref 0–6)
EOSINOPHILS RELATIVE PERCENT: 3.4 % (ref 0–6)
EPITHELIAL CELLS, UA: ABNORMAL /HPF
EPL-TEG: 0 % (ref 0–15)
FERRITIN: 21 NG/ML
FIO2: 100 %
FIO2: 70 %
FOLATE: 8 NG/ML (ref 4.8–24.2)
G-TEG: 19.2 K D/SC (ref 4.5–11)
GAMMA GLOBULIN: 0.9 G/DL (ref 0.7–1.6)
GFR AFRICAN AMERICAN: 26
GFR AFRICAN AMERICAN: 27
GFR AFRICAN AMERICAN: 35
GFR AFRICAN AMERICAN: 35
GFR AFRICAN AMERICAN: 37
GFR AFRICAN AMERICAN: 47
GFR AFRICAN AMERICAN: 57
GFR AFRICAN AMERICAN: >60
GFR NON-AFRICAN AMERICAN: 21 ML/MIN/1.73
GFR NON-AFRICAN AMERICAN: 22 ML/MIN/1.73
GFR NON-AFRICAN AMERICAN: 29 ML/MIN/1.73
GFR NON-AFRICAN AMERICAN: 29 ML/MIN/1.73
GFR NON-AFRICAN AMERICAN: 31 ML/MIN/1.73
GFR NON-AFRICAN AMERICAN: 39 ML/MIN/1.73
GFR NON-AFRICAN AMERICAN: 47 ML/MIN/1.73
GFR NON-AFRICAN AMERICAN: 53 ML/MIN/1.73
GFR NON-AFRICAN AMERICAN: 60 ML/MIN/1.73
GFR NON-AFRICAN AMERICAN: 60 ML/MIN/1.73
GFR NON-AFRICAN AMERICAN: >60 ML/MIN/1.73
GLUCOSE BLD-MCNC: 103 MG/DL (ref 74–99)
GLUCOSE BLD-MCNC: 105 MG/DL (ref 74–99)
GLUCOSE BLD-MCNC: 105 MG/DL (ref 74–99)
GLUCOSE BLD-MCNC: 106 MG/DL (ref 74–99)
GLUCOSE BLD-MCNC: 107 MG/DL (ref 74–99)
GLUCOSE BLD-MCNC: 107 MG/DL (ref 74–99)
GLUCOSE BLD-MCNC: 109 MG/DL (ref 74–99)
GLUCOSE BLD-MCNC: 110 MG/DL (ref 74–99)
GLUCOSE BLD-MCNC: 116 MG/DL (ref 74–99)
GLUCOSE BLD-MCNC: 118 MG/DL (ref 74–99)
GLUCOSE BLD-MCNC: 121 MG/DL (ref 74–99)
GLUCOSE BLD-MCNC: 122 MG/DL (ref 74–99)
GLUCOSE BLD-MCNC: 13 MG/DL (ref 74–99)
GLUCOSE BLD-MCNC: 132 MG/DL (ref 74–99)
GLUCOSE BLD-MCNC: 132 MG/DL (ref 74–99)
GLUCOSE BLD-MCNC: 134 MG/DL (ref 74–99)
GLUCOSE BLD-MCNC: 134 MG/DL (ref 74–99)
GLUCOSE BLD-MCNC: 136 MG/DL (ref 74–99)
GLUCOSE BLD-MCNC: 138 MG/DL (ref 74–99)
GLUCOSE BLD-MCNC: 16 MG/DL (ref 74–99)
GLUCOSE BLD-MCNC: 161 MG/DL (ref 74–99)
GLUCOSE BLD-MCNC: 170 MG/DL (ref 74–99)
GLUCOSE BLD-MCNC: 173 MG/DL (ref 74–99)
GLUCOSE BLD-MCNC: 185 MG/DL (ref 74–99)
GLUCOSE BLD-MCNC: 204 MG/DL (ref 74–99)
GLUCOSE BLD-MCNC: 210 MG/DL (ref 74–99)
GLUCOSE BLD-MCNC: 230 MG/DL (ref 74–99)
GLUCOSE BLD-MCNC: 232 MG/DL (ref 74–99)
GLUCOSE BLD-MCNC: 250 MG/DL (ref 74–99)
GLUCOSE BLD-MCNC: 441 MG/DL (ref 74–99)
GLUCOSE BLD-MCNC: 62 MG/DL (ref 74–99)
GLUCOSE BLD-MCNC: 65 MG/DL (ref 74–99)
GLUCOSE BLD-MCNC: 673 MG/DL (ref 74–99)
GLUCOSE BLD-MCNC: 68 MG/DL (ref 74–99)
GLUCOSE BLD-MCNC: 70 MG/DL (ref 74–99)
GLUCOSE BLD-MCNC: 72 MG/DL (ref 74–99)
GLUCOSE BLD-MCNC: 75 MG/DL (ref 74–99)
GLUCOSE BLD-MCNC: 77 MG/DL (ref 74–99)
GLUCOSE BLD-MCNC: 77 MG/DL (ref 74–99)
GLUCOSE BLD-MCNC: 81 MG/DL (ref 74–99)
GLUCOSE BLD-MCNC: 81 MG/DL (ref 74–99)
GLUCOSE BLD-MCNC: 84 MG/DL (ref 74–99)
GLUCOSE BLD-MCNC: 85 MG/DL (ref 74–99)
GLUCOSE BLD-MCNC: 86 MG/DL (ref 74–99)
GLUCOSE BLD-MCNC: 87 MG/DL (ref 74–99)
GLUCOSE BLD-MCNC: 88 MG/DL (ref 74–99)
GLUCOSE BLD-MCNC: 89 MG/DL (ref 74–99)
GLUCOSE BLD-MCNC: 91 MG/DL (ref 74–99)
GLUCOSE BLD-MCNC: 93 MG/DL (ref 74–99)
GLUCOSE BLD-MCNC: 93 MG/DL (ref 74–99)
GLUCOSE BLD-MCNC: 96 MG/DL (ref 74–99)
GLUCOSE BLD-MCNC: 96 MG/DL (ref 74–99)
GLUCOSE URINE: NEGATIVE MG/DL
GLUCOSE URINE: NEGATIVE MG/DL
GRAM STAIN ORDERABLE: NORMAL
HAV IGM SER IA-ACNC: NORMAL
HCO3 ARTERIAL: 11.9 MMOL/L (ref 22–26)
HCO3 ARTERIAL: 15.8 MMOL/L (ref 22–26)
HCO3 ARTERIAL: 20.4 MMOL/L (ref 22–26)
HCO3: 11.1 MMOL/L (ref 22–26)
HCO3: 11.5 MMOL/L (ref 22–26)
HCO3: 11.8 MMOL/L (ref 22–26)
HCO3: 11.9 MMOL/L (ref 22–26)
HCO3: 12 MMOL/L (ref 22–26)
HCO3: 12.7 MMOL/L (ref 22–26)
HCO3: 14.1 MMOL/L (ref 22–26)
HCO3: 14.6 MMOL/L (ref 22–26)
HCO3: 18.6 MMOL/L (ref 22–26)
HCO3: 9.3 MMOL/L (ref 22–26)
HCT (EST): 22 % (ref 34–48)
HCT (EST): 25 % (ref 34–48)
HCT (EST): 28 % (ref 34–48)
HCT VFR BLD CALC: 19.3 % (ref 34–48)
HCT VFR BLD CALC: 21.2 % (ref 34–48)
HCT VFR BLD CALC: 21.8 % (ref 34–48)
HCT VFR BLD CALC: 21.9 % (ref 34–48)
HCT VFR BLD CALC: 22.1 % (ref 34–48)
HCT VFR BLD CALC: 22.6 % (ref 34–48)
HCT VFR BLD CALC: 22.8 % (ref 34–48)
HCT VFR BLD CALC: 23 % (ref 34–48)
HCT VFR BLD CALC: 23.1 % (ref 34–48)
HCT VFR BLD CALC: 23.1 % (ref 34–48)
HCT VFR BLD CALC: 23.4 % (ref 34–48)
HCT VFR BLD CALC: 23.4 % (ref 34–48)
HCT VFR BLD CALC: 23.5 % (ref 34–48)
HCT VFR BLD CALC: 23.6 % (ref 34–48)
HCT VFR BLD CALC: 23.8 % (ref 34–48)
HCT VFR BLD CALC: 23.8 % (ref 34–48)
HCT VFR BLD CALC: 23.9 % (ref 34–48)
HCT VFR BLD CALC: 24 % (ref 34–48)
HCT VFR BLD CALC: 24.2 % (ref 34–48)
HCT VFR BLD CALC: 24.3 % (ref 34–48)
HCT VFR BLD CALC: 24.4 % (ref 34–48)
HCT VFR BLD CALC: 24.5 % (ref 34–48)
HCT VFR BLD CALC: 24.5 % (ref 34–48)
HCT VFR BLD CALC: 24.6 % (ref 34–48)
HCT VFR BLD CALC: 24.8 % (ref 34–48)
HCT VFR BLD CALC: 24.8 % (ref 34–48)
HCT VFR BLD CALC: 24.9 % (ref 34–48)
HCT VFR BLD CALC: 25.1 % (ref 34–48)
HCT VFR BLD CALC: 25.1 % (ref 34–48)
HCT VFR BLD CALC: 25.2 % (ref 34–48)
HCT VFR BLD CALC: 25.4 % (ref 34–48)
HCT VFR BLD CALC: 25.5 % (ref 34–48)
HCT VFR BLD CALC: 25.5 % (ref 34–48)
HCT VFR BLD CALC: 25.6 % (ref 34–48)
HCT VFR BLD CALC: 25.7 % (ref 34–48)
HCT VFR BLD CALC: 26.2 % (ref 34–48)
HCT VFR BLD CALC: 26.3 % (ref 34–48)
HCT VFR BLD CALC: 26.3 % (ref 34–48)
HCT VFR BLD CALC: 26.4 % (ref 34–48)
HCT VFR BLD CALC: 26.7 % (ref 34–48)
HCT VFR BLD CALC: 26.8 % (ref 34–48)
HCT VFR BLD CALC: 27 % (ref 34–48)
HCT VFR BLD CALC: 27.1 % (ref 34–48)
HCT VFR BLD CALC: 28.3 % (ref 34–48)
HCT VFR BLD CALC: 28.5 % (ref 34–48)
HCT VFR BLD CALC: 29.3 % (ref 34–48)
HCT VFR BLD CALC: 29.7 % (ref 34–48)
HCT VFR BLD CALC: 30.3 % (ref 34–48)
HCT VFR BLD CALC: 30.4 % (ref 34–48)
HCT VFR BLD CALC: 32 % (ref 34–48)
HCT VFR BLD CALC: 33.2 % (ref 34–48)
HCT VFR BLD CALC: 34.2 % (ref 34–48)
HCT VFR BLD CALC: 36.2 % (ref 34–48)
HCT VFR BLD CALC: 36.7 % (ref 34–48)
HCT VFR BLD CALC: 38 % (ref 34–48)
HELICOBACTER PYLORI IGG: NORMAL
HEMOGLOBIN: 10.1 G/DL (ref 11.5–15.5)
HEMOGLOBIN: 10.4 G/DL (ref 11.5–15.5)
HEMOGLOBIN: 10.9 G/DL (ref 11.5–15.5)
HEMOGLOBIN: 11.4 G/DL (ref 11.5–15.5)
HEMOGLOBIN: 11.5 G/DL (ref 11.5–15.5)
HEMOGLOBIN: 11.8 G/DL (ref 11.5–15.5)
HEMOGLOBIN: 5.7 G/DL (ref 11.5–15.5)
HEMOGLOBIN: 6.6 G/DL (ref 11.5–15.5)
HEMOGLOBIN: 6.8 G/DL (ref 11.5–15.5)
HEMOGLOBIN: 6.8 G/DL (ref 11.5–15.5)
HEMOGLOBIN: 7 G/DL (ref 11.5–15.5)
HEMOGLOBIN: 7 G/DL (ref 11.5–15.5)
HEMOGLOBIN: 7.1 G/DL (ref 11.5–15.5)
HEMOGLOBIN: 7.1 G/DL (ref 11.5–15.5)
HEMOGLOBIN: 7.2 G/DL (ref 11.5–15.5)
HEMOGLOBIN: 7.3 G/DL (ref 11.5–15.5)
HEMOGLOBIN: 7.3 G/DL (ref 11.5–15.5)
HEMOGLOBIN: 7.4 G/DL (ref 11.5–15.5)
HEMOGLOBIN: 7.5 G/DL (ref 11.5–15.5)
HEMOGLOBIN: 7.6 G/DL (ref 11.5–15.5)
HEMOGLOBIN: 7.7 G/DL (ref 11.5–15.5)
HEMOGLOBIN: 7.8 G/DL (ref 11.5–15.5)
HEMOGLOBIN: 7.9 G/DL (ref 11.5–15.5)
HEMOGLOBIN: 8 G/DL (ref 11.5–15.5)
HEMOGLOBIN: 8 G/DL (ref 11.5–15.5)
HEMOGLOBIN: 8.1 G/DL (ref 11.5–15.5)
HEMOGLOBIN: 8.2 G/DL (ref 11.5–15.5)
HEMOGLOBIN: 8.3 G/DL (ref 11.5–15.5)
HEMOGLOBIN: 8.4 G/DL (ref 11.5–15.5)
HEMOGLOBIN: 8.6 G/DL (ref 11.5–15.5)
HEMOGLOBIN: 8.7 G/DL (ref 11.5–15.5)
HEMOGLOBIN: 8.8 G/DL (ref 11.5–15.5)
HEMOGLOBIN: 8.9 G/DL (ref 11.5–15.5)
HEMOGLOBIN: 9.6 G/DL (ref 11.5–15.5)
HEMOGLOBIN: 9.7 G/DL (ref 11.5–15.5)
HEMOGLOBIN: 9.8 G/DL (ref 11.5–15.5)
HEPATITIS B CORE IGM ANTIBODY: NORMAL
HEPATITIS B SURFACE ANTIGEN INTERPRETATION: NORMAL
HEPATITIS C ANTIBODY INTERPRETATION: NORMAL
HGB, (EST): 7.5 G/DL (ref 11.5–15.5)
HGB, (EST): 8.6 G/DL (ref 11.5–15.5)
HGB, (EST): 9.4 G/DL (ref 11.5–15.5)
HHB: 1.4 % (ref 0–5)
HHB: 1.4 % (ref 0–5)
HHB: 10.7 % (ref 0–5)
HHB: 2.9 % (ref 0–5)
HHB: 3.6 % (ref 0–5)
HHB: 3.8 % (ref 0–5)
HHB: 38.1 % (ref 0–5)
HHB: 4.6 % (ref 0–5)
HHB: 6.3 % (ref 0–5)
HHB: 6.4 % (ref 0–5)
HYPOCHROMIA: ABNORMAL
IGA: 209 MG/DL (ref 70–400)
IGG: 731 MG/DL (ref 700–1600)
IGM: 155 MG/DL (ref 40–230)
IMMATURE GRANULOCYTES #: 0.05 E9/L
IMMATURE GRANULOCYTES #: 0.06 E9/L
IMMATURE GRANULOCYTES #: 0.07 E9/L
IMMATURE GRANULOCYTES #: 0.07 E9/L
IMMATURE GRANULOCYTES #: 0.09 E9/L
IMMATURE GRANULOCYTES #: 0.1 E9/L
IMMATURE GRANULOCYTES #: 0.11 E9/L
IMMATURE GRANULOCYTES #: 0.14 E9/L
IMMATURE GRANULOCYTES #: 0.15 E9/L
IMMATURE GRANULOCYTES #: 0.15 E9/L
IMMATURE GRANULOCYTES #: 0.16 E9/L
IMMATURE GRANULOCYTES #: 0.16 E9/L
IMMATURE GRANULOCYTES #: 0.17 E9/L
IMMATURE GRANULOCYTES #: 0.17 E9/L
IMMATURE GRANULOCYTES #: 0.19 E9/L
IMMATURE GRANULOCYTES #: 0.22 E9/L
IMMATURE GRANULOCYTES #: 0.23 E9/L
IMMATURE GRANULOCYTES #: 0.24 E9/L
IMMATURE GRANULOCYTES #: 0.25 E9/L
IMMATURE GRANULOCYTES #: 0.25 E9/L
IMMATURE GRANULOCYTES #: 0.26 E9/L
IMMATURE GRANULOCYTES #: 0.27 E9/L
IMMATURE GRANULOCYTES #: 0.27 E9/L
IMMATURE GRANULOCYTES #: 0.29 E9/L
IMMATURE GRANULOCYTES #: 0.3 E9/L
IMMATURE GRANULOCYTES #: 0.31 E9/L
IMMATURE GRANULOCYTES #: 0.32 E9/L
IMMATURE GRANULOCYTES #: 0.33 E9/L
IMMATURE GRANULOCYTES #: 0.36 E9/L
IMMATURE GRANULOCYTES #: 0.38 E9/L
IMMATURE GRANULOCYTES #: 0.4 E9/L
IMMATURE GRANULOCYTES %: 0.5 % (ref 0–5)
IMMATURE GRANULOCYTES %: 0.6 % (ref 0–5)
IMMATURE GRANULOCYTES %: 0.6 % (ref 0–5)
IMMATURE GRANULOCYTES %: 0.7 % (ref 0–5)
IMMATURE GRANULOCYTES %: 0.9 % (ref 0–5)
IMMATURE GRANULOCYTES %: 0.9 % (ref 0–5)
IMMATURE GRANULOCYTES %: 1 % (ref 0–5)
IMMATURE GRANULOCYTES %: 1.1 % (ref 0–5)
IMMATURE GRANULOCYTES %: 1.2 % (ref 0–5)
IMMATURE GRANULOCYTES %: 1.3 % (ref 0–5)
IMMATURE GRANULOCYTES %: 1.4 % (ref 0–5)
IMMATURE GRANULOCYTES %: 1.5 % (ref 0–5)
IMMATURE GRANULOCYTES %: 1.6 % (ref 0–5)
IMMATURE GRANULOCYTES %: 2 % (ref 0–5)
IMMATURE GRANULOCYTES %: 2.1 % (ref 0–5)
IMMATURE GRANULOCYTES %: 2.3 % (ref 0–5)
IMMATURE GRANULOCYTES %: 2.6 % (ref 0–5)
IMMATURE GRANULOCYTES %: 2.7 % (ref 0–5)
IMMATURE GRANULOCYTES %: 2.8 % (ref 0–5)
IMMATURE GRANULOCYTES %: 3.3 % (ref 0–5)
IMMATURE RETIC FRACT: 29.6 % (ref 3–15.9)
IMMUNOFIXATION URINE: NORMAL
INR BLD: 1.1
INR BLD: 1.1
INR BLD: 1.2
INR BLD: 2
IRON SATURATION: 12 % (ref 15–50)
IRON: 43 MCG/DL (ref 37–145)
K (CLOTTING TIME): 0.8 MIN (ref 1–3)
KETONES, URINE: ABNORMAL MG/DL
KETONES, URINE: ABNORMAL MG/DL
LAB: ABNORMAL
LACTATE DEHYDROGENASE: 138 U/L (ref 135–214)
LACTIC ACID: 0.7 MMOL/L (ref 0.5–2.2)
LACTIC ACID: 0.9 MMOL/L (ref 0.5–2.2)
LACTIC ACID: 13.8 MMOL/L (ref 0.5–2.2)
LACTIC ACID: 15 MMOL/L (ref 0.5–2.2)
LACTIC ACID: 15.2 MMOL/L (ref 0.5–2.2)
LACTIC ACID: 15.8 MMOL/L (ref 0.5–2.2)
LACTIC ACID: 16.3 MMOL/L (ref 0.5–2.2)
LACTIC ACID: 16.4 MMOL/L (ref 0.5–2.2)
LACTIC ACID: 17.7 MMOL/L (ref 0.5–2.2)
LACTIC ACID: 18.3 MMOL/L (ref 0.5–2.2)
LACTIC ACID: 18.6 MMOL/L (ref 0.5–2.2)
LACTIC ACID: 19.2 MMOL/L (ref 0.5–2.2)
LACTIC ACID: 21.6 MMOL/L (ref 0.5–2.2)
LEUKOCYTE ESTERASE, URINE: ABNORMAL
LEUKOCYTE ESTERASE, URINE: NEGATIVE
LIPASE: 23 U/L (ref 13–60)
LY30 (FIBRINOLYSIS): 0 % (ref 0–8)
LYMPHOCYTES ABSOLUTE: 0.17 E9/L (ref 1.5–4)
LYMPHOCYTES ABSOLUTE: 0.29 E9/L (ref 1.5–4)
LYMPHOCYTES ABSOLUTE: 0.67 E9/L (ref 1.5–4)
LYMPHOCYTES ABSOLUTE: 0.81 E9/L (ref 1.5–4)
LYMPHOCYTES ABSOLUTE: 0.81 E9/L (ref 1.5–4)
LYMPHOCYTES ABSOLUTE: 0.85 E9/L (ref 1.5–4)
LYMPHOCYTES ABSOLUTE: 0.86 E9/L (ref 1.5–4)
LYMPHOCYTES ABSOLUTE: 0.86 E9/L (ref 1.5–4)
LYMPHOCYTES ABSOLUTE: 0.88 E9/L (ref 1.5–4)
LYMPHOCYTES ABSOLUTE: 0.9 E9/L (ref 1.5–4)
LYMPHOCYTES ABSOLUTE: 0.91 E9/L (ref 1.5–4)
LYMPHOCYTES ABSOLUTE: 0.92 E9/L (ref 1.5–4)
LYMPHOCYTES ABSOLUTE: 0.93 E9/L (ref 1.5–4)
LYMPHOCYTES ABSOLUTE: 0.95 E9/L (ref 1.5–4)
LYMPHOCYTES ABSOLUTE: 0.98 E9/L (ref 1.5–4)
LYMPHOCYTES ABSOLUTE: 0.99 E9/L (ref 1.5–4)
LYMPHOCYTES ABSOLUTE: 1.02 E9/L (ref 1.5–4)
LYMPHOCYTES ABSOLUTE: 1.03 E9/L (ref 1.5–4)
LYMPHOCYTES ABSOLUTE: 1.08 E9/L (ref 1.5–4)
LYMPHOCYTES ABSOLUTE: 1.09 E9/L (ref 1.5–4)
LYMPHOCYTES ABSOLUTE: 1.09 E9/L (ref 1.5–4)
LYMPHOCYTES ABSOLUTE: 1.16 E9/L (ref 1.5–4)
LYMPHOCYTES ABSOLUTE: 1.17 E9/L (ref 1.5–4)
LYMPHOCYTES ABSOLUTE: 1.17 E9/L (ref 1.5–4)
LYMPHOCYTES ABSOLUTE: 1.18 E9/L (ref 1.5–4)
LYMPHOCYTES ABSOLUTE: 1.21 E9/L (ref 1.5–4)
LYMPHOCYTES ABSOLUTE: 1.22 E9/L (ref 1.5–4)
LYMPHOCYTES ABSOLUTE: 1.24 E9/L (ref 1.5–4)
LYMPHOCYTES ABSOLUTE: 1.28 E9/L (ref 1.5–4)
LYMPHOCYTES ABSOLUTE: 1.3 E9/L (ref 1.5–4)
LYMPHOCYTES ABSOLUTE: 1.3 E9/L (ref 1.5–4)
LYMPHOCYTES ABSOLUTE: 1.32 E9/L (ref 1.5–4)
LYMPHOCYTES ABSOLUTE: 1.34 E9/L (ref 1.5–4)
LYMPHOCYTES ABSOLUTE: 1.35 E9/L (ref 1.5–4)
LYMPHOCYTES ABSOLUTE: 1.42 E9/L (ref 1.5–4)
LYMPHOCYTES ABSOLUTE: 1.45 E9/L (ref 1.5–4)
LYMPHOCYTES ABSOLUTE: 1.53 E9/L (ref 1.5–4)
LYMPHOCYTES ABSOLUTE: 1.95 E9/L (ref 1.5–4)
LYMPHOCYTES RELATIVE PERCENT: 0.9 % (ref 20–42)
LYMPHOCYTES RELATIVE PERCENT: 0.9 % (ref 20–42)
LYMPHOCYTES RELATIVE PERCENT: 10.5 % (ref 20–42)
LYMPHOCYTES RELATIVE PERCENT: 10.6 % (ref 20–42)
LYMPHOCYTES RELATIVE PERCENT: 11.2 % (ref 20–42)
LYMPHOCYTES RELATIVE PERCENT: 11.7 % (ref 20–42)
LYMPHOCYTES RELATIVE PERCENT: 11.7 % (ref 20–42)
LYMPHOCYTES RELATIVE PERCENT: 11.9 % (ref 20–42)
LYMPHOCYTES RELATIVE PERCENT: 12.2 % (ref 20–42)
LYMPHOCYTES RELATIVE PERCENT: 12.8 % (ref 20–42)
LYMPHOCYTES RELATIVE PERCENT: 13 % (ref 20–42)
LYMPHOCYTES RELATIVE PERCENT: 13.6 % (ref 20–42)
LYMPHOCYTES RELATIVE PERCENT: 14 % (ref 20–42)
LYMPHOCYTES RELATIVE PERCENT: 18.5 % (ref 20–42)
LYMPHOCYTES RELATIVE PERCENT: 2.6 % (ref 20–42)
LYMPHOCYTES RELATIVE PERCENT: 21.7 % (ref 20–42)
LYMPHOCYTES RELATIVE PERCENT: 3.7 % (ref 20–42)
LYMPHOCYTES RELATIVE PERCENT: 4.4 % (ref 20–42)
LYMPHOCYTES RELATIVE PERCENT: 4.5 % (ref 20–42)
LYMPHOCYTES RELATIVE PERCENT: 5 % (ref 20–42)
LYMPHOCYTES RELATIVE PERCENT: 5.8 % (ref 20–42)
LYMPHOCYTES RELATIVE PERCENT: 5.8 % (ref 20–42)
LYMPHOCYTES RELATIVE PERCENT: 5.9 % (ref 20–42)
LYMPHOCYTES RELATIVE PERCENT: 6.1 % (ref 20–42)
LYMPHOCYTES RELATIVE PERCENT: 6.2 % (ref 20–42)
LYMPHOCYTES RELATIVE PERCENT: 6.2 % (ref 20–42)
LYMPHOCYTES RELATIVE PERCENT: 6.3 % (ref 20–42)
LYMPHOCYTES RELATIVE PERCENT: 6.4 % (ref 20–42)
LYMPHOCYTES RELATIVE PERCENT: 6.6 % (ref 20–42)
LYMPHOCYTES RELATIVE PERCENT: 6.9 % (ref 20–42)
LYMPHOCYTES RELATIVE PERCENT: 7 % (ref 20–42)
LYMPHOCYTES RELATIVE PERCENT: 7.2 % (ref 20–42)
LYMPHOCYTES RELATIVE PERCENT: 7.2 % (ref 20–42)
LYMPHOCYTES RELATIVE PERCENT: 7.4 % (ref 20–42)
LYMPHOCYTES RELATIVE PERCENT: 7.5 % (ref 20–42)
LYMPHOCYTES RELATIVE PERCENT: 7.9 % (ref 20–42)
LYMPHOCYTES RELATIVE PERCENT: 8 % (ref 20–42)
LYMPHOCYTES RELATIVE PERCENT: 8.4 % (ref 20–42)
LYMPHOCYTES RELATIVE PERCENT: 9.2 % (ref 20–42)
LYMPHOCYTES RELATIVE PERCENT: 9.3 % (ref 20–42)
Lab: ABNORMAL
MA (MAX AMPLITUDE): 79.3 MM (ref 50–70)
MAGNESIUM: 1.1 MG/DL (ref 1.6–2.6)
MAGNESIUM: 1.5 MG/DL (ref 1.6–2.6)
MAGNESIUM: 1.6 MG/DL (ref 1.6–2.6)
MAGNESIUM: 1.7 MG/DL (ref 1.6–2.6)
MAGNESIUM: 1.8 MG/DL (ref 1.6–2.6)
MAGNESIUM: 1.9 MG/DL (ref 1.6–2.6)
MAGNESIUM: 2 MG/DL (ref 1.6–2.6)
MAGNESIUM: 2.1 MG/DL (ref 1.6–2.6)
MAGNESIUM: 2.2 MG/DL (ref 1.6–2.6)
MAGNESIUM: 2.3 MG/DL (ref 1.6–2.6)
MAGNESIUM: 2.4 MG/DL (ref 1.6–2.6)
MAGNESIUM: 2.6 MG/DL (ref 1.6–2.6)
MAGNESIUM: 3 MG/DL (ref 1.6–2.6)
MCH RBC QN AUTO: 25 PG (ref 26–35)
MCH RBC QN AUTO: 25.9 PG (ref 26–35)
MCH RBC QN AUTO: 26.1 PG (ref 26–35)
MCH RBC QN AUTO: 26.4 PG (ref 26–35)
MCH RBC QN AUTO: 26.5 PG (ref 26–35)
MCH RBC QN AUTO: 26.5 PG (ref 26–35)
MCH RBC QN AUTO: 26.6 PG (ref 26–35)
MCH RBC QN AUTO: 26.7 PG (ref 26–35)
MCH RBC QN AUTO: 26.7 PG (ref 26–35)
MCH RBC QN AUTO: 26.8 PG (ref 26–35)
MCH RBC QN AUTO: 27.1 PG (ref 26–35)
MCH RBC QN AUTO: 27.2 PG (ref 26–35)
MCH RBC QN AUTO: 27.3 PG (ref 26–35)
MCH RBC QN AUTO: 27.4 PG (ref 26–35)
MCH RBC QN AUTO: 27.5 PG (ref 26–35)
MCH RBC QN AUTO: 27.5 PG (ref 26–35)
MCH RBC QN AUTO: 27.6 PG (ref 26–35)
MCH RBC QN AUTO: 27.6 PG (ref 26–35)
MCH RBC QN AUTO: 27.7 PG (ref 26–35)
MCH RBC QN AUTO: 27.9 PG (ref 26–35)
MCH RBC QN AUTO: 28.1 PG (ref 26–35)
MCH RBC QN AUTO: 28.2 PG (ref 26–35)
MCH RBC QN AUTO: 28.3 PG (ref 26–35)
MCH RBC QN AUTO: 28.4 PG (ref 26–35)
MCH RBC QN AUTO: 28.5 PG (ref 26–35)
MCH RBC QN AUTO: 28.5 PG (ref 26–35)
MCH RBC QN AUTO: 28.6 PG (ref 26–35)
MCH RBC QN AUTO: 28.6 PG (ref 26–35)
MCH RBC QN AUTO: 28.8 PG (ref 26–35)
MCH RBC QN AUTO: 28.9 PG (ref 26–35)
MCH RBC QN AUTO: 29 PG (ref 26–35)
MCH RBC QN AUTO: 29.2 PG (ref 26–35)
MCH RBC QN AUTO: 30.5 PG (ref 26–35)
MCHC RBC AUTO-ENTMCNC: 29.4 % (ref 32–34.5)
MCHC RBC AUTO-ENTMCNC: 29.5 % (ref 32–34.5)
MCHC RBC AUTO-ENTMCNC: 29.6 % (ref 32–34.5)
MCHC RBC AUTO-ENTMCNC: 29.9 % (ref 32–34.5)
MCHC RBC AUTO-ENTMCNC: 29.9 % (ref 32–34.5)
MCHC RBC AUTO-ENTMCNC: 30 % (ref 32–34.5)
MCHC RBC AUTO-ENTMCNC: 30.2 % (ref 32–34.5)
MCHC RBC AUTO-ENTMCNC: 30.3 % (ref 32–34.5)
MCHC RBC AUTO-ENTMCNC: 30.4 % (ref 32–34.5)
MCHC RBC AUTO-ENTMCNC: 30.6 % (ref 32–34.5)
MCHC RBC AUTO-ENTMCNC: 30.7 % (ref 32–34.5)
MCHC RBC AUTO-ENTMCNC: 30.7 % (ref 32–34.5)
MCHC RBC AUTO-ENTMCNC: 30.9 % (ref 32–34.5)
MCHC RBC AUTO-ENTMCNC: 31.1 % (ref 32–34.5)
MCHC RBC AUTO-ENTMCNC: 31.2 % (ref 32–34.5)
MCHC RBC AUTO-ENTMCNC: 31.2 % (ref 32–34.5)
MCHC RBC AUTO-ENTMCNC: 31.3 % (ref 32–34.5)
MCHC RBC AUTO-ENTMCNC: 31.4 % (ref 32–34.5)
MCHC RBC AUTO-ENTMCNC: 31.5 % (ref 32–34.5)
MCHC RBC AUTO-ENTMCNC: 31.5 % (ref 32–34.5)
MCHC RBC AUTO-ENTMCNC: 31.6 % (ref 32–34.5)
MCHC RBC AUTO-ENTMCNC: 31.7 % (ref 32–34.5)
MCHC RBC AUTO-ENTMCNC: 31.7 % (ref 32–34.5)
MCHC RBC AUTO-ENTMCNC: 31.8 % (ref 32–34.5)
MCHC RBC AUTO-ENTMCNC: 31.8 % (ref 32–34.5)
MCHC RBC AUTO-ENTMCNC: 31.9 % (ref 32–34.5)
MCHC RBC AUTO-ENTMCNC: 31.9 % (ref 32–34.5)
MCHC RBC AUTO-ENTMCNC: 32.1 % (ref 32–34.5)
MCHC RBC AUTO-ENTMCNC: 32.1 % (ref 32–34.5)
MCHC RBC AUTO-ENTMCNC: 32.2 % (ref 32–34.5)
MCHC RBC AUTO-ENTMCNC: 32.3 % (ref 32–34.5)
MCHC RBC AUTO-ENTMCNC: 32.3 % (ref 32–34.5)
MCHC RBC AUTO-ENTMCNC: 32.5 % (ref 32–34.5)
MCHC RBC AUTO-ENTMCNC: 32.6 % (ref 32–34.5)
MCHC RBC AUTO-ENTMCNC: 32.6 % (ref 32–34.5)
MCHC RBC AUTO-ENTMCNC: 32.7 % (ref 32–34.5)
MCV RBC AUTO: 83.7 FL (ref 80–99.9)
MCV RBC AUTO: 84 FL (ref 80–99.9)
MCV RBC AUTO: 84.2 FL (ref 80–99.9)
MCV RBC AUTO: 84.7 FL (ref 80–99.9)
MCV RBC AUTO: 84.8 FL (ref 80–99.9)
MCV RBC AUTO: 84.9 FL (ref 80–99.9)
MCV RBC AUTO: 85 FL (ref 80–99.9)
MCV RBC AUTO: 85 FL (ref 80–99.9)
MCV RBC AUTO: 85.2 FL (ref 80–99.9)
MCV RBC AUTO: 85.3 FL (ref 80–99.9)
MCV RBC AUTO: 85.8 FL (ref 80–99.9)
MCV RBC AUTO: 86 FL (ref 80–99.9)
MCV RBC AUTO: 86.2 FL (ref 80–99.9)
MCV RBC AUTO: 86.4 FL (ref 80–99.9)
MCV RBC AUTO: 86.8 FL (ref 80–99.9)
MCV RBC AUTO: 86.9 FL (ref 80–99.9)
MCV RBC AUTO: 87.2 FL (ref 80–99.9)
MCV RBC AUTO: 87.3 FL (ref 80–99.9)
MCV RBC AUTO: 87.4 FL (ref 80–99.9)
MCV RBC AUTO: 87.7 FL (ref 80–99.9)
MCV RBC AUTO: 88.1 FL (ref 80–99.9)
MCV RBC AUTO: 88.5 FL (ref 80–99.9)
MCV RBC AUTO: 88.7 FL (ref 80–99.9)
MCV RBC AUTO: 89 FL (ref 80–99.9)
MCV RBC AUTO: 89.1 FL (ref 80–99.9)
MCV RBC AUTO: 89.2 FL (ref 80–99.9)
MCV RBC AUTO: 89.2 FL (ref 80–99.9)
MCV RBC AUTO: 89.5 FL (ref 80–99.9)
MCV RBC AUTO: 89.5 FL (ref 80–99.9)
MCV RBC AUTO: 89.7 FL (ref 80–99.9)
MCV RBC AUTO: 89.8 FL (ref 80–99.9)
MCV RBC AUTO: 89.9 FL (ref 80–99.9)
MCV RBC AUTO: 90 FL (ref 80–99.9)
MCV RBC AUTO: 90 FL (ref 80–99.9)
MCV RBC AUTO: 90.1 FL (ref 80–99.9)
MCV RBC AUTO: 90.2 FL (ref 80–99.9)
MCV RBC AUTO: 90.3 FL (ref 80–99.9)
MCV RBC AUTO: 90.5 FL (ref 80–99.9)
MCV RBC AUTO: 90.6 FL (ref 80–99.9)
MCV RBC AUTO: 90.7 FL (ref 80–99.9)
MCV RBC AUTO: 90.8 FL (ref 80–99.9)
MCV RBC AUTO: 91.1 FL (ref 80–99.9)
MCV RBC AUTO: 91.1 FL (ref 80–99.9)
MCV RBC AUTO: 91.3 FL (ref 80–99.9)
MCV RBC AUTO: 91.4 FL (ref 80–99.9)
MCV RBC AUTO: 91.7 FL (ref 80–99.9)
MCV RBC AUTO: 92.1 FL (ref 80–99.9)
MCV RBC AUTO: 93.4 FL (ref 80–99.9)
METAMYELOCYTES RELATIVE PERCENT: 0.9 % (ref 0–1)
METAMYELOCYTES RELATIVE PERCENT: 4 % (ref 0–1)
METAMYELOCYTES RELATIVE PERCENT: 5.2 % (ref 0–1)
METAMYELOCYTES RELATIVE PERCENT: 6.1 % (ref 0–1)
METER GLUCOSE: 100 MG/DL (ref 74–99)
METER GLUCOSE: 101 MG/DL (ref 74–99)
METER GLUCOSE: 102 MG/DL (ref 74–99)
METER GLUCOSE: 106 MG/DL (ref 74–99)
METER GLUCOSE: 106 MG/DL (ref 74–99)
METER GLUCOSE: 110 MG/DL (ref 74–99)
METER GLUCOSE: 112 MG/DL (ref 74–99)
METER GLUCOSE: 122 MG/DL (ref 74–99)
METER GLUCOSE: 124 MG/DL (ref 74–99)
METER GLUCOSE: 133 MG/DL (ref 74–99)
METER GLUCOSE: 136 MG/DL (ref 74–99)
METER GLUCOSE: 154 MG/DL (ref 74–99)
METER GLUCOSE: 160 MG/DL (ref 74–99)
METER GLUCOSE: 164 MG/DL (ref 74–99)
METER GLUCOSE: 170 MG/DL (ref 74–99)
METER GLUCOSE: 180 MG/DL (ref 74–99)
METER GLUCOSE: 186 MG/DL (ref 74–99)
METER GLUCOSE: 189 MG/DL (ref 74–99)
METER GLUCOSE: 194 MG/DL (ref 74–99)
METER GLUCOSE: 194 MG/DL (ref 74–99)
METER GLUCOSE: 195 MG/DL (ref 74–99)
METER GLUCOSE: 289 MG/DL (ref 74–99)
METER GLUCOSE: 44 MG/DL (ref 74–99)
METER GLUCOSE: 45 MG/DL (ref 74–99)
METER GLUCOSE: 66 MG/DL (ref 74–99)
METER GLUCOSE: 69 MG/DL (ref 74–99)
METER GLUCOSE: 71 MG/DL (ref 74–99)
METER GLUCOSE: 72 MG/DL (ref 74–99)
METER GLUCOSE: 76 MG/DL (ref 74–99)
METER GLUCOSE: 78 MG/DL (ref 74–99)
METER GLUCOSE: 78 MG/DL (ref 74–99)
METER GLUCOSE: 81 MG/DL (ref 74–99)
METER GLUCOSE: 83 MG/DL (ref 74–99)
METER GLUCOSE: 95 MG/DL (ref 74–99)
METER GLUCOSE: 95 MG/DL (ref 74–99)
METER GLUCOSE: 96 MG/DL (ref 74–99)
METER GLUCOSE: 97 MG/DL (ref 74–99)
METER GLUCOSE: 99 MG/DL (ref 74–99)
METER GLUCOSE: >500 MG/DL (ref 74–99)
METHB: 0.1 % (ref 0–1.5)
METHB: 0.2 % (ref 0–1.5)
METHB: 0.3 % (ref 0–1.5)
MODE: ABNORMAL
MODE: ABNORMAL
MODE: AC
MONOCYTES ABSOLUTE: 0 E9/L (ref 0.1–0.95)
MONOCYTES ABSOLUTE: 0.28 E9/L (ref 0.1–0.95)
MONOCYTES ABSOLUTE: 0.29 E9/L (ref 0.1–0.95)
MONOCYTES ABSOLUTE: 0.41 E9/L (ref 0.1–0.95)
MONOCYTES ABSOLUTE: 0.51 E9/L (ref 0.1–0.95)
MONOCYTES ABSOLUTE: 0.54 E9/L (ref 0.1–0.95)
MONOCYTES ABSOLUTE: 0.55 E9/L (ref 0.1–0.95)
MONOCYTES ABSOLUTE: 0.6 E9/L (ref 0.1–0.95)
MONOCYTES ABSOLUTE: 0.63 E9/L (ref 0.1–0.95)
MONOCYTES ABSOLUTE: 0.64 E9/L (ref 0.1–0.95)
MONOCYTES ABSOLUTE: 0.69 E9/L (ref 0.1–0.95)
MONOCYTES ABSOLUTE: 0.7 E9/L (ref 0.1–0.95)
MONOCYTES ABSOLUTE: 0.71 E9/L (ref 0.1–0.95)
MONOCYTES ABSOLUTE: 0.74 E9/L (ref 0.1–0.95)
MONOCYTES ABSOLUTE: 0.75 E9/L (ref 0.1–0.95)
MONOCYTES ABSOLUTE: 0.77 E9/L (ref 0.1–0.95)
MONOCYTES ABSOLUTE: 0.81 E9/L (ref 0.1–0.95)
MONOCYTES ABSOLUTE: 0.83 E9/L (ref 0.1–0.95)
MONOCYTES ABSOLUTE: 0.87 E9/L (ref 0.1–0.95)
MONOCYTES ABSOLUTE: 0.89 E9/L (ref 0.1–0.95)
MONOCYTES ABSOLUTE: 0.91 E9/L (ref 0.1–0.95)
MONOCYTES ABSOLUTE: 0.93 E9/L (ref 0.1–0.95)
MONOCYTES ABSOLUTE: 0.94 E9/L (ref 0.1–0.95)
MONOCYTES ABSOLUTE: 0.95 E9/L (ref 0.1–0.95)
MONOCYTES ABSOLUTE: 0.99 E9/L (ref 0.1–0.95)
MONOCYTES ABSOLUTE: 1.03 E9/L (ref 0.1–0.95)
MONOCYTES ABSOLUTE: 1.06 E9/L (ref 0.1–0.95)
MONOCYTES ABSOLUTE: 1.08 E9/L (ref 0.1–0.95)
MONOCYTES ABSOLUTE: 1.14 E9/L (ref 0.1–0.95)
MONOCYTES ABSOLUTE: 1.18 E9/L (ref 0.1–0.95)
MONOCYTES ABSOLUTE: 1.2 E9/L (ref 0.1–0.95)
MONOCYTES ABSOLUTE: 1.21 E9/L (ref 0.1–0.95)
MONOCYTES ABSOLUTE: 1.3 E9/L (ref 0.1–0.95)
MONOCYTES ABSOLUTE: 1.32 E9/L (ref 0.1–0.95)
MONOCYTES ABSOLUTE: 1.34 E9/L (ref 0.1–0.95)
MONOCYTES ABSOLUTE: 1.4 E9/L (ref 0.1–0.95)
MONOCYTES ABSOLUTE: 1.54 E9/L (ref 0.1–0.95)
MONOCYTES ABSOLUTE: 1.73 E9/L (ref 0.1–0.95)
MONOCYTES ABSOLUTE: 1.78 E9/L (ref 0.1–0.95)
MONOCYTES RELATIVE PERCENT: 0.9 % (ref 2–12)
MONOCYTES RELATIVE PERCENT: 10.5 % (ref 2–12)
MONOCYTES RELATIVE PERCENT: 10.8 % (ref 2–12)
MONOCYTES RELATIVE PERCENT: 10.8 % (ref 2–12)
MONOCYTES RELATIVE PERCENT: 10.9 % (ref 2–12)
MONOCYTES RELATIVE PERCENT: 11.1 % (ref 2–12)
MONOCYTES RELATIVE PERCENT: 11.3 % (ref 2–12)
MONOCYTES RELATIVE PERCENT: 2 % (ref 2–12)
MONOCYTES RELATIVE PERCENT: 2.2 % (ref 2–12)
MONOCYTES RELATIVE PERCENT: 3.3 % (ref 2–12)
MONOCYTES RELATIVE PERCENT: 3.5 % (ref 2–12)
MONOCYTES RELATIVE PERCENT: 3.9 % (ref 2–12)
MONOCYTES RELATIVE PERCENT: 4.1 % (ref 2–12)
MONOCYTES RELATIVE PERCENT: 4.2 % (ref 2–12)
MONOCYTES RELATIVE PERCENT: 4.4 % (ref 2–12)
MONOCYTES RELATIVE PERCENT: 4.5 % (ref 2–12)
MONOCYTES RELATIVE PERCENT: 4.7 % (ref 2–12)
MONOCYTES RELATIVE PERCENT: 4.8 % (ref 2–12)
MONOCYTES RELATIVE PERCENT: 4.9 % (ref 2–12)
MONOCYTES RELATIVE PERCENT: 5.2 % (ref 2–12)
MONOCYTES RELATIVE PERCENT: 5.5 % (ref 2–12)
MONOCYTES RELATIVE PERCENT: 5.6 % (ref 2–12)
MONOCYTES RELATIVE PERCENT: 5.6 % (ref 2–12)
MONOCYTES RELATIVE PERCENT: 5.8 % (ref 2–12)
MONOCYTES RELATIVE PERCENT: 5.9 % (ref 2–12)
MONOCYTES RELATIVE PERCENT: 6 % (ref 2–12)
MONOCYTES RELATIVE PERCENT: 6.4 % (ref 2–12)
MONOCYTES RELATIVE PERCENT: 6.5 % (ref 2–12)
MONOCYTES RELATIVE PERCENT: 6.6 % (ref 2–12)
MONOCYTES RELATIVE PERCENT: 6.8 % (ref 2–12)
MONOCYTES RELATIVE PERCENT: 7.3 % (ref 2–12)
MONOCYTES RELATIVE PERCENT: 7.7 % (ref 2–12)
MONOCYTES RELATIVE PERCENT: 7.8 % (ref 2–12)
MONOCYTES RELATIVE PERCENT: 8.4 % (ref 2–12)
MONOCYTES RELATIVE PERCENT: 8.7 % (ref 2–12)
MONOCYTES RELATIVE PERCENT: 8.9 % (ref 2–12)
MONOCYTES RELATIVE PERCENT: 8.9 % (ref 2–12)
MONOCYTES RELATIVE PERCENT: 9 % (ref 2–12)
MONOCYTES RELATIVE PERCENT: 9.3 % (ref 2–12)
MONOCYTES RELATIVE PERCENT: 9.4 % (ref 2–12)
MONOCYTES RELATIVE PERCENT: 9.7 % (ref 2–12)
MONOCYTES RELATIVE PERCENT: 9.9 % (ref 2–12)
MRSA CULTURE ONLY: NORMAL
MYELOCYTE PERCENT: 0.9 % (ref 0–0)
MYELOCYTE PERCENT: 0.9 % (ref 0–0)
MYELOCYTE PERCENT: 2 % (ref 0–0)
MYELOCYTE PERCENT: 4.3 % (ref 0–0)
NEUTROPHILS ABSOLUTE: 10.62 E9/L (ref 1.8–7.3)
NEUTROPHILS ABSOLUTE: 12.43 E9/L (ref 1.8–7.3)
NEUTROPHILS ABSOLUTE: 12.64 E9/L (ref 1.8–7.3)
NEUTROPHILS ABSOLUTE: 13.41 E9/L (ref 1.8–7.3)
NEUTROPHILS ABSOLUTE: 13.75 E9/L (ref 1.8–7.3)
NEUTROPHILS ABSOLUTE: 14.27 E9/L (ref 1.8–7.3)
NEUTROPHILS ABSOLUTE: 14.3 E9/L (ref 1.8–7.3)
NEUTROPHILS ABSOLUTE: 15.31 E9/L (ref 1.8–7.3)
NEUTROPHILS ABSOLUTE: 15.55 E9/L (ref 1.8–7.3)
NEUTROPHILS ABSOLUTE: 16.18 E9/L (ref 1.8–7.3)
NEUTROPHILS ABSOLUTE: 16.2 E9/L (ref 1.8–7.3)
NEUTROPHILS ABSOLUTE: 16.44 E9/L (ref 1.8–7.3)
NEUTROPHILS ABSOLUTE: 16.49 E9/L (ref 1.8–7.3)
NEUTROPHILS ABSOLUTE: 16.52 E9/L (ref 1.8–7.3)
NEUTROPHILS ABSOLUTE: 17.04 E9/L (ref 1.8–7.3)
NEUTROPHILS ABSOLUTE: 17.2 E9/L (ref 1.8–7.3)
NEUTROPHILS ABSOLUTE: 17.23 E9/L (ref 1.8–7.3)
NEUTROPHILS ABSOLUTE: 17.38 E9/L (ref 1.8–7.3)
NEUTROPHILS ABSOLUTE: 18.91 E9/L (ref 1.8–7.3)
NEUTROPHILS ABSOLUTE: 20.31 E9/L (ref 1.8–7.3)
NEUTROPHILS ABSOLUTE: 23.81 E9/L (ref 1.8–7.3)
NEUTROPHILS ABSOLUTE: 26.19 E9/L (ref 1.8–7.3)
NEUTROPHILS ABSOLUTE: 28.22 E9/L (ref 1.8–7.3)
NEUTROPHILS ABSOLUTE: 5.46 E9/L (ref 1.8–7.3)
NEUTROPHILS ABSOLUTE: 5.84 E9/L (ref 1.8–7.3)
NEUTROPHILS ABSOLUTE: 6.37 E9/L (ref 1.8–7.3)
NEUTROPHILS ABSOLUTE: 6.39 E9/L (ref 1.8–7.3)
NEUTROPHILS ABSOLUTE: 6.83 E9/L (ref 1.8–7.3)
NEUTROPHILS ABSOLUTE: 6.87 E9/L (ref 1.8–7.3)
NEUTROPHILS ABSOLUTE: 7.35 E9/L (ref 1.8–7.3)
NEUTROPHILS ABSOLUTE: 7.46 E9/L (ref 1.8–7.3)
NEUTROPHILS ABSOLUTE: 7.55 E9/L (ref 1.8–7.3)
NEUTROPHILS ABSOLUTE: 7.85 E9/L (ref 1.8–7.3)
NEUTROPHILS ABSOLUTE: 7.95 E9/L (ref 1.8–7.3)
NEUTROPHILS ABSOLUTE: 8.06 E9/L (ref 1.8–7.3)
NEUTROPHILS ABSOLUTE: 8.13 E9/L (ref 1.8–7.3)
NEUTROPHILS ABSOLUTE: 8.36 E9/L (ref 1.8–7.3)
NEUTROPHILS ABSOLUTE: 8.69 E9/L (ref 1.8–7.3)
NEUTROPHILS ABSOLUTE: 9.36 E9/L (ref 1.8–7.3)
NEUTROPHILS ABSOLUTE: 9.37 E9/L (ref 1.8–7.3)
NEUTROPHILS ABSOLUTE: 9.71 E9/L (ref 1.8–7.3)
NEUTROPHILS ABSOLUTE: 9.8 E9/L (ref 1.8–7.3)
NEUTROPHILS RELATIVE PERCENT: 65 % (ref 43–80)
NEUTROPHILS RELATIVE PERCENT: 66.3 % (ref 43–80)
NEUTROPHILS RELATIVE PERCENT: 72.9 % (ref 43–80)
NEUTROPHILS RELATIVE PERCENT: 73.6 % (ref 43–80)
NEUTROPHILS RELATIVE PERCENT: 73.7 % (ref 43–80)
NEUTROPHILS RELATIVE PERCENT: 73.7 % (ref 43–80)
NEUTROPHILS RELATIVE PERCENT: 74.2 % (ref 43–80)
NEUTROPHILS RELATIVE PERCENT: 74.5 % (ref 43–80)
NEUTROPHILS RELATIVE PERCENT: 75.3 % (ref 43–80)
NEUTROPHILS RELATIVE PERCENT: 75.8 % (ref 43–80)
NEUTROPHILS RELATIVE PERCENT: 76.3 % (ref 43–80)
NEUTROPHILS RELATIVE PERCENT: 76.4 % (ref 43–80)
NEUTROPHILS RELATIVE PERCENT: 77.1 % (ref 43–80)
NEUTROPHILS RELATIVE PERCENT: 78.5 % (ref 43–80)
NEUTROPHILS RELATIVE PERCENT: 79.5 % (ref 43–80)
NEUTROPHILS RELATIVE PERCENT: 80.5 % (ref 43–80)
NEUTROPHILS RELATIVE PERCENT: 81.4 % (ref 43–80)
NEUTROPHILS RELATIVE PERCENT: 81.7 % (ref 43–80)
NEUTROPHILS RELATIVE PERCENT: 83 % (ref 43–80)
NEUTROPHILS RELATIVE PERCENT: 83.2 % (ref 43–80)
NEUTROPHILS RELATIVE PERCENT: 83.3 % (ref 43–80)
NEUTROPHILS RELATIVE PERCENT: 83.5 % (ref 43–80)
NEUTROPHILS RELATIVE PERCENT: 83.5 % (ref 43–80)
NEUTROPHILS RELATIVE PERCENT: 84.1 % (ref 43–80)
NEUTROPHILS RELATIVE PERCENT: 85 % (ref 43–80)
NEUTROPHILS RELATIVE PERCENT: 85.4 % (ref 43–80)
NEUTROPHILS RELATIVE PERCENT: 85.5 % (ref 43–80)
NEUTROPHILS RELATIVE PERCENT: 85.8 % (ref 43–80)
NEUTROPHILS RELATIVE PERCENT: 87.2 % (ref 43–80)
NEUTROPHILS RELATIVE PERCENT: 87.4 % (ref 43–80)
NEUTROPHILS RELATIVE PERCENT: 87.6 % (ref 43–80)
NEUTROPHILS RELATIVE PERCENT: 87.6 % (ref 43–80)
NEUTROPHILS RELATIVE PERCENT: 87.8 % (ref 43–80)
NEUTROPHILS RELATIVE PERCENT: 87.9 % (ref 43–80)
NEUTROPHILS RELATIVE PERCENT: 88.1 % (ref 43–80)
NEUTROPHILS RELATIVE PERCENT: 88.4 % (ref 43–80)
NEUTROPHILS RELATIVE PERCENT: 88.7 % (ref 43–80)
NEUTROPHILS RELATIVE PERCENT: 89 % (ref 43–80)
NEUTROPHILS RELATIVE PERCENT: 90.4 % (ref 43–80)
NEUTROPHILS RELATIVE PERCENT: 90.4 % (ref 43–80)
NEUTROPHILS RELATIVE PERCENT: 91.5 % (ref 43–80)
NEUTROPHILS RELATIVE PERCENT: 96.5 % (ref 43–80)
NITRITE, URINE: NEGATIVE
NITRITE, URINE: NEGATIVE
NUCLEATED RED BLOOD CELLS: 1.7 /100 WBC
NUCLEATED RED BLOOD CELLS: 2 /100 WBC
NUCLEATED RED BLOOD CELLS: 2.6 /100 WBC
NUCLEATED RED BLOOD CELLS: 5.4 /100 WBC
O2 CONTENT: 10.3 ML/DL
O2 CONTENT: 12.1 ML/DL
O2 CONTENT: 14.2 ML/DL
O2 CONTENT: 14.5 ML/DL
O2 CONTENT: 9.6 ML/DL
O2 SATURATION: 61.5 % (ref 92–98.5)
O2 SATURATION: 83.2 % (ref 92–98.5)
O2 SATURATION: 89.2 % (ref 92–98.5)
O2 SATURATION: 93.2 % (ref 92–98.5)
O2 SATURATION: 93.6 % (ref 92–98.5)
O2 SATURATION: 94.5 % (ref 92–98.5)
O2 SATURATION: 96.2 % (ref 92–98.5)
O2 SATURATION: 96.2 % (ref 92–98.5)
O2 SATURATION: 97.2 % (ref 92–98.5)
O2 SATURATION: 97.4 % (ref 92–98.5)
O2 SATURATION: 98.6 % (ref 92–98.5)
O2 SATURATION: 99.3 % (ref 92–98.5)
O2 SATURATION: 99.3 % (ref 92–98.5)
O2HB: 60.9 % (ref 94–97)
O2HB: 88.6 % (ref 94–97)
O2HB: 92.4 % (ref 94–97)
O2HB: 92.7 % (ref 94–97)
O2HB: 94.9 % (ref 94–97)
O2HB: 95.4 % (ref 94–97)
O2HB: 95.8 % (ref 94–97)
O2HB: 96.5 % (ref 94–97)
O2HB: 98.2 % (ref 94–97)
O2HB: 98.4 % (ref 94–97)
OPERATOR ID: 1023
OPERATOR ID: 1632
OPERATOR ID: 1632
OPERATOR ID: 2577
OPERATOR ID: 2593
OPERATOR ID: 7490
OPERATOR ID: ABNORMAL
ORGANISM: ABNORMAL
OVALOCYTES: ABNORMAL
PATHOLOGIST REVIEW: NORMAL
PATIENT TEMP: 37 C
PCO2 ARTERIAL: 41.6 MMHG (ref 35–45)
PCO2 ARTERIAL: 50.5 MMHG (ref 35–45)
PCO2 ARTERIAL: 53.5 MMHG (ref 35–45)
PCO2: 22.1 MMHG (ref 35–45)
PCO2: 27.6 MMHG (ref 35–45)
PCO2: 28.3 MMHG (ref 35–45)
PCO2: 30.3 MMHG (ref 35–45)
PCO2: 30.8 MMHG (ref 35–45)
PCO2: 31.7 MMHG (ref 35–45)
PCO2: 32.7 MMHG (ref 35–45)
PCO2: 33.8 MMHG (ref 35–45)
PCO2: 36.9 MMHG (ref 35–45)
PCO2: 36.9 MMHG (ref 35–45)
PDW BLD-RTO: 14.9 FL (ref 11.5–15)
PDW BLD-RTO: 15 FL (ref 11.5–15)
PDW BLD-RTO: 15.1 FL (ref 11.5–15)
PDW BLD-RTO: 15.1 FL (ref 11.5–15)
PDW BLD-RTO: 15.2 FL (ref 11.5–15)
PDW BLD-RTO: 15.4 FL (ref 11.5–15)
PDW BLD-RTO: 15.5 FL (ref 11.5–15)
PDW BLD-RTO: 15.6 FL (ref 11.5–15)
PDW BLD-RTO: 15.6 FL (ref 11.5–15)
PDW BLD-RTO: 15.7 FL (ref 11.5–15)
PDW BLD-RTO: 15.8 FL (ref 11.5–15)
PDW BLD-RTO: 15.9 FL (ref 11.5–15)
PDW BLD-RTO: 16 FL (ref 11.5–15)
PDW BLD-RTO: 16.2 FL (ref 11.5–15)
PDW BLD-RTO: 16.4 FL (ref 11.5–15)
PDW BLD-RTO: 16.4 FL (ref 11.5–15)
PDW BLD-RTO: 16.5 FL (ref 11.5–15)
PDW BLD-RTO: 16.6 FL (ref 11.5–15)
PDW BLD-RTO: 16.7 FL (ref 11.5–15)
PDW BLD-RTO: 16.8 FL (ref 11.5–15)
PDW BLD-RTO: 16.9 FL (ref 11.5–15)
PDW BLD-RTO: 17 FL (ref 11.5–15)
PDW BLD-RTO: 17.1 FL (ref 11.5–15)
PDW BLD-RTO: 17.2 FL (ref 11.5–15)
PDW BLD-RTO: 17.3 FL (ref 11.5–15)
PDW BLD-RTO: 17.7 FL (ref 11.5–15)
PDW BLD-RTO: 17.9 FL (ref 11.5–15)
PDW BLD-RTO: 18 FL (ref 11.5–15)
PEEP/CPAP: 10 CMH2O
PEEP/CPAP: 10 CMH2O
PEEP/CPAP: 8 CMH2O
PFO2: 0.58 MMHG/%
PFO2: 0.84 MMHG/%
PFO2: 0.88 MMHG/%
PFO2: 0.89 MMHG/%
PFO2: 0.94 MMHG/%
PFO2: 1.05 MMHG/%
PFO2: 1.53 MMHG/%
PFO2: 2.15 MMHG/%
PH BLOOD GAS: 6.98 (ref 7.35–7.45)
PH BLOOD GAS: 7.08 (ref 7.35–7.45)
PH BLOOD GAS: 7.09 (ref 7.35–7.45)
PH BLOOD GAS: 7.16 (ref 7.35–7.45)
PH BLOOD GAS: 7.2 (ref 7.35–7.45)
PH BLOOD GAS: 7.22 (ref 7.35–7.45)
PH BLOOD GAS: 7.24 (ref 7.35–7.45)
PH BLOOD GAS: 7.25 (ref 7.35–7.45)
PH BLOOD GAS: 7.29 (ref 7.35–7.45)
PH BLOOD GAS: 7.3 (ref 7.35–7.45)
PH BLOOD GAS: 7.32 (ref 7.35–7.45)
PH UA: 5 (ref 5–9)
PH UA: 5 (ref 5–9)
PHOSPHORUS: 1.5 MG/DL (ref 2.5–4.5)
PHOSPHORUS: 1.9 MG/DL (ref 2.5–4.5)
PHOSPHORUS: 2.1 MG/DL (ref 2.5–4.5)
PHOSPHORUS: 2.6 MG/DL (ref 2.5–4.5)
PHOSPHORUS: 2.9 MG/DL (ref 2.5–4.5)
PHOSPHORUS: 3 MG/DL (ref 2.5–4.5)
PHOSPHORUS: 3.1 MG/DL (ref 2.5–4.5)
PHOSPHORUS: 3.2 MG/DL (ref 2.5–4.5)
PHOSPHORUS: 3.3 MG/DL (ref 2.5–4.5)
PHOSPHORUS: 3.4 MG/DL (ref 2.5–4.5)
PHOSPHORUS: 3.7 MG/DL (ref 2.5–4.5)
PHOSPHORUS: 3.8 MG/DL (ref 2.5–4.5)
PHOSPHORUS: 4.3 MG/DL (ref 2.5–4.5)
PHOSPHORUS: 5.1 MG/DL (ref 2.5–4.5)
PHOSPHORUS: 5.3 MG/DL (ref 2.5–4.5)
PHOSPHORUS: 5.6 MG/DL (ref 2.5–4.5)
PHOSPHORUS: 6 MG/DL (ref 2.5–4.5)
PLATELET # BLD: 142 E9/L (ref 130–450)
PLATELET # BLD: 226 E9/L (ref 130–450)
PLATELET # BLD: 287 E9/L (ref 130–450)
PLATELET # BLD: 291 E9/L (ref 130–450)
PLATELET # BLD: 296 E9/L (ref 130–450)
PLATELET # BLD: 297 E9/L (ref 130–450)
PLATELET # BLD: 297 E9/L (ref 130–450)
PLATELET # BLD: 300 E9/L (ref 130–450)
PLATELET # BLD: 301 E9/L (ref 130–450)
PLATELET # BLD: 303 E9/L (ref 130–450)
PLATELET # BLD: 308 E9/L (ref 130–450)
PLATELET # BLD: 312 E9/L (ref 130–450)
PLATELET # BLD: 313 E9/L (ref 130–450)
PLATELET # BLD: 314 E9/L (ref 130–450)
PLATELET # BLD: 319 E9/L (ref 130–450)
PLATELET # BLD: 320 E9/L (ref 130–450)
PLATELET # BLD: 322 E9/L (ref 130–450)
PLATELET # BLD: 323 E9/L (ref 130–450)
PLATELET # BLD: 324 E9/L (ref 130–450)
PLATELET # BLD: 329 E9/L (ref 130–450)
PLATELET # BLD: 335 E9/L (ref 130–450)
PLATELET # BLD: 34 E9/L (ref 130–450)
PLATELET # BLD: 342 E9/L (ref 130–450)
PLATELET # BLD: 345 E9/L (ref 130–450)
PLATELET # BLD: 347 E9/L (ref 130–450)
PLATELET # BLD: 351 E9/L (ref 130–450)
PLATELET # BLD: 360 E9/L (ref 130–450)
PLATELET # BLD: 364 E9/L (ref 130–450)
PLATELET # BLD: 366 E9/L (ref 130–450)
PLATELET # BLD: 366 E9/L (ref 130–450)
PLATELET # BLD: 368 E9/L (ref 130–450)
PLATELET # BLD: 371 E9/L (ref 130–450)
PLATELET # BLD: 372 E9/L (ref 130–450)
PLATELET # BLD: 374 E9/L (ref 130–450)
PLATELET # BLD: 374 E9/L (ref 130–450)
PLATELET # BLD: 376 E9/L (ref 130–450)
PLATELET # BLD: 377 E9/L (ref 130–450)
PLATELET # BLD: 377 E9/L (ref 130–450)
PLATELET # BLD: 378 E9/L (ref 130–450)
PLATELET # BLD: 386 E9/L (ref 130–450)
PLATELET # BLD: 388 E9/L (ref 130–450)
PLATELET # BLD: 388 E9/L (ref 130–450)
PLATELET # BLD: 390 E9/L (ref 130–450)
PLATELET # BLD: 396 E9/L (ref 130–450)
PLATELET # BLD: 397 E9/L (ref 130–450)
PLATELET # BLD: 403 E9/L (ref 130–450)
PLATELET # BLD: 407 E9/L (ref 130–450)
PLATELET # BLD: 415 E9/L (ref 130–450)
PLATELET # BLD: 419 E9/L (ref 130–450)
PLATELET # BLD: 426 E9/L (ref 130–450)
PLATELET # BLD: 436 E9/L (ref 130–450)
PLATELET # BLD: 440 E9/L (ref 130–450)
PLATELET # BLD: 465 E9/L (ref 130–450)
PLATELET # BLD: 473 E9/L (ref 130–450)
PLATELET # BLD: 549 E9/L (ref 130–450)
PLATELET CONFIRMATION: NORMAL
PMV BLD AUTO: 10 FL (ref 7–12)
PMV BLD AUTO: 10.1 FL (ref 7–12)
PMV BLD AUTO: 10.2 FL (ref 7–12)
PMV BLD AUTO: 10.2 FL (ref 7–12)
PMV BLD AUTO: 10.3 FL (ref 7–12)
PMV BLD AUTO: 10.4 FL (ref 7–12)
PMV BLD AUTO: 10.5 FL (ref 7–12)
PMV BLD AUTO: 10.6 FL (ref 7–12)
PMV BLD AUTO: 10.7 FL (ref 7–12)
PMV BLD AUTO: 10.8 FL (ref 7–12)
PMV BLD AUTO: 10.9 FL (ref 7–12)
PMV BLD AUTO: 11 FL (ref 7–12)
PMV BLD AUTO: 11.1 FL (ref 7–12)
PMV BLD AUTO: 11.2 FL (ref 7–12)
PMV BLD AUTO: 11.3 FL (ref 7–12)
PMV BLD AUTO: 11.5 FL (ref 7–12)
PMV BLD AUTO: 12.8 FL (ref 7–12)
PMV BLD AUTO: 9.6 FL (ref 7–12)
PO2 ARTERIAL: 103.3 MMHG (ref 60–80)
PO2 ARTERIAL: 207 MMHG (ref 60–80)
PO2 ARTERIAL: 52.7 MMHG (ref 60–80)
PO2: 104.8 MMHG (ref 75–100)
PO2: 106.8 MMHG (ref 75–100)
PO2: 169.4 MMHG (ref 75–100)
PO2: 214.9 MMHG (ref 75–100)
PO2: 39.3 MMHG (ref 75–100)
PO2: 58.5 MMHG (ref 75–100)
PO2: 84.3 MMHG (ref 75–100)
PO2: 87.7 MMHG (ref 75–100)
PO2: 89 MMHG (ref 75–100)
PO2: 94 MMHG (ref 75–100)
POIKILOCYTES: ABNORMAL
POLYCHROMASIA: ABNORMAL
POTASSIUM REFLEX MAGNESIUM: 2.6 MMOL/L (ref 3.5–5)
POTASSIUM REFLEX MAGNESIUM: 2.7 MMOL/L (ref 3.5–5)
POTASSIUM REFLEX MAGNESIUM: 2.8 MMOL/L (ref 3.5–5)
POTASSIUM REFLEX MAGNESIUM: 3 MMOL/L (ref 3.5–5)
POTASSIUM REFLEX MAGNESIUM: 3.4 MMOL/L (ref 3.5–5)
POTASSIUM REFLEX MAGNESIUM: 3.5 MMOL/L (ref 3.5–5)
POTASSIUM REFLEX MAGNESIUM: 3.7 MMOL/L (ref 3.5–5)
POTASSIUM REFLEX MAGNESIUM: 4.3 MMOL/L (ref 3.5–5)
POTASSIUM REFLEX MAGNESIUM: 4.3 MMOL/L (ref 3.5–5)
POTASSIUM SERPL-SCNC: 2.6 MMOL/L (ref 3.5–5)
POTASSIUM SERPL-SCNC: 3.2 MMOL/L (ref 3.5–5)
POTASSIUM SERPL-SCNC: 3.3 MMOL/L (ref 3.5–5)
POTASSIUM SERPL-SCNC: 3.5 MMOL/L (ref 3.5–5)
POTASSIUM SERPL-SCNC: 3.6 MMOL/L (ref 3.5–5)
POTASSIUM SERPL-SCNC: 3.6 MMOL/L (ref 3.5–5)
POTASSIUM SERPL-SCNC: 3.7 MMOL/L (ref 3.5–5)
POTASSIUM SERPL-SCNC: 3.7 MMOL/L (ref 3.5–5)
POTASSIUM SERPL-SCNC: 3.8 MMOL/L (ref 3.5–5)
POTASSIUM SERPL-SCNC: 3.8 MMOL/L (ref 3.5–5)
POTASSIUM SERPL-SCNC: 3.9 MMOL/L (ref 3.5–5)
POTASSIUM SERPL-SCNC: 3.9 MMOL/L (ref 3.5–5.5)
POTASSIUM SERPL-SCNC: 4 MMOL/L (ref 3.5–5)
POTASSIUM SERPL-SCNC: 4.1 MMOL/L (ref 3.5–5)
POTASSIUM SERPL-SCNC: 4.1 MMOL/L (ref 3.5–5.5)
POTASSIUM SERPL-SCNC: 4.2 MMOL/L (ref 3.5–5)
POTASSIUM SERPL-SCNC: 4.3 MMOL/L (ref 3.5–5)
POTASSIUM SERPL-SCNC: 4.3 MMOL/L (ref 3.5–5.5)
POTASSIUM SERPL-SCNC: 4.4 MMOL/L (ref 3.5–5)
POTASSIUM SERPL-SCNC: 4.4 MMOL/L (ref 3.5–5)
POTASSIUM SERPL-SCNC: 4.6 MMOL/L (ref 3.5–5)
POTASSIUM SERPL-SCNC: 4.7 MMOL/L (ref 3.5–5)
POTASSIUM SERPL-SCNC: 5 MMOL/L (ref 3.5–5)
POTASSIUM SERPL-SCNC: 5.1 MMOL/L (ref 3.5–5)
POTASSIUM SERPL-SCNC: 5.6 MMOL/L (ref 3.5–5)
PREALBUMIN: 9 MG/DL (ref 20–40)
PRO-BNP: 1713 PG/ML (ref 0–450)
PRO-BNP: 6987 PG/ML (ref 0–450)
PROCALCITONIN: 0.22 NG/ML (ref 0–0.08)
PROCALCITONIN: 0.87 NG/ML (ref 0–0.08)
PROMYELOCYTES PERCENT: 0.9 % (ref 0–0)
PROMYELOCYTES PERCENT: 1 % (ref 0–0)
PROTEIN UA: ABNORMAL MG/DL
PROTEIN UA: NEGATIVE MG/DL
PROTHROMBIN TIME: 11.9 SEC (ref 9.3–12.4)
PROTHROMBIN TIME: 12.1 SEC (ref 9.3–12.4)
PROTHROMBIN TIME: 13 SEC (ref 9.3–12.4)
PROTHROMBIN TIME: 22.6 SEC (ref 9.3–12.4)
R (REACTION TIME): 4.2 MIN (ref 5–10)
RBC # BLD: 2.14 E12/L (ref 3.5–5.5)
RBC # BLD: 2.39 E12/L (ref 3.5–5.5)
RBC # BLD: 2.4 E12/L (ref 3.5–5.5)
RBC # BLD: 2.4 E12/L (ref 3.5–5.5)
RBC # BLD: 2.46 E12/L (ref 3.5–5.5)
RBC # BLD: 2.53 E12/L (ref 3.5–5.5)
RBC # BLD: 2.56 E12/L (ref 3.5–5.5)
RBC # BLD: 2.56 E12/L (ref 3.5–5.5)
RBC # BLD: 2.57 E12/L (ref 3.5–5.5)
RBC # BLD: 2.58 E12/L (ref 3.5–5.5)
RBC # BLD: 2.6 E12/L (ref 3.5–5.5)
RBC # BLD: 2.62 E12/L (ref 3.5–5.5)
RBC # BLD: 2.63 E12/L (ref 3.5–5.5)
RBC # BLD: 2.64 E12/L (ref 3.5–5.5)
RBC # BLD: 2.66 E12/L (ref 3.5–5.5)
RBC # BLD: 2.68 E12/L (ref 3.5–5.5)
RBC # BLD: 2.69 E12/L (ref 3.5–5.5)
RBC # BLD: 2.7 E12/L (ref 3.5–5.5)
RBC # BLD: 2.72 E12/L (ref 3.5–5.5)
RBC # BLD: 2.72 E12/L (ref 3.5–5.5)
RBC # BLD: 2.77 E12/L (ref 3.5–5.5)
RBC # BLD: 2.79 E12/L (ref 3.5–5.5)
RBC # BLD: 2.79 E12/L (ref 3.5–5.5)
RBC # BLD: 2.8 E12/L (ref 3.5–5.5)
RBC # BLD: 2.8 E12/L (ref 3.5–5.5)
RBC # BLD: 2.85 E12/L (ref 3.5–5.5)
RBC # BLD: 2.91 E12/L (ref 3.5–5.5)
RBC # BLD: 2.91 E12/L (ref 3.5–5.5)
RBC # BLD: 2.92 E12/L (ref 3.5–5.5)
RBC # BLD: 2.94 E12/L (ref 3.5–5.5)
RBC # BLD: 2.99 E12/L (ref 3.5–5.5)
RBC # BLD: 2.99 E12/L (ref 3.5–5.5)
RBC # BLD: 3 E12/L (ref 3.5–5.5)
RBC # BLD: 3 E12/L (ref 3.5–5.5)
RBC # BLD: 3.1 E12/L (ref 3.5–5.5)
RBC # BLD: 3.17 E12/L (ref 3.5–5.5)
RBC # BLD: 3.18 E12/L (ref 3.5–5.5)
RBC # BLD: 3.32 E12/L (ref 3.5–5.5)
RBC # BLD: 3.32 E12/L (ref 3.5–5.5)
RBC # BLD: 3.37 E12/L (ref 3.5–5.5)
RBC # BLD: 3.42 E12/L (ref 3.5–5.5)
RBC # BLD: 3.44 E12/L (ref 3.5–5.5)
RBC # BLD: 3.59 E12/L (ref 3.5–5.5)
RBC # BLD: 3.66 E12/L (ref 3.5–5.5)
RBC # BLD: 3.67 E12/L (ref 3.5–5.5)
RBC # BLD: 4.07 E12/L (ref 3.5–5.5)
RBC # BLD: 4.19 E12/L (ref 3.5–5.5)
RBC # BLD: 4.2 E12/L (ref 3.5–5.5)
RBC # BLD: 4.27 E12/L (ref 3.5–5.5)
RBC UA: ABNORMAL /HPF (ref 0–2)
RBC UA: ABNORMAL /HPF (ref 0–2)
REASON FOR REJECTION: NORMAL
REJECTED TEST: NORMAL
RETIC HGB EQUIVALENT: 22.9 PG (ref 28.2–36.6)
RETICULOCYTE ABSOLUTE COUNT: 0.05 E12/L
RETICULOCYTE COUNT PCT: 1.7 % (ref 0.4–1.9)
RI(T): 1022 %
RI(T): 2.07
RI(T): 3.29
RI(T): 5.21
RI(T): 6.02
RI(T): 6.36
RI(T): 642 %
RI(T): 680 %
RR MECHANICAL: 16 B/MIN
SARS-COV-2: NOT DETECTED
SARS-COV-2: NOT DETECTED
SCHISTOCYTES: ABNORMAL
SEDIMENTATION RATE, ERYTHROCYTE: 85 MM/HR (ref 0–20)
SODIUM BLD-SCNC: 130 MMOL/L (ref 132–146)
SODIUM BLD-SCNC: 131 MMOL/L (ref 132–146)
SODIUM BLD-SCNC: 131 MMOL/L (ref 132–146)
SODIUM BLD-SCNC: 133 MMOL/L (ref 132–146)
SODIUM BLD-SCNC: 134 MMOL/L (ref 132–146)
SODIUM BLD-SCNC: 135 MMOL/L (ref 132–146)
SODIUM BLD-SCNC: 136 MMOL/L (ref 132–146)
SODIUM BLD-SCNC: 137 MMOL/L (ref 132–146)
SODIUM BLD-SCNC: 138 MMOL/L (ref 132–146)
SODIUM BLD-SCNC: 139 MMOL/L (ref 132–146)
SODIUM BLD-SCNC: 140 MMOL/L (ref 132–146)
SODIUM BLD-SCNC: 141 MMOL/L (ref 132–146)
SODIUM BLD-SCNC: 142 MMOL/L (ref 132–146)
SODIUM BLD-SCNC: 143 MMOL/L (ref 132–146)
SODIUM BLD-SCNC: 144 MMOL/L (ref 132–146)
SODIUM BLD-SCNC: 145 MMOL/L (ref 132–146)
SODIUM BLD-SCNC: 145 MMOL/L (ref 132–146)
SODIUM BLD-SCNC: 146 MMOL/L (ref 132–146)
SOURCE, BLOOD GAS: ABNORMAL
SOURCE: NORMAL
SOURCE: NORMAL
SPECIFIC GRAVITY UA: 1.01 (ref 1–1.03)
SPECIFIC GRAVITY UA: 1.02 (ref 1–1.03)
TARGET CELLS: ABNORMAL
THB: 10.1 G/DL (ref 11.5–16.5)
THB: 10.2 G/DL (ref 11.5–16.5)
THB: 11.2 G/DL (ref 11.5–16.5)
THB: 11.2 G/DL (ref 11.5–16.5)
THB: 11.4 G/DL (ref 11.5–16.5)
THB: 7.7 G/DL (ref 11.5–16.5)
THB: 7.8 G/DL (ref 11.5–16.5)
THB: 7.9 G/DL (ref 11.5–16.5)
THB: 8.3 G/DL (ref 11.5–16.5)
THB: 8.9 G/DL (ref 11.5–16.5)
TIME ANALYZED: 130
TIME ANALYZED: 133
TIME ANALYZED: 1433
TIME ANALYZED: 1710
TIME ANALYZED: 2138
TIME ANALYZED: 2202
TIME ANALYZED: 2319
TIME ANALYZED: 455
TIME ANALYZED: 559
TIME ANALYZED: 634
TOTAL IRON BINDING CAPACITY: 361 MCG/DL (ref 250–450)
TOTAL PROTEIN: 2.9 G/DL (ref 6.4–8.3)
TOTAL PROTEIN: 3 G/DL (ref 6.4–8.3)
TOTAL PROTEIN: 3.1 G/DL (ref 6.4–8.3)
TOTAL PROTEIN: 3.7 G/DL (ref 6.4–8.3)
TOTAL PROTEIN: 3.8 G/DL (ref 6.4–8.3)
TOTAL PROTEIN: 4.2 G/DL (ref 6.4–8.3)
TOTAL PROTEIN: 4.8 G/DL (ref 6.4–8.3)
TOTAL PROTEIN: 4.8 G/DL (ref 6.4–8.3)
TOTAL PROTEIN: 4.9 G/DL (ref 6.4–8.3)
TOTAL PROTEIN: 5 G/DL (ref 6.4–8.3)
TOTAL PROTEIN: 5 G/DL (ref 6.4–8.3)
TOTAL PROTEIN: 5.2 G/DL (ref 6.4–8.3)
TOTAL PROTEIN: 5.3 G/DL (ref 6.4–8.3)
TOTAL PROTEIN: 5.4 G/DL (ref 6.4–8.3)
TOTAL PROTEIN: 5.4 G/DL (ref 6.4–8.3)
TOTAL PROTEIN: 5.5 G/DL (ref 6.4–8.3)
TOTAL PROTEIN: 5.5 G/DL (ref 6.4–8.3)
TOTAL PROTEIN: 5.6 G/DL (ref 6.4–8.3)
TOTAL PROTEIN: 5.7 G/DL (ref 6.4–8.3)
TOTAL PROTEIN: 5.9 G/DL (ref 6.4–8.3)
TOTAL PROTEIN: 5.9 G/DL (ref 6.4–8.3)
TOTAL PROTEIN: 6.1 G/DL (ref 6.4–8.3)
TOTAL PROTEIN: 6.2 G/DL (ref 6.4–8.3)
TOTAL PROTEIN: 6.2 G/DL (ref 6.4–8.3)
TOTAL PROTEIN: 7.6 G/DL (ref 6.4–8.3)
TOTAL PROTEIN: 8.2 G/DL (ref 6.4–8.3)
TOXIC GRANULATION: ABNORMAL
TRIGL SERPL-MCNC: 183 MG/DL (ref 0–149)
TRIGL SERPL-MCNC: 219 MG/DL (ref 0–149)
TROPONIN: 0.03 NG/ML (ref 0–0.03)
TROPONIN: <0.01 NG/ML (ref 0–0.03)
TROPONIN: <0.01 NG/ML (ref 0–0.03)
TSH SERPL DL<=0.05 MIU/L-ACNC: 0.82 UIU/ML (ref 0.27–4.2)
URINE CULTURE, ROUTINE: ABNORMAL
UROBILINOGEN, URINE: 0.2 E.U./DL
UROBILINOGEN, URINE: 0.2 E.U./DL
VACUOLATED NEUTROPHILS: ABNORMAL
VACUOLATED NEUTROPHILS: ABNORMAL
VITAMIN B-12: 317 PG/ML (ref 211–946)
VT MECHANICAL: 380 ML
VT MECHANICAL: 450 ML
WBC # BLD: 10.1 E9/L (ref 4.5–11.5)
WBC # BLD: 10.4 E9/L (ref 4.5–11.5)
WBC # BLD: 10.5 E9/L (ref 4.5–11.5)
WBC # BLD: 10.8 E9/L (ref 4.5–11.5)
WBC # BLD: 11 E9/L (ref 4.5–11.5)
WBC # BLD: 11 E9/L (ref 4.5–11.5)
WBC # BLD: 11.2 E9/L (ref 4.5–11.5)
WBC # BLD: 11.3 E9/L (ref 4.5–11.5)
WBC # BLD: 11.4 E9/L (ref 4.5–11.5)
WBC # BLD: 11.5 E9/L (ref 4.5–11.5)
WBC # BLD: 11.9 E9/L (ref 4.5–11.5)
WBC # BLD: 12.1 E9/L (ref 4.5–11.5)
WBC # BLD: 12.4 E9/L (ref 4.5–11.5)
WBC # BLD: 12.8 E9/L (ref 4.5–11.5)
WBC # BLD: 13 E9/L (ref 4.5–11.5)
WBC # BLD: 14 E9/L (ref 4.5–11.5)
WBC # BLD: 14.2 E9/L (ref 4.5–11.5)
WBC # BLD: 14.3 E9/L (ref 4.5–11.5)
WBC # BLD: 15.1 E9/L (ref 4.5–11.5)
WBC # BLD: 15.6 E9/L (ref 4.5–11.5)
WBC # BLD: 16.5 E9/L (ref 4.5–11.5)
WBC # BLD: 16.7 E9/L (ref 4.5–11.5)
WBC # BLD: 16.7 E9/L (ref 4.5–11.5)
WBC # BLD: 17 E9/L (ref 4.5–11.5)
WBC # BLD: 17.3 E9/L (ref 4.5–11.5)
WBC # BLD: 17.5 E9/L (ref 4.5–11.5)
WBC # BLD: 18.2 E9/L (ref 4.5–11.5)
WBC # BLD: 18.3 E9/L (ref 4.5–11.5)
WBC # BLD: 18.4 E9/L (ref 4.5–11.5)
WBC # BLD: 18.6 E9/L (ref 4.5–11.5)
WBC # BLD: 18.6 E9/L (ref 4.5–11.5)
WBC # BLD: 18.9 E9/L (ref 4.5–11.5)
WBC # BLD: 19.7 E9/L (ref 4.5–11.5)
WBC # BLD: 20.1 E9/L (ref 4.5–11.5)
WBC # BLD: 20.5 E9/L (ref 4.5–11.5)
WBC # BLD: 21.6 E9/L (ref 4.5–11.5)
WBC # BLD: 23.3 E9/L (ref 4.5–11.5)
WBC # BLD: 26.7 E9/L (ref 4.5–11.5)
WBC # BLD: 27 E9/L (ref 4.5–11.5)
WBC # BLD: 29.4 E9/L (ref 4.5–11.5)
WBC # BLD: 7.7 E9/L (ref 4.5–11.5)
WBC # BLD: 8.3 E9/L (ref 4.5–11.5)
WBC # BLD: 8.5 E9/L (ref 4.5–11.5)
WBC # BLD: 8.5 E9/L (ref 4.5–11.5)
WBC # BLD: 8.6 E9/L (ref 4.5–11.5)
WBC # BLD: 8.6 E9/L (ref 4.5–11.5)
WBC # BLD: 8.7 E9/L (ref 4.5–11.5)
WBC # BLD: 9 E9/L (ref 4.5–11.5)
WBC # BLD: 9.1 E9/L (ref 4.5–11.5)
WBC # BLD: 9.3 E9/L (ref 4.5–11.5)
WBC # BLD: 9.6 E9/L (ref 4.5–11.5)
WBC # BLD: 9.6 E9/L (ref 4.5–11.5)
WBC UA: ABNORMAL /HPF (ref 0–5)
WBC UA: ABNORMAL /HPF (ref 0–5)
YEAST: PRESENT /HPF

## 2020-01-01 PROCEDURE — 6370000000 HC RX 637 (ALT 250 FOR IP): Performed by: SURGERY

## 2020-01-01 PROCEDURE — 99024 POSTOP FOLLOW-UP VISIT: CPT | Performed by: SURGERY

## 2020-01-01 PROCEDURE — 2709999900 HC NON-CHARGEABLE SUPPLY

## 2020-01-01 PROCEDURE — 6360000002 HC RX W HCPCS: Performed by: SURGERY

## 2020-01-01 PROCEDURE — 36415 COLL VENOUS BLD VENIPUNCTURE: CPT

## 2020-01-01 PROCEDURE — 85025 COMPLETE CBC W/AUTO DIFF WBC: CPT

## 2020-01-01 PROCEDURE — 6360000002 HC RX W HCPCS: Performed by: SPECIALIST

## 2020-01-01 PROCEDURE — 2580000003 HC RX 258: Performed by: STUDENT IN AN ORGANIZED HEALTH CARE EDUCATION/TRAINING PROGRAM

## 2020-01-01 PROCEDURE — 6360000002 HC RX W HCPCS: Performed by: EMERGENCY MEDICINE

## 2020-01-01 PROCEDURE — 6360000002 HC RX W HCPCS: Performed by: REGISTERED NURSE

## 2020-01-01 PROCEDURE — 6360000002 HC RX W HCPCS: Performed by: INTERNAL MEDICINE

## 2020-01-01 PROCEDURE — 36569 INSJ PICC 5 YR+ W/O IMAGING: CPT

## 2020-01-01 PROCEDURE — C1769 GUIDE WIRE: HCPCS

## 2020-01-01 PROCEDURE — 2580000003 HC RX 258: Performed by: SPECIALIST

## 2020-01-01 PROCEDURE — 75726 ARTERY X-RAYS ABDOMEN: CPT | Performed by: SURGERY

## 2020-01-01 PROCEDURE — 1200000000 HC SEMI PRIVATE

## 2020-01-01 PROCEDURE — C9113 INJ PANTOPRAZOLE SODIUM, VIA: HCPCS | Performed by: STUDENT IN AN ORGANIZED HEALTH CARE EDUCATION/TRAINING PROGRAM

## 2020-01-01 PROCEDURE — 82962 GLUCOSE BLOOD TEST: CPT

## 2020-01-01 PROCEDURE — A9560 TC99M LABELED RBC: HCPCS | Performed by: RADIOLOGY

## 2020-01-01 PROCEDURE — 6370000000 HC RX 637 (ALT 250 FOR IP): Performed by: STUDENT IN AN ORGANIZED HEALTH CARE EDUCATION/TRAINING PROGRAM

## 2020-01-01 PROCEDURE — 6370000000 HC RX 637 (ALT 250 FOR IP): Performed by: INTERNAL MEDICINE

## 2020-01-01 PROCEDURE — 94640 AIRWAY INHALATION TREATMENT: CPT

## 2020-01-01 PROCEDURE — 2500000003 HC RX 250 WO HCPCS: Performed by: STUDENT IN AN ORGANIZED HEALTH CARE EDUCATION/TRAINING PROGRAM

## 2020-01-01 PROCEDURE — 86900 BLOOD TYPING SEROLOGIC ABO: CPT

## 2020-01-01 PROCEDURE — 85730 THROMBOPLASTIN TIME PARTIAL: CPT

## 2020-01-01 PROCEDURE — 2580000003 HC RX 258: Performed by: INTERNAL MEDICINE

## 2020-01-01 PROCEDURE — 96376 TX/PRO/DX INJ SAME DRUG ADON: CPT

## 2020-01-01 PROCEDURE — 6360000002 HC RX W HCPCS: Performed by: STUDENT IN AN ORGANIZED HEALTH CARE EDUCATION/TRAINING PROGRAM

## 2020-01-01 PROCEDURE — 85014 HEMATOCRIT: CPT

## 2020-01-01 PROCEDURE — 87186 SC STD MICRODIL/AGAR DIL: CPT

## 2020-01-01 PROCEDURE — 2060000000 HC ICU INTERMEDIATE R&B

## 2020-01-01 PROCEDURE — 85027 COMPLETE CBC AUTOMATED: CPT

## 2020-01-01 PROCEDURE — 84100 ASSAY OF PHOSPHORUS: CPT

## 2020-01-01 PROCEDURE — 80053 COMPREHEN METABOLIC PANEL: CPT

## 2020-01-01 PROCEDURE — G0378 HOSPITAL OBSERVATION PER HR: HCPCS

## 2020-01-01 PROCEDURE — 5A1D90Z PERFORMANCE OF URINARY FILTRATION, CONTINUOUS, GREATER THAN 18 HOURS PER DAY: ICD-10-PCS | Performed by: INTERNAL MEDICINE

## 2020-01-01 PROCEDURE — 86850 RBC ANTIBODY SCREEN: CPT

## 2020-01-01 PROCEDURE — 2500000003 HC RX 250 WO HCPCS: Performed by: EMERGENCY MEDICINE

## 2020-01-01 PROCEDURE — 36556 INSERT NON-TUNNEL CV CATH: CPT

## 2020-01-01 PROCEDURE — 85018 HEMOGLOBIN: CPT

## 2020-01-01 PROCEDURE — 71045 X-RAY EXAM CHEST 1 VIEW: CPT

## 2020-01-01 PROCEDURE — 82330 ASSAY OF CALCIUM: CPT

## 2020-01-01 PROCEDURE — 2500000003 HC RX 250 WO HCPCS

## 2020-01-01 PROCEDURE — 83735 ASSAY OF MAGNESIUM: CPT

## 2020-01-01 PROCEDURE — 36245 INS CATH ABD/L-EXT ART 1ST: CPT | Performed by: SURGERY

## 2020-01-01 PROCEDURE — 36592 COLLECT BLOOD FROM PICC: CPT

## 2020-01-01 PROCEDURE — 51701 INSERT BLADDER CATHETER: CPT

## 2020-01-01 PROCEDURE — 6360000002 HC RX W HCPCS

## 2020-01-01 PROCEDURE — P9016 RBC LEUKOCYTES REDUCED: HCPCS

## 2020-01-01 PROCEDURE — 2500000003 HC RX 250 WO HCPCS: Performed by: SURGERY

## 2020-01-01 PROCEDURE — 97530 THERAPEUTIC ACTIVITIES: CPT

## 2020-01-01 PROCEDURE — 85610 PROTHROMBIN TIME: CPT

## 2020-01-01 PROCEDURE — 2500000003 HC RX 250 WO HCPCS: Performed by: FAMILY MEDICINE

## 2020-01-01 PROCEDURE — 2709999900 HC NON-CHARGEABLE SUPPLY: Performed by: SURGERY

## 2020-01-01 PROCEDURE — 86334 IMMUNOFIX E-PHORESIS SERUM: CPT

## 2020-01-01 PROCEDURE — 82607 VITAMIN B-12: CPT

## 2020-01-01 PROCEDURE — 2580000003 HC RX 258: Performed by: FAMILY MEDICINE

## 2020-01-01 PROCEDURE — 2500000003 HC RX 250 WO HCPCS: Performed by: INTERNAL MEDICINE

## 2020-01-01 PROCEDURE — 3700000001 HC ADD 15 MINUTES (ANESTHESIA): Performed by: SURGERY

## 2020-01-01 PROCEDURE — 93005 ELECTROCARDIOGRAM TRACING: CPT | Performed by: NURSE PRACTITIONER

## 2020-01-01 PROCEDURE — 87040 BLOOD CULTURE FOR BACTERIA: CPT

## 2020-01-01 PROCEDURE — 96372 THER/PROPH/DIAG INJ SC/IM: CPT

## 2020-01-01 PROCEDURE — C1887 CATHETER, GUIDING: HCPCS

## 2020-01-01 PROCEDURE — 3700000000 HC ANESTHESIA ATTENDED CARE: Performed by: SURGERY

## 2020-01-01 PROCEDURE — 02HV33Z INSERTION OF INFUSION DEVICE INTO SUPERIOR VENA CAVA, PERCUTANEOUS APPROACH: ICD-10-PCS | Performed by: STUDENT IN AN ORGANIZED HEALTH CARE EDUCATION/TRAINING PROGRAM

## 2020-01-01 PROCEDURE — 86901 BLOOD TYPING SEROLOGIC RH(D): CPT

## 2020-01-01 PROCEDURE — 83880 ASSAY OF NATRIURETIC PEPTIDE: CPT

## 2020-01-01 PROCEDURE — 97535 SELF CARE MNGMENT TRAINING: CPT

## 2020-01-01 PROCEDURE — 2580000003 HC RX 258: Performed by: SURGERY

## 2020-01-01 PROCEDURE — 86140 C-REACTIVE PROTEIN: CPT

## 2020-01-01 PROCEDURE — 02HV33Z INSERTION OF INFUSION DEVICE INTO SUPERIOR VENA CAVA, PERCUTANEOUS APPROACH: ICD-10-PCS | Performed by: SURGERY

## 2020-01-01 PROCEDURE — 6360000002 HC RX W HCPCS: Performed by: NURSE ANESTHETIST, CERTIFIED REGISTERED

## 2020-01-01 PROCEDURE — 3600000003 HC SURGERY LEVEL 3 BASE: Performed by: SURGERY

## 2020-01-01 PROCEDURE — 0DTJ4ZZ RESECTION OF APPENDIX, PERCUTANEOUS ENDOSCOPIC APPROACH: ICD-10-PCS | Performed by: SURGERY

## 2020-01-01 PROCEDURE — 99232 SBSQ HOSP IP/OBS MODERATE 35: CPT | Performed by: SURGERY

## 2020-01-01 PROCEDURE — 96375 TX/PRO/DX INJ NEW DRUG ADDON: CPT

## 2020-01-01 PROCEDURE — 31500 INSERT EMERGENCY AIRWAY: CPT | Performed by: SURGERY

## 2020-01-01 PROCEDURE — U0003 INFECTIOUS AGENT DETECTION BY NUCLEIC ACID (DNA OR RNA); SEVERE ACUTE RESPIRATORY SYNDROME CORONAVIRUS 2 (SARS-COV-2) (CORONAVIRUS DISEASE [COVID-19]), AMPLIFIED PROBE TECHNIQUE, MAKING USE OF HIGH THROUGHPUT TECHNOLOGIES AS DESCRIBED BY CMS-2020-01-R: HCPCS

## 2020-01-01 PROCEDURE — 5A1945Z RESPIRATORY VENTILATION, 24-96 CONSECUTIVE HOURS: ICD-10-PCS | Performed by: SURGERY

## 2020-01-01 PROCEDURE — 97606 NEG PRS WND THER DME>50 SQCM: CPT | Performed by: SURGERY

## 2020-01-01 PROCEDURE — 80048 BASIC METABOLIC PNL TOTAL CA: CPT

## 2020-01-01 PROCEDURE — C1751 CATH, INF, PER/CENT/MIDLINE: HCPCS

## 2020-01-01 PROCEDURE — 93005 ELECTROCARDIOGRAM TRACING: CPT | Performed by: EMERGENCY MEDICINE

## 2020-01-01 PROCEDURE — B410ZZZ FLUOROSCOPY OF ABDOMINAL AORTA: ICD-10-PCS | Performed by: SURGERY

## 2020-01-01 PROCEDURE — 99291 CRITICAL CARE FIRST HOUR: CPT | Performed by: SURGERY

## 2020-01-01 PROCEDURE — 84145 PROCALCITONIN (PCT): CPT

## 2020-01-01 PROCEDURE — 74018 RADEX ABDOMEN 1 VIEW: CPT

## 2020-01-01 PROCEDURE — 3600000014 HC SURGERY LEVEL 4 ADDTL 15MIN: Performed by: SURGERY

## 2020-01-01 PROCEDURE — 84484 ASSAY OF TROPONIN QUANT: CPT

## 2020-01-01 PROCEDURE — 76937 US GUIDE VASCULAR ACCESS: CPT

## 2020-01-01 PROCEDURE — C1876 STENT, NON-COA/NON-COV W/DEL: HCPCS

## 2020-01-01 PROCEDURE — 51798 US URINE CAPACITY MEASURE: CPT

## 2020-01-01 PROCEDURE — 6360000002 HC RX W HCPCS: Performed by: NURSE PRACTITIONER

## 2020-01-01 PROCEDURE — 37799 UNLISTED PX VASCULAR SURGERY: CPT

## 2020-01-01 PROCEDURE — 84132 ASSAY OF SERUM POTASSIUM: CPT

## 2020-01-01 PROCEDURE — C1760 CLOSURE DEV, VASC: HCPCS

## 2020-01-01 PROCEDURE — 88304 TISSUE EXAM BY PATHOLOGIST: CPT

## 2020-01-01 PROCEDURE — 74176 CT ABD & PELVIS W/O CONTRAST: CPT

## 2020-01-01 PROCEDURE — 2500000003 HC RX 250 WO HCPCS: Performed by: NURSE ANESTHETIST, CERTIFIED REGISTERED

## 2020-01-01 PROCEDURE — 85384 FIBRINOGEN ACTIVITY: CPT

## 2020-01-01 PROCEDURE — C9113 INJ PANTOPRAZOLE SODIUM, VIA: HCPCS | Performed by: SURGERY

## 2020-01-01 PROCEDURE — 86923 COMPATIBILITY TEST ELECTRIC: CPT

## 2020-01-01 PROCEDURE — P9047 ALBUMIN (HUMAN), 25%, 50ML: HCPCS | Performed by: INTERNAL MEDICINE

## 2020-01-01 PROCEDURE — 43235 EGD DIAGNOSTIC BRUSH WASH: CPT | Performed by: SURGERY

## 2020-01-01 PROCEDURE — 37236 OPEN/PERQ PLACE STENT 1ST: CPT | Performed by: SURGERY

## 2020-01-01 PROCEDURE — 93010 ELECTROCARDIOGRAM REPORT: CPT | Performed by: INTERNAL MEDICINE

## 2020-01-01 PROCEDURE — 94003 VENT MGMT INPAT SUBQ DAY: CPT

## 2020-01-01 PROCEDURE — 2500000003 HC RX 250 WO HCPCS: Performed by: NURSE PRACTITIONER

## 2020-01-01 PROCEDURE — 97166 OT EVAL MOD COMPLEX 45 MIN: CPT

## 2020-01-01 PROCEDURE — 97162 PT EVAL MOD COMPLEX 30 MIN: CPT | Performed by: PHYSICAL THERAPIST

## 2020-01-01 PROCEDURE — 85045 AUTOMATED RETICULOCYTE COUNT: CPT

## 2020-01-01 PROCEDURE — 96367 TX/PROPH/DG ADDL SEQ IV INF: CPT

## 2020-01-01 PROCEDURE — 97530 THERAPEUTIC ACTIVITIES: CPT | Performed by: PHYSICAL THERAPIST

## 2020-01-01 PROCEDURE — 2580000003 HC RX 258: Performed by: EMERGENCY MEDICINE

## 2020-01-01 PROCEDURE — 2500000003 HC RX 250 WO HCPCS: Performed by: REGISTERED NURSE

## 2020-01-01 PROCEDURE — 7100000000 HC PACU RECOVERY - FIRST 15 MIN: Performed by: SURGERY

## 2020-01-01 PROCEDURE — 0DJ08ZZ INSPECTION OF UPPER INTESTINAL TRACT, VIA NATURAL OR ARTIFICIAL OPENING ENDOSCOPIC: ICD-10-PCS | Performed by: SURGERY

## 2020-01-01 PROCEDURE — 97162 PT EVAL MOD COMPLEX 30 MIN: CPT

## 2020-01-01 PROCEDURE — 96365 THER/PROPH/DIAG IV INF INIT: CPT

## 2020-01-01 PROCEDURE — 94002 VENT MGMT INPAT INIT DAY: CPT

## 2020-01-01 PROCEDURE — 2580000003 HC RX 258: Performed by: RADIOLOGY

## 2020-01-01 PROCEDURE — 82533 TOTAL CORTISOL: CPT

## 2020-01-01 PROCEDURE — 2580000003 HC RX 258: Performed by: NURSE PRACTITIONER

## 2020-01-01 PROCEDURE — 83605 ASSAY OF LACTIC ACID: CPT

## 2020-01-01 PROCEDURE — 86677 HELICOBACTER PYLORI ANTIBODY: CPT

## 2020-01-01 PROCEDURE — 99213 OFFICE O/P EST LOW 20 MIN: CPT | Performed by: SURGERY

## 2020-01-01 PROCEDURE — 36556 INSERT NON-TUNNEL CV CATH: CPT | Performed by: SURGERY

## 2020-01-01 PROCEDURE — 84478 ASSAY OF TRIGLYCERIDES: CPT

## 2020-01-01 PROCEDURE — 84165 PROTEIN E-PHORESIS SERUM: CPT

## 2020-01-01 PROCEDURE — 82746 ASSAY OF FOLIC ACID SERUM: CPT

## 2020-01-01 PROCEDURE — 2000000000 HC ICU R&B

## 2020-01-01 PROCEDURE — 36620 INSERTION CATHETER ARTERY: CPT | Performed by: SURGERY

## 2020-01-01 PROCEDURE — 82805 BLOOD GASES W/O2 SATURATION: CPT

## 2020-01-01 PROCEDURE — 84134 ASSAY OF PREALBUMIN: CPT

## 2020-01-01 PROCEDURE — C1894 INTRO/SHEATH, NON-LASER: HCPCS

## 2020-01-01 PROCEDURE — 7100000001 HC PACU RECOVERY - ADDTL 15 MIN: Performed by: SURGERY

## 2020-01-01 PROCEDURE — 6360000004 HC RX CONTRAST MEDICATION: Performed by: RADIOLOGY

## 2020-01-01 PROCEDURE — 0BH18EZ INSERTION OF ENDOTRACHEAL AIRWAY INTO TRACHEA, VIA NATURAL OR ARTIFICIAL OPENING ENDOSCOPIC: ICD-10-PCS | Performed by: SURGERY

## 2020-01-01 PROCEDURE — 80307 DRUG TEST PRSMV CHEM ANLYZR: CPT

## 2020-01-01 PROCEDURE — 36430 TRANSFUSION BLD/BLD COMPNT: CPT

## 2020-01-01 PROCEDURE — 87075 CULTR BACTERIA EXCEPT BLOOD: CPT

## 2020-01-01 PROCEDURE — 84443 ASSAY THYROID STIM HORMONE: CPT

## 2020-01-01 PROCEDURE — 81001 URINALYSIS AUTO W/SCOPE: CPT

## 2020-01-01 PROCEDURE — 87088 URINE BACTERIA CULTURE: CPT

## 2020-01-01 PROCEDURE — 74177 CT ABD & PELVIS W/CONTRAST: CPT

## 2020-01-01 PROCEDURE — 6370000000 HC RX 637 (ALT 250 FOR IP): Performed by: FAMILY MEDICINE

## 2020-01-01 PROCEDURE — B414ZZZ FLUOROSCOPY OF SUPERIOR MESENTERIC ARTERY: ICD-10-PCS | Performed by: SURGERY

## 2020-01-01 PROCEDURE — 2720000010 HC SURG SUPPLY STERILE: Performed by: SURGERY

## 2020-01-01 PROCEDURE — 94669 MECHANICAL CHEST WALL OSCILL: CPT

## 2020-01-01 PROCEDURE — 83550 IRON BINDING TEST: CPT

## 2020-01-01 PROCEDURE — 31645 BRNCHSC W/THER ASPIR 1ST: CPT | Performed by: SURGERY

## 2020-01-01 PROCEDURE — 84166 PROTEIN E-PHORESIS/URINE/CSF: CPT

## 2020-01-01 PROCEDURE — 3430000000 HC RX DIAGNOSTIC RADIOPHARMACEUTICAL: Performed by: RADIOLOGY

## 2020-01-01 PROCEDURE — 04753DZ DILATION OF SUPERIOR MESENTERIC ARTERY WITH INTRALUMINAL DEVICE, PERCUTANEOUS APPROACH: ICD-10-PCS | Performed by: SURGERY

## 2020-01-01 PROCEDURE — 87077 CULTURE AEROBIC IDENTIFY: CPT

## 2020-01-01 PROCEDURE — 0B978ZZ DRAINAGE OF LEFT MAIN BRONCHUS, VIA NATURAL OR ARTIFICIAL OPENING ENDOSCOPIC: ICD-10-PCS | Performed by: SURGERY

## 2020-01-01 PROCEDURE — 88307 TISSUE EXAM BY PATHOLOGIST: CPT

## 2020-01-01 PROCEDURE — 7100000001 HC PACU RECOVERY - ADDTL 15 MIN

## 2020-01-01 PROCEDURE — 82803 BLOOD GASES ANY COMBINATION: CPT

## 2020-01-01 PROCEDURE — 0DTH0ZZ RESECTION OF CECUM, OPEN APPROACH: ICD-10-PCS | Performed by: SURGERY

## 2020-01-01 PROCEDURE — 87081 CULTURE SCREEN ONLY: CPT

## 2020-01-01 PROCEDURE — 83615 LACTATE (LD) (LDH) ENZYME: CPT

## 2020-01-01 PROCEDURE — 3600000013 HC SURGERY LEVEL 3 ADDTL 15MIN: Performed by: SURGERY

## 2020-01-01 PROCEDURE — 99285 EMERGENCY DEPT VISIT HI MDM: CPT

## 2020-01-01 PROCEDURE — 2580000003 HC RX 258: Performed by: NURSE ANESTHETIST, CERTIFIED REGISTERED

## 2020-01-01 PROCEDURE — 83540 ASSAY OF IRON: CPT

## 2020-01-01 PROCEDURE — 82784 ASSAY IGA/IGD/IGG/IGM EACH: CPT

## 2020-01-01 PROCEDURE — 87205 SMEAR GRAM STAIN: CPT

## 2020-01-01 PROCEDURE — 3609017100 HC EGD: Performed by: SURGERY

## 2020-01-01 PROCEDURE — 82140 ASSAY OF AMMONIA: CPT

## 2020-01-01 PROCEDURE — C1725 CATH, TRANSLUMIN NON-LASER: HCPCS

## 2020-01-01 PROCEDURE — 2580000003 HC RX 258: Performed by: REGISTERED NURSE

## 2020-01-01 PROCEDURE — 82728 ASSAY OF FERRITIN: CPT

## 2020-01-01 PROCEDURE — 80074 ACUTE HEPATITIS PANEL: CPT

## 2020-01-01 PROCEDURE — 93005 ELECTROCARDIOGRAM TRACING: CPT | Performed by: SURGERY

## 2020-01-01 PROCEDURE — 0B938ZZ DRAINAGE OF RIGHT MAIN BRONCHUS, VIA NATURAL OR ARTIFICIAL OPENING ENDOSCOPIC: ICD-10-PCS | Performed by: SURGERY

## 2020-01-01 PROCEDURE — 99291 CRITICAL CARE FIRST HOUR: CPT

## 2020-01-01 PROCEDURE — 83690 ASSAY OF LIPASE: CPT

## 2020-01-01 PROCEDURE — 0DBA0ZZ EXCISION OF JEJUNUM, OPEN APPROACH: ICD-10-PCS | Performed by: SURGERY

## 2020-01-01 PROCEDURE — 78278 ACUTE GI BLOOD LOSS IMAGING: CPT

## 2020-01-01 PROCEDURE — 99284 EMERGENCY DEPT VISIT MOD MDM: CPT

## 2020-01-01 PROCEDURE — 85651 RBC SED RATE NONAUTOMATED: CPT

## 2020-01-01 PROCEDURE — 94760 N-INVAS EAR/PLS OXIMETRY 1: CPT

## 2020-01-01 PROCEDURE — 93460 R&L HRT ART/VENTRICLE ANGIO: CPT | Performed by: INTERNAL MEDICINE

## 2020-01-01 PROCEDURE — 90945 DIALYSIS ONE EVALUATION: CPT

## 2020-01-01 PROCEDURE — 94664 DEMO&/EVAL PT USE INHALER: CPT

## 2020-01-01 PROCEDURE — 44970 LAPAROSCOPY APPENDECTOMY: CPT | Performed by: SURGERY

## 2020-01-01 PROCEDURE — 31500 INSERT EMERGENCY AIRWAY: CPT

## 2020-01-01 PROCEDURE — 96366 THER/PROPH/DIAG IV INF ADDON: CPT

## 2020-01-01 PROCEDURE — 85347 COAGULATION TIME ACTIVATED: CPT

## 2020-01-01 PROCEDURE — 36620 INSERTION CATHETER ARTERY: CPT

## 2020-01-01 PROCEDURE — 87070 CULTURE OTHR SPECIMN AEROBIC: CPT

## 2020-01-01 PROCEDURE — 3600000004 HC SURGERY LEVEL 4 BASE: Performed by: SURGERY

## 2020-01-01 PROCEDURE — 7100000000 HC PACU RECOVERY - FIRST 15 MIN

## 2020-01-01 PROCEDURE — 44120 REMOVAL OF SMALL INTESTINE: CPT | Performed by: SURGERY

## 2020-01-01 PROCEDURE — 85576 BLOOD PLATELET AGGREGATION: CPT

## 2020-01-01 PROCEDURE — 2580000003 HC RX 258

## 2020-01-01 PROCEDURE — 06HM33Z INSERTION OF INFUSION DEVICE INTO RIGHT FEMORAL VEIN, PERCUTANEOUS APPROACH: ICD-10-PCS | Performed by: SURGERY

## 2020-01-01 RX ORDER — MAGNESIUM SULFATE IN WATER 40 MG/ML
2 INJECTION, SOLUTION INTRAVENOUS ONCE
Status: DISCONTINUED | OUTPATIENT
Start: 2020-01-01 | End: 2020-01-01 | Stop reason: CLARIF

## 2020-01-01 RX ORDER — TORSEMIDE 20 MG/1
20 TABLET ORAL DAILY
Status: DISCONTINUED | OUTPATIENT
Start: 2020-01-01 | End: 2020-01-01

## 2020-01-01 RX ORDER — ACETAMINOPHEN 325 MG/1
650 TABLET ORAL EVERY 6 HOURS PRN
Status: DISCONTINUED | OUTPATIENT
Start: 2020-01-01 | End: 2020-01-01 | Stop reason: HOSPADM

## 2020-01-01 RX ORDER — SODIUM CHLORIDE 0.9 % (FLUSH) 0.9 %
10 SYRINGE (ML) INJECTION EVERY 12 HOURS SCHEDULED
Status: DISCONTINUED | OUTPATIENT
Start: 2020-01-01 | End: 2020-01-01 | Stop reason: HOSPADM

## 2020-01-01 RX ORDER — MIDAZOLAM HYDROCHLORIDE 1 MG/ML
INJECTION INTRAMUSCULAR; INTRAVENOUS PRN
Status: DISCONTINUED | OUTPATIENT
Start: 2020-01-01 | End: 2020-01-01 | Stop reason: SDUPTHER

## 2020-01-01 RX ORDER — POTASSIUM CHLORIDE 7.45 MG/ML
10 INJECTION INTRAVENOUS
Status: DISCONTINUED | OUTPATIENT
Start: 2020-01-01 | End: 2020-01-01

## 2020-01-01 RX ORDER — SODIUM CHLORIDE, SODIUM LACTATE, POTASSIUM CHLORIDE, CALCIUM CHLORIDE 600; 310; 30; 20 MG/100ML; MG/100ML; MG/100ML; MG/100ML
INJECTION, SOLUTION INTRAVENOUS CONTINUOUS
Status: DISCONTINUED | OUTPATIENT
Start: 2020-01-01 | End: 2020-01-01

## 2020-01-01 RX ORDER — GLYCOPYRROLATE 1 MG/5 ML
SYRINGE (ML) INTRAVENOUS PRN
Status: DISCONTINUED | OUTPATIENT
Start: 2020-01-01 | End: 2020-01-01 | Stop reason: SDUPTHER

## 2020-01-01 RX ORDER — BUPIVACAINE HYDROCHLORIDE AND EPINEPHRINE 2.5; 5 MG/ML; UG/ML
INJECTION, SOLUTION EPIDURAL; INFILTRATION; INTRACAUDAL; PERINEURAL PRN
Status: DISCONTINUED | OUTPATIENT
Start: 2020-01-01 | End: 2020-01-01 | Stop reason: ALTCHOICE

## 2020-01-01 RX ORDER — SODIUM CHLORIDE 450 MG/100ML
INJECTION, SOLUTION INTRAVENOUS CONTINUOUS
Status: DISCONTINUED | OUTPATIENT
Start: 2020-01-01 | End: 2020-01-01

## 2020-01-01 RX ORDER — 0.9 % SODIUM CHLORIDE 0.9 %
250 INTRAVENOUS SOLUTION INTRAVENOUS ONCE
Status: COMPLETED | OUTPATIENT
Start: 2020-01-01 | End: 2020-01-01

## 2020-01-01 RX ORDER — OXYMETAZOLINE HYDROCHLORIDE 0.05 G/100ML
2 SPRAY NASAL ONCE
Status: COMPLETED | OUTPATIENT
Start: 2020-01-01 | End: 2020-01-01

## 2020-01-01 RX ORDER — BISACODYL 10 MG
10 SUPPOSITORY, RECTAL RECTAL ONCE
Status: COMPLETED | OUTPATIENT
Start: 2020-01-01 | End: 2020-01-01

## 2020-01-01 RX ORDER — SODIUM CHLORIDE, SODIUM LACTATE, POTASSIUM CHLORIDE, AND CALCIUM CHLORIDE .6; .31; .03; .02 G/100ML; G/100ML; G/100ML; G/100ML
500 INJECTION, SOLUTION INTRAVENOUS ONCE
Status: COMPLETED | OUTPATIENT
Start: 2020-01-01 | End: 2020-01-01

## 2020-01-01 RX ORDER — HEPARIN SODIUM 10000 [USP'U]/ML
5000 INJECTION, SOLUTION INTRAVENOUS; SUBCUTANEOUS EVERY 8 HOURS SCHEDULED
Status: DISCONTINUED | OUTPATIENT
Start: 2020-01-01 | End: 2020-01-01

## 2020-01-01 RX ORDER — METOCLOPRAMIDE HYDROCHLORIDE 5 MG/ML
5 INJECTION INTRAMUSCULAR; INTRAVENOUS EVERY 6 HOURS SCHEDULED
Status: DISCONTINUED | OUTPATIENT
Start: 2020-01-01 | End: 2020-01-01 | Stop reason: HOSPADM

## 2020-01-01 RX ORDER — DEXTROSE MONOHYDRATE 25 G/50ML
12.5 INJECTION, SOLUTION INTRAVENOUS PRN
Status: DISCONTINUED | OUTPATIENT
Start: 2020-01-01 | End: 2020-01-01 | Stop reason: HOSPADM

## 2020-01-01 RX ORDER — MAGNESIUM SULFATE 1 G/100ML
1 INJECTION INTRAVENOUS
Status: COMPLETED | OUTPATIENT
Start: 2020-01-01 | End: 2020-01-01

## 2020-01-01 RX ORDER — ROCURONIUM BROMIDE 10 MG/ML
INJECTION, SOLUTION INTRAVENOUS PRN
Status: DISCONTINUED | OUTPATIENT
Start: 2020-01-01 | End: 2020-01-01 | Stop reason: SDUPTHER

## 2020-01-01 RX ORDER — DEXTROSE MONOHYDRATE 25 G/50ML
12.5 INJECTION, SOLUTION INTRAVENOUS PRN
Status: DISCONTINUED | OUTPATIENT
Start: 2020-01-01 | End: 2020-01-01 | Stop reason: SDUPTHER

## 2020-01-01 RX ORDER — PETROLATUM 42 G/100G
OINTMENT TOPICAL 2 TIMES DAILY PRN
Status: DISCONTINUED | OUTPATIENT
Start: 2020-01-01 | End: 2020-01-01 | Stop reason: HOSPADM

## 2020-01-01 RX ORDER — FERROUS ASPARTO GLYCINATE, IRON, ASCORBIC ACID, FOLIC ACID, CYANOCOBALAMIN, ZINC, SUCCINIC ACID, AND INTRINSIC FACTOR 50; 100; 60; 750; 250; 25; 15; 50; 100 MG/1; MG/1; MG/1; UG/1; UG/1; UG/1; MG/1; MG/1; MG/1
1 TABLET ORAL DAILY
Qty: 100 TABLET | Refills: 0 | Status: SHIPPED | OUTPATIENT
Start: 2020-01-01

## 2020-01-01 RX ORDER — VASOPRESSIN 20 U/ML
INJECTION PARENTERAL PRN
Status: DISCONTINUED | OUTPATIENT
Start: 2020-01-01 | End: 2020-01-01 | Stop reason: SDUPTHER

## 2020-01-01 RX ORDER — CHLORHEXIDINE GLUCONATE 0.12 MG/ML
15 RINSE ORAL 2 TIMES DAILY
Status: DISCONTINUED | OUTPATIENT
Start: 2020-01-01 | End: 2020-01-01 | Stop reason: HOSPADM

## 2020-01-01 RX ORDER — DEXTROSE MONOHYDRATE 25 G/50ML
INJECTION, SOLUTION INTRAVENOUS
Status: DISPENSED
Start: 2020-01-01 | End: 2020-01-01

## 2020-01-01 RX ORDER — SODIUM CHLORIDE 0.9 % (FLUSH) 0.9 %
10 SYRINGE (ML) INJECTION PRN
Status: DISCONTINUED | OUTPATIENT
Start: 2020-01-01 | End: 2020-01-01 | Stop reason: HOSPADM

## 2020-01-01 RX ORDER — MORPHINE SULFATE 2 MG/ML
2 INJECTION, SOLUTION INTRAMUSCULAR; INTRAVENOUS
Status: DISCONTINUED | OUTPATIENT
Start: 2020-01-01 | End: 2020-01-01 | Stop reason: HOSPADM

## 2020-01-01 RX ORDER — MAGNESIUM SULFATE 1 G/100ML
1 INJECTION INTRAVENOUS PRN
Status: DISCONTINUED | OUTPATIENT
Start: 2020-01-01 | End: 2020-01-01 | Stop reason: HOSPADM

## 2020-01-01 RX ORDER — SODIUM CHLORIDE 0.9 % (FLUSH) 0.9 %
10 SYRINGE (ML) INJECTION
Status: COMPLETED | OUTPATIENT
Start: 2020-01-01 | End: 2020-01-01

## 2020-01-01 RX ORDER — SODIUM CHLORIDE 9 MG/ML
10 INJECTION INTRAVENOUS 2 TIMES DAILY
Status: DISCONTINUED | OUTPATIENT
Start: 2020-01-01 | End: 2020-01-01 | Stop reason: HOSPADM

## 2020-01-01 RX ORDER — 0.9 % SODIUM CHLORIDE 0.9 %
20 INTRAVENOUS SOLUTION INTRAVENOUS ONCE
Status: DISCONTINUED | OUTPATIENT
Start: 2020-01-01 | End: 2020-01-01 | Stop reason: HOSPADM

## 2020-01-01 RX ORDER — ALBUMIN (HUMAN) 12.5 G/50ML
25 SOLUTION INTRAVENOUS EVERY 6 HOURS
Status: DISCONTINUED | OUTPATIENT
Start: 2020-01-01 | End: 2020-01-01 | Stop reason: HOSPADM

## 2020-01-01 RX ORDER — 0.9 % SODIUM CHLORIDE 0.9 %
INTRAVENOUS SOLUTION INTRAVENOUS ONCE
Status: DISCONTINUED | OUTPATIENT
Start: 2020-01-01 | End: 2020-01-01

## 2020-01-01 RX ORDER — DEXTROSE MONOHYDRATE 50 MG/ML
100 INJECTION, SOLUTION INTRAVENOUS PRN
Status: DISCONTINUED | OUTPATIENT
Start: 2020-01-01 | End: 2020-01-01 | Stop reason: HOSPADM

## 2020-01-01 RX ORDER — NICOTINE POLACRILEX 4 MG
15 LOZENGE BUCCAL PRN
Status: DISCONTINUED | OUTPATIENT
Start: 2020-01-01 | End: 2020-01-01 | Stop reason: SDUPTHER

## 2020-01-01 RX ORDER — FLUCONAZOLE 2 MG/ML
400 INJECTION, SOLUTION INTRAVENOUS ONCE
Status: COMPLETED | OUTPATIENT
Start: 2020-01-01 | End: 2020-01-01

## 2020-01-01 RX ORDER — PROPOFOL 10 MG/ML
INJECTION, EMULSION INTRAVENOUS PRN
Status: DISCONTINUED | OUTPATIENT
Start: 2020-01-01 | End: 2020-01-01 | Stop reason: SDUPTHER

## 2020-01-01 RX ORDER — ONDANSETRON 2 MG/ML
INJECTION INTRAMUSCULAR; INTRAVENOUS PRN
Status: DISCONTINUED | OUTPATIENT
Start: 2020-01-01 | End: 2020-01-01 | Stop reason: SDUPTHER

## 2020-01-01 RX ORDER — LIDOCAINE HYDROCHLORIDE 10 MG/ML
INJECTION, SOLUTION EPIDURAL; INFILTRATION; INTRACAUDAL; PERINEURAL PRN
Status: DISCONTINUED | OUTPATIENT
Start: 2020-01-01 | End: 2020-01-01 | Stop reason: ALTCHOICE

## 2020-01-01 RX ORDER — OXYMETAZOLINE HYDROCHLORIDE 0.05 G/100ML
2 SPRAY NASAL ONCE
Status: DISCONTINUED | OUTPATIENT
Start: 2020-01-01 | End: 2020-01-01

## 2020-01-01 RX ORDER — LORAZEPAM 2 MG/ML
0.5 INJECTION INTRAMUSCULAR
Status: DISCONTINUED | OUTPATIENT
Start: 2020-01-01 | End: 2020-01-01 | Stop reason: HOSPADM

## 2020-01-01 RX ORDER — MAGNESIUM SULFATE IN WATER 40 MG/ML
2 INJECTION, SOLUTION INTRAVENOUS ONCE
Status: COMPLETED | OUTPATIENT
Start: 2020-01-01 | End: 2020-01-01

## 2020-01-01 RX ORDER — SENNA AND DOCUSATE SODIUM 50; 8.6 MG/1; MG/1
2 TABLET, FILM COATED ORAL DAILY PRN
Status: DISCONTINUED | OUTPATIENT
Start: 2020-01-01 | End: 2020-01-01

## 2020-01-01 RX ORDER — ONDANSETRON 2 MG/ML
4 INJECTION INTRAMUSCULAR; INTRAVENOUS EVERY 6 HOURS PRN
Status: DISCONTINUED | OUTPATIENT
Start: 2020-01-01 | End: 2020-01-01

## 2020-01-01 RX ORDER — POTASSIUM CHLORIDE 29.8 MG/ML
20 INJECTION INTRAVENOUS
Status: COMPLETED | OUTPATIENT
Start: 2020-01-01 | End: 2020-01-01

## 2020-01-01 RX ORDER — ACETAMINOPHEN 650 MG/1
650 SUPPOSITORY RECTAL EVERY 6 HOURS PRN
Status: DISCONTINUED | OUTPATIENT
Start: 2020-01-01 | End: 2020-01-01 | Stop reason: HOSPADM

## 2020-01-01 RX ORDER — POLYETHYLENE GLYCOL 3350 17 G/17G
17 POWDER, FOR SOLUTION ORAL DAILY PRN
Status: DISCONTINUED | OUTPATIENT
Start: 2020-01-01 | End: 2020-01-01 | Stop reason: HOSPADM

## 2020-01-01 RX ORDER — FUROSEMIDE 10 MG/ML
20 INJECTION INTRAMUSCULAR; INTRAVENOUS ONCE
Status: COMPLETED | OUTPATIENT
Start: 2020-01-01 | End: 2020-01-01

## 2020-01-01 RX ORDER — SUCRALFATE 1 G/1
1 TABLET ORAL 4 TIMES DAILY
Qty: 120 TABLET | Refills: 3 | Status: SHIPPED | OUTPATIENT
Start: 2020-01-01

## 2020-01-01 RX ORDER — MIDAZOLAM HYDROCHLORIDE 1 MG/ML
INJECTION INTRAMUSCULAR; INTRAVENOUS
Status: DISCONTINUED
Start: 2020-01-01 | End: 2020-01-01

## 2020-01-01 RX ORDER — LIDOCAINE HYDROCHLORIDE 20 MG/ML
INJECTION, SOLUTION INTRAVENOUS PRN
Status: DISCONTINUED | OUTPATIENT
Start: 2020-01-01 | End: 2020-01-01 | Stop reason: SDUPTHER

## 2020-01-01 RX ORDER — SODIUM CHLORIDE, SODIUM LACTATE, POTASSIUM CHLORIDE, AND CALCIUM CHLORIDE .6; .31; .03; .02 G/100ML; G/100ML; G/100ML; G/100ML
1000 INJECTION, SOLUTION INTRAVENOUS ONCE
Status: COMPLETED | OUTPATIENT
Start: 2020-01-01 | End: 2020-01-01

## 2020-01-01 RX ORDER — SUCCINYLCHOLINE CHLORIDE 20 MG/ML
INJECTION INTRAMUSCULAR; INTRAVENOUS
Status: DISCONTINUED
Start: 2020-01-01 | End: 2020-01-01 | Stop reason: WASHOUT

## 2020-01-01 RX ORDER — FLUCONAZOLE 2 MG/ML
200 INJECTION, SOLUTION INTRAVENOUS EVERY 24 HOURS
Status: DISCONTINUED | OUTPATIENT
Start: 2020-01-01 | End: 2020-01-01 | Stop reason: HOSPADM

## 2020-01-01 RX ORDER — POTASSIUM CHLORIDE 29.8 MG/ML
20 INJECTION INTRAVENOUS PRN
Status: DISCONTINUED | OUTPATIENT
Start: 2020-01-01 | End: 2020-01-01 | Stop reason: HOSPADM

## 2020-01-01 RX ORDER — NIFEDIPINE 30 MG/1
30 TABLET, EXTENDED RELEASE ORAL DAILY
Status: DISCONTINUED | OUTPATIENT
Start: 2020-01-01 | End: 2020-01-01 | Stop reason: HOSPADM

## 2020-01-01 RX ORDER — GLYCOPYRROLATE 0.2 MG/ML
INJECTION INTRAMUSCULAR; INTRAVENOUS
Status: COMPLETED
Start: 2020-01-01 | End: 2020-01-01

## 2020-01-01 RX ORDER — HEPARIN SODIUM 10000 [USP'U]/100ML
12 INJECTION, SOLUTION INTRAVENOUS CONTINUOUS
Status: DISCONTINUED | OUTPATIENT
Start: 2020-01-01 | End: 2020-01-01

## 2020-01-01 RX ORDER — BISACODYL 10 MG
10 SUPPOSITORY, RECTAL RECTAL DAILY
Status: DISCONTINUED | OUTPATIENT
Start: 2020-01-01 | End: 2020-01-01

## 2020-01-01 RX ORDER — FERROUS SULFATE 325(65) MG
325 TABLET ORAL
Status: DISCONTINUED | OUTPATIENT
Start: 2020-01-01 | End: 2020-01-01

## 2020-01-01 RX ORDER — OXYCODONE HYDROCHLORIDE 5 MG/1
5 TABLET ORAL EVERY 4 HOURS PRN
Status: DISCONTINUED | OUTPATIENT
Start: 2020-01-01 | End: 2020-01-01

## 2020-01-01 RX ORDER — PANTOPRAZOLE SODIUM 40 MG/10ML
40 INJECTION, POWDER, LYOPHILIZED, FOR SOLUTION INTRAVENOUS 2 TIMES DAILY
Status: DISCONTINUED | OUTPATIENT
Start: 2020-01-01 | End: 2020-01-01 | Stop reason: HOSPADM

## 2020-01-01 RX ORDER — 0.9 % SODIUM CHLORIDE 0.9 %
250 INTRAVENOUS SOLUTION INTRAVENOUS ONCE
Status: DISCONTINUED | OUTPATIENT
Start: 2020-01-01 | End: 2020-01-01 | Stop reason: HOSPADM

## 2020-01-01 RX ORDER — MAGNESIUM SULFATE IN WATER 40 MG/ML
4 INJECTION, SOLUTION INTRAVENOUS ONCE
Status: COMPLETED | OUTPATIENT
Start: 2020-01-01 | End: 2020-01-01

## 2020-01-01 RX ORDER — PROMETHAZINE HYDROCHLORIDE 25 MG/1
12.5 TABLET ORAL EVERY 6 HOURS PRN
Status: DISCONTINUED | OUTPATIENT
Start: 2020-01-01 | End: 2020-01-01

## 2020-01-01 RX ORDER — DEXAMETHASONE SODIUM PHOSPHATE 10 MG/ML
INJECTION INTRAMUSCULAR; INTRAVENOUS PRN
Status: DISCONTINUED | OUTPATIENT
Start: 2020-01-01 | End: 2020-01-01 | Stop reason: SDUPTHER

## 2020-01-01 RX ORDER — PHENYLEPHRINE HYDROCHLORIDE 10 MG/ML
INJECTION INTRAVENOUS PRN
Status: DISCONTINUED | OUTPATIENT
Start: 2020-01-01 | End: 2020-01-01 | Stop reason: SDUPTHER

## 2020-01-01 RX ORDER — CLOPIDOGREL BISULFATE 75 MG/1
75 TABLET ORAL DAILY
COMMUNITY

## 2020-01-01 RX ORDER — SODIUM CHLORIDE 9 MG/ML
INJECTION, SOLUTION INTRAVENOUS CONTINUOUS
Status: DISCONTINUED | OUTPATIENT
Start: 2020-01-01 | End: 2020-01-01

## 2020-01-01 RX ORDER — AMLODIPINE BESYLATE 5 MG/1
5 TABLET ORAL DAILY
Status: DISCONTINUED | OUTPATIENT
Start: 2020-01-01 | End: 2020-01-01

## 2020-01-01 RX ORDER — SENNA PLUS 8.6 MG/1
2 TABLET ORAL NIGHTLY
Status: DISCONTINUED | OUTPATIENT
Start: 2020-01-01 | End: 2020-01-01

## 2020-01-01 RX ORDER — 0.9 % SODIUM CHLORIDE 0.9 %
1000 INTRAVENOUS SOLUTION INTRAVENOUS ONCE
Status: COMPLETED | OUTPATIENT
Start: 2020-01-01 | End: 2020-01-01

## 2020-01-01 RX ORDER — FERROUS SULFATE 325(65) MG
325 TABLET ORAL
COMMUNITY

## 2020-01-01 RX ORDER — SODIUM CHLORIDE 9 MG/ML
INJECTION, SOLUTION INTRAVENOUS CONTINUOUS PRN
Status: DISCONTINUED | OUTPATIENT
Start: 2020-01-01 | End: 2020-01-01 | Stop reason: SDUPTHER

## 2020-01-01 RX ORDER — POTASSIUM CHLORIDE 7.45 MG/ML
10 INJECTION INTRAVENOUS
Status: COMPLETED | OUTPATIENT
Start: 2020-01-01 | End: 2020-01-01

## 2020-01-01 RX ORDER — NEOSTIGMINE METHYLSULFATE 1 MG/ML
INJECTION, SOLUTION INTRAVENOUS PRN
Status: DISCONTINUED | OUTPATIENT
Start: 2020-01-01 | End: 2020-01-01 | Stop reason: SDUPTHER

## 2020-01-01 RX ORDER — MIDAZOLAM HYDROCHLORIDE 1 MG/ML
2 INJECTION INTRAMUSCULAR; INTRAVENOUS ONCE
Status: COMPLETED | OUTPATIENT
Start: 2020-01-01 | End: 2020-01-01

## 2020-01-01 RX ORDER — SODIUM CHLORIDE, SODIUM LACTATE, POTASSIUM CHLORIDE, CALCIUM CHLORIDE 600; 310; 30; 20 MG/100ML; MG/100ML; MG/100ML; MG/100ML
1000 INJECTION, SOLUTION INTRAVENOUS ONCE
Status: COMPLETED | OUTPATIENT
Start: 2020-01-01 | End: 2020-01-01

## 2020-01-01 RX ORDER — BISACODYL 10 MG
10 SUPPOSITORY, RECTAL RECTAL DAILY PRN
Status: DISCONTINUED | OUTPATIENT
Start: 2020-01-01 | End: 2020-01-01 | Stop reason: HOSPADM

## 2020-01-01 RX ORDER — PANTOPRAZOLE SODIUM 40 MG/1
40 TABLET, DELAYED RELEASE ORAL
Qty: 60 TABLET | Refills: 0 | Status: SHIPPED | OUTPATIENT
Start: 2020-01-01

## 2020-01-01 RX ORDER — NIFEDIPINE 30 MG/1
30 TABLET, EXTENDED RELEASE ORAL EVERY MORNING
Status: DISCONTINUED | OUTPATIENT
Start: 2020-01-01 | End: 2020-01-01

## 2020-01-01 RX ORDER — EPINEPHRINE 1 MG/ML
INJECTION, SOLUTION, CONCENTRATE INTRAVENOUS
Status: COMPLETED
Start: 2020-01-01 | End: 2020-01-01

## 2020-01-01 RX ORDER — DEXTROSE, SODIUM CHLORIDE, AND POTASSIUM CHLORIDE 5; .45; .15 G/100ML; G/100ML; G/100ML
INJECTION INTRAVENOUS CONTINUOUS
Status: DISCONTINUED | OUTPATIENT
Start: 2020-01-01 | End: 2020-01-01

## 2020-01-01 RX ORDER — 0.9 % SODIUM CHLORIDE 0.9 %
20 INTRAVENOUS SOLUTION INTRAVENOUS ONCE
Status: COMPLETED | OUTPATIENT
Start: 2020-01-01 | End: 2020-01-01

## 2020-01-01 RX ORDER — ACETAMINOPHEN 325 MG/1
650 TABLET ORAL EVERY 4 HOURS PRN
Status: DISCONTINUED | OUTPATIENT
Start: 2020-01-01 | End: 2020-01-01

## 2020-01-01 RX ORDER — ASPIRIN 81 MG/1
81 TABLET ORAL DAILY
Status: DISCONTINUED | OUTPATIENT
Start: 2020-01-01 | End: 2020-01-01

## 2020-01-01 RX ORDER — METHOCARBAMOL 500 MG/1
500 TABLET, FILM COATED ORAL 4 TIMES DAILY
Qty: 40 TABLET | Refills: 0 | Status: SHIPPED | OUTPATIENT
Start: 2020-01-01 | End: 2020-01-01

## 2020-01-01 RX ORDER — MORPHINE SULFATE 10 MG/5ML
5 SOLUTION ORAL
Status: DISCONTINUED | OUTPATIENT
Start: 2020-01-01 | End: 2020-01-01 | Stop reason: HOSPADM

## 2020-01-01 RX ORDER — SODIUM CHLORIDE 0.9 % (FLUSH) 0.9 %
10 SYRINGE (ML) INJECTION PRN
Status: DISCONTINUED | OUTPATIENT
Start: 2020-01-01 | End: 2020-01-01 | Stop reason: SDUPTHER

## 2020-01-01 RX ORDER — DEXTROSE MONOHYDRATE 25 G/50ML
25 INJECTION, SOLUTION INTRAVENOUS PRN
Status: DISCONTINUED | OUTPATIENT
Start: 2020-01-01 | End: 2020-01-01 | Stop reason: SDUPTHER

## 2020-01-01 RX ORDER — SENNA PLUS 8.6 MG/1
1 TABLET ORAL NIGHTLY
Status: DISCONTINUED | OUTPATIENT
Start: 2020-01-01 | End: 2020-01-01

## 2020-01-01 RX ORDER — ENALAPRIL MALEATE 10 MG/1
10 TABLET ORAL DAILY
Status: DISCONTINUED | OUTPATIENT
Start: 2020-01-01 | End: 2020-01-01

## 2020-01-01 RX ORDER — DEXTROSE MONOHYDRATE 25 G/50ML
25 INJECTION, SOLUTION INTRAVENOUS PRN
Status: DISCONTINUED | OUTPATIENT
Start: 2020-01-01 | End: 2020-01-01 | Stop reason: HOSPADM

## 2020-01-01 RX ORDER — PANTOPRAZOLE SODIUM 40 MG/1
40 TABLET, DELAYED RELEASE ORAL
Status: DISCONTINUED | OUTPATIENT
Start: 2020-01-01 | End: 2020-01-01

## 2020-01-01 RX ORDER — BISACODYL 10 MG
10 SUPPOSITORY, RECTAL RECTAL DAILY PRN
Status: DISCONTINUED | OUTPATIENT
Start: 2020-01-01 | End: 2020-01-01

## 2020-01-01 RX ORDER — PHENYLEPHRINE HCL IN 0.9% NACL 1 MG/10 ML
SYRINGE (ML) INTRAVENOUS PRN
Status: DISCONTINUED | OUTPATIENT
Start: 2020-01-01 | End: 2020-01-01 | Stop reason: SDUPTHER

## 2020-01-01 RX ORDER — DOCUSATE SODIUM 100 MG/1
100 CAPSULE, LIQUID FILLED ORAL DAILY
Status: DISCONTINUED | OUTPATIENT
Start: 2020-01-01 | End: 2020-01-01

## 2020-01-01 RX ORDER — DEXTROSE MONOHYDRATE 50 MG/ML
100 INJECTION, SOLUTION INTRAVENOUS PRN
Status: DISCONTINUED | OUTPATIENT
Start: 2020-01-01 | End: 2020-01-01 | Stop reason: SDUPTHER

## 2020-01-01 RX ORDER — PANTOPRAZOLE SODIUM 40 MG/10ML
40 INJECTION, POWDER, LYOPHILIZED, FOR SOLUTION INTRAVENOUS 2 TIMES DAILY
Status: DISCONTINUED | OUTPATIENT
Start: 2020-01-01 | End: 2020-01-01

## 2020-01-01 RX ORDER — GLYCOPYRROLATE 0.2 MG/ML
0.2 INJECTION INTRAMUSCULAR; INTRAVENOUS EVERY 4 HOURS PRN
Status: DISCONTINUED | OUTPATIENT
Start: 2020-01-01 | End: 2020-01-01 | Stop reason: HOSPADM

## 2020-01-01 RX ORDER — ETOMIDATE 2 MG/ML
INJECTION INTRAVENOUS
Status: COMPLETED
Start: 2020-01-01 | End: 2020-01-01

## 2020-01-01 RX ORDER — ENALAPRIL MALEATE AND HYDROCHLOROTHIAZIDE 10; 25 MG/1; MG/1
1 TABLET ORAL DAILY
Status: DISCONTINUED | OUTPATIENT
Start: 2020-01-01 | End: 2020-01-01 | Stop reason: ALTCHOICE

## 2020-01-01 RX ORDER — POTASSIUM CHLORIDE 20 MEQ/1
20 TABLET, EXTENDED RELEASE ORAL 2 TIMES DAILY WITH MEALS
Status: DISCONTINUED | OUTPATIENT
Start: 2020-01-01 | End: 2020-01-01

## 2020-01-01 RX ORDER — ONDANSETRON 2 MG/ML
4 INJECTION INTRAMUSCULAR; INTRAVENOUS ONCE
Status: COMPLETED | OUTPATIENT
Start: 2020-01-01 | End: 2020-01-01

## 2020-01-01 RX ORDER — MIDAZOLAM HYDROCHLORIDE 1 MG/ML
2 INJECTION INTRAMUSCULAR; INTRAVENOUS
Status: DISCONTINUED | OUTPATIENT
Start: 2020-01-01 | End: 2020-01-01 | Stop reason: HOSPADM

## 2020-01-01 RX ORDER — HYDROCHLOROTHIAZIDE 25 MG/1
25 TABLET ORAL DAILY
Status: DISCONTINUED | OUTPATIENT
Start: 2020-01-01 | End: 2020-01-01

## 2020-01-01 RX ORDER — IPRATROPIUM BROMIDE AND ALBUTEROL SULFATE 2.5; .5 MG/3ML; MG/3ML
1 SOLUTION RESPIRATORY (INHALATION)
Status: DISCONTINUED | OUTPATIENT
Start: 2020-01-01 | End: 2020-01-01

## 2020-01-01 RX ORDER — LIDOCAINE HYDROCHLORIDE 20 MG/ML
JELLY TOPICAL ONCE
Status: COMPLETED | OUTPATIENT
Start: 2020-01-01 | End: 2020-01-01

## 2020-01-01 RX ORDER — SUCRALFATE 1 G/1
1 TABLET ORAL EVERY 6 HOURS SCHEDULED
Status: DISCONTINUED | OUTPATIENT
Start: 2020-01-01 | End: 2020-01-01 | Stop reason: HOSPADM

## 2020-01-01 RX ORDER — 0.9 % SODIUM CHLORIDE 0.9 %
1000 INTRAVENOUS SOLUTION INTRAVENOUS
Status: ACTIVE | OUTPATIENT
Start: 2020-01-01 | End: 2020-01-01

## 2020-01-01 RX ORDER — 0.9 % SODIUM CHLORIDE 0.9 %
500 INTRAVENOUS SOLUTION INTRAVENOUS ONCE
Status: COMPLETED | OUTPATIENT
Start: 2020-01-01 | End: 2020-01-01

## 2020-01-01 RX ORDER — SODIUM CHLORIDE 9 MG/ML
INJECTION, SOLUTION INTRAVENOUS CONTINUOUS
Status: DISCONTINUED | OUTPATIENT
Start: 2020-01-01 | End: 2020-01-01 | Stop reason: HOSPADM

## 2020-01-01 RX ORDER — DEXTROSE AND SODIUM CHLORIDE 5; .45 G/100ML; G/100ML
INJECTION, SOLUTION INTRAVENOUS CONTINUOUS
Status: DISCONTINUED | OUTPATIENT
Start: 2020-01-01 | End: 2020-01-01

## 2020-01-01 RX ORDER — SODIUM CHLORIDE 9 MG/ML
10 INJECTION INTRAVENOUS 2 TIMES DAILY
Status: DISCONTINUED | OUTPATIENT
Start: 2020-01-01 | End: 2020-01-01

## 2020-01-01 RX ORDER — EPINEPHRINE 1 MG/ML
INJECTION, SOLUTION, CONCENTRATE INTRAVENOUS
Status: DISPENSED
Start: 2020-01-01 | End: 2020-01-01

## 2020-01-01 RX ORDER — ACETAMINOPHEN 325 MG/1
650 TABLET ORAL
Status: DISCONTINUED | OUTPATIENT
Start: 2020-01-01 | End: 2020-01-01

## 2020-01-01 RX ORDER — CALCIUM CARBONATE 200(500)MG
500 TABLET,CHEWABLE ORAL 3 TIMES DAILY PRN
Status: DISCONTINUED | OUTPATIENT
Start: 2020-01-01 | End: 2020-01-01 | Stop reason: HOSPADM

## 2020-01-01 RX ORDER — NICOTINE POLACRILEX 4 MG
15 LOZENGE BUCCAL PRN
Status: DISCONTINUED | OUTPATIENT
Start: 2020-01-01 | End: 2020-01-01 | Stop reason: HOSPADM

## 2020-01-01 RX ORDER — PANTOPRAZOLE SODIUM 40 MG/10ML
40 INJECTION, POWDER, LYOPHILIZED, FOR SOLUTION INTRAVENOUS DAILY
Status: DISCONTINUED | OUTPATIENT
Start: 2020-01-01 | End: 2020-01-01 | Stop reason: HOSPADM

## 2020-01-01 RX ORDER — POTASSIUM CHLORIDE 7.45 MG/ML
10 INJECTION INTRAVENOUS ONCE
Status: DISCONTINUED | OUTPATIENT
Start: 2020-01-01 | End: 2020-01-01 | Stop reason: SDUPTHER

## 2020-01-01 RX ORDER — MORPHINE SULFATE 2 MG/ML
2 INJECTION, SOLUTION INTRAMUSCULAR; INTRAVENOUS
Status: DISCONTINUED | OUTPATIENT
Start: 2020-01-01 | End: 2020-01-01

## 2020-01-01 RX ORDER — LIDOCAINE HYDROCHLORIDE 10 MG/ML
5 INJECTION, SOLUTION EPIDURAL; INFILTRATION; INTRACAUDAL; PERINEURAL ONCE
Status: COMPLETED | OUTPATIENT
Start: 2020-01-01 | End: 2020-01-01

## 2020-01-01 RX ORDER — SODIUM CHLORIDE 0.9 % (FLUSH) 0.9 %
10 SYRINGE (ML) INJECTION EVERY 12 HOURS SCHEDULED
Status: DISCONTINUED | OUTPATIENT
Start: 2020-01-01 | End: 2020-01-01 | Stop reason: SDUPTHER

## 2020-01-01 RX ORDER — POTASSIUM CHLORIDE 29.8 MG/ML
20 INJECTION INTRAVENOUS
Status: DISCONTINUED | OUTPATIENT
Start: 2020-01-01 | End: 2020-01-01 | Stop reason: SDUPTHER

## 2020-01-01 RX ORDER — ANTICOAGULANT SODIUM CITRATE SOLUTION 4 G/100ML
3 SOLUTION INTRAVENOUS
Status: ACTIVE | OUTPATIENT
Start: 2020-01-01 | End: 2020-01-01

## 2020-01-01 RX ORDER — ONDANSETRON 2 MG/ML
4 INJECTION INTRAMUSCULAR; INTRAVENOUS EVERY 6 HOURS PRN
Status: DISCONTINUED | OUTPATIENT
Start: 2020-01-01 | End: 2020-01-01 | Stop reason: ALTCHOICE

## 2020-01-01 RX ORDER — OXYCODONE HYDROCHLORIDE 10 MG/1
10 TABLET ORAL EVERY 4 HOURS PRN
Status: DISCONTINUED | OUTPATIENT
Start: 2020-01-01 | End: 2020-01-01

## 2020-01-01 RX ORDER — POLYETHYLENE GLYCOL 3350 17 G/17G
17 POWDER, FOR SOLUTION ORAL DAILY
Status: DISCONTINUED | OUTPATIENT
Start: 2020-01-01 | End: 2020-01-01

## 2020-01-01 RX ORDER — POLYVINYL ALCOHOL 14 MG/ML
1 SOLUTION/ DROPS OPHTHALMIC EVERY 4 HOURS
Status: DISCONTINUED | OUTPATIENT
Start: 2020-01-01 | End: 2020-01-01 | Stop reason: HOSPADM

## 2020-01-01 RX ORDER — POLYETHYLENE GLYCOL 3350 17 G/17G
17 POWDER, FOR SOLUTION ORAL DAILY PRN
Status: DISCONTINUED | OUTPATIENT
Start: 2020-01-01 | End: 2020-01-01

## 2020-01-01 RX ORDER — SUCRALFATE 1 G/1
1 TABLET ORAL 4 TIMES DAILY
Status: DISCONTINUED | OUTPATIENT
Start: 2020-01-01 | End: 2020-01-01 | Stop reason: HOSPADM

## 2020-01-01 RX ORDER — DEXTROSE, SODIUM CHLORIDE, AND POTASSIUM CHLORIDE 5; .45; .3 G/100ML; G/100ML; G/100ML
INJECTION INTRAVENOUS CONTINUOUS
Status: DISCONTINUED | OUTPATIENT
Start: 2020-01-01 | End: 2020-01-01

## 2020-01-01 RX ORDER — METHOCARBAMOL 500 MG/1
500 TABLET, FILM COATED ORAL 4 TIMES DAILY
Status: DISCONTINUED | OUTPATIENT
Start: 2020-01-01 | End: 2020-01-01 | Stop reason: HOSPADM

## 2020-01-01 RX ORDER — IRON POLYSACCHARIDE COMPLEX 150 MG
1 CAPSULE ORAL DAILY
Status: DISCONTINUED | OUTPATIENT
Start: 2020-01-01 | End: 2020-01-01

## 2020-01-01 RX ORDER — POTASSIUM CHLORIDE 7.45 MG/ML
10 INJECTION INTRAVENOUS
Status: DISCONTINUED | OUTPATIENT
Start: 2020-01-01 | End: 2020-01-01 | Stop reason: CLARIF

## 2020-01-01 RX ORDER — SODIUM CHLORIDE FOR INHALATION 3 %
4 VIAL, NEBULIZER (ML) INHALATION PRN
Status: DISCONTINUED | OUTPATIENT
Start: 2020-01-01 | End: 2020-01-01

## 2020-01-01 RX ORDER — ENALAPRIL MALEATE AND HYDROCHLOROTHIAZIDE 10; 25 MG/1; MG/1
1 TABLET ORAL DAILY
COMMUNITY

## 2020-01-01 RX ORDER — SODIUM CHLORIDE 9 MG/ML
INJECTION, SOLUTION INTRAVENOUS ONCE
Status: COMPLETED | OUTPATIENT
Start: 2020-01-01 | End: 2020-01-01

## 2020-01-01 RX ORDER — LIDOCAINE HYDROCHLORIDE 20 MG/ML
JELLY TOPICAL ONCE
Status: DISCONTINUED | OUTPATIENT
Start: 2020-01-01 | End: 2020-01-01

## 2020-01-01 RX ORDER — MORPHINE SULFATE 4 MG/ML
4 INJECTION, SOLUTION INTRAMUSCULAR; INTRAVENOUS
Status: DISCONTINUED | OUTPATIENT
Start: 2020-01-01 | End: 2020-01-01 | Stop reason: HOSPADM

## 2020-01-01 RX ORDER — FENTANYL CITRATE 50 UG/ML
25 INJECTION, SOLUTION INTRAMUSCULAR; INTRAVENOUS ONCE
Status: COMPLETED | OUTPATIENT
Start: 2020-01-01 | End: 2020-01-01

## 2020-01-01 RX ORDER — ONDANSETRON 4 MG/1
4 TABLET, ORALLY DISINTEGRATING ORAL EVERY 8 HOURS PRN
Status: DISCONTINUED | OUTPATIENT
Start: 2020-01-01 | End: 2020-01-01 | Stop reason: HOSPADM

## 2020-01-01 RX ORDER — CLOPIDOGREL BISULFATE 75 MG/1
75 TABLET ORAL DAILY
Status: DISCONTINUED | OUTPATIENT
Start: 2020-01-01 | End: 2020-01-01

## 2020-01-01 RX ORDER — POTASSIUM CHLORIDE 7.45 MG/ML
10 INJECTION INTRAVENOUS
Status: DISPENSED | OUTPATIENT
Start: 2020-01-01 | End: 2020-01-01

## 2020-01-01 RX ORDER — ACETAMINOPHEN 325 MG/1
650 TABLET ORAL EVERY 4 HOURS PRN
Status: DISCONTINUED | OUTPATIENT
Start: 2020-01-01 | End: 2020-01-01 | Stop reason: HOSPADM

## 2020-01-01 RX ORDER — SCOLOPAMINE TRANSDERMAL SYSTEM 1 MG/1
1 PATCH, EXTENDED RELEASE TRANSDERMAL
Status: DISCONTINUED | OUTPATIENT
Start: 2020-01-01 | End: 2020-01-01 | Stop reason: HOSPADM

## 2020-01-01 RX ORDER — SODIUM CHLORIDE 9 MG/ML
10 INJECTION INTRAVENOUS DAILY
Status: DISCONTINUED | OUTPATIENT
Start: 2020-01-01 | End: 2020-01-01 | Stop reason: HOSPADM

## 2020-01-01 RX ORDER — SPIRONOLACTONE 25 MG/1
25 TABLET ORAL DAILY
Status: DISCONTINUED | OUTPATIENT
Start: 2020-01-01 | End: 2020-01-01

## 2020-01-01 RX ORDER — ONDANSETRON 2 MG/ML
4 INJECTION INTRAMUSCULAR; INTRAVENOUS EVERY 6 HOURS PRN
Status: DISCONTINUED | OUTPATIENT
Start: 2020-01-01 | End: 2020-01-01 | Stop reason: HOSPADM

## 2020-01-01 RX ORDER — ASPIRIN 81 MG/1
81 TABLET ORAL DAILY
COMMUNITY

## 2020-01-01 RX ORDER — PROCHLORPERAZINE EDISYLATE 5 MG/ML
10 INJECTION INTRAMUSCULAR; INTRAVENOUS EVERY 6 HOURS PRN
Status: DISCONTINUED | OUTPATIENT
Start: 2020-01-01 | End: 2020-01-01 | Stop reason: HOSPADM

## 2020-01-01 RX ORDER — FENTANYL CITRATE 50 UG/ML
INJECTION, SOLUTION INTRAMUSCULAR; INTRAVENOUS PRN
Status: DISCONTINUED | OUTPATIENT
Start: 2020-01-01 | End: 2020-01-01 | Stop reason: SDUPTHER

## 2020-01-01 RX ORDER — DEXTROSE MONOHYDRATE 25 G/50ML
INJECTION, SOLUTION INTRAVENOUS
Status: COMPLETED
Start: 2020-01-01 | End: 2020-01-01

## 2020-01-01 RX ORDER — SUCCINYLCHOLINE/SOD CL,ISO/PF 200MG/10ML
SYRINGE (ML) INTRAVENOUS PRN
Status: DISCONTINUED | OUTPATIENT
Start: 2020-01-01 | End: 2020-01-01 | Stop reason: SDUPTHER

## 2020-01-01 RX ORDER — SODIUM CHLORIDE 9 MG/ML
INJECTION, SOLUTION INTRAVENOUS ONCE
Status: DISCONTINUED | OUTPATIENT
Start: 2020-01-01 | End: 2020-01-01

## 2020-01-01 RX ORDER — SODIUM CHLORIDE 0.9 % (FLUSH) 0.9 %
10 SYRINGE (ML) INJECTION
Status: ACTIVE | OUTPATIENT
Start: 2020-01-01 | End: 2020-01-01

## 2020-01-01 RX ORDER — MORPHINE SULFATE 2 MG/ML
0.5 INJECTION, SOLUTION INTRAMUSCULAR; INTRAVENOUS ONCE
Status: COMPLETED | OUTPATIENT
Start: 2020-01-01 | End: 2020-01-01

## 2020-01-01 RX ORDER — METOCLOPRAMIDE HYDROCHLORIDE 5 MG/ML
10 INJECTION INTRAMUSCULAR; INTRAVENOUS EVERY 6 HOURS
Status: DISCONTINUED | OUTPATIENT
Start: 2020-01-01 | End: 2020-01-01

## 2020-01-01 RX ORDER — MORPHINE SULFATE 4 MG/ML
4 INJECTION, SOLUTION INTRAMUSCULAR; INTRAVENOUS
Status: DISCONTINUED | OUTPATIENT
Start: 2020-01-01 | End: 2020-01-01

## 2020-01-01 RX ADMIN — VASOPRESSIN 0.04 UNITS/MIN: 20 INJECTION INTRAVENOUS at 01:48

## 2020-01-01 RX ADMIN — ACETAMINOPHEN 650 MG: 325 TABLET, FILM COATED ORAL at 06:45

## 2020-01-01 RX ADMIN — SODIUM CHLORIDE 20 ML: 9 INJECTION, SOLUTION INTRAVENOUS at 13:14

## 2020-01-01 RX ADMIN — IPRATROPIUM BROMIDE AND ALBUTEROL SULFATE 1 AMPULE: 2.5; .5 SOLUTION RESPIRATORY (INHALATION) at 12:03

## 2020-01-01 RX ADMIN — SUCRALFATE 1 G: 1 TABLET ORAL at 12:25

## 2020-01-01 RX ADMIN — SUCRALFATE 1 G: 1 TABLET ORAL at 20:43

## 2020-01-01 RX ADMIN — SPIRONOLACTONE 25 MG: 25 TABLET ORAL at 08:55

## 2020-01-01 RX ADMIN — Medication 10 ML: at 08:52

## 2020-01-01 RX ADMIN — I.V. FAT EMULSION 250 ML: 20 EMULSION INTRAVENOUS at 12:13

## 2020-01-01 RX ADMIN — HEPARIN SODIUM 5000 UNITS: 10000 INJECTION INTRAVENOUS; SUBCUTANEOUS at 21:44

## 2020-01-01 RX ADMIN — Medication: at 23:46

## 2020-01-01 RX ADMIN — SODIUM CHLORIDE: 4.5 INJECTION, SOLUTION INTRAVENOUS at 00:12

## 2020-01-01 RX ADMIN — POTASSIUM CHLORIDE 10 MEQ: 10 INJECTION, SOLUTION INTRAVENOUS at 08:09

## 2020-01-01 RX ADMIN — SUCRALFATE 1 G: 1 TABLET ORAL at 05:43

## 2020-01-01 RX ADMIN — POTASSIUM CHLORIDE 20 MEQ: 20 TABLET, EXTENDED RELEASE ORAL at 09:44

## 2020-01-01 RX ADMIN — SODIUM CHLORIDE: 9 INJECTION, SOLUTION INTRAVENOUS at 22:05

## 2020-01-01 RX ADMIN — METRONIDAZOLE 500 MG: 500 INJECTION, SOLUTION INTRAVENOUS at 10:27

## 2020-01-01 RX ADMIN — ALBUMIN (HUMAN) 25 G: 0.25 INJECTION, SOLUTION INTRAVENOUS at 15:14

## 2020-01-01 RX ADMIN — SUCRALFATE 1 G: 1 TABLET ORAL at 12:02

## 2020-01-01 RX ADMIN — SUCRALFATE 1 G: 1 TABLET ORAL at 17:06

## 2020-01-01 RX ADMIN — VASOPRESSIN 0.04 UNITS/MIN: 20 INJECTION INTRAVENOUS at 10:50

## 2020-01-01 RX ADMIN — SODIUM CHLORIDE, PRESERVATIVE FREE 10 ML: 5 INJECTION INTRAVENOUS at 10:03

## 2020-01-01 RX ADMIN — HEPARIN SODIUM 5000 UNITS: 10000 INJECTION INTRAVENOUS; SUBCUTANEOUS at 14:41

## 2020-01-01 RX ADMIN — GLYCOPYRROLATE 0.2 MG: 0.2 INJECTION, SOLUTION INTRAMUSCULAR; INTRAVENOUS at 15:19

## 2020-01-01 RX ADMIN — SODIUM CHLORIDE, POTASSIUM CHLORIDE, SODIUM LACTATE AND CALCIUM CHLORIDE 1000 ML: 600; 310; 30; 20 INJECTION, SOLUTION INTRAVENOUS at 15:14

## 2020-01-01 RX ADMIN — POTASSIUM CHLORIDE 20 MEQ: 400 INJECTION, SOLUTION INTRAVENOUS at 16:01

## 2020-01-01 RX ADMIN — Medication 30 MCG/MIN: at 22:16

## 2020-01-01 RX ADMIN — FERROUS SULFATE TAB 325 MG (65 MG ELEMENTAL FE) 325 MG: 325 (65 FE) TAB at 08:29

## 2020-01-01 RX ADMIN — SODIUM CHLORIDE, PRESERVATIVE FREE 10 ML: 5 INJECTION INTRAVENOUS at 17:27

## 2020-01-01 RX ADMIN — Medication 10 ML: at 20:35

## 2020-01-01 RX ADMIN — VASOPRESSIN 0.04 UNITS/MIN: 20 INJECTION INTRAVENOUS at 18:12

## 2020-01-01 RX ADMIN — NIFEDIPINE 30 MG: 30 TABLET, FILM COATED, EXTENDED RELEASE ORAL at 10:31

## 2020-01-01 RX ADMIN — SUCRALFATE 1 G: 1 TABLET ORAL at 14:00

## 2020-01-01 RX ADMIN — NIFEDIPINE 30 MG: 30 TABLET, FILM COATED, EXTENDED RELEASE ORAL at 08:55

## 2020-01-01 RX ADMIN — HEPARIN SODIUM 5000 UNITS: 10000 INJECTION INTRAVENOUS; SUBCUTANEOUS at 15:56

## 2020-01-01 RX ADMIN — INSULIN LISPRO 2 UNITS: 100 INJECTION, SOLUTION INTRAVENOUS; SUBCUTANEOUS at 08:23

## 2020-01-01 RX ADMIN — ACETAMINOPHEN 650 MG: 325 TABLET, FILM COATED ORAL at 10:37

## 2020-01-01 RX ADMIN — ASPIRIN 81 MG: 81 TABLET, COATED ORAL at 10:24

## 2020-01-01 RX ADMIN — METOCLOPRAMIDE HYDROCHLORIDE 5 MG: 5 INJECTION INTRAMUSCULAR; INTRAVENOUS at 06:08

## 2020-01-01 RX ADMIN — POLYVINYL ALCOHOL 1 DROP: 14 SOLUTION/ DROPS OPHTHALMIC at 00:50

## 2020-01-01 RX ADMIN — TRIMETHOBENZAMIDE HYDROCHLORIDE 200 MG: 100 INJECTION INTRAMUSCULAR at 18:20

## 2020-01-01 RX ADMIN — Medication 10 ML: at 21:03

## 2020-01-01 RX ADMIN — SODIUM CHLORIDE, PRESERVATIVE FREE 10 ML: 5 INJECTION INTRAVENOUS at 01:39

## 2020-01-01 RX ADMIN — METHOCARBAMOL TABLETS 500 MG: 500 TABLET, COATED ORAL at 10:01

## 2020-01-01 RX ADMIN — PANTOPRAZOLE SODIUM 40 MG: 40 TABLET, DELAYED RELEASE ORAL at 16:25

## 2020-01-01 RX ADMIN — MAGNESIUM SULFATE HEPTAHYDRATE 1 G: 1 INJECTION, SOLUTION INTRAVENOUS at 13:11

## 2020-01-01 RX ADMIN — METOCLOPRAMIDE HYDROCHLORIDE 10 MG: 5 INJECTION INTRAMUSCULAR; INTRAVENOUS at 13:46

## 2020-01-01 RX ADMIN — ASPIRIN 81 MG: 81 TABLET, COATED ORAL at 09:45

## 2020-01-01 RX ADMIN — METOCLOPRAMIDE HYDROCHLORIDE 5 MG: 5 INJECTION INTRAMUSCULAR; INTRAVENOUS at 00:17

## 2020-01-01 RX ADMIN — PHENYLEPHRINE HYDROCHLORIDE 100 MCG: 10 INJECTION INTRAVENOUS at 07:44

## 2020-01-01 RX ADMIN — METHOCARBAMOL TABLETS 500 MG: 500 TABLET, COATED ORAL at 17:26

## 2020-01-01 RX ADMIN — POLYVINYL ALCOHOL 1 DROP: 14 SOLUTION/ DROPS OPHTHALMIC at 08:10

## 2020-01-01 RX ADMIN — METRONIDAZOLE 500 MG: 500 INJECTION, SOLUTION INTRAVENOUS at 02:02

## 2020-01-01 RX ADMIN — METRONIDAZOLE 500 MG: 500 INJECTION, SOLUTION INTRAVENOUS at 01:58

## 2020-01-01 RX ADMIN — Medication 10 ML: at 08:07

## 2020-01-01 RX ADMIN — SODIUM CHLORIDE, POTASSIUM CHLORIDE, SODIUM LACTATE AND CALCIUM CHLORIDE: 600; 310; 30; 20 INJECTION, SOLUTION INTRAVENOUS at 07:05

## 2020-01-01 RX ADMIN — SODIUM CHLORIDE, PRESERVATIVE FREE 10 ML: 5 INJECTION INTRAVENOUS at 20:24

## 2020-01-01 RX ADMIN — MINERAL OIL AND PETROLATUM: 150; 830 OINTMENT OPHTHALMIC at 02:23

## 2020-01-01 RX ADMIN — ASPIRIN 81 MG: 81 TABLET, COATED ORAL at 09:11

## 2020-01-01 RX ADMIN — Medication 10 ML: at 21:44

## 2020-01-01 RX ADMIN — SODIUM CHLORIDE, POTASSIUM CHLORIDE, SODIUM LACTATE AND CALCIUM CHLORIDE: 600; 310; 30; 20 INJECTION, SOLUTION INTRAVENOUS at 12:49

## 2020-01-01 RX ADMIN — DEXTROSE AND SODIUM CHLORIDE: 5; 450 INJECTION, SOLUTION INTRAVENOUS at 10:12

## 2020-01-01 RX ADMIN — SODIUM CHLORIDE, POTASSIUM CHLORIDE, SODIUM LACTATE AND CALCIUM CHLORIDE 500 ML: 600; 310; 30; 20 INJECTION, SOLUTION INTRAVENOUS at 06:36

## 2020-01-01 RX ADMIN — Medication 10 ML: at 08:45

## 2020-01-01 RX ADMIN — METOCLOPRAMIDE HYDROCHLORIDE 10 MG: 5 INJECTION INTRAMUSCULAR; INTRAVENOUS at 20:09

## 2020-01-01 RX ADMIN — HYDROCHLOROTHIAZIDE 25 MG: 25 TABLET ORAL at 08:35

## 2020-01-01 RX ADMIN — POLYVINYL ALCOHOL 1 DROP: 14 SOLUTION/ DROPS OPHTHALMIC at 13:06

## 2020-01-01 RX ADMIN — ASPIRIN 81 MG: 81 TABLET, COATED ORAL at 10:27

## 2020-01-01 RX ADMIN — PHENYLEPHRINE HYDROCHLORIDE 100 MCG: 10 INJECTION INTRAVENOUS at 07:23

## 2020-01-01 RX ADMIN — ALBUMIN (HUMAN) 25 G: 0.25 INJECTION, SOLUTION INTRAVENOUS at 08:10

## 2020-01-01 RX ADMIN — PANTOPRAZOLE SODIUM 40 MG: 40 INJECTION, POWDER, FOR SOLUTION INTRAVENOUS at 08:18

## 2020-01-01 RX ADMIN — MINERAL OIL AND PETROLATUM: 150; 830 OINTMENT OPHTHALMIC at 01:42

## 2020-01-01 RX ADMIN — METOCLOPRAMIDE HYDROCHLORIDE 10 MG: 5 INJECTION INTRAMUSCULAR; INTRAVENOUS at 01:29

## 2020-01-01 RX ADMIN — METOCLOPRAMIDE HYDROCHLORIDE 10 MG: 5 INJECTION INTRAMUSCULAR; INTRAVENOUS at 06:51

## 2020-01-01 RX ADMIN — HEPARIN SODIUM 5000 UNITS: 10000 INJECTION INTRAVENOUS; SUBCUTANEOUS at 14:06

## 2020-01-01 RX ADMIN — METHOCARBAMOL TABLETS 500 MG: 500 TABLET, COATED ORAL at 13:19

## 2020-01-01 RX ADMIN — METHOCARBAMOL TABLETS 500 MG: 500 TABLET, COATED ORAL at 20:02

## 2020-01-01 RX ADMIN — METOCLOPRAMIDE HYDROCHLORIDE 5 MG: 5 INJECTION INTRAMUSCULAR; INTRAVENOUS at 05:59

## 2020-01-01 RX ADMIN — SODIUM CHLORIDE, POTASSIUM CHLORIDE, SODIUM LACTATE AND CALCIUM CHLORIDE 1000 ML: 600; 310; 30; 20 INJECTION, SOLUTION INTRAVENOUS at 08:23

## 2020-01-01 RX ADMIN — METOCLOPRAMIDE HYDROCHLORIDE 5 MG: 5 INJECTION INTRAMUSCULAR; INTRAVENOUS at 01:00

## 2020-01-01 RX ADMIN — METOCLOPRAMIDE HYDROCHLORIDE 5 MG: 5 INJECTION INTRAMUSCULAR; INTRAVENOUS at 05:49

## 2020-01-01 RX ADMIN — METOCLOPRAMIDE HYDROCHLORIDE 5 MG: 5 INJECTION INTRAMUSCULAR; INTRAVENOUS at 12:44

## 2020-01-01 RX ADMIN — Medication 0.6 MG: at 09:04

## 2020-01-01 RX ADMIN — PANTOPRAZOLE SODIUM 40 MG: 40 INJECTION, POWDER, FOR SOLUTION INTRAVENOUS at 10:05

## 2020-01-01 RX ADMIN — CEFTRIAXONE 2 G: 2 INJECTION, POWDER, FOR SOLUTION INTRAMUSCULAR; INTRAVENOUS at 02:53

## 2020-01-01 RX ADMIN — INSULIN LISPRO 2 UNITS: 100 INJECTION, SOLUTION INTRAVENOUS; SUBCUTANEOUS at 12:21

## 2020-01-01 RX ADMIN — SUCRALFATE 1 G: 1 TABLET ORAL at 16:25

## 2020-01-01 RX ADMIN — TORSEMIDE 20 MG: 20 TABLET ORAL at 12:37

## 2020-01-01 RX ADMIN — ACETAMINOPHEN 650 MG: 325 TABLET, FILM COATED ORAL at 10:24

## 2020-01-01 RX ADMIN — SODIUM CHLORIDE, POTASSIUM CHLORIDE, SODIUM LACTATE AND CALCIUM CHLORIDE: 600; 310; 30; 20 INJECTION, SOLUTION INTRAVENOUS at 14:12

## 2020-01-01 RX ADMIN — CHLORHEXIDINE GLUCONATE 15 ML: 1.2 RINSE ORAL at 21:21

## 2020-01-01 RX ADMIN — Medication 10 ML: at 07:39

## 2020-01-01 RX ADMIN — Medication 10 ML: at 09:24

## 2020-01-01 RX ADMIN — CEFTRIAXONE 1 G: 1 INJECTION, POWDER, FOR SOLUTION INTRAMUSCULAR; INTRAVENOUS at 22:18

## 2020-01-01 RX ADMIN — SODIUM CHLORIDE 1000 ML: 9 INJECTION, SOLUTION INTRAVENOUS at 15:21

## 2020-01-01 RX ADMIN — Medication 10 ML: at 22:40

## 2020-01-01 RX ADMIN — ROCURONIUM BROMIDE 25 MG: 10 INJECTION, SOLUTION INTRAVENOUS at 07:25

## 2020-01-01 RX ADMIN — PANTOPRAZOLE SODIUM 40 MG: 40 INJECTION, POWDER, FOR SOLUTION INTRAVENOUS at 20:02

## 2020-01-01 RX ADMIN — MORPHINE SULFATE 4 MG: 4 INJECTION, SOLUTION INTRAMUSCULAR; INTRAVENOUS at 15:18

## 2020-01-01 RX ADMIN — HEPARIN SODIUM 5000 UNITS: 10000 INJECTION INTRAVENOUS; SUBCUTANEOUS at 06:14

## 2020-01-01 RX ADMIN — SODIUM CHLORIDE, PRESERVATIVE FREE 10 ML: 5 INJECTION INTRAVENOUS at 21:52

## 2020-01-01 RX ADMIN — CALCIUM GLUCONATE 1 G: 98 INJECTION, SOLUTION INTRAVENOUS at 16:30

## 2020-01-01 RX ADMIN — METOCLOPRAMIDE HYDROCHLORIDE 10 MG: 5 INJECTION INTRAMUSCULAR; INTRAVENOUS at 06:39

## 2020-01-01 RX ADMIN — ENALAPRIL MALEATE 10 MG: 10 TABLET ORAL at 08:29

## 2020-01-01 RX ADMIN — HEPARIN SODIUM 5000 UNITS: 10000 INJECTION INTRAVENOUS; SUBCUTANEOUS at 21:36

## 2020-01-01 RX ADMIN — SODIUM CHLORIDE: 9 INJECTION, SOLUTION INTRAVENOUS at 09:09

## 2020-01-01 RX ADMIN — Medication: at 10:00

## 2020-01-01 RX ADMIN — METOCLOPRAMIDE HYDROCHLORIDE 5 MG: 5 INJECTION INTRAMUSCULAR; INTRAVENOUS at 23:53

## 2020-01-01 RX ADMIN — INSULIN LISPRO 2 UNITS: 100 INJECTION, SOLUTION INTRAVENOUS; SUBCUTANEOUS at 04:37

## 2020-01-01 RX ADMIN — PANTOPRAZOLE SODIUM 40 MG: 40 INJECTION, POWDER, FOR SOLUTION INTRAVENOUS at 20:16

## 2020-01-01 RX ADMIN — SUCRALFATE 1 G: 1 TABLET ORAL at 23:23

## 2020-01-01 RX ADMIN — METHOCARBAMOL TABLETS 500 MG: 500 TABLET, COATED ORAL at 13:03

## 2020-01-01 RX ADMIN — SODIUM CHLORIDE: 9 INJECTION, SOLUTION INTRAVENOUS at 09:54

## 2020-01-01 RX ADMIN — SODIUM CHLORIDE, POTASSIUM CHLORIDE, SODIUM LACTATE AND CALCIUM CHLORIDE 1000 ML: 600; 310; 30; 20 INJECTION, SOLUTION INTRAVENOUS at 01:44

## 2020-01-01 RX ADMIN — CLOPIDOGREL 75 MG: 75 TABLET, FILM COATED ORAL at 10:30

## 2020-01-01 RX ADMIN — HEPARIN SODIUM 5000 UNITS: 10000 INJECTION INTRAVENOUS; SUBCUTANEOUS at 05:22

## 2020-01-01 RX ADMIN — SUCRALFATE 1 G: 1 TABLET ORAL at 06:12

## 2020-01-01 RX ADMIN — SODIUM CHLORIDE: 9 INJECTION, SOLUTION INTRAVENOUS at 07:05

## 2020-01-01 RX ADMIN — ENOXAPARIN SODIUM 40 MG: 40 INJECTION SUBCUTANEOUS at 09:43

## 2020-01-01 RX ADMIN — METHOCARBAMOL TABLETS 500 MG: 500 TABLET, COATED ORAL at 10:26

## 2020-01-01 RX ADMIN — POLYVINYL ALCOHOL 1 DROP: 14 SOLUTION/ DROPS OPHTHALMIC at 05:00

## 2020-01-01 RX ADMIN — Medication 10 ML: at 21:40

## 2020-01-01 RX ADMIN — NIFEDIPINE 30 MG: 30 TABLET, FILM COATED, EXTENDED RELEASE ORAL at 09:45

## 2020-01-01 RX ADMIN — Medication 3 MG: at 09:04

## 2020-01-01 RX ADMIN — FERROUS SULFATE TAB 325 MG (65 MG ELEMENTAL FE) 325 MG: 325 (65 FE) TAB at 08:35

## 2020-01-01 RX ADMIN — ONDANSETRON 4 MG: 2 INJECTION INTRAMUSCULAR; INTRAVENOUS at 22:25

## 2020-01-01 RX ADMIN — DEXTROSE MONOHYDRATE 50 ML: 25 INJECTION, SOLUTION INTRAVENOUS at 08:30

## 2020-01-01 RX ADMIN — SUCRALFATE 1 G: 1 TABLET ORAL at 20:46

## 2020-01-01 RX ADMIN — SUCRALFATE 1 G: 1 TABLET ORAL at 05:18

## 2020-01-01 RX ADMIN — METOCLOPRAMIDE HYDROCHLORIDE 10 MG: 5 INJECTION INTRAMUSCULAR; INTRAVENOUS at 21:03

## 2020-01-01 RX ADMIN — METOCLOPRAMIDE HYDROCHLORIDE 10 MG: 5 INJECTION INTRAMUSCULAR; INTRAVENOUS at 06:45

## 2020-01-01 RX ADMIN — METHOCARBAMOL TABLETS 500 MG: 500 TABLET, COATED ORAL at 10:05

## 2020-01-01 RX ADMIN — MAGNESIUM SULFATE HEPTAHYDRATE 1 G: 1 INJECTION, SOLUTION INTRAVENOUS at 09:03

## 2020-01-01 RX ADMIN — METHOCARBAMOL TABLETS 500 MG: 500 TABLET, COATED ORAL at 22:03

## 2020-01-01 RX ADMIN — PANTOPRAZOLE SODIUM 40 MG: 40 INJECTION, POWDER, FOR SOLUTION INTRAVENOUS at 21:52

## 2020-01-01 RX ADMIN — MORPHINE SULFATE 2 MG: 2 INJECTION, SOLUTION INTRAMUSCULAR; INTRAVENOUS at 02:47

## 2020-01-01 RX ADMIN — SODIUM CHLORIDE, PRESERVATIVE FREE 10 ML: 5 INJECTION INTRAVENOUS at 16:41

## 2020-01-01 RX ADMIN — HEPARIN SODIUM 5000 UNITS: 10000 INJECTION INTRAVENOUS; SUBCUTANEOUS at 20:33

## 2020-01-01 RX ADMIN — SODIUM CHLORIDE: 9 INJECTION, SOLUTION INTRAVENOUS at 18:00

## 2020-01-01 RX ADMIN — MINERAL OIL AND PETROLATUM: 150; 830 OINTMENT OPHTHALMIC at 05:44

## 2020-01-01 RX ADMIN — HEPARIN SODIUM 5000 UNITS: 10000 INJECTION, SOLUTION INTRAVENOUS; SUBCUTANEOUS at 14:08

## 2020-01-01 RX ADMIN — EPINEPHRINE: 1 INJECTION, SOLUTION INTRAMUSCULAR; SUBCUTANEOUS at 13:53

## 2020-01-01 RX ADMIN — Medication 10 ML: at 10:28

## 2020-01-01 RX ADMIN — SUCRALFATE 1 G: 1 TABLET ORAL at 21:34

## 2020-01-01 RX ADMIN — HEPARIN SODIUM 5000 UNITS: 10000 INJECTION INTRAVENOUS; SUBCUTANEOUS at 20:28

## 2020-01-01 RX ADMIN — PANTOPRAZOLE SODIUM 40 MG: 40 INJECTION, POWDER, FOR SOLUTION INTRAVENOUS at 20:08

## 2020-01-01 RX ADMIN — SODIUM CHLORIDE, POTASSIUM CHLORIDE, SODIUM LACTATE AND CALCIUM CHLORIDE 1000 ML: 600; 310; 30; 20 INJECTION, SOLUTION INTRAVENOUS at 05:30

## 2020-01-01 RX ADMIN — SUCRALFATE 1 G: 1 TABLET ORAL at 16:51

## 2020-01-01 RX ADMIN — MINERAL OIL AND PETROLATUM: 150; 830 OINTMENT OPHTHALMIC at 13:56

## 2020-01-01 RX ADMIN — POTASSIUM CHLORIDE 20 MEQ: 20 TABLET, EXTENDED RELEASE ORAL at 08:55

## 2020-01-01 RX ADMIN — Medication 10 ML: at 21:20

## 2020-01-01 RX ADMIN — SODIUM BICARBONATE 100 MEQ: 84 INJECTION, SOLUTION INTRAVENOUS at 01:42

## 2020-01-01 RX ADMIN — METOCLOPRAMIDE HYDROCHLORIDE 5 MG: 5 INJECTION INTRAMUSCULAR; INTRAVENOUS at 11:46

## 2020-01-01 RX ADMIN — SUCRALFATE 1 G: 1 TABLET ORAL at 16:37

## 2020-01-01 RX ADMIN — TORSEMIDE 20 MG: 20 TABLET ORAL at 08:10

## 2020-01-01 RX ADMIN — Medication 10 ML: at 08:47

## 2020-01-01 RX ADMIN — METHOCARBAMOL TABLETS 500 MG: 500 TABLET, COATED ORAL at 22:05

## 2020-01-01 RX ADMIN — NIFEDIPINE 30 MG: 30 TABLET, FILM COATED, EXTENDED RELEASE ORAL at 10:24

## 2020-01-01 RX ADMIN — CLOPIDOGREL 75 MG: 75 TABLET, FILM COATED ORAL at 10:27

## 2020-01-01 RX ADMIN — POLYETHYLENE GLYCOL 3350 17 G: 17 POWDER, FOR SOLUTION ORAL at 08:09

## 2020-01-01 RX ADMIN — SODIUM CHLORIDE 500 ML: 9 INJECTION, SOLUTION INTRAVENOUS at 10:38

## 2020-01-01 RX ADMIN — SODIUM CHLORIDE, POTASSIUM CHLORIDE, SODIUM LACTATE AND CALCIUM CHLORIDE 1000 ML: 600; 310; 30; 20 INJECTION, SOLUTION INTRAVENOUS at 06:10

## 2020-01-01 RX ADMIN — POLYVINYL ALCOHOL 1 DROP: 14 SOLUTION/ DROPS OPHTHALMIC at 04:24

## 2020-01-01 RX ADMIN — SODIUM CHLORIDE: 9 INJECTION, SOLUTION INTRAVENOUS at 02:50

## 2020-01-01 RX ADMIN — ACETAMINOPHEN 650 MG: 325 TABLET, FILM COATED ORAL at 20:33

## 2020-01-01 RX ADMIN — SODIUM CHLORIDE, PRESERVATIVE FREE 10 ML: 5 INJECTION INTRAVENOUS at 06:52

## 2020-01-01 RX ADMIN — CLOPIDOGREL 75 MG: 75 TABLET, FILM COATED ORAL at 09:25

## 2020-01-01 RX ADMIN — BISACODYL 10 MG: 10 SUPPOSITORY RECTAL at 18:20

## 2020-01-01 RX ADMIN — Medication 30 MCG/MIN: at 15:42

## 2020-01-01 RX ADMIN — METOCLOPRAMIDE HYDROCHLORIDE 5 MG: 5 INJECTION INTRAMUSCULAR; INTRAVENOUS at 23:23

## 2020-01-01 RX ADMIN — SUCRALFATE 1 G: 1 TABLET ORAL at 08:55

## 2020-01-01 RX ADMIN — POTASSIUM CHLORIDE 20 MEQ: 400 INJECTION, SOLUTION INTRAVENOUS at 13:47

## 2020-01-01 RX ADMIN — LIDOCAINE HYDROCHLORIDE 60 MG: 20 INJECTION, SOLUTION INTRAVENOUS at 07:15

## 2020-01-01 RX ADMIN — NIFEDIPINE 30 MG: 30 TABLET, FILM COATED, EXTENDED RELEASE ORAL at 10:34

## 2020-01-01 RX ADMIN — SODIUM CHLORIDE, POTASSIUM CHLORIDE, SODIUM LACTATE AND CALCIUM CHLORIDE: 600; 310; 30; 20 INJECTION, SOLUTION INTRAVENOUS at 00:01

## 2020-01-01 RX ADMIN — MIDAZOLAM 2 MG: 1 INJECTION INTRAMUSCULAR; INTRAVENOUS at 11:00

## 2020-01-01 RX ADMIN — HEPARIN SODIUM 5000 UNITS: 10000 INJECTION INTRAVENOUS; SUBCUTANEOUS at 21:25

## 2020-01-01 RX ADMIN — Medication 10 ML: at 10:38

## 2020-01-01 RX ADMIN — INSULIN LISPRO 4 UNITS: 100 INJECTION, SOLUTION INTRAVENOUS; SUBCUTANEOUS at 21:15

## 2020-01-01 RX ADMIN — SODIUM BICARBONATE: 84 INJECTION, SOLUTION INTRAVENOUS at 02:52

## 2020-01-01 RX ADMIN — OXYMETAZOLINE HYDROCHLORIDE 2 SPRAY: 5 SPRAY NASAL at 09:00

## 2020-01-01 RX ADMIN — METHOCARBAMOL TABLETS 500 MG: 500 TABLET, COATED ORAL at 17:38

## 2020-01-01 RX ADMIN — SODIUM CHLORIDE, PRESERVATIVE FREE 10 ML: 5 INJECTION INTRAVENOUS at 08:10

## 2020-01-01 RX ADMIN — Medication 30 MCG/MIN: at 07:59

## 2020-01-01 RX ADMIN — POTASSIUM CHLORIDE: 2 INJECTION, SOLUTION, CONCENTRATE INTRAVENOUS at 17:31

## 2020-01-01 RX ADMIN — CHLORHEXIDINE GLUCONATE 15 ML: 1.2 RINSE ORAL at 08:10

## 2020-01-01 RX ADMIN — HEPARIN SODIUM 5000 UNITS: 10000 INJECTION INTRAVENOUS; SUBCUTANEOUS at 14:37

## 2020-01-01 RX ADMIN — POTASSIUM CHLORIDE 20 MEQ: 400 INJECTION, SOLUTION INTRAVENOUS at 05:25

## 2020-01-01 RX ADMIN — ASPIRIN 81 MG: 81 TABLET, COATED ORAL at 07:38

## 2020-01-01 RX ADMIN — POTASSIUM CHLORIDE 20 MEQ: 400 INJECTION, SOLUTION INTRAVENOUS at 04:12

## 2020-01-01 RX ADMIN — SODIUM CHLORIDE, PRESERVATIVE FREE 10 ML: 5 INJECTION INTRAVENOUS at 21:34

## 2020-01-01 RX ADMIN — POTASSIUM CHLORIDE 20 MEQ: 20 TABLET, EXTENDED RELEASE ORAL at 08:35

## 2020-01-01 RX ADMIN — HEPARIN SODIUM 5000 UNITS: 10000 INJECTION, SOLUTION INTRAVENOUS; SUBCUTANEOUS at 08:58

## 2020-01-01 RX ADMIN — Medication 10 ML: at 20:43

## 2020-01-01 RX ADMIN — NIFEDIPINE 30 MG: 30 TABLET, FILM COATED, EXTENDED RELEASE ORAL at 08:45

## 2020-01-01 RX ADMIN — Medication: at 00:48

## 2020-01-01 RX ADMIN — ASPIRIN 81 MG: 81 TABLET, COATED ORAL at 08:49

## 2020-01-01 RX ADMIN — POLYVINYL ALCOHOL 1 DROP: 14 SOLUTION/ DROPS OPHTHALMIC at 01:29

## 2020-01-01 RX ADMIN — Medication 100 MG: at 07:15

## 2020-01-01 RX ADMIN — SODIUM CHLORIDE: 4.5 INJECTION, SOLUTION INTRAVENOUS at 06:16

## 2020-01-01 RX ADMIN — DEXTROSE AND SODIUM CHLORIDE: 5; 450 INJECTION, SOLUTION INTRAVENOUS at 07:47

## 2020-01-01 RX ADMIN — SODIUM CHLORIDE 1000 ML: 9 INJECTION, SOLUTION INTRAVENOUS at 22:25

## 2020-01-01 RX ADMIN — METOCLOPRAMIDE HYDROCHLORIDE 5 MG: 5 INJECTION INTRAMUSCULAR; INTRAVENOUS at 12:25

## 2020-01-01 RX ADMIN — PANTOPRAZOLE SODIUM 40 MG: 40 INJECTION, POWDER, FOR SOLUTION INTRAVENOUS at 07:55

## 2020-01-01 RX ADMIN — PROPOFOL 10 MG: 10 INJECTION, EMULSION INTRAVENOUS at 07:15

## 2020-01-01 RX ADMIN — IPRATROPIUM BROMIDE AND ALBUTEROL SULFATE 1 AMPULE: 2.5; .5 SOLUTION RESPIRATORY (INHALATION) at 08:43

## 2020-01-01 RX ADMIN — HEPARIN SODIUM 5000 UNITS: 10000 INJECTION INTRAVENOUS; SUBCUTANEOUS at 06:18

## 2020-01-01 RX ADMIN — Medication 25 MCG/HR: at 13:09

## 2020-01-01 RX ADMIN — CALCIUM GLUCONATE 1 G: 98 INJECTION, SOLUTION INTRAVENOUS at 16:27

## 2020-01-01 RX ADMIN — METOCLOPRAMIDE HYDROCHLORIDE 5 MG: 5 INJECTION INTRAMUSCULAR; INTRAVENOUS at 06:13

## 2020-01-01 RX ADMIN — GLYCOPYRROLATE 0.2 MG: 0.2 INJECTION INTRAMUSCULAR; INTRAVENOUS at 15:19

## 2020-01-01 RX ADMIN — MINERAL OIL AND PETROLATUM: 150; 830 OINTMENT OPHTHALMIC at 10:01

## 2020-01-01 RX ADMIN — METHOCARBAMOL TABLETS 500 MG: 500 TABLET, COATED ORAL at 13:15

## 2020-01-01 RX ADMIN — Medication 20 MG: at 23:55

## 2020-01-01 RX ADMIN — POTASSIUM CHLORIDE 10 MEQ: 10 INJECTION, SOLUTION INTRAVENOUS at 14:09

## 2020-01-01 RX ADMIN — Medication 30 MCG/MIN: at 14:37

## 2020-01-01 RX ADMIN — SUCRALFATE 1 G: 1 TABLET ORAL at 01:16

## 2020-01-01 RX ADMIN — Medication 10 ML: at 20:02

## 2020-01-01 RX ADMIN — IPRATROPIUM BROMIDE AND ALBUTEROL SULFATE 1 AMPULE: 2.5; .5 SOLUTION RESPIRATORY (INHALATION) at 20:50

## 2020-01-01 RX ADMIN — MAGNESIUM SULFATE HEPTAHYDRATE 4 G: 40 INJECTION, SOLUTION INTRAVENOUS at 03:04

## 2020-01-01 RX ADMIN — SODIUM CHLORIDE, PRESERVATIVE FREE 10 ML: 5 INJECTION INTRAVENOUS at 09:12

## 2020-01-01 RX ADMIN — METRONIDAZOLE 500 MG: 500 INJECTION, SOLUTION INTRAVENOUS at 17:46

## 2020-01-01 RX ADMIN — METOCLOPRAMIDE HYDROCHLORIDE 10 MG: 5 INJECTION INTRAMUSCULAR; INTRAVENOUS at 12:01

## 2020-01-01 RX ADMIN — POLYVINYL ALCOHOL 1 DROP: 14 SOLUTION/ DROPS OPHTHALMIC at 08:08

## 2020-01-01 RX ADMIN — MIDAZOLAM HYDROCHLORIDE 2 MG: 1 INJECTION, SOLUTION INTRAMUSCULAR; INTRAVENOUS at 00:25

## 2020-01-01 RX ADMIN — IOHEXOL 50 ML: 240 INJECTION, SOLUTION INTRATHECAL; INTRAVASCULAR; INTRAVENOUS; ORAL at 14:06

## 2020-01-01 RX ADMIN — HYDROCHLOROTHIAZIDE 25 MG: 25 TABLET ORAL at 08:09

## 2020-01-01 RX ADMIN — Medication 10 ML: at 06:40

## 2020-01-01 RX ADMIN — SODIUM CHLORIDE: 4.5 INJECTION, SOLUTION INTRAVENOUS at 19:15

## 2020-01-01 RX ADMIN — Medication 10 ML: at 09:45

## 2020-01-01 RX ADMIN — Medication 10 ML: at 08:31

## 2020-01-01 RX ADMIN — DEXTROSE MONOHYDRATE, SODIUM CHLORIDE, AND POTASSIUM CHLORIDE: 50; 4.5; 2.98 INJECTION, SOLUTION INTRAVENOUS at 17:21

## 2020-01-01 RX ADMIN — SODIUM CHLORIDE 8 MG/HR: 9 INJECTION, SOLUTION INTRAVENOUS at 22:48

## 2020-01-01 RX ADMIN — ASPIRIN 81 MG: 81 TABLET, COATED ORAL at 10:34

## 2020-01-01 RX ADMIN — METOCLOPRAMIDE HYDROCHLORIDE 10 MG: 5 INJECTION INTRAMUSCULAR; INTRAVENOUS at 17:24

## 2020-01-01 RX ADMIN — METHOCARBAMOL TABLETS 500 MG: 500 TABLET, COATED ORAL at 18:09

## 2020-01-01 RX ADMIN — SODIUM CHLORIDE, POTASSIUM CHLORIDE, SODIUM LACTATE AND CALCIUM CHLORIDE: 600; 310; 30; 20 INJECTION, SOLUTION INTRAVENOUS at 05:50

## 2020-01-01 RX ADMIN — MORPHINE SULFATE 0.5 MG: 2 INJECTION, SOLUTION INTRAMUSCULAR; INTRAVENOUS at 13:00

## 2020-01-01 RX ADMIN — POLYVINYL ALCOHOL 1 DROP: 14 SOLUTION/ DROPS OPHTHALMIC at 20:23

## 2020-01-01 RX ADMIN — DEXTROSE MONOHYDRATE, SODIUM CHLORIDE, AND POTASSIUM CHLORIDE: 50; 4.5; 2.98 INJECTION, SOLUTION INTRAVENOUS at 12:51

## 2020-01-01 RX ADMIN — METOCLOPRAMIDE HYDROCHLORIDE 10 MG: 5 INJECTION INTRAMUSCULAR; INTRAVENOUS at 12:07

## 2020-01-01 RX ADMIN — SODIUM CHLORIDE, PRESERVATIVE FREE 10 ML: 5 INJECTION INTRAVENOUS at 08:57

## 2020-01-01 RX ADMIN — SODIUM PHOSPHATE, MONOBASIC, MONOHYDRATE 6 MMOL: 276; 142 INJECTION, SOLUTION INTRAVENOUS at 17:46

## 2020-01-01 RX ADMIN — INSULIN LISPRO 2 UNITS: 100 INJECTION, SOLUTION INTRAVENOUS; SUBCUTANEOUS at 00:26

## 2020-01-01 RX ADMIN — Medication 10 ML: at 21:15

## 2020-01-01 RX ADMIN — SODIUM BICARBONATE: 84 INJECTION, SOLUTION INTRAVENOUS at 23:01

## 2020-01-01 RX ADMIN — CLOPIDOGREL 75 MG: 75 TABLET, FILM COATED ORAL at 07:38

## 2020-01-01 RX ADMIN — CLOPIDOGREL 75 MG: 75 TABLET, FILM COATED ORAL at 09:45

## 2020-01-01 RX ADMIN — INSULIN LISPRO 6 UNITS: 100 INJECTION, SOLUTION INTRAVENOUS; SUBCUTANEOUS at 12:14

## 2020-01-01 RX ADMIN — FENTANYL CITRATE 25 MCG: 50 INJECTION INTRAMUSCULAR; INTRAVENOUS at 23:08

## 2020-01-01 RX ADMIN — HEPARIN SODIUM 5000 UNITS: 10000 INJECTION INTRAVENOUS; SUBCUTANEOUS at 06:29

## 2020-01-01 RX ADMIN — Medication 10 ML: at 20:46

## 2020-01-01 RX ADMIN — METOCLOPRAMIDE HYDROCHLORIDE 5 MG: 5 INJECTION INTRAMUSCULAR; INTRAVENOUS at 11:18

## 2020-01-01 RX ADMIN — VASOPRESSIN 1 UNITS: 20 INJECTION INTRAVENOUS at 10:52

## 2020-01-01 RX ADMIN — FERROUS SULFATE TAB 325 MG (65 MG ELEMENTAL FE) 325 MG: 325 (65 FE) TAB at 08:56

## 2020-01-01 RX ADMIN — MAGNESIUM SULFATE HEPTAHYDRATE 1 G: 1 INJECTION, SOLUTION INTRAVENOUS at 11:43

## 2020-01-01 RX ADMIN — DOCUSATE SODIUM 100 MG: 100 CAPSULE, LIQUID FILLED ORAL at 08:10

## 2020-01-01 RX ADMIN — EPINEPHRINE 30 MCG/MIN: 1 INJECTION INTRAMUSCULAR; INTRAVENOUS; SUBCUTANEOUS at 22:13

## 2020-01-01 RX ADMIN — METHOCARBAMOL TABLETS 500 MG: 500 TABLET, COATED ORAL at 20:33

## 2020-01-01 RX ADMIN — METHOCARBAMOL TABLETS 500 MG: 500 TABLET, COATED ORAL at 16:42

## 2020-01-01 RX ADMIN — BISACODYL 10 MG: 10 SUPPOSITORY RECTAL at 11:45

## 2020-01-01 RX ADMIN — SUCRALFATE 1 G: 1 TABLET ORAL at 09:00

## 2020-01-01 RX ADMIN — SPIRONOLACTONE 25 MG: 25 TABLET ORAL at 08:35

## 2020-01-01 RX ADMIN — MAGNESIUM SULFATE HEPTAHYDRATE 2 G: 40 INJECTION, SOLUTION INTRAVENOUS at 11:04

## 2020-01-01 RX ADMIN — Medication 150 MG: at 06:39

## 2020-01-01 RX ADMIN — FENTANYL CITRATE 50 MCG: 50 INJECTION, SOLUTION INTRAMUSCULAR; INTRAVENOUS at 07:43

## 2020-01-01 RX ADMIN — PIPERACILLIN AND TAZOBACTAM 3.38 G: 3; .375 INJECTION, POWDER, LYOPHILIZED, FOR SOLUTION INTRAVENOUS at 06:09

## 2020-01-01 RX ADMIN — SUCRALFATE 1 G: 1 TABLET ORAL at 00:32

## 2020-01-01 RX ADMIN — SODIUM CHLORIDE: 4.5 INJECTION, SOLUTION INTRAVENOUS at 10:05

## 2020-01-01 RX ADMIN — METRONIDAZOLE 500 MG: 500 INJECTION, SOLUTION INTRAVENOUS at 10:35

## 2020-01-01 RX ADMIN — LIDOCAINE HYDROCHLORIDE: 20 JELLY TOPICAL at 21:15

## 2020-01-01 RX ADMIN — METRONIDAZOLE 500 MG: 500 INJECTION, SOLUTION INTRAVENOUS at 02:07

## 2020-01-01 RX ADMIN — SUCRALFATE 1 G: 1 TABLET ORAL at 21:45

## 2020-01-01 RX ADMIN — HEPARIN SODIUM 5000 UNITS: 10000 INJECTION INTRAVENOUS; SUBCUTANEOUS at 14:16

## 2020-01-01 RX ADMIN — POLYVINYL ALCOHOL 1 DROP: 14 SOLUTION/ DROPS OPHTHALMIC at 16:35

## 2020-01-01 RX ADMIN — Medication 10 ML: at 11:47

## 2020-01-01 RX ADMIN — Medication 100 MCG: at 10:10

## 2020-01-01 RX ADMIN — PANTOPRAZOLE SODIUM 40 MG: 40 INJECTION, POWDER, FOR SOLUTION INTRAVENOUS at 10:27

## 2020-01-01 RX ADMIN — ACETAMINOPHEN 650 MG: 325 TABLET, FILM COATED ORAL at 21:25

## 2020-01-01 RX ADMIN — METOCLOPRAMIDE HYDROCHLORIDE 5 MG: 5 INJECTION INTRAMUSCULAR; INTRAVENOUS at 18:10

## 2020-01-01 RX ADMIN — FERROUS SULFATE TAB 325 MG (65 MG ELEMENTAL FE) 325 MG: 325 (65 FE) TAB at 09:44

## 2020-01-01 RX ADMIN — METHOCARBAMOL TABLETS 500 MG: 500 TABLET, COATED ORAL at 07:55

## 2020-01-01 RX ADMIN — METOCLOPRAMIDE HYDROCHLORIDE 10 MG: 5 INJECTION INTRAMUSCULAR; INTRAVENOUS at 20:45

## 2020-01-01 RX ADMIN — MINERAL OIL AND PETROLATUM: 150; 830 OINTMENT OPHTHALMIC at 18:04

## 2020-01-01 RX ADMIN — NIFEDIPINE 30 MG: 30 TABLET, FILM COATED, EXTENDED RELEASE ORAL at 08:41

## 2020-01-01 RX ADMIN — CLOPIDOGREL 75 MG: 75 TABLET, FILM COATED ORAL at 10:34

## 2020-01-01 RX ADMIN — SODIUM CHLORIDE, PRESERVATIVE FREE 10 ML: 5 INJECTION INTRAVENOUS at 08:58

## 2020-01-01 RX ADMIN — CHLORHEXIDINE GLUCONATE 15 ML: 1.2 RINSE ORAL at 08:58

## 2020-01-01 RX ADMIN — DEXTROSE MONOHYDRATE 25 G: 25 INJECTION, SOLUTION INTRAVENOUS at 16:54

## 2020-01-01 RX ADMIN — Medication 10 ML: at 09:49

## 2020-01-01 RX ADMIN — METHOCARBAMOL TABLETS 500 MG: 500 TABLET, COATED ORAL at 16:36

## 2020-01-01 RX ADMIN — SODIUM CHLORIDE, POTASSIUM CHLORIDE, SODIUM LACTATE AND CALCIUM CHLORIDE 1000 ML: 600; 310; 30; 20 INJECTION, SOLUTION INTRAVENOUS at 07:09

## 2020-01-01 RX ADMIN — SODIUM CHLORIDE, POTASSIUM CHLORIDE, SODIUM LACTATE AND CALCIUM CHLORIDE 1000 ML: 600; 310; 30; 20 INJECTION, SOLUTION INTRAVENOUS at 09:29

## 2020-01-01 RX ADMIN — Medication 10 ML: at 09:25

## 2020-01-01 RX ADMIN — METOCLOPRAMIDE HYDROCHLORIDE 5 MG: 5 INJECTION INTRAMUSCULAR; INTRAVENOUS at 05:43

## 2020-01-01 RX ADMIN — METHOCARBAMOL TABLETS 500 MG: 500 TABLET, COATED ORAL at 09:11

## 2020-01-01 RX ADMIN — Medication 10 ML: at 19:53

## 2020-01-01 RX ADMIN — PANTOPRAZOLE SODIUM 40 MG: 40 TABLET, DELAYED RELEASE ORAL at 16:51

## 2020-01-01 RX ADMIN — Medication 10 ML: at 21:52

## 2020-01-01 RX ADMIN — FENTANYL CITRATE 50 MCG: 50 INJECTION, SOLUTION INTRAMUSCULAR; INTRAVENOUS at 08:04

## 2020-01-01 RX ADMIN — ASPIRIN 81 MG: 81 TABLET, COATED ORAL at 08:45

## 2020-01-01 RX ADMIN — MIDAZOLAM 4 MG: 1 INJECTION INTRAMUSCULAR; INTRAVENOUS at 09:56

## 2020-01-01 RX ADMIN — EPINEPHRINE 30 MCG/MIN: 1 INJECTION INTRAMUSCULAR; INTRAVENOUS; SUBCUTANEOUS at 00:58

## 2020-01-01 RX ADMIN — PANTOPRAZOLE SODIUM 40 MG: 40 INJECTION, POWDER, FOR SOLUTION INTRAVENOUS at 21:33

## 2020-01-01 RX ADMIN — METOCLOPRAMIDE HYDROCHLORIDE 5 MG: 5 INJECTION INTRAMUSCULAR; INTRAVENOUS at 05:20

## 2020-01-01 RX ADMIN — MORPHINE SULFATE 2 MG: 2 INJECTION, SOLUTION INTRAMUSCULAR; INTRAVENOUS at 21:32

## 2020-01-01 RX ADMIN — SUCRALFATE 1 G: 1 TABLET ORAL at 00:20

## 2020-01-01 RX ADMIN — SUCRALFATE 1 G: 1 TABLET ORAL at 23:33

## 2020-01-01 RX ADMIN — METHOCARBAMOL TABLETS 500 MG: 500 TABLET, COATED ORAL at 10:23

## 2020-01-01 RX ADMIN — MAGNESIUM SULFATE HEPTAHYDRATE 1 G: 1 INJECTION, SOLUTION INTRAVENOUS at 21:52

## 2020-01-01 RX ADMIN — NIFEDIPINE 30 MG: 30 TABLET, FILM COATED, EXTENDED RELEASE ORAL at 10:54

## 2020-01-01 RX ADMIN — Medication: at 03:00

## 2020-01-01 RX ADMIN — IOPAMIDOL 110 ML: 755 INJECTION, SOLUTION INTRAVENOUS at 11:47

## 2020-01-01 RX ADMIN — METOCLOPRAMIDE HYDROCHLORIDE 5 MG: 5 INJECTION INTRAMUSCULAR; INTRAVENOUS at 17:24

## 2020-01-01 RX ADMIN — METRONIDAZOLE 500 MG: 500 INJECTION, SOLUTION INTRAVENOUS at 02:45

## 2020-01-01 RX ADMIN — VASOPRESSIN 0.04 UNITS/MIN: 20 INJECTION INTRAVENOUS at 02:23

## 2020-01-01 RX ADMIN — SUCRALFATE 1 G: 1 TABLET ORAL at 11:18

## 2020-01-01 RX ADMIN — SODIUM CHLORIDE 20 ML: 9 INJECTION, SOLUTION INTRAVENOUS at 22:41

## 2020-01-01 RX ADMIN — METRONIDAZOLE 500 MG: 500 INJECTION, SOLUTION INTRAVENOUS at 18:01

## 2020-01-01 RX ADMIN — METHOCARBAMOL TABLETS 500 MG: 500 TABLET, COATED ORAL at 21:20

## 2020-01-01 RX ADMIN — Medication: at 13:12

## 2020-01-01 RX ADMIN — MINERAL OIL AND PETROLATUM: 150; 830 OINTMENT OPHTHALMIC at 21:53

## 2020-01-01 RX ADMIN — ACETAMINOPHEN 650 MG: 325 TABLET, FILM COATED ORAL at 22:28

## 2020-01-01 RX ADMIN — IPRATROPIUM BROMIDE AND ALBUTEROL SULFATE 1 AMPULE: 2.5; .5 SOLUTION RESPIRATORY (INHALATION) at 05:18

## 2020-01-01 RX ADMIN — CALCIUM CHLORIDE: 100 INJECTION, SOLUTION INTRAVENOUS at 14:12

## 2020-01-01 RX ADMIN — HEPARIN SODIUM 5000 UNITS: 10000 INJECTION INTRAVENOUS; SUBCUTANEOUS at 21:00

## 2020-01-01 RX ADMIN — METHOCARBAMOL TABLETS 500 MG: 500 TABLET, COATED ORAL at 14:42

## 2020-01-01 RX ADMIN — SODIUM CHLORIDE 20 ML: 9 INJECTION, SOLUTION INTRAVENOUS at 11:09

## 2020-01-01 RX ADMIN — FERROUS SULFATE TAB 325 MG (65 MG ELEMENTAL FE) 325 MG: 325 (65 FE) TAB at 08:10

## 2020-01-01 RX ADMIN — MAGNESIUM SULFATE HEPTAHYDRATE 1 G: 1 INJECTION, SOLUTION INTRAVENOUS at 20:24

## 2020-01-01 RX ADMIN — HEPARIN SODIUM 5000 UNITS: 10000 INJECTION INTRAVENOUS; SUBCUTANEOUS at 14:02

## 2020-01-01 RX ADMIN — SUCRALFATE 1 G: 1 TABLET ORAL at 08:10

## 2020-01-01 RX ADMIN — METOCLOPRAMIDE HYDROCHLORIDE 5 MG: 5 INJECTION INTRAMUSCULAR; INTRAVENOUS at 11:36

## 2020-01-01 RX ADMIN — PANTOPRAZOLE SODIUM 40 MG: 40 INJECTION, POWDER, FOR SOLUTION INTRAVENOUS at 09:12

## 2020-01-01 RX ADMIN — POTASSIUM CHLORIDE 10 MEQ: 10 INJECTION, SOLUTION INTRAVENOUS at 10:44

## 2020-01-01 RX ADMIN — SODIUM PHOSPHATE, MONOBASIC, MONOHYDRATE 12 MMOL: 276; 142 INJECTION, SOLUTION INTRAVENOUS at 09:03

## 2020-01-01 RX ADMIN — PANTOPRAZOLE SODIUM 40 MG: 40 INJECTION, POWDER, FOR SOLUTION INTRAVENOUS at 08:10

## 2020-01-01 RX ADMIN — SUCRALFATE 1 G: 1 TABLET ORAL at 17:26

## 2020-01-01 RX ADMIN — PANTOPRAZOLE SODIUM 40 MG: 40 INJECTION, POWDER, FOR SOLUTION INTRAVENOUS at 08:07

## 2020-01-01 RX ADMIN — Medication: at 16:00

## 2020-01-01 RX ADMIN — CALCIUM GLUCONATE 1 G: 98 INJECTION, SOLUTION INTRAVENOUS at 23:23

## 2020-01-01 RX ADMIN — MINERAL OIL AND PETROLATUM: 150; 830 OINTMENT OPHTHALMIC at 06:14

## 2020-01-01 RX ADMIN — SUCRALFATE 1 G: 1 TABLET ORAL at 23:42

## 2020-01-01 RX ADMIN — SUCRALFATE 1 G: 1 TABLET ORAL at 13:46

## 2020-01-01 RX ADMIN — SODIUM CHLORIDE: 9 INJECTION, SOLUTION INTRAVENOUS at 20:32

## 2020-01-01 RX ADMIN — SODIUM CHLORIDE, PRESERVATIVE FREE 10 ML: 5 INJECTION INTRAVENOUS at 15:02

## 2020-01-01 RX ADMIN — Medication: at 14:11

## 2020-01-01 RX ADMIN — HEPARIN SODIUM 5000 UNITS: 10000 INJECTION INTRAVENOUS; SUBCUTANEOUS at 06:08

## 2020-01-01 RX ADMIN — ASPIRIN 81 MG: 81 TABLET, COATED ORAL at 10:02

## 2020-01-01 RX ADMIN — SUCRALFATE 1 G: 1 TABLET ORAL at 05:49

## 2020-01-01 RX ADMIN — SODIUM CHLORIDE: 9 INJECTION, SOLUTION INTRAVENOUS at 00:17

## 2020-01-01 RX ADMIN — ASCORBIC ACID, VITAMIN A PALMITATE, CHOLECALCIFEROL, THIAMINE HYDROCHLORIDE, RIBOFLAVIN-5 PHOSPHATE SODIUM, PYRIDOXINE HYDROCHLORIDE, NIACINAMIDE, DEXPANTHENOL, ALPHA-TOCOPHEROL ACETATE, VITAMIN K1, FOLIC ACID, BIOTIN, CYANOCOBALAMIN: 200; 3300; 200; 6; 3.6; 6; 40; 15; 10; 150; 600; 60; 5 INJECTION, SOLUTION INTRAVENOUS at 12:14

## 2020-01-01 RX ADMIN — DEXTROSE AND SODIUM CHLORIDE: 5; 450 INJECTION, SOLUTION INTRAVENOUS at 21:04

## 2020-01-01 RX ADMIN — HEPARIN SODIUM 5000 UNITS: 10000 INJECTION INTRAVENOUS; SUBCUTANEOUS at 21:14

## 2020-01-01 RX ADMIN — HEPARIN SODIUM 5000 UNITS: 10000 INJECTION INTRAVENOUS; SUBCUTANEOUS at 14:04

## 2020-01-01 RX ADMIN — SUCRALFATE 1 G: 1 TABLET ORAL at 21:52

## 2020-01-01 RX ADMIN — MORPHINE SULFATE 4 MG: 4 INJECTION, SOLUTION INTRAMUSCULAR; INTRAVENOUS at 22:10

## 2020-01-01 RX ADMIN — METOCLOPRAMIDE HYDROCHLORIDE 10 MG: 5 INJECTION INTRAMUSCULAR; INTRAVENOUS at 07:08

## 2020-01-01 RX ADMIN — POTASSIUM CHLORIDE 20 MEQ: 20 TABLET, EXTENDED RELEASE ORAL at 16:37

## 2020-01-01 RX ADMIN — SODIUM CHLORIDE 20 ML: 9 INJECTION, SOLUTION INTRAVENOUS at 10:01

## 2020-01-01 RX ADMIN — NIFEDIPINE 30 MG: 30 TABLET, FILM COATED, EXTENDED RELEASE ORAL at 09:11

## 2020-01-01 RX ADMIN — NIFEDIPINE 30 MG: 30 TABLET, FILM COATED, EXTENDED RELEASE ORAL at 09:25

## 2020-01-01 RX ADMIN — SPIRONOLACTONE 25 MG: 25 TABLET ORAL at 08:29

## 2020-01-01 RX ADMIN — SODIUM CHLORIDE, POTASSIUM CHLORIDE, SODIUM LACTATE AND CALCIUM CHLORIDE 1000 ML: 600; 310; 30; 20 INJECTION, SOLUTION INTRAVENOUS at 00:30

## 2020-01-01 RX ADMIN — SODIUM BICARBONATE: 84 INJECTION, SOLUTION INTRAVENOUS at 11:04

## 2020-01-01 RX ADMIN — SUCRALFATE 1 G: 1 TABLET ORAL at 16:36

## 2020-01-01 RX ADMIN — SODIUM BICARBONATE: 84 INJECTION, SOLUTION INTRAVENOUS at 21:56

## 2020-01-01 RX ADMIN — NIFEDIPINE 30 MG: 30 TABLET, FILM COATED, EXTENDED RELEASE ORAL at 10:02

## 2020-01-01 RX ADMIN — METHOCARBAMOL TABLETS 500 MG: 500 TABLET, COATED ORAL at 12:26

## 2020-01-01 RX ADMIN — SODIUM CHLORIDE, POTASSIUM CHLORIDE, SODIUM LACTATE AND CALCIUM CHLORIDE: 600; 310; 30; 20 INJECTION, SOLUTION INTRAVENOUS at 05:43

## 2020-01-01 RX ADMIN — ACETAMINOPHEN 650 MG: 325 TABLET, FILM COATED ORAL at 22:06

## 2020-01-01 RX ADMIN — MINERAL OIL AND PETROLATUM: 150; 830 OINTMENT OPHTHALMIC at 10:03

## 2020-01-01 RX ADMIN — PIPERACILLIN AND TAZOBACTAM 3.38 G: 3; .375 INJECTION, POWDER, LYOPHILIZED, FOR SOLUTION INTRAVENOUS at 05:52

## 2020-01-01 RX ADMIN — SUCRALFATE 1 G: 1 TABLET ORAL at 13:10

## 2020-01-01 RX ADMIN — Medication 10 ML: at 09:00

## 2020-01-01 RX ADMIN — SODIUM CHLORIDE: 9 INJECTION, SOLUTION INTRAVENOUS at 20:12

## 2020-01-01 RX ADMIN — METHOCARBAMOL TABLETS 500 MG: 500 TABLET, COATED ORAL at 16:55

## 2020-01-01 RX ADMIN — PROPOFOL 80 MG: 10 INJECTION, EMULSION INTRAVENOUS at 10:15

## 2020-01-01 RX ADMIN — METHOCARBAMOL TABLETS 500 MG: 500 TABLET, COATED ORAL at 20:16

## 2020-01-01 RX ADMIN — SUCRALFATE 1 G: 1 TABLET ORAL at 12:07

## 2020-01-01 RX ADMIN — POTASSIUM CHLORIDE 10 MEQ: 10 INJECTION, SOLUTION INTRAVENOUS at 11:43

## 2020-01-01 RX ADMIN — POTASSIUM CHLORIDE 20 MEQ: 400 INJECTION, SOLUTION INTRAVENOUS at 02:22

## 2020-01-01 RX ADMIN — SENNOSIDES 8.6 MG: 8.6 TABLET, FILM COATED ORAL at 21:04

## 2020-01-01 RX ADMIN — METOCLOPRAMIDE HYDROCHLORIDE 5 MG: 5 INJECTION INTRAMUSCULAR; INTRAVENOUS at 16:42

## 2020-01-01 RX ADMIN — METRONIDAZOLE 500 MG: 500 INJECTION, SOLUTION INTRAVENOUS at 10:39

## 2020-01-01 RX ADMIN — CEFTRIAXONE SODIUM 1 G: 1 INJECTION, POWDER, FOR SOLUTION INTRAMUSCULAR; INTRAVENOUS at 01:58

## 2020-01-01 RX ADMIN — DEXTROSE MONOHYDRATE, SODIUM CHLORIDE, AND POTASSIUM CHLORIDE: 50; 4.5; 2.98 INJECTION, SOLUTION INTRAVENOUS at 01:52

## 2020-01-01 RX ADMIN — Medication 10 ML: at 09:12

## 2020-01-01 RX ADMIN — SODIUM BICARBONATE: 84 INJECTION, SOLUTION INTRAVENOUS at 15:14

## 2020-01-01 RX ADMIN — TORSEMIDE 20 MG: 20 TABLET ORAL at 08:29

## 2020-01-01 RX ADMIN — SODIUM CHLORIDE, POTASSIUM CHLORIDE, SODIUM LACTATE AND CALCIUM CHLORIDE 1000 ML: 600; 310; 30; 20 INJECTION, SOLUTION INTRAVENOUS at 06:09

## 2020-01-01 RX ADMIN — CLOPIDOGREL 75 MG: 75 TABLET, FILM COATED ORAL at 12:08

## 2020-01-01 RX ADMIN — ENALAPRIL MALEATE 10 MG: 10 TABLET ORAL at 08:35

## 2020-01-01 RX ADMIN — NIFEDIPINE 30 MG: 30 TABLET, FILM COATED, EXTENDED RELEASE ORAL at 08:35

## 2020-01-01 RX ADMIN — HEPARIN SODIUM 5000 UNITS: 10000 INJECTION INTRAVENOUS; SUBCUTANEOUS at 06:45

## 2020-01-01 RX ADMIN — PANTOPRAZOLE SODIUM 40 MG: 40 INJECTION, POWDER, FOR SOLUTION INTRAVENOUS at 22:03

## 2020-01-01 RX ADMIN — METRONIDAZOLE 500 MG: 500 INJECTION, SOLUTION INTRAVENOUS at 18:20

## 2020-01-01 RX ADMIN — SODIUM CHLORIDE: 9 INJECTION, SOLUTION INTRAVENOUS at 10:08

## 2020-01-01 RX ADMIN — Medication 10 ML: at 21:34

## 2020-01-01 RX ADMIN — Medication 10 ML: at 21:35

## 2020-01-01 RX ADMIN — ACETAMINOPHEN 650 MG: 325 TABLET, FILM COATED ORAL at 01:39

## 2020-01-01 RX ADMIN — ACETAMINOPHEN 650 MG: 325 TABLET, FILM COATED ORAL at 09:37

## 2020-01-01 RX ADMIN — MAGNESIUM SULFATE HEPTAHYDRATE 1 G: 1 INJECTION, SOLUTION INTRAVENOUS at 09:59

## 2020-01-01 RX ADMIN — FENTANYL CITRATE 50 MCG: 50 INJECTION, SOLUTION INTRAMUSCULAR; INTRAVENOUS at 07:52

## 2020-01-01 RX ADMIN — Medication: at 20:15

## 2020-01-01 RX ADMIN — CLOPIDOGREL 75 MG: 75 TABLET, FILM COATED ORAL at 08:45

## 2020-01-01 RX ADMIN — ASPIRIN 81 MG: 81 TABLET, COATED ORAL at 12:07

## 2020-01-01 RX ADMIN — METOCLOPRAMIDE HYDROCHLORIDE 10 MG: 5 INJECTION INTRAMUSCULAR; INTRAVENOUS at 01:39

## 2020-01-01 RX ADMIN — CHLORHEXIDINE GLUCONATE 15 ML: 1.2 RINSE ORAL at 01:26

## 2020-01-01 RX ADMIN — POLYVINYL ALCOHOL 1 DROP: 14 SOLUTION/ DROPS OPHTHALMIC at 16:10

## 2020-01-01 RX ADMIN — SODIUM CHLORIDE, PRESERVATIVE FREE 10 ML: 5 INJECTION INTRAVENOUS at 20:17

## 2020-01-01 RX ADMIN — METHOCARBAMOL TABLETS 500 MG: 500 TABLET, COATED ORAL at 20:24

## 2020-01-01 RX ADMIN — PHENYLEPHRINE HYDROCHLORIDE 100 MCG: 10 INJECTION INTRAVENOUS at 07:18

## 2020-01-01 RX ADMIN — ACETAMINOPHEN 650 MG: 325 TABLET, FILM COATED ORAL at 17:23

## 2020-01-01 RX ADMIN — ALBUMIN (HUMAN) 25 G: 0.25 INJECTION, SOLUTION INTRAVENOUS at 02:25

## 2020-01-01 RX ADMIN — SUCRALFATE 1 G: 1 TABLET ORAL at 13:45

## 2020-01-01 RX ADMIN — SODIUM CHLORIDE: 4.5 INJECTION, SOLUTION INTRAVENOUS at 09:11

## 2020-01-01 RX ADMIN — METRONIDAZOLE 500 MG: 500 INJECTION, SOLUTION INTRAVENOUS at 09:25

## 2020-01-01 RX ADMIN — SODIUM BICARBONATE 50 MEQ: 84 INJECTION, SOLUTION INTRAVENOUS at 10:15

## 2020-01-01 RX ADMIN — VASOPRESSIN 0.04 UNITS/MIN: 20 INJECTION INTRAVENOUS at 02:09

## 2020-01-01 RX ADMIN — ACETAMINOPHEN 650 MG: 325 TABLET, FILM COATED ORAL at 05:58

## 2020-01-01 RX ADMIN — METOCLOPRAMIDE HYDROCHLORIDE 10 MG: 5 INJECTION INTRAMUSCULAR; INTRAVENOUS at 13:45

## 2020-01-01 RX ADMIN — METOCLOPRAMIDE HYDROCHLORIDE 5 MG: 5 INJECTION INTRAMUSCULAR; INTRAVENOUS at 05:18

## 2020-01-01 RX ADMIN — POTASSIUM CHLORIDE 20 MEQ: 400 INJECTION, SOLUTION INTRAVENOUS at 12:07

## 2020-01-01 RX ADMIN — POLYVINYL ALCOHOL 1 DROP: 14 SOLUTION/ DROPS OPHTHALMIC at 04:33

## 2020-01-01 RX ADMIN — NIFEDIPINE 30 MG: 30 TABLET, FILM COATED, EXTENDED RELEASE ORAL at 10:27

## 2020-01-01 RX ADMIN — SODIUM BICARBONATE: 84 INJECTION, SOLUTION INTRAVENOUS at 10:34

## 2020-01-01 RX ADMIN — CALCIUM GLUCONATE: 98 INJECTION, SOLUTION INTRAVENOUS at 17:35

## 2020-01-01 RX ADMIN — ONDANSETRON HYDROCHLORIDE 4 MG: 2 INJECTION, SOLUTION INTRAMUSCULAR; INTRAVENOUS at 08:49

## 2020-01-01 RX ADMIN — Medication 10 ML: at 21:36

## 2020-01-01 RX ADMIN — METHOCARBAMOL TABLETS 500 MG: 500 TABLET, COATED ORAL at 16:40

## 2020-01-01 RX ADMIN — SODIUM CHLORIDE 8 MG/HR: 9 INJECTION, SOLUTION INTRAVENOUS at 22:14

## 2020-01-01 RX ADMIN — BENZOCAINE AND MENTHOL 1 LOZENGE: 15; 3.6 LOZENGE ORAL at 11:36

## 2020-01-01 RX ADMIN — PANTOPRAZOLE SODIUM 40 MG: 40 INJECTION, POWDER, FOR SOLUTION INTRAVENOUS at 08:35

## 2020-01-01 RX ADMIN — SUCRALFATE 1 G: 1 TABLET ORAL at 05:20

## 2020-01-01 RX ADMIN — SUCRALFATE 1 G: 1 TABLET ORAL at 23:54

## 2020-01-01 RX ADMIN — VASOPRESSIN 0.04 UNITS/MIN: 20 INJECTION INTRAVENOUS at 04:30

## 2020-01-01 RX ADMIN — FLUCONAZOLE IN SODIUM CHLORIDE 200 MG: 2 INJECTION, SOLUTION INTRAVENOUS at 13:55

## 2020-01-01 RX ADMIN — SPIRONOLACTONE 25 MG: 25 TABLET ORAL at 08:09

## 2020-01-01 RX ADMIN — Medication: at 18:17

## 2020-01-01 RX ADMIN — CALCIUM GLUCONATE: 98 INJECTION, SOLUTION INTRAVENOUS at 18:13

## 2020-01-01 RX ADMIN — Medication 10 ML: at 12:11

## 2020-01-01 RX ADMIN — INSULIN LISPRO 2 UNITS: 100 INJECTION, SOLUTION INTRAVENOUS; SUBCUTANEOUS at 16:27

## 2020-01-01 RX ADMIN — SUCRALFATE 1 G: 1 TABLET ORAL at 11:36

## 2020-01-01 RX ADMIN — POTASSIUM CHLORIDE 10 MEQ: 10 INJECTION, SOLUTION INTRAVENOUS at 09:59

## 2020-01-01 RX ADMIN — METOCLOPRAMIDE HYDROCHLORIDE 5 MG: 5 INJECTION INTRAMUSCULAR; INTRAVENOUS at 23:54

## 2020-01-01 RX ADMIN — ACETAMINOPHEN 650 MG: 325 TABLET, FILM COATED ORAL at 16:55

## 2020-01-01 RX ADMIN — DOCUSATE SODIUM 100 MG: 100 CAPSULE, LIQUID FILLED ORAL at 08:35

## 2020-01-01 RX ADMIN — POLYVINYL ALCOHOL 1 DROP: 14 SOLUTION/ DROPS OPHTHALMIC at 12:21

## 2020-01-01 RX ADMIN — DOCUSATE SODIUM 100 MG: 100 CAPSULE, LIQUID FILLED ORAL at 09:43

## 2020-01-01 RX ADMIN — CLOPIDOGREL 75 MG: 75 TABLET, FILM COATED ORAL at 09:11

## 2020-01-01 RX ADMIN — MIDAZOLAM HYDROCHLORIDE 2 MG: 1 INJECTION, SOLUTION INTRAMUSCULAR; INTRAVENOUS at 04:33

## 2020-01-01 RX ADMIN — EPINEPHRINE 5 MCG/MIN: 1 INJECTION INTRAMUSCULAR; INTRAVENOUS; SUBCUTANEOUS at 15:09

## 2020-01-01 RX ADMIN — HEPARIN SODIUM 5000 UNITS: 10000 INJECTION INTRAVENOUS; SUBCUTANEOUS at 16:59

## 2020-01-01 RX ADMIN — SODIUM CHLORIDE, PRESERVATIVE FREE 10 ML: 5 INJECTION INTRAVENOUS at 20:08

## 2020-01-01 RX ADMIN — SODIUM BICARBONATE: 84 INJECTION, SOLUTION INTRAVENOUS at 14:29

## 2020-01-01 RX ADMIN — Medication: at 16:08

## 2020-01-01 RX ADMIN — METOCLOPRAMIDE HYDROCHLORIDE 10 MG: 5 INJECTION INTRAMUSCULAR; INTRAVENOUS at 20:43

## 2020-01-01 RX ADMIN — MINERAL OIL AND PETROLATUM: 150; 830 OINTMENT OPHTHALMIC at 22:14

## 2020-01-01 RX ADMIN — FLUCONAZOLE 400 MG: 400 INJECTION, SOLUTION INTRAVENOUS at 15:14

## 2020-01-01 RX ADMIN — Medication 10 ML: at 08:56

## 2020-01-01 RX ADMIN — ACETAMINOPHEN 650 MG: 325 TABLET, FILM COATED ORAL at 14:36

## 2020-01-01 RX ADMIN — CEFTRIAXONE 2 G: 2 INJECTION, POWDER, FOR SOLUTION INTRAMUSCULAR; INTRAVENOUS at 02:45

## 2020-01-01 RX ADMIN — HYDROCHLOROTHIAZIDE 25 MG: 25 TABLET ORAL at 12:36

## 2020-01-01 RX ADMIN — Medication 10 ML: at 20:24

## 2020-01-01 RX ADMIN — HYDROCHLOROTHIAZIDE 25 MG: 25 TABLET ORAL at 08:55

## 2020-01-01 RX ADMIN — Medication 10 ML: at 08:57

## 2020-01-01 RX ADMIN — Medication: at 11:04

## 2020-01-01 RX ADMIN — METOCLOPRAMIDE HYDROCHLORIDE 5 MG: 5 INJECTION INTRAMUSCULAR; INTRAVENOUS at 23:42

## 2020-01-01 RX ADMIN — Medication 10 ML: at 20:09

## 2020-01-01 RX ADMIN — METOCLOPRAMIDE HYDROCHLORIDE 5 MG: 5 INJECTION INTRAMUSCULAR; INTRAVENOUS at 16:36

## 2020-01-01 RX ADMIN — Medication: at 20:43

## 2020-01-01 RX ADMIN — Medication 10 ML: at 21:07

## 2020-01-01 RX ADMIN — NIFEDIPINE 30 MG: 30 TABLET, FILM COATED, EXTENDED RELEASE ORAL at 13:32

## 2020-01-01 RX ADMIN — METOCLOPRAMIDE HYDROCHLORIDE 10 MG: 5 INJECTION INTRAMUSCULAR; INTRAVENOUS at 02:31

## 2020-01-01 RX ADMIN — MAGNESIUM SULFATE HEPTAHYDRATE 2 G: 40 INJECTION, SOLUTION INTRAVENOUS at 00:09

## 2020-01-01 RX ADMIN — SODIUM CHLORIDE, POTASSIUM CHLORIDE, SODIUM LACTATE AND CALCIUM CHLORIDE: 600; 310; 30; 20 INJECTION, SOLUTION INTRAVENOUS at 09:54

## 2020-01-01 RX ADMIN — METOCLOPRAMIDE HYDROCHLORIDE 5 MG: 5 INJECTION INTRAMUSCULAR; INTRAVENOUS at 06:12

## 2020-01-01 RX ADMIN — Medication 10 ML: at 20:29

## 2020-01-01 RX ADMIN — LIDOCAINE HYDROCHLORIDE: 20 JELLY TOPICAL at 09:00

## 2020-01-01 RX ADMIN — LIDOCAINE HYDROCHLORIDE 5 ML: 10 INJECTION, SOLUTION EPIDURAL; INFILTRATION; INTRACAUDAL; PERINEURAL at 11:38

## 2020-01-01 RX ADMIN — METOCLOPRAMIDE HYDROCHLORIDE 5 MG: 5 INJECTION INTRAMUSCULAR; INTRAVENOUS at 00:31

## 2020-01-01 RX ADMIN — METHOCARBAMOL TABLETS 500 MG: 500 TABLET, COATED ORAL at 12:06

## 2020-01-01 RX ADMIN — METHOCARBAMOL TABLETS 500 MG: 500 TABLET, COATED ORAL at 08:35

## 2020-01-01 RX ADMIN — Medication 10 ML: at 20:16

## 2020-01-01 RX ADMIN — Medication 150 MG: at 08:56

## 2020-01-01 RX ADMIN — PANTOPRAZOLE SODIUM 40 MG: 40 TABLET, DELAYED RELEASE ORAL at 06:43

## 2020-01-01 RX ADMIN — TORSEMIDE 20 MG: 20 TABLET ORAL at 08:55

## 2020-01-01 RX ADMIN — SODIUM CHLORIDE SOLN NEBU 3% 4 ML: 3 NEBU SOLN at 05:18

## 2020-01-01 RX ADMIN — METOCLOPRAMIDE HYDROCHLORIDE 5 MG: 5 INJECTION INTRAMUSCULAR; INTRAVENOUS at 17:06

## 2020-01-01 RX ADMIN — Medication 25 MCG/HR: at 23:44

## 2020-01-01 RX ADMIN — PANTOPRAZOLE SODIUM 40 MG: 40 TABLET, DELAYED RELEASE ORAL at 08:10

## 2020-01-01 RX ADMIN — METRONIDAZOLE 500 MG: 500 INJECTION, SOLUTION INTRAVENOUS at 02:53

## 2020-01-01 RX ADMIN — SODIUM CHLORIDE, POTASSIUM CHLORIDE, SODIUM LACTATE AND CALCIUM CHLORIDE: 600; 310; 30; 20 INJECTION, SOLUTION INTRAVENOUS at 19:59

## 2020-01-01 RX ADMIN — DEXTROSE MONOHYDRATE, SODIUM CHLORIDE, AND POTASSIUM CHLORIDE: 50; 4.5; 2.98 INJECTION, SOLUTION INTRAVENOUS at 09:52

## 2020-01-01 RX ADMIN — IPRATROPIUM BROMIDE AND ALBUTEROL SULFATE 1 AMPULE: 2.5; .5 SOLUTION RESPIRATORY (INHALATION) at 17:38

## 2020-01-01 RX ADMIN — POLYVINYL ALCOHOL 1 DROP: 14 SOLUTION/ DROPS OPHTHALMIC at 09:00

## 2020-01-01 RX ADMIN — SODIUM CHLORIDE, POTASSIUM CHLORIDE, SODIUM LACTATE AND CALCIUM CHLORIDE: 600; 310; 30; 20 INJECTION, SOLUTION INTRAVENOUS at 15:56

## 2020-01-01 RX ADMIN — DEXTROSE MONOHYDRATE, SODIUM CHLORIDE, AND POTASSIUM CHLORIDE: 50; 4.5; 2.98 INJECTION, SOLUTION INTRAVENOUS at 22:41

## 2020-01-01 RX ADMIN — Medication 24.1 MILLICURIE: at 12:20

## 2020-01-01 RX ADMIN — SODIUM CHLORIDE 8 MG/HR: 9 INJECTION, SOLUTION INTRAVENOUS at 12:25

## 2020-01-01 RX ADMIN — SODIUM BICARBONATE: 84 INJECTION, SOLUTION INTRAVENOUS at 06:52

## 2020-01-01 RX ADMIN — Medication 10 ML: at 22:05

## 2020-01-01 RX ADMIN — CALCIUM GLUCONATE: 98 INJECTION, SOLUTION INTRAVENOUS at 18:15

## 2020-01-01 RX ADMIN — SODIUM CHLORIDE: 9 INJECTION, SOLUTION INTRAVENOUS at 08:41

## 2020-01-01 RX ADMIN — SODIUM CHLORIDE, PRESERVATIVE FREE 10 ML: 5 INJECTION INTRAVENOUS at 22:04

## 2020-01-01 RX ADMIN — SODIUM CHLORIDE, POTASSIUM CHLORIDE, SODIUM LACTATE AND CALCIUM CHLORIDE: 600; 310; 30; 20 INJECTION, SOLUTION INTRAVENOUS at 01:53

## 2020-01-01 RX ADMIN — SODIUM CHLORIDE, POTASSIUM CHLORIDE, SODIUM LACTATE AND CALCIUM CHLORIDE: 600; 310; 30; 20 INJECTION, SOLUTION INTRAVENOUS at 21:22

## 2020-01-01 RX ADMIN — PANTOPRAZOLE SODIUM 40 MG: 40 TABLET, DELAYED RELEASE ORAL at 06:51

## 2020-01-01 RX ADMIN — POTASSIUM CHLORIDE 10 MEQ: 10 INJECTION, SOLUTION INTRAVENOUS at 20:05

## 2020-01-01 RX ADMIN — SUCRALFATE 1 G: 1 TABLET ORAL at 16:40

## 2020-01-01 RX ADMIN — Medication 100 MEQ: at 01:42

## 2020-01-01 RX ADMIN — SODIUM CHLORIDE: 4.5 INJECTION, SOLUTION INTRAVENOUS at 15:21

## 2020-01-01 RX ADMIN — Medication 29.97 MCG/MIN: at 06:40

## 2020-01-01 RX ADMIN — CEFTRIAXONE 2 G: 2 INJECTION, POWDER, FOR SOLUTION INTRAMUSCULAR; INTRAVENOUS at 01:58

## 2020-01-01 RX ADMIN — SODIUM BICARBONATE 50 MEQ: 84 INJECTION, SOLUTION INTRAVENOUS at 10:16

## 2020-01-01 RX ADMIN — POTASSIUM CHLORIDE 10 MEQ: 10 INJECTION, SOLUTION INTRAVENOUS at 00:53

## 2020-01-01 RX ADMIN — ETOMIDATE 10 MG: 2 INJECTION INTRAVENOUS at 23:37

## 2020-01-01 RX ADMIN — IPRATROPIUM BROMIDE AND ALBUTEROL SULFATE 1 AMPULE: 2.5; .5 SOLUTION RESPIRATORY (INHALATION) at 11:24

## 2020-01-01 RX ADMIN — PANTOPRAZOLE SODIUM 40 MG: 40 INJECTION, POWDER, FOR SOLUTION INTRAVENOUS at 17:27

## 2020-01-01 RX ADMIN — METOCLOPRAMIDE HYDROCHLORIDE 10 MG: 5 INJECTION INTRAMUSCULAR; INTRAVENOUS at 01:53

## 2020-01-01 RX ADMIN — CALCIUM GLUCONATE 1 G: 98 INJECTION, SOLUTION INTRAVENOUS at 06:53

## 2020-01-01 RX ADMIN — METHOCARBAMOL TABLETS 500 MG: 500 TABLET, COATED ORAL at 17:23

## 2020-01-01 RX ADMIN — SODIUM CHLORIDE, POTASSIUM CHLORIDE, SODIUM LACTATE AND CALCIUM CHLORIDE: 600; 310; 30; 20 INJECTION, SOLUTION INTRAVENOUS at 22:18

## 2020-01-01 RX ADMIN — SODIUM CHLORIDE: 9 INJECTION, SOLUTION INTRAVENOUS at 08:32

## 2020-01-01 RX ADMIN — METHOCARBAMOL TABLETS 500 MG: 500 TABLET, COATED ORAL at 21:53

## 2020-01-01 RX ADMIN — Medication 100 MCG: at 10:08

## 2020-01-01 RX ADMIN — VASOPRESSIN 0.04 UNITS/MIN: 20 INJECTION INTRAVENOUS at 17:43

## 2020-01-01 RX ADMIN — FENTANYL CITRATE 50 MCG: 50 INJECTION, SOLUTION INTRAMUSCULAR; INTRAVENOUS at 07:15

## 2020-01-01 RX ADMIN — VASOPRESSIN 0.04 UNITS/MIN: 20 INJECTION INTRAVENOUS at 09:39

## 2020-01-01 RX ADMIN — FLUCONAZOLE IN SODIUM CHLORIDE 200 MG: 2 INJECTION, SOLUTION INTRAVENOUS at 12:07

## 2020-01-01 RX ADMIN — IPRATROPIUM BROMIDE AND ALBUTEROL SULFATE 1 AMPULE: 2.5; .5 SOLUTION RESPIRATORY (INHALATION) at 07:48

## 2020-01-01 RX ADMIN — Medication: at 09:00

## 2020-01-01 RX ADMIN — SODIUM CHLORIDE: 4.5 INJECTION, SOLUTION INTRAVENOUS at 13:11

## 2020-01-01 RX ADMIN — HEPARIN SODIUM 5000 UNITS: 10000 INJECTION INTRAVENOUS; SUBCUTANEOUS at 21:04

## 2020-01-01 RX ADMIN — PANTOPRAZOLE SODIUM 40 MG: 40 INJECTION, POWDER, FOR SOLUTION INTRAVENOUS at 12:34

## 2020-01-01 RX ADMIN — METOCLOPRAMIDE HYDROCHLORIDE 5 MG: 5 INJECTION INTRAMUSCULAR; INTRAVENOUS at 17:31

## 2020-01-01 RX ADMIN — SUCRALFATE 1 G: 1 TABLET ORAL at 14:42

## 2020-01-01 RX ADMIN — PIPERACILLIN AND TAZOBACTAM 3.38 G: 3; .375 INJECTION, POWDER, FOR SOLUTION INTRAVENOUS at 16:08

## 2020-01-01 RX ADMIN — MORPHINE SULFATE 4 MG: 4 INJECTION, SOLUTION INTRAMUSCULAR; INTRAVENOUS at 05:01

## 2020-01-01 RX ADMIN — SODIUM CHLORIDE, PRESERVATIVE FREE 10 ML: 5 INJECTION INTRAVENOUS at 09:45

## 2020-01-01 RX ADMIN — EPINEPHRINE 30 MCG/MIN: 1 INJECTION INTRAMUSCULAR; INTRAVENOUS; SUBCUTANEOUS at 03:48

## 2020-01-01 RX ADMIN — PIPERACILLIN AND TAZOBACTAM 3.38 G: 3; .375 INJECTION, POWDER, FOR SOLUTION INTRAVENOUS at 06:11

## 2020-01-01 RX ADMIN — Medication: at 12:45

## 2020-01-01 RX ADMIN — ALBUMIN (HUMAN) 25 G: 0.25 INJECTION, SOLUTION INTRAVENOUS at 08:34

## 2020-01-01 RX ADMIN — PIPERACILLIN AND TAZOBACTAM 3.38 G: 3; .375 INJECTION, POWDER, LYOPHILIZED, FOR SOLUTION INTRAVENOUS at 18:05

## 2020-01-01 RX ADMIN — PANTOPRAZOLE SODIUM 40 MG: 40 INJECTION, POWDER, FOR SOLUTION INTRAVENOUS at 09:44

## 2020-01-01 RX ADMIN — CLOPIDOGREL 75 MG: 75 TABLET, FILM COATED ORAL at 08:49

## 2020-01-01 RX ADMIN — Medication 10 ML: at 22:01

## 2020-01-01 RX ADMIN — CALCIUM GLUCONATE: 98 INJECTION, SOLUTION INTRAVENOUS at 18:10

## 2020-01-01 RX ADMIN — POLYVINYL ALCOHOL 1 DROP: 14 SOLUTION/ DROPS OPHTHALMIC at 00:33

## 2020-01-01 RX ADMIN — CALCIUM CHLORIDE: 100 INJECTION, SOLUTION INTRAVENOUS at 12:16

## 2020-01-01 RX ADMIN — METHOCARBAMOL TABLETS 500 MG: 500 TABLET, COATED ORAL at 21:25

## 2020-01-01 RX ADMIN — Medication 10 ML: at 20:52

## 2020-01-01 RX ADMIN — SODIUM CHLORIDE, PRESERVATIVE FREE 10 ML: 5 INJECTION INTRAVENOUS at 10:05

## 2020-01-01 RX ADMIN — METOCLOPRAMIDE HYDROCHLORIDE 5 MG: 5 INJECTION INTRAMUSCULAR; INTRAVENOUS at 12:06

## 2020-01-01 RX ADMIN — SUCRALFATE 1 G: 1 TABLET ORAL at 08:58

## 2020-01-01 RX ADMIN — CALCIUM GLUCONATE: 98 INJECTION, SOLUTION INTRAVENOUS at 19:14

## 2020-01-01 RX ADMIN — TORSEMIDE 20 MG: 20 TABLET ORAL at 08:35

## 2020-01-01 RX ADMIN — PANTOPRAZOLE SODIUM 40 MG: 40 INJECTION, POWDER, FOR SOLUTION INTRAVENOUS at 20:24

## 2020-01-01 RX ADMIN — MINERAL OIL AND PETROLATUM: 150; 830 OINTMENT OPHTHALMIC at 05:55

## 2020-01-01 RX ADMIN — Medication 10 ML: at 11:00

## 2020-01-01 RX ADMIN — CALCIUM CHLORIDE: 100 INJECTION, SOLUTION INTRAVENOUS at 09:38

## 2020-01-01 RX ADMIN — SUCRALFATE 1 G: 1 TABLET ORAL at 12:06

## 2020-01-01 RX ADMIN — MINERAL OIL AND PETROLATUM: 150; 830 OINTMENT OPHTHALMIC at 14:08

## 2020-01-01 RX ADMIN — Medication 10 ML: at 10:24

## 2020-01-01 RX ADMIN — METOCLOPRAMIDE HYDROCHLORIDE 5 MG: 5 INJECTION INTRAMUSCULAR; INTRAVENOUS at 05:58

## 2020-01-01 RX ADMIN — Medication 150 MG: at 06:00

## 2020-01-01 RX ADMIN — ENALAPRIL MALEATE 10 MG: 10 TABLET ORAL at 08:10

## 2020-01-01 RX ADMIN — HEPARIN SODIUM 5000 UNITS: 10000 INJECTION INTRAVENOUS; SUBCUTANEOUS at 06:40

## 2020-01-01 RX ADMIN — POLYVINYL ALCOHOL 1 DROP: 14 SOLUTION/ DROPS OPHTHALMIC at 20:20

## 2020-01-01 RX ADMIN — ASPIRIN 81 MG: 81 TABLET, COATED ORAL at 09:24

## 2020-01-01 RX ADMIN — NIFEDIPINE 30 MG: 30 TABLET, FILM COATED, EXTENDED RELEASE ORAL at 09:24

## 2020-01-01 RX ADMIN — SUCRALFATE 1 G: 1 TABLET ORAL at 11:45

## 2020-01-01 RX ADMIN — METOCLOPRAMIDE HYDROCHLORIDE 5 MG: 5 INJECTION INTRAMUSCULAR; INTRAVENOUS at 16:40

## 2020-01-01 RX ADMIN — CALCIUM GLUCONATE 2 G: 98 INJECTION, SOLUTION INTRAVENOUS at 10:20

## 2020-01-01 RX ADMIN — SODIUM CHLORIDE, PRESERVATIVE FREE 10 ML: 5 INJECTION INTRAVENOUS at 08:20

## 2020-01-01 RX ADMIN — SODIUM BICARBONATE: 84 INJECTION, SOLUTION INTRAVENOUS at 02:23

## 2020-01-01 RX ADMIN — OXYMETAZOLINE HYDROCHLORIDE 2 SPRAY: 5 SPRAY NASAL at 21:14

## 2020-01-01 RX ADMIN — METHOCARBAMOL TABLETS 500 MG: 500 TABLET, COATED ORAL at 07:38

## 2020-01-01 RX ADMIN — SUCRALFATE 1 G: 1 TABLET ORAL at 17:38

## 2020-01-01 RX ADMIN — DOCUSATE SODIUM 100 MG: 100 CAPSULE, LIQUID FILLED ORAL at 09:06

## 2020-01-01 RX ADMIN — EPINEPHRINE 20 MCG/MIN: 1 INJECTION INTRAMUSCULAR; INTRAVENOUS; SUBCUTANEOUS at 19:09

## 2020-01-01 RX ADMIN — METHOCARBAMOL TABLETS 500 MG: 500 TABLET, COATED ORAL at 21:43

## 2020-01-01 RX ADMIN — SODIUM CHLORIDE: 9 INJECTION, SOLUTION INTRAVENOUS at 10:02

## 2020-01-01 RX ADMIN — ALBUMIN (HUMAN) 25 G: 0.25 INJECTION, SOLUTION INTRAVENOUS at 21:34

## 2020-01-01 RX ADMIN — ACETAMINOPHEN 650 MG: 325 TABLET, FILM COATED ORAL at 09:48

## 2020-01-01 RX ADMIN — ROCURONIUM BROMIDE 30 MG: 10 INJECTION, SOLUTION INTRAVENOUS at 10:02

## 2020-01-01 RX ADMIN — SODIUM BICARBONATE: 84 INJECTION, SOLUTION INTRAVENOUS at 06:38

## 2020-01-01 RX ADMIN — SODIUM CHLORIDE 20 ML: 9 INJECTION, SOLUTION INTRAVENOUS at 01:28

## 2020-01-01 RX ADMIN — SODIUM CHLORIDE, PRESERVATIVE FREE 10 ML: 5 INJECTION INTRAVENOUS at 20:02

## 2020-01-01 RX ADMIN — HYDROCHLOROTHIAZIDE 25 MG: 25 TABLET ORAL at 08:29

## 2020-01-01 RX ADMIN — SENNOSIDES 8.6 MG: 8.6 TABLET, FILM COATED ORAL at 20:49

## 2020-01-01 RX ADMIN — VASOPRESSIN 1 UNITS: 20 INJECTION INTRAVENOUS at 10:26

## 2020-01-01 RX ADMIN — IPRATROPIUM BROMIDE AND ALBUTEROL SULFATE 1 AMPULE: 2.5; .5 SOLUTION RESPIRATORY (INHALATION) at 21:59

## 2020-01-01 RX ADMIN — Medication 10 ML: at 22:06

## 2020-01-01 RX ADMIN — Medication 10 ML: at 08:18

## 2020-01-01 RX ADMIN — CLOPIDOGREL 75 MG: 75 TABLET, FILM COATED ORAL at 10:01

## 2020-01-01 RX ADMIN — Medication 150 MG: at 06:51

## 2020-01-01 RX ADMIN — METOCLOPRAMIDE HYDROCHLORIDE 5 MG: 5 INJECTION INTRAMUSCULAR; INTRAVENOUS at 00:20

## 2020-01-01 RX ADMIN — CHLORHEXIDINE GLUCONATE 15 ML: 1.2 RINSE ORAL at 08:07

## 2020-01-01 RX ADMIN — IPRATROPIUM BROMIDE AND ALBUTEROL SULFATE 1 AMPULE: 2.5; .5 SOLUTION RESPIRATORY (INHALATION) at 15:57

## 2020-01-01 RX ADMIN — FUROSEMIDE 20 MG: 10 INJECTION, SOLUTION INTRAMUSCULAR; INTRAVENOUS at 22:19

## 2020-01-01 RX ADMIN — Medication 10 ML: at 21:05

## 2020-01-01 RX ADMIN — POTASSIUM CHLORIDE 10 MEQ: 10 INJECTION, SOLUTION INTRAVENOUS at 12:57

## 2020-01-01 RX ADMIN — SUCRALFATE 1 G: 1 TABLET ORAL at 06:21

## 2020-01-01 RX ADMIN — SODIUM CHLORIDE, PRESERVATIVE FREE 10 ML: 5 INJECTION INTRAVENOUS at 08:35

## 2020-01-01 RX ADMIN — SODIUM CHLORIDE, POTASSIUM CHLORIDE, SODIUM LACTATE AND CALCIUM CHLORIDE: 600; 310; 30; 20 INJECTION, SOLUTION INTRAVENOUS at 06:23

## 2020-01-01 RX ADMIN — ENALAPRIL MALEATE 10 MG: 10 TABLET ORAL at 08:55

## 2020-01-01 RX ADMIN — CALCIUM CARBONATE (ANTACID) CHEW TAB 500 MG 500 MG: 500 CHEW TAB at 21:25

## 2020-01-01 RX ADMIN — CHLORHEXIDINE GLUCONATE 15 ML: 1.2 RINSE ORAL at 21:34

## 2020-01-01 RX ADMIN — METHOCARBAMOL TABLETS 500 MG: 500 TABLET, COATED ORAL at 21:11

## 2020-01-01 RX ADMIN — SUCRALFATE 1 G: 1 TABLET ORAL at 01:00

## 2020-01-01 RX ADMIN — HEPARIN SODIUM 12 UNITS/KG/HR: 10000 INJECTION, SOLUTION INTRAVENOUS at 15:45

## 2020-01-01 RX ADMIN — SODIUM BICARBONATE: 84 INJECTION, SOLUTION INTRAVENOUS at 18:54

## 2020-01-01 RX ADMIN — SODIUM CHLORIDE, PRESERVATIVE FREE 10 ML: 5 INJECTION INTRAVENOUS at 08:08

## 2020-01-01 RX ADMIN — NIFEDIPINE 30 MG: 30 TABLET, FILM COATED, EXTENDED RELEASE ORAL at 08:49

## 2020-01-01 RX ADMIN — PANTOPRAZOLE SODIUM 40 MG: 40 TABLET, DELAYED RELEASE ORAL at 16:36

## 2020-01-01 RX ADMIN — NIFEDIPINE 30 MG: 30 TABLET, FILM COATED, EXTENDED RELEASE ORAL at 08:10

## 2020-01-01 RX ADMIN — Medication: at 06:42

## 2020-01-01 RX ADMIN — CALCIUM CARBONATE (ANTACID) CHEW TAB 500 MG 500 MG: 500 CHEW TAB at 17:21

## 2020-01-01 RX ADMIN — METOCLOPRAMIDE HYDROCHLORIDE 5 MG: 5 INJECTION INTRAMUSCULAR; INTRAVENOUS at 18:15

## 2020-01-01 RX ADMIN — SODIUM CHLORIDE, PRESERVATIVE FREE 10 ML: 5 INJECTION INTRAVENOUS at 10:27

## 2020-01-01 RX ADMIN — METHOCARBAMOL TABLETS 500 MG: 500 TABLET, COATED ORAL at 10:31

## 2020-01-01 RX ADMIN — PANTOPRAZOLE SODIUM 40 MG: 40 INJECTION, POWDER, FOR SOLUTION INTRAVENOUS at 10:03

## 2020-01-01 RX ADMIN — METHOCARBAMOL TABLETS 500 MG: 500 TABLET, COATED ORAL at 14:36

## 2020-01-01 RX ADMIN — DEXAMETHASONE SODIUM PHOSPHATE 10 MG: 10 INJECTION INTRAMUSCULAR; INTRAVENOUS at 07:25

## 2020-01-01 RX ADMIN — NIFEDIPINE 30 MG: 30 TABLET, FILM COATED, EXTENDED RELEASE ORAL at 08:29

## 2020-01-01 RX ADMIN — INSULIN LISPRO 4 UNITS: 100 INJECTION, SOLUTION INTRAVENOUS; SUBCUTANEOUS at 08:48

## 2020-01-01 RX ADMIN — EPINEPHRINE 10 MCG/MIN: 1 INJECTION INTRAMUSCULAR; INTRAVENOUS; SUBCUTANEOUS at 13:54

## 2020-01-01 RX ADMIN — TRIMETHOBENZAMIDE HYDROCHLORIDE 200 MG: 100 INJECTION INTRAMUSCULAR at 23:31

## 2020-01-01 RX ADMIN — Medication 29.97 MCG/MIN: at 00:08

## 2020-01-01 RX ADMIN — METHOCARBAMOL TABLETS 500 MG: 500 TABLET, COATED ORAL at 09:44

## 2020-01-01 RX ADMIN — PANTOPRAZOLE SODIUM 40 MG: 40 TABLET, DELAYED RELEASE ORAL at 06:00

## 2020-01-01 RX ADMIN — POTASSIUM CHLORIDE: 2 INJECTION, SOLUTION, CONCENTRATE INTRAVENOUS at 17:07

## 2020-01-01 RX ADMIN — VASOPRESSIN 0.5 UNITS: 20 INJECTION INTRAVENOUS at 10:41

## 2020-01-01 RX ADMIN — PANTOPRAZOLE SODIUM 40 MG: 40 INJECTION, POWDER, FOR SOLUTION INTRAVENOUS at 08:58

## 2020-01-01 RX ADMIN — METHOCARBAMOL TABLETS 500 MG: 500 TABLET, COATED ORAL at 12:01

## 2020-01-01 RX ADMIN — SUCRALFATE 1 G: 1 TABLET ORAL at 16:42

## 2020-01-01 RX ADMIN — SODIUM CHLORIDE: 9 INJECTION, SOLUTION INTRAVENOUS at 13:51

## 2020-01-01 RX ADMIN — METOCLOPRAMIDE HYDROCHLORIDE 5 MG: 5 INJECTION INTRAMUSCULAR; INTRAVENOUS at 23:34

## 2020-01-01 RX ADMIN — METHOCARBAMOL TABLETS 500 MG: 500 TABLET, COATED ORAL at 08:18

## 2020-01-01 RX ADMIN — METHOCARBAMOL TABLETS 500 MG: 500 TABLET, COATED ORAL at 13:12

## 2020-01-01 RX ADMIN — Medication 150 MG: at 06:43

## 2020-01-01 RX ADMIN — ACETAMINOPHEN 650 MG: 325 TABLET, FILM COATED ORAL at 21:32

## 2020-01-01 RX ADMIN — SUCRALFATE 1 G: 1 TABLET ORAL at 08:29

## 2020-01-01 RX ADMIN — Medication: at 06:52

## 2020-01-01 RX ADMIN — PROCHLORPERAZINE EDISYLATE 10 MG: 5 INJECTION INTRAMUSCULAR; INTRAVENOUS at 22:53

## 2020-01-01 RX ADMIN — METOCLOPRAMIDE HYDROCHLORIDE 10 MG: 5 INJECTION INTRAMUSCULAR; INTRAVENOUS at 08:10

## 2020-01-01 RX ADMIN — HEPARIN SODIUM 5000 UNITS: 10000 INJECTION INTRAVENOUS; SUBCUTANEOUS at 05:36

## 2020-01-01 RX ADMIN — MAGNESIUM SULFATE 1 ENEMA: 1 CRYSTAL ORAL; TOPICAL at 17:18

## 2020-01-01 RX ADMIN — MORPHINE SULFATE 2 MG: 2 INJECTION, SOLUTION INTRAMUSCULAR; INTRAVENOUS at 19:51

## 2020-01-01 RX ADMIN — ASPIRIN 81 MG: 81 TABLET, COATED ORAL at 08:29

## 2020-01-01 RX ADMIN — PROMETHAZINE HYDROCHLORIDE 12.5 MG: 25 TABLET ORAL at 09:06

## 2020-01-01 RX ADMIN — HEPARIN SODIUM 5000 UNITS: 10000 INJECTION INTRAVENOUS; SUBCUTANEOUS at 21:20

## 2020-01-01 RX ADMIN — CLOPIDOGREL 75 MG: 75 TABLET, FILM COATED ORAL at 10:24

## 2020-01-01 RX ADMIN — TRIMETHOBENZAMIDE HYDROCHLORIDE 200 MG: 100 INJECTION INTRAMUSCULAR at 10:38

## 2020-01-01 RX ADMIN — POTASSIUM CHLORIDE 20 MEQ: 400 INJECTION, SOLUTION INTRAVENOUS at 02:52

## 2020-01-01 RX ADMIN — METOCLOPRAMIDE HYDROCHLORIDE 10 MG: 5 INJECTION INTRAMUSCULAR; INTRAVENOUS at 13:10

## 2020-01-01 RX ADMIN — METOCLOPRAMIDE HYDROCHLORIDE 5 MG: 5 INJECTION INTRAMUSCULAR; INTRAVENOUS at 17:38

## 2020-01-01 RX ADMIN — SUCRALFATE 1 G: 1 TABLET ORAL at 12:44

## 2020-01-01 RX ADMIN — CLOPIDOGREL 75 MG: 75 TABLET, FILM COATED ORAL at 09:24

## 2020-01-01 RX ADMIN — Medication 10 ML: at 08:35

## 2020-01-01 RX ADMIN — SUCRALFATE 1 G: 1 TABLET ORAL at 18:00

## 2020-01-01 RX ADMIN — IPRATROPIUM BROMIDE AND ALBUTEROL SULFATE 1 AMPULE: 2.5; .5 SOLUTION RESPIRATORY (INHALATION) at 21:47

## 2020-01-01 RX ADMIN — DOXYCYCLINE 100 MG: 100 INJECTION, POWDER, LYOPHILIZED, FOR SOLUTION INTRAVENOUS at 22:20

## 2020-01-01 RX ADMIN — TRIMETHOBENZAMIDE HYDROCHLORIDE 200 MG: 100 INJECTION INTRAMUSCULAR at 06:44

## 2020-01-01 RX ADMIN — IPRATROPIUM BROMIDE AND ALBUTEROL SULFATE 1 AMPULE: 2.5; .5 SOLUTION RESPIRATORY (INHALATION) at 08:25

## 2020-01-01 RX ADMIN — CALCIUM CARBONATE (ANTACID) CHEW TAB 500 MG 500 MG: 500 CHEW TAB at 01:05

## 2020-01-01 RX ADMIN — POTASSIUM CHLORIDE 10 MEQ: 10 INJECTION, SOLUTION INTRAVENOUS at 08:55

## 2020-01-01 RX ADMIN — PANTOPRAZOLE SODIUM 40 MG: 40 TABLET, DELAYED RELEASE ORAL at 06:39

## 2020-01-01 RX ADMIN — NIFEDIPINE 30 MG: 30 TABLET, FILM COATED, EXTENDED RELEASE ORAL at 12:08

## 2020-01-01 RX ADMIN — POTASSIUM CHLORIDE 10 MEQ: 10 INJECTION, SOLUTION INTRAVENOUS at 22:06

## 2020-01-01 RX ADMIN — Medication: at 07:30

## 2020-01-01 RX ADMIN — HEPARIN SODIUM 5000 UNITS: 10000 INJECTION INTRAVENOUS; SUBCUTANEOUS at 13:12

## 2020-01-01 RX ADMIN — SODIUM CHLORIDE 250 ML: 9 INJECTION, SOLUTION INTRAVENOUS at 00:24

## 2020-01-01 RX ADMIN — NIFEDIPINE 30 MG: 30 TABLET, FILM COATED, EXTENDED RELEASE ORAL at 07:38

## 2020-01-01 RX ADMIN — SODIUM CHLORIDE: 4.5 INJECTION, SOLUTION INTRAVENOUS at 17:03

## 2020-01-01 RX ADMIN — MINERAL OIL AND PETROLATUM: 150; 830 OINTMENT OPHTHALMIC at 18:00

## 2020-01-01 RX ADMIN — ROCURONIUM BROMIDE 5 MG: 10 INJECTION, SOLUTION INTRAVENOUS at 07:59

## 2020-01-01 RX ADMIN — MINERAL OIL AND PETROLATUM: 150; 830 OINTMENT OPHTHALMIC at 01:26

## 2020-01-01 RX ADMIN — CALCIUM CHLORIDE: 100 INJECTION, SOLUTION INTRAVENOUS at 22:08

## 2020-01-01 RX ADMIN — CALCIUM CHLORIDE 1 G: 100 INJECTION, SOLUTION INTRAVENOUS at 07:47

## 2020-01-01 RX ADMIN — METHOCARBAMOL TABLETS 500 MG: 500 TABLET, COATED ORAL at 21:33

## 2020-01-01 RX ADMIN — PROCHLORPERAZINE EDISYLATE 10 MG: 5 INJECTION INTRAMUSCULAR; INTRAVENOUS at 16:57

## 2020-01-01 RX ADMIN — ASPIRIN 81 MG: 81 TABLET, COATED ORAL at 10:30

## 2020-01-01 RX ADMIN — CEFTRIAXONE 2 G: 2 INJECTION, POWDER, FOR SOLUTION INTRAMUSCULAR; INTRAVENOUS at 02:07

## 2020-01-01 RX ADMIN — POTASSIUM CHLORIDE, DEXTROSE MONOHYDRATE AND SODIUM CHLORIDE: 150; 5; 450 INJECTION, SOLUTION INTRAVENOUS at 06:40

## 2020-01-01 RX ADMIN — SUCRALFATE 1 G: 1 TABLET ORAL at 08:35

## 2020-01-01 RX ADMIN — SODIUM PHOSPHATE, MONOBASIC, MONOHYDRATE 30 MMOL: 276; 142 INJECTION, SOLUTION INTRAVENOUS at 09:10

## 2020-01-01 RX ADMIN — VASOPRESSIN 0.04 UNITS/MIN: 20 INJECTION INTRAVENOUS at 09:24

## 2020-01-01 RX ADMIN — CALCIUM GLUCONATE 1 G: 98 INJECTION, SOLUTION INTRAVENOUS at 10:01

## 2020-01-01 RX ADMIN — ASPIRIN 81 MG: 81 TABLET, COATED ORAL at 09:25

## 2020-01-01 RX ADMIN — SODIUM BICARBONATE: 84 INJECTION, SOLUTION INTRAVENOUS at 07:47

## 2020-01-01 RX ADMIN — PHENYLEPHRINE HYDROCHLORIDE 100 MCG: 10 INJECTION INTRAVENOUS at 07:33

## 2020-01-01 RX ADMIN — METOCLOPRAMIDE HYDROCHLORIDE 5 MG: 5 INJECTION INTRAMUSCULAR; INTRAVENOUS at 12:01

## 2020-01-01 RX ADMIN — PIPERACILLIN AND TAZOBACTAM 3.38 G: 3; .375 INJECTION, POWDER, LYOPHILIZED, FOR SOLUTION INTRAVENOUS at 18:03

## 2020-01-01 RX ADMIN — METOCLOPRAMIDE HYDROCHLORIDE 5 MG: 5 INJECTION INTRAMUSCULAR; INTRAVENOUS at 11:43

## 2020-01-01 ASSESSMENT — PULMONARY FUNCTION TESTS
PIF_VALUE: 1
PIF_VALUE: 38
PIF_VALUE: 22
PIF_VALUE: 31
PIF_VALUE: 32
PIF_VALUE: 20
PIF_VALUE: 43
PIF_VALUE: 22
PIF_VALUE: 33
PIF_VALUE: 2
PIF_VALUE: 29
PIF_VALUE: 20
PIF_VALUE: 37
PIF_VALUE: 27
PIF_VALUE: 38
PIF_VALUE: 20
PIF_VALUE: 23
PIF_VALUE: 35
PIF_VALUE: 32
PIF_VALUE: 33
PIF_VALUE: 30
PIF_VALUE: 28
PIF_VALUE: 31
PIF_VALUE: 20
PIF_VALUE: 31
PIF_VALUE: 30
PIF_VALUE: 21
PIF_VALUE: 2
PIF_VALUE: 21
PIF_VALUE: 21
PIF_VALUE: 3
PIF_VALUE: 40
PIF_VALUE: 30
PIF_VALUE: 31
PIF_VALUE: 19
PIF_VALUE: 20
PIF_VALUE: 18
PIF_VALUE: 29
PIF_VALUE: 33
PIF_VALUE: 38
PIF_VALUE: 20
PIF_VALUE: 30
PIF_VALUE: 35
PIF_VALUE: 23
PIF_VALUE: 29
PIF_VALUE: 19
PIF_VALUE: 38
PIF_VALUE: 38
PIF_VALUE: 29
PIF_VALUE: 32
PIF_VALUE: 31
PIF_VALUE: 29
PIF_VALUE: 22
PIF_VALUE: 0
PIF_VALUE: 38
PIF_VALUE: 0
PIF_VALUE: 36
PIF_VALUE: 3
PIF_VALUE: 32
PIF_VALUE: 30
PIF_VALUE: 37
PIF_VALUE: 33
PIF_VALUE: 0
PIF_VALUE: 20
PIF_VALUE: 32
PIF_VALUE: 32
PIF_VALUE: 36
PIF_VALUE: 18
PIF_VALUE: 31
PIF_VALUE: 0
PIF_VALUE: 35
PIF_VALUE: 38
PIF_VALUE: 25
PIF_VALUE: 32
PIF_VALUE: 36
PIF_VALUE: 36
PIF_VALUE: 29
PIF_VALUE: 31
PIF_VALUE: 27
PIF_VALUE: 32
PIF_VALUE: 38
PIF_VALUE: 29
PIF_VALUE: 29
PIF_VALUE: 34
PIF_VALUE: 36
PIF_VALUE: 19
PIF_VALUE: 30
PIF_VALUE: 32
PIF_VALUE: 37
PIF_VALUE: 32
PIF_VALUE: 1
PIF_VALUE: 3
PIF_VALUE: 32
PIF_VALUE: 19
PIF_VALUE: 42
PIF_VALUE: 31
PIF_VALUE: 41
PIF_VALUE: 20
PIF_VALUE: 41
PIF_VALUE: 0
PIF_VALUE: 38
PIF_VALUE: 36
PIF_VALUE: 16
PIF_VALUE: 29
PIF_VALUE: 33
PIF_VALUE: 36
PIF_VALUE: 0
PIF_VALUE: 29
PIF_VALUE: 3
PIF_VALUE: 22
PIF_VALUE: 35
PIF_VALUE: 21
PIF_VALUE: 5
PIF_VALUE: 18
PIF_VALUE: 0
PIF_VALUE: 33
PIF_VALUE: 37
PIF_VALUE: 38
PIF_VALUE: 21
PIF_VALUE: 31
PIF_VALUE: 19
PEFR_L/MIN: 70
PIF_VALUE: 21
PIF_VALUE: 36
PIF_VALUE: 36
PIF_VALUE: 29
PIF_VALUE: 18
PIF_VALUE: 30
PIF_VALUE: 37
PIF_VALUE: 26
PIF_VALUE: 27
PIF_VALUE: 36
PIF_VALUE: 35
PIF_VALUE: 30
PIF_VALUE: 32
PIF_VALUE: 29
PIF_VALUE: 32
PIF_VALUE: 35
PIF_VALUE: 19
PIF_VALUE: 31
PIF_VALUE: 28
PIF_VALUE: 32
PIF_VALUE: 35
PIF_VALUE: 22
PIF_VALUE: 22
PIF_VALUE: 30
PIF_VALUE: 39
PIF_VALUE: 33
PIF_VALUE: 24
PIF_VALUE: 35
PIF_VALUE: 37
PIF_VALUE: 37
PIF_VALUE: 27
PIF_VALUE: 34
PIF_VALUE: 32
PIF_VALUE: 35
PIF_VALUE: 34
PIF_VALUE: 31
PIF_VALUE: 1
PIF_VALUE: 35
PIF_VALUE: 0
PIF_VALUE: 26
PIF_VALUE: 29
PIF_VALUE: 32
PIF_VALUE: 29
PIF_VALUE: 20
PIF_VALUE: 23
PIF_VALUE: 29
PIF_VALUE: 32
PIF_VALUE: 21
PIF_VALUE: 21
PIF_VALUE: 20
PIF_VALUE: 22
PIF_VALUE: 23
PIF_VALUE: 31
PIF_VALUE: 20
PIF_VALUE: 23
PIF_VALUE: 18
PIF_VALUE: 24
PIF_VALUE: 28
PIF_VALUE: 29
PIF_VALUE: 20
PIF_VALUE: 29
PIF_VALUE: 22
PIF_VALUE: 31
PIF_VALUE: 0
PIF_VALUE: 27
PIF_VALUE: 29
PIF_VALUE: 35
PIF_VALUE: 28
PIF_VALUE: 35
PIF_VALUE: 26
PIF_VALUE: 40
PIF_VALUE: 29
PIF_VALUE: 18
PIF_VALUE: 38
PIF_VALUE: 38
PIF_VALUE: 32
PIF_VALUE: 31
PIF_VALUE: 30
PIF_VALUE: 18
PIF_VALUE: 30
PIF_VALUE: 23
PIF_VALUE: 35
PIF_VALUE: 24
PIF_VALUE: 0
PIF_VALUE: 2
PIF_VALUE: 29
PIF_VALUE: 30
PIF_VALUE: 1
PIF_VALUE: 1
PIF_VALUE: 31
PIF_VALUE: 29
PIF_VALUE: 30
PIF_VALUE: 35
PIF_VALUE: 35
PIF_VALUE: 33
PIF_VALUE: 33
PIF_VALUE: 31
PIF_VALUE: 32
PIF_VALUE: 7
PIF_VALUE: 1
PIF_VALUE: 21
PIF_VALUE: 1
PIF_VALUE: 23
PIF_VALUE: 33
PIF_VALUE: 20
PIF_VALUE: 0
PIF_VALUE: 38
PIF_VALUE: 31
PIF_VALUE: 32
PIF_VALUE: 22
PIF_VALUE: 31
PIF_VALUE: 22
PIF_VALUE: 24
PIF_VALUE: 21
PIF_VALUE: 32
PIF_VALUE: 36
PIF_VALUE: 36
PIF_VALUE: 35
PIF_VALUE: 30
PIF_VALUE: 1
PIF_VALUE: 32
PIF_VALUE: 40
PIF_VALUE: 34
PIF_VALUE: 23
PIF_VALUE: 37
PIF_VALUE: 28
PIF_VALUE: 0
PIF_VALUE: 33
PIF_VALUE: 25
PIF_VALUE: 32
PIF_VALUE: 41
PIF_VALUE: 38
PIF_VALUE: 31
PIF_VALUE: 29
PIF_VALUE: 31
PIF_VALUE: 35
PIF_VALUE: 38
PIF_VALUE: 21
PIF_VALUE: 20
PIF_VALUE: 2
PIF_VALUE: 35
PIF_VALUE: 38
PIF_VALUE: 2
PIF_VALUE: 38
PIF_VALUE: 0
PIF_VALUE: 29
PIF_VALUE: 20
PIF_VALUE: 28
PIF_VALUE: 22
PIF_VALUE: 21
PIF_VALUE: 3
PIF_VALUE: 41
PIF_VALUE: 19
PIF_VALUE: 0
PIF_VALUE: 38
PIF_VALUE: 28
PIF_VALUE: 32
PIF_VALUE: 34
PIF_VALUE: 36
PIF_VALUE: 38
PIF_VALUE: 30
PIF_VALUE: 22
PIF_VALUE: 32
PIF_VALUE: 28
PIF_VALUE: 31
PIF_VALUE: 35
PIF_VALUE: 36
PIF_VALUE: 1
PIF_VALUE: 20
PIF_VALUE: 1
PIF_VALUE: 34
PIF_VALUE: 25
PIF_VALUE: 31
PIF_VALUE: 31
PIF_VALUE: 32
PIF_VALUE: 31
PIF_VALUE: 33
PIF_VALUE: 37
PIF_VALUE: 32
PIF_VALUE: 20
PIF_VALUE: 35
PIF_VALUE: 34
PIF_VALUE: 1
PIF_VALUE: 31
PIF_VALUE: 28
PIF_VALUE: 30
PIF_VALUE: 20
PIF_VALUE: 14

## 2020-01-01 ASSESSMENT — PAIN SCALES - GENERAL
PAINLEVEL_OUTOF10: 0
PAINLEVEL_OUTOF10: 6
PAINLEVEL_OUTOF10: 0
PAINLEVEL_OUTOF10: 7
PAINLEVEL_OUTOF10: 0
PAINLEVEL_OUTOF10: 4
PAINLEVEL_OUTOF10: 0
PAINLEVEL_OUTOF10: 8
PAINLEVEL_OUTOF10: 0
PAINLEVEL_OUTOF10: 5
PAINLEVEL_OUTOF10: 0
PAINLEVEL_OUTOF10: 4
PAINLEVEL_OUTOF10: 0
PAINLEVEL_OUTOF10: 0
PAINLEVEL_OUTOF10: 8
PAINLEVEL_OUTOF10: 0
PAINLEVEL_OUTOF10: 0
PAINLEVEL_OUTOF10: 6
PAINLEVEL_OUTOF10: 5
PAINLEVEL_OUTOF10: 0
PAINLEVEL_OUTOF10: 10
PAINLEVEL_OUTOF10: 0
PAINLEVEL_OUTOF10: 6
PAINLEVEL_OUTOF10: 0
PAINLEVEL_OUTOF10: 7
PAINLEVEL_OUTOF10: 0
PAINLEVEL_OUTOF10: 3
PAINLEVEL_OUTOF10: 0
PAINLEVEL_OUTOF10: 0
PAINLEVEL_OUTOF10: 5
PAINLEVEL_OUTOF10: 0
PAINLEVEL_OUTOF10: 8
PAINLEVEL_OUTOF10: 0
PAINLEVEL_OUTOF10: 3
PAINLEVEL_OUTOF10: 0
PAINLEVEL_OUTOF10: 3
PAINLEVEL_OUTOF10: 0
PAINLEVEL_OUTOF10: 5
PAINLEVEL_OUTOF10: 0
PAINLEVEL_OUTOF10: 4
PAINLEVEL_OUTOF10: 0
PAINLEVEL_OUTOF10: 10
PAINLEVEL_OUTOF10: 0
PAINLEVEL_OUTOF10: 0
PAINLEVEL_OUTOF10: 7
PAINLEVEL_OUTOF10: 0
PAINLEVEL_OUTOF10: 7
PAINLEVEL_OUTOF10: 8
PAINLEVEL_OUTOF10: 0
PAINLEVEL_OUTOF10: 6
PAINLEVEL_OUTOF10: 0
PAINLEVEL_OUTOF10: 0
PAINLEVEL_OUTOF10: 7
PAINLEVEL_OUTOF10: 0
PAINLEVEL_OUTOF10: 7
PAINLEVEL_OUTOF10: 0
PAINLEVEL_OUTOF10: 8
PAINLEVEL_OUTOF10: 0
PAINLEVEL_OUTOF10: 0
PAINLEVEL_OUTOF10: 7
PAINLEVEL_OUTOF10: 0

## 2020-01-01 ASSESSMENT — PAIN DESCRIPTION - ONSET
ONSET: ON-GOING

## 2020-01-01 ASSESSMENT — PAIN DESCRIPTION - PAIN TYPE
TYPE: ACUTE PAIN
TYPE: CHRONIC PAIN
TYPE: ACUTE PAIN

## 2020-01-01 ASSESSMENT — PAIN DESCRIPTION - ORIENTATION: ORIENTATION: LOWER

## 2020-01-01 ASSESSMENT — PAIN DESCRIPTION - FREQUENCY
FREQUENCY: CONTINUOUS

## 2020-01-01 ASSESSMENT — PAIN DESCRIPTION - LOCATION
LOCATION: ABDOMEN
LOCATION: ABDOMEN
LOCATION: GENERALIZED
LOCATION: ABDOMEN
LOCATION: ABDOMEN
LOCATION: BUTTOCKS
LOCATION: ABDOMEN
LOCATION: BACK

## 2020-01-01 ASSESSMENT — PAIN DESCRIPTION - PROGRESSION
CLINICAL_PROGRESSION: NOT CHANGED

## 2020-01-01 ASSESSMENT — PAIN SCALES - WONG BAKER
WONGBAKER_NUMERICALRESPONSE: 0

## 2020-01-01 ASSESSMENT — PAIN DESCRIPTION - DESCRIPTORS
DESCRIPTORS: ACHING
DESCRIPTORS: CRAMPING
DESCRIPTORS: ACHING;DISCOMFORT;SORE
DESCRIPTORS: ACHING;DULL
DESCRIPTORS: ACHING;CONSTANT;DISCOMFORT
DESCRIPTORS: ACHING;CONSTANT;DISCOMFORT
DESCRIPTORS: ACHING;CONSTANT;DISCOMFORT;DULL
DESCRIPTORS: ACHING

## 2020-01-01 ASSESSMENT — PAIN - FUNCTIONAL ASSESSMENT: PAIN_FUNCTIONAL_ASSESSMENT: ACTIVITIES ARE NOT PREVENTED

## 2020-01-11 NOTE — PROCEDURES
[address] [facility]  [facility]                            CARDIAC CATHETERIZATION    PATIENT NAME: Derrick Stokes                    :        1935  MED REC NO:   [id number]                            ROOM:  ACCOUNT NO:   [de-identified]                           ADMIT DATE: 01/10/2020  PROVIDER:     Michael Henning DO    DATE OF PROCEDURE:  01/10/2020    RIGHT AND LEFT HEART CATHETERIZATION    PROCEDURE PERFORMED:  [place] left and right heart catheterization. SCAI indicator 70, score 7. OXYGEN SATURATIONS:  Aorta 96.2, right atrium 66.5, pulmonary artery  65.2. PRESSURES:  /45. /97, mean 116. Pulmonary wedge pressure:   A wave 13, B wave 12, mean 11. Pulmonary artery pressure is 36/27, mean  32. Right ventricle 36/20. Right atrium:  A wave 14, B wave 14, mean  14. CARDIAC OUTPUT:  Thermodilution 5.04 liters per minute. Milo 5.55  liters per minute. Cardiac index 2.7 liters/minute per sq. m.  Systemic  vascular resistance is 1685 dynes. Calculated valve area thermodilution  0.68 cm2. Index 0.36 cm/sq. m. SUMMARY OF INJECTIONS:  II.  Selective coronary arteriogram:  a. Right coronary artery - preponderant, 99% stenosis seen at junction  of proximal mid third segment with subtotal occlusion seen in the mid  segment. The distal vessel fills retrograde via intercoronary  collaterals and is grossly underperfused. b.  Left coronary:  1. Left main trunk - normal.  2.  Left anterior descending: In the proximal anterior descending  artery, there was eccentric 20% to 25% segmental stenosis. The distal  vessel is widely patent. 3.  Circumflex:  Nondominant. A moderate caliber vessel giving rise to  a large first marginal branch without areas of critical stenosis or  spontaneous coronary artery spasm. III.   Left ventricular angiogram:  Left ventricular cavity size was  upper limits of normal

## 2020-03-04 PROBLEM — D64.9 ANEMIA: Status: ACTIVE | Noted: 2020-01-01

## 2020-03-04 PROBLEM — N17.9 AKI (ACUTE KIDNEY INJURY) (HCC): Status: ACTIVE | Noted: 2020-01-01

## 2020-03-04 NOTE — ED NOTES
Bed: D  Expected date:   Expected time:   Means of arrival:   Comments:  Triage      Gordon Juan RN  03/04/20 3221

## 2020-03-05 PROBLEM — I10 HTN (HYPERTENSION): Status: ACTIVE | Noted: 2020-01-01

## 2020-03-05 NOTE — PROGRESS NOTES
Nutrition Assessment    Type and Reason for Visit: Initial, Positive Nutrition Screen    Nutrition Recommendations: Continue current diet    Nutrition Assessment: Pt admit w/ Hgb of 7.7. Pt w/ good Po intake, declines ONS at this time. Malnutrition Assessment:  · Malnutrition Status: Insufficient data  · Context: Chronic illness  · Findings of the 6 clinical characteristics of malnutrition (Minimum of 2 out of 6 clinical characteristics is required to make the diagnosis of moderate or severe Protein Calorie Malnutrition based on AND/ASPEN Guidelines):  1. Energy Intake-Greater than 75% of estimated energy requirement, (x1 day)  2. Weight Loss-Unable to assess, unable to assess  3. Fat Loss-No significant subcutaneous fat loss  4. Muscle Loss-No significant muscle mass loss  5. Fluid Accumulation-No significant fluid accumulation  6.  Strength-Not measured    Nutrition Risk Level: Low    Nutrient Needs:  · Estimated Daily Total Kcal: (MSJ 1198 x 1.2 SF)  · Estimated Daily Protein (g): 75-85(1.3-1.5 g/kg)  · Estimated Daily Total Fluid (ml/day): (1 ml/kcal)    Nutrition Diagnosis:   · Problem: Increased nutrient needs  · Etiology: related to Increased demand for energy/nutrients     Signs and symptoms:  as evidenced by Presence of wounds    Objective Information:  · Nutrition-Focused Physical Findings: Awake/alert, abd WDL, fluids WNL, trace edema LE, h/o chronic cellulitis  · Wound Type:    · Current Nutrition Therapies:  · Oral Diet Orders: General   · Oral Diet intake: %(per RN)     · Anthropometric Measures:  · Ht: 5' 5\" (165.1 cm)   · Current Body Wt: 162 lb (73.5 kg)(3/5 bed scale)  · Admission Body Wt: 162 lb (73.5 kg)(3/4 no method)  · Usual Body Wt: (UTO )  · % Weight Change:  ,  Uanble to properly assess wt hx. EMR shows wts ranging from 177-187 x1 year.  Pt denies any wt loss  · Ideal Body Wt: 125 lb (56.7 kg), % Ideal Body 129%  · BMI Classification: BMI 25.0 - 29.9 Overweight    Nutrition Interventions:   Continued Inpatient Monitoring, Education Initiated, Coordination of Care(diet and ONS options d/w pt and family.  Declines ONS at this time)    Nutrition Evaluation:   · Evaluation: Goals set   · Goals: Consume >75% of meals    · Monitoring: Meal Intake, Diet Tolerance, Skin Integrity, Wound Healing, I&O, Weight, Pertinent Labs, Monitor Bowel Function      Electronically signed by Meeta Jose RD, LD on 3/5/20 at 11:29 AM    Contact Number: [phone]

## 2020-03-05 NOTE — H&P
History and Physical      CHIEF COMPLAINT:  anemia      HISTORY OF PRESENT ILLNESS:      The patient is a 81 y.o. female patient of Dr. Méndez Erps  presents with new onset anemia reported 3 days ago on routine lab evaluation for aortic stenosis at [facility] (noted 2 yrs ago when hospitalized for pneumonia). She complains of SOB over the past 2 yrs. She denies chest pain, syncope or palpitations. She also denies a known history of anemia or GI disorders except occasional heart burn. Stool sample this AM negative for blood on guaiac testing. She remains on tx for hypertension and complaint with all meds including diuretics. Past Medical History:    Past Medical History:   Diagnosis Date    Acute blood loss anemia 3/21/2018    Acute on chronic diastolic congestive heart failure ([address] 3/20/2018    Acute respiratory failure with hypoxia ([address] 3/20/2018    Aortic stenosis, severe 03/2018    Arthritis     Cellulitis 3/20/2018    Right and left lower extremity    Class 3 obesity in adult 3/22/2018    History of blood transfusion     Hypertension     Hypokalemia 3/21/2018    Mitral regurgitation 03/2018    Moderate aortic regurgitation 03/2018    Sepsis due to methicillin susceptible Staphylococcus aureus ([address] 3/22/2018       Past Surgical History:    Past Surgical History:   Procedure Laterality Date    CARDIAC CATHETERIZATION  01/10/2020    Dr. Rebecca Melgoza  Left and Right Heart Catheterization       Medications Prior to Admission:    Medications Prior to Admission: Potassium 99 MG TABS, Take by mouth daily  spironolactone (ALDACTONE) 25 MG tablet, Take 1 tablet by mouth daily  torsemide (DEMADEX) 20 MG tablet, Take 1 tablet by mouth daily  NIFEdipine (NIFEDICAL XL) 30 MG extended release tablet, Take 30 mg by mouth every morning  enalapril-hydrochlorothiazide (VASERETIC) 10-25 MG per tablet, Take 1 tablet by mouth every morning    Allergies:    Patient has no known allergies.     Social History: reports that she has never smoked. She has never used smokeless tobacco. She reports that she does not drink alcohol or use drugs. Family History:   family history is not on file. REVIEW OF SYSTEMS:  As above in the HPI, otherwise negative    PHYSICAL EXAM:    Vitals:  BP (!) 147/60   Pulse 85   Temp 98 °F (36.7 °C) (Temporal)   Resp 18   Ht 5' 5\" (1.651 m)   Wt 162 lb (73.5 kg)   SpO2 99%   BMI 26.96 kg/m²     General:   Awake, alert, oriented X 3. No acute distress. HEENT:    Normocephalic, atraumatic. Pupils equal, round, reactive to light. No scleral icterus. No conjunctival injection. Oropharynx clear. No nasal discharge. Neck:       Supple. No bruits, adenopathy, masses, neck vein distention. Trachea in the midline. Heart:      RRR, ALFONSO base, no gallops or rubs  Lungs:     CTA bilaterally, bilat symmetrical expansion, no wheeze, rales, or rhonchi  Abdomen:   Bowel sounds present, soft, nontender, no masses, no organomegaly, no peritoneal signs  Extremities:  No clubbing, cyanosis, or edema, healed scaring LE  Skin:         Warm and dry, no open lesions or rash  Neuro:     Cranial nerves 2-12 intact, no focal deficits  Breast:    deferred  Rectal:    deferred  Genitalia:  deferred    LABS:    CBC with Differential:    Lab Results   Component Value Date    WBC 11.0 03/05/2020    RBC 3.32 03/05/2020    HGB 8.6 03/05/2020    HCT 28.3 03/05/2020     03/05/2020    MCV 85.2 03/05/2020    MCH 25.9 03/05/2020    MCHC 30.4 03/05/2020    RDW 15.2 03/05/2020    LYMPHOPCT 11.9 03/05/2020    MONOPCT 11.1 03/05/2020    BASOPCT 0.3 03/05/2020    MONOSABS 1.21 03/05/2020    LYMPHSABS 1.30 03/05/2020    EOSABS 0.29 03/05/2020    BASOSABS 0.03 03/05/2020     CMP:    Lab Results   Component Value Date     03/05/2020    K 4.3 03/05/2020     03/05/2020    CO2 14 03/05/2020    BUN 86 03/05/2020    CREATININE 2.1 03/05/2020    GFRAA 27 03/05/2020    LABGLOM 22 03/05/2020    GLUCOSE 96 03/05/2020 PROT 7.6 03/04/2020    LABALBU 3.9 03/04/2020    CALCIUM 8.9 03/05/2020    BILITOT <0.2 03/04/2020    ALKPHOS 106 03/04/2020    AST 10 03/04/2020    ALT 7 03/04/2020       ASSESSMENT:      Active Hospital Problems    Diagnosis Date Noted    HTN (hypertension) [I10] 03/05/2020    NORBERTO (acute kidney injury) ([facility] [N17.9] 03/04/2020    Anemia [D64.9] 03/04/2020    Aortic stenosis, severe [I35.0] 03/01/2018        PLAN:  Admit to a monitored floor               IVF              Stop diuretics and monitor RFT              Transfuse pRBC's and monitor hemogram               Hemoccult stool--negative               Cardiac and hematology consults               Check iron studies, folate and B12         David Field DO  11:30 AM  3/5/2020

## 2020-03-05 NOTE — ED NOTES
[facility] faxed, steven notified.  Patient in for transport      [facility]  03/04/20 2027

## 2020-03-05 NOTE — CARE COORDINATION
3/5/2020  Met with patient and her daughter to obtain information and assist with transition of care. Patient lives with her daughter, stays on the first floor. Patient has been independent with [facility] and most [facility]. She uses a cane. Patient denies history of home health or PARRISH. Discharge plan is to return home and family is available to assist as needed. No needs identified at this time.

## 2020-03-05 NOTE — PROGRESS NOTES
PROGRESS NOTE       PATIENT PROBLEM LIST:  Active Problems: Aortic stenosis, severe    NORBERTO (acute kidney injury) ([address]    Anemia    HTN (hypertension)  Resolved Problems:    * No resolved hospital problems. *      SUBJECTIVE:  Akanksha Dubose states She feels somewhat better this afternoon and less short of breath and denies any palpitations nor chest discomfort or lightheadedness. Her daughter is sitting at her bedside. REVIEW OF SYSTEMS:  General ROS: positive for - fatigue. Psychological ROS: positive for - anxiety and mild depression  Ophthalmic ROS: negative for - decreased vision or visual distortion. ENT ROS: negative  Allergy and Immunology ROS: negative  Hematological and Lymphatic ROS: positive for - anemia  Endocrine: no heat or cold intolerance and no polyphagia, polydipsia, or polyuria  Respiratory ROS: positive for - shortness of breath  Cardiovascular ROS: positive for - dyspnea on exertion, edema, irregular heartbeat, murmur, palpitations and paroxysmal nocturnal dyspnea. Gastrointestinal ROS: no abdominal pain, change in bowel habits, or black or bloody stools  Genito-Urinary ROS: no nocturia, dysuria, trouble voiding, frequency or hematuria  Musculoskeletal ROS: negative for- myalgias, arthralgias, or claudication  Neurological ROS: no TIA or stroke symptoms otherwise no significant change in symptoms or problems since yesterday as documented in previous progress notes.     SCHEDULED MEDICATIONS:   NIFEdipine  30 mg Oral Daily    sodium chloride flush  10 mL Intravenous 2 times per day    enoxaparin  30 mg Subcutaneous Daily       VITAL SIGNS:                                                                                                                          BP (!) 147/60   Pulse 85   Temp 98 °F (36.7 °C) (Temporal)   Resp 18   Ht 5' 5\" (1.651 m)   Wt 162 lb (73.5 kg)   SpO2 99%   BMI 26.96 kg/m²   Patient Vitals for the past 96 hrs (Last 3 readings):   Weight   03/05/20 1118 Component Value Date    TRIG 157 2018    HDL 45 2018    LDLCALC 85 2018    CHOL 161 2018      Hemoglobin A1C: No components found for: HGBA1C     RAD:   Xr Chest Portable    Result Date: 3/4/2020  Patient MRN: [id number] : 1935 Age:  81 years Gender: Female Order Date: 3/4/2020 3:45 PM Exam: XR CHEST PORTABLE Number of Images: 1 view Indication:  Chest pain Comparison: Two-view chest study 2018 Findings: The lungs are symmetrically expanded, and are clear. There is no evidence of pneumothorax or pleural effusion. Cardiovascular shadows show aortic intimal calcification, but otherwise are normal in appearance. There is no evidence of cardiac enlargement or decompensation. Skeletal structures show anterior dislocation of the right humeral head (likely chronically unstable) and superior subluxation left humeral head. Hypertrophic degenerative spinal findings are noted. No evidence of acute cardiopulmonary pathology. 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 EKG: See Report  Echo: See Report      IMPRESSIONS:  Active Problems: Aortic stenosis, severe    NORBERTO (acute kidney injury) ([address]    Anemia    HTN (hypertension)  Resolved Problems:    * No resolved hospital problems. *      RECOMMENDATIONS:  Based upon this is complex most recent change in symptoms, and the fact that she has not had any significant change in dietary habits nor change in medications, I would suspect that her present symptoms suggest bleeding in the small bowel secondary to  angiodysplasia. I have spent more than 25minutes face to face with constantine Robertson and reviewing notes and laboratory data, with greater than 50% of this time instructing and counseling the patient and Water face to face regarding my findings and recommendations and I have answered all questions as posed to me by Ms. Moreira Gains well as her daughter. Oh Chicas, DO FACP,FACC,FSCAI      NOTE:  This report was transcribed using voice recognition software.   Every effort was made to ensure accuracy; however, inadvertent computerized transcription errors may be present

## 2020-03-05 NOTE — CONSULTS
[address]                  [address] [place],  [facility]                                  CONSULTATION    PATIENT NAME: Spencer Choudhury                    :        1935  MED REC NO:   [id number]                            ROOM:       4505  ACCOUNT NO:   [de-identified]                           ADMIT DATE: 2020  PROVIDER:     Belinda Gomes MD    CONSULT DATE:  2020    HISTORY OF PRESENT ILLNESS:  This 81-year-old white female patient of  Dr. Nya Bryant, is referred for evaluation of anemia. She has been [place]  [place] stenosis. She is admitted with  complaint of dyspnea which is chronic. She denied any change in bowels,  melena, any other bleeding; but she has lost weight of about 20 pounds  in the last few months. No complaint of any pain. During this  admission, the stools for occult blood is negative. She has received  one unit of packed red blood cell transfusion. PAST MEDICAL HISTORY:  Hypertension, aortic stenosis, apparently blood  loss anemia in 2018, chronic congestive heart failure, blood  transfusion in 2018, staphylococcal sepsis in 2018, cardiac  catheterization by Dr. Andres Davis in 2020. She denied any history of  any cancer. MEDICATIONS:  Prior to the admission she has been on Aldactone, Demadex,  nifedipine, Vaseretic. ALLERGIES:  She is not allergic to any medicine. SOCIAL HISTORY:  No history of any cigarette smoking or alcohol abuse. FAMILY HISTORY:  Noncontributory. PHYSICAL EXAMINATION:  GENERAL:  She is alert, not in any acute distress. NECK:  No neck or axillary mass. LUNGS:  Clear. HEART:  Regular. ABDOMEN:  No definite mass or ascites, but the examination is  compromised by obesity. EXTREMITIES:  There is no edema. NEUROLOGIC:  No focal neurological deficit. Her nurse, Deondre Raymond is present during my visit.     Recent lab work, radiological reports, etc. reviewed. The CBC showed hemoglobin 7.5 gm at the time of admission with mild  leukocytosis, normal platelet count. Serum creatinine 2.2 mg. The  hemoglobin was 7.9 gm in 01/2020 and 10.8 gm in 12/2018. She has  received one unit of packed red blood cells this admission. History of  transfusion in 03/2018. I have ordered workup including serum ferritin,  N76, folic, acid level, reticulocyte count, serum and urine protein  electrophoresis, LDH, etc.  Also blood smear. We will review these and  recommend. She gives history of weight loss of about 20 pounds. Chest x-ray, no  acute findings. The CT scan of the abdomen and pelvis in 12/2018 was  not remarkable. She denies any history of any colonoscopy. We will  review the workup and recommend. Thank you for letting me participate in her care.         Elia Allison MD    D: 03/05/2020 13:13:43       T: 03/05/2020 13:22:12     [email or web address]  Job#: [id number]     Doc#: [id number]    CC:

## 2020-03-05 NOTE — CONSULTS
CARDIOLOGY CONSULTATION    Patient Name:  Waldo Osorio    :      Reason for Consultation:   Aortic stenosis; Heyde syndrome; dehydration    History of Present Illness:   Consuello House returns to [facility] with complaints of weakness and shortness of breath gradually increasing over the past two weeks. He is currently being evaluated for potential TAVR secondary to known severe aortic stenosis. She has also been treated for heart failure and during her most recent examination found to have a markedly elevated BUN of >80 mg/dL as well as having a hemoglobin of 7.7 g.  He returns to the hospital for further evaluation of the above her background history of severe aortic stenosis. Denies any chest discomfort usually feels lightheaded. Past Medical History:   has a past medical history of Acute blood loss anemia, Acute on chronic diastolic congestive heart failure ([address] Acute respiratory failure with hypoxia ([address] Aortic stenosis, severe, Arthritis, Cellulitis, Class 3 obesity in adult, History of blood transfusion, Hypertension, Hypokalemia, Mitral regurgitation, Moderate aortic regurgitation, and Sepsis due to methicillin susceptible Staphylococcus aureus ([address] Surgical History:   has a past surgical history that includes Cardiac catheterization (01/10/2020). Social History:   reports that she has never smoked. She has never used smokeless tobacco. She reports that she does not drink alcohol or use drugs. Family History:  family history is significant for a massive MI in her father in his old age, mother  secondary to complications of gallbladder surgery, sister with cancer, sister with pulmonary embolism     Medications:  Prior to Admission medications    Medication Sig Start Date End Date Taking?  Authorizing Provider   Potassium 99 MG TABS Take by mouth daily    Historical Provider, MD   spironolactone (ALDACTONE) 25 MG tablet Take 1 tablet by mouth daily 3/28/18   Gordon Shaunaheron Jacinta    torsemide (DEMADEX) 20 MG tablet Take 1 tablet by mouth daily 3/28/18   Gordon Villatoroheron Lexiijean    NIFEdipine (NIFEDICAL XL) 30 MG extended release tablet Take 30 mg by mouth every morning    Historical Provider, MD   enalapril-hydrochlorothiazide (VASERETIC) 10-25 MG per tablet Take 1 tablet by mouth every morning    Historical Provider, MD       Allergies:  Patient has no known allergies. Review of Systems:   · Constitutional: there has been no unanticipated weight loss. There's been no significant change in energy level, sleep pattern or activity level. No fever chills or rigors. · Eyes: No visual changes or diplopia. No scleral icterus. · ENT: No Headaches, hearing loss or vertigo. No mouth sores or sore throat. No change in taste or smell. · Cardiovascular: No chest discomfort, dyspnea on exertion, palpitations, loss of consciousness, no phlebitis, no claudication. · Respiratory: +Shortness of Breath No cough or wheezing, no sputum production. No hemoptysis, pleuritic pain. · Gastrointestinal: No abdominal pain, appetite loss, blood in stools. No change in bowel habits. No hematemesis  · Genitourinary: No dysuria, trouble voiding or hematuria. No nocturia or increased frequency. · Musculoskeletal:  +leg swelling since birth of her children No gait disturbance, weakness or joint complaints. · Integumentary: No rash or pruritis. · Neurological: No headache, diplopia, change in muscle strength, numbness or tingling. No change in gait, balance, coordination, mood, affect, memory, mentation, behavior. · Psychiatric: No anxiety or depression. · Endocrine: No temperature intolerance. No excessive thirst, fluid intake, or urination. No tremor. · Hematologic/Lymphatic: No abnormal bruising or bleeding, blood clots or swollen lymph nodes. · Allergic/Immunologic: No nasal congestion or hives.     Physical Examination:    Vital Signs: /60   Pulse 93   Temp 97.9 °F (36.6 °C) (Oral)   Resp 17   Ht 5' 5\" (1.651 m)   Wt 162 lb (73.5 kg)   SpO2 97%   BMI 26.96 kg/m²   General appearance: Well preserved, mesomorphic body habitus, alert, no distress. Skin: Skin color, texture, turgor normal. No rashes or lesions. No induration or tightening palpated. Head: Normocephalic. No masses, lesions, tenderness or abnormalities  Eyes: Conjunctivae/corneas clear. PERRL, EOMs intact. Sclera non icteric. Ears: External ears normal. Canals clear. TM's clear bilaterally. Hearing normal to finger rub. Nose/Sinuses: Nares normal. Septum midline. Mucosa normal. No drainage or sinus tenderness. Oropharynx: Lips, mucosa, and tongue normal. Oropharynx clear with no exudate seen. Neck: Neck supple and symmetric. No adenopathy. Thyroid symmetric, normal size, without nodules. Trachea is midline. Carotids brisk in upstroke without bruits, no abnormal JVP noted at 45°. Chest: Even excursion  Lungs: Lungs clear to auscultation bilaterally. No retractions or use of accessory muscles. No tactile vocal fremitus. No rhonchi, crackles or rales. Heart:  S1 > S2. Regular rhythm. Grade 2/6 harsh holosystolic murmur present at the right and left 2nd intercostal space and apex no S3 noted. No rub, palpable thrill or heave noted. PMI 5th intercostal space midclavicular line. Abdomen: Abdomen soft, moderately protuberant abdomen, non-tender. BS normal. No masses, organomegaly. No hernia noted. Extremities: Extremities normal. No deformities, edema, or skin discoloration. No cyanosis or clubbing noted to the nails. Peripheral pulses present 2+ upper extremities and present 2+  lower extremities. Musculoskeletal: Spine ROM normal. Muscular strength intact. Neuro: Cranial nerves intact. Motor: Strength 5/5 in all extremities. Reflexes 2+ in all extremities. No focal weakness. Sensory: grossly normal to touch. Coordination intact.     Pertinent Labs:  CBC:   Recent Labs     03/04/20  1552   WBC 12.8* HGB 7.5*   *     BMP:  Recent Labs     20  1552      K 4.4      CO2 16*   BUN 89*   CREATININE 2.2*   GLUCOSE 109*   LABGLOM 21     ABGs: No results found for: PH, PO2, PCO2  INR: No results for input(s): INR in the last 72 hours. PRO-BNP:   Lab Results   Component Value Date    PROBNP 1,713 (H) 2020    PROBNP 6,550 (H) 2018      Cardiac Injury Profile:   Recent Labs     20  1552   TROPONINI <0.01      Lipid Profile:   Lab Results   Component Value Date    TRIG 157 2018    HDL 45 2018    LDLCALC 85 2018    CHOL 161 2018      Hemoglobin A1C: No components found for: HGBA1C   ECG:  See report    Radiology:  Xr Chest Portable    Result Date: 3/4/2020  Patient MRN: [id number] : 1935 Age:  81 years Gender: Female Order Date: 3/4/2020 3:45 PM Exam: XR CHEST PORTABLE Number of Images: 1 view Indication:  Chest pain Comparison: Two-view chest study 2018 Findings: The lungs are symmetrically expanded, and are clear. There is no evidence of pneumothorax or pleural effusion. Cardiovascular shadows show aortic intimal calcification, but otherwise are normal in appearance. There is no evidence of cardiac enlargement or decompensation. Skeletal structures show anterior dislocation of the right humeral head (likely chronically unstable) and superior subluxation left humeral head. Hypertrophic degenerative spinal findings are noted. No evidence of acute cardiopulmonary pathology. 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 Assessment:    Active Problems: Aortic stenosis, severe    NORBERTO (acute kidney injury) ([facility]    Anemia  Resolved Problems:    * No resolved hospital problems. *      Plan: Will obtain 2D-ECHO and assess significance of valvular disease as well as left ventricular systolic/diastolic function and adjust cardiac medications accordingly.    Anion gap may be due to low flow state secondary to poor forward cardiac output. Carefully monitor renal function as well as hemoglobin once her intravascular volume increases his severe hemoglobin will decrease concomitantly. Would consider a nuclear bleeding scan but more importantly would consider TAVR with the hope that this will decrease any further assumed gastrointestinal bleeding. .       I have spent more than 45 minutes face to face with Roshni Shirley reviewing notes and laboratory data with greater than 50% of this time instructing and counseling the patient and her daughter regarding my findings and recommendations and I have answered all questions as posed to me by Ms. Rom Celis and her daughter. Thank you, Janet Santos MD for allowing me to consult in the care of this patient. Marisa Reyez DO, FACP, FACC, [facility]    NOTE:  This report was transcribed using voice recognition software. Every effort was made to ensure accuracy; however, inadvertent computerized transcription errors may be present.

## 2020-03-05 NOTE — ED PROVIDER NOTES
Department of Emergency Medicine   ED  Provider Note  Admit Date/RoomTime: 3/4/2020  5:54 PM  ED Room: [facility]          History of Present Illness:  3/4/20, Time: 7:48 PM  Chief Complaint   Patient presents with    Abnormal Lab     hgb 7.7 sent by Dr Jimi Gomez is a 81 y.o. female presenting to the ED for abnormal lab. Patient was sent for Dr. Honey Serrato for anemia. Hemoglobin is 7.7. Unclear baseline. She is on no anticoagulation. She denies any dark or bloody stools. She has no symptoms or complaints at this time. She does mentions that her diuretic was recently increased by her cardiologist.  Does have a history of severe aortic stenosis. Is scheduled to be evaluated at the [facility]. Patient denies any fever, chills, nausea, vomiting, chest pain with cough, sputum, or any other symptoms. Review of Systems:   Pertinent positives and negatives are stated within HPI, all other systems reviewed and are negative.        --------------------------------------------- PAST HISTORY ---------------------------------------------  Past Medical History:  has a past medical history of Acute blood loss anemia, Acute on chronic diastolic congestive heart failure ([address] Acute respiratory failure with hypoxia ([address] Aortic stenosis, severe, Arthritis, Cellulitis, Class 3 obesity in adult, History of blood transfusion, Hypertension, Hypokalemia, Mitral regurgitation, Moderate aortic regurgitation, and Sepsis due to methicillin susceptible Staphylococcus aureus ([address] Past Surgical History:  has a past surgical history that includes Cardiac catheterization (01/10/2020). Social History:  reports that she has never smoked. She has never used smokeless tobacco. She reports that she does not drink alcohol or use drugs. Family History: family history is not on file. . Unless otherwise noted, family history is non contributory    The patients home medications have been reviewed. Allergies: Patient has no known allergies. ---------------------------------------------------PHYSICAL EXAM--------------------------------------    Constitutional/General: Alert and oriented x3  Head: Normocephalic and atraumatic  Eyes: PERRL, EOMI, sclera non icteric  Mouth: Oropharynx clear, handling secretions, no trismus, no asymmetry of the posterior oropharynx or uvular edema  Neck: Supple, full ROM, no stridor, no meningeal signs  Respiratory: Lungs clear to auscultation bilaterally, no wheezes, rales, or rhonchi. Not in respiratory distress  Cardiovascular:  Regular rate. Regular rhythm. 2+ distal pulses. Equal extremity pulses. Chest: No chest wall tenderness  GI:  Abdomen Soft, Non tender, Non distended. No rebound, guarding, or rigidity. No pulsatile masses. Musculoskeletal: Moves all extremities x 4. Warm and well perfused, no clubbing, cyanosis, or edema. Capillary refill <3 seconds  Integument: skin warm and dry. No rashes. Neurologic: GCS 15, no focal deficits, symmetric strength 5/5 in the upper and lower extremities bilaterally  Psychiatric: Normal Affect          -------------------------------------------------- RESULTS -------------------------------------------------  I have personally reviewed all laboratory and imaging results for this patient. Results are listed below.      LABS: (Lab results interpreted by me)  Results for orders placed or performed during the hospital encounter of 03/04/20   CBC Auto Differential   Result Value Ref Range    WBC 12.8 (H) 4.5 - 11.5 E9/L    RBC 3.00 (L) 3.50 - 5.50 E12/L    Hemoglobin 7.5 (L) 11.5 - 15.5 g/dL    Hematocrit 25.1 (L) 34.0 - 48.0 %    MCV 83.7 80.0 - 99.9 fL    MCH 25.0 (L) 26.0 - 35.0 pg    MCHC 29.9 (L) 32.0 - 34.5 %    RDW 14.9 11.5 - 15.0 fL    Platelets 537 (H) 696 - 450 E9/L    MPV 10.6 7.0 - 12.0 fL    Neutrophils % 83.2 (H) 43.0 - 80.0 %    Immature Granulocytes % 0.5 0.0 - 5.0 %    Lymphocytes % 8.0 (L) 20.0 - 42.0 %    Monocytes % 6.5 2.0 - 12.0 %    Eosinophils % 1.5 0.0 - 6.0 %    Basophils % 0.3 0.0 - 2.0 %    Neutrophils Absolute 10.62 (H) 1.80 - 7.30 E9/L    Immature Granulocytes # 0.07 E9/L    Lymphocytes Absolute 1.02 (L) 1.50 - 4.00 E9/L    Monocytes Absolute 0.83 0.10 - 0.95 E9/L    Eosinophils Absolute 0.19 0.05 - 0.50 E9/L    Basophils Absolute 0.04 0.00 - 0.20 E9/L   Comprehensive Metabolic Panel   Result Value Ref Range    Sodium 135 132 - 146 mmol/L    Potassium 4.4 3.5 - 5.0 mmol/L    Chloride 102 98 - 107 mmol/L    CO2 16 (L) 22 - 29 mmol/L    Anion Gap 17 (H) 7 - 16 mmol/L    Glucose 109 (H) 74 - 99 mg/dL    BUN 89 (H) 8 - 23 mg/dL    CREATININE 2.2 (H) 0.5 - 1.0 mg/dL    GFR Non-African American 21 >=60 mL/min/1.73    GFR African American 26     Calcium 9.4 8.6 - 10.2 mg/dL    Total Protein 7.6 6.4 - 8.3 g/dL    Alb 3.9 3.5 - 5.2 g/dL    Total Bilirubin <0.2 0.0 - 1.2 mg/dL    Alkaline Phosphatase 106 (H) 35 - 104 U/L    ALT 7 0 - 32 U/L    AST 10 0 - 31 U/L   Troponin   Result Value Ref Range    Troponin <0.01 0.00 - 0.03 ng/mL   Brain Natriuretic Peptide   Result Value Ref Range    Pro-BNP 1,713 (H) 0 - 450 pg/mL   EKG 12 Lead   Result Value Ref Range    Ventricular Rate 89 BPM    Atrial Rate 89 BPM    P-R Interval 166 ms    QRS Duration 76 ms    Q-T Interval 360 ms    QTc Calculation (Bazett) 438 ms    P Axis 45 degrees    R Axis 26 degrees    T Axis 84 degrees   ,       RADIOLOGY:  Interpreted by Radiologist unless otherwise specified  XR CHEST PORTABLE   Final Result   No evidence of acute cardiopulmonary pathology. 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             EKG Interpretation  Interpreted by emergency department physician, Dr. Manuel Oliveira     Sinus, rate 81, no STEMI, no significant change      ------------------------- NURSING NOTES AND VITALS REVIEWED ---------------------------   The nursing notes within the ED encounter and vital signs as below have been reviewed by myself  /76   Pulse 85   Temp 97.4 °F (36.3 °C) (Infrared)   Resp 16   SpO2 98%     Oxygen Saturation Interpretation: Normal    The patients available past medical records and past encounters were reviewed. ------------------------------ ED COURSE/MEDICAL DECISION MAKING----------------------  Medications   0.9 % sodium chloride infusion (has no administration in time range)           The cardiac monitor revealed sinus with a heart rate in the 80s as interpreted by me. The cardiac monitor was ordered secondary to the patient's norberto and to monitor the patient for dysrhythmia. CPT [id number]         Medical Decision Making:    Patient had no symptoms on arrival.  Labs and imaging reviewed. Discussed with Dr. Dennise Chavez, agrees with very gentle hydration. This will be done. Patient be admitted. Counseling: The emergency provider has spoken with the patient and discussed todays results, in addition to providing specific details for the plan of care and counseling regarding the diagnosis and prognosis. Questions are answered at this time and they are agreeable with the plan.       --------------------------------- IMPRESSION AND DISPOSITION ---------------------------------    IMPRESSION  1. NORBERTO (acute kidney injury) ([address]        DISPOSITION  Disposition: Admit to telemetry  Patient condition is stable        NOTE: This report was transcribed using voice recognition software.  Every effort was made to ensure accuracy; however, inadvertent computerized transcription errors may be present       Barney Mcclellan MD  03/04/20 1950

## 2020-03-06 NOTE — PROGRESS NOTES
Sarah Beth Kaur        SUBJECTIVE:  Admitted with dyspnea . Diana Rodarte is over 2-3 years per son & daughter , not recent . No new complaints. S/p PRBCs    OBJECTIVE:    BP (!) 141/68 Comment: holding arm up  Pulse 80   Temp 98.6 °F (37 °C) (Oral)   Resp 18   Ht 5' 5\" (1.651 m)   Wt 162 lb (73.5 kg)   SpO2 96%   BMI 26.96 kg/m²   PHYSICAL:  GENERAL:  Alert, orient x 3, in no acute distress. HEENT:  No icterus  LYMPH:  No lymphadenopathy. HEART:  Regular rate and rhythm. No murmurs. LUNGS:  Clear to auscultation. ABDOMEN:  Soft. Non-tender. No mass / ascites  EXTREMITIES:  Warm,dry. No edema. NEURO:  No focal deficits.            CBC with Differential:    Lab Results   Component Value Date    WBC 11.2 03/06/2020    RBC 2.99 03/06/2020    HGB 7.9 03/06/2020    HCT 25.7 03/06/2020     03/06/2020    MCV 86.0 03/06/2020    MCH 26.4 03/06/2020    MCHC 30.7 03/06/2020    RDW 15.0 03/06/2020    LYMPHOPCT 13.0 03/06/2020    MONOPCT 9.7 03/06/2020    BASOPCT 0.4 03/06/2020    MONOSABS 1.08 03/06/2020    LYMPHSABS 1.45 03/06/2020    EOSABS 0.38 03/06/2020    BASOSABS 0.04 03/06/2020        CMP:    Lab Results   Component Value Date     03/06/2020    K 3.9 03/06/2020    K 4.3 03/05/2020     03/06/2020    CO2 14 03/06/2020    BUN 70 03/06/2020    CREATININE 1.6 03/06/2020    GFRAA 37 03/06/2020    LABGLOM 31 03/06/2020    GLUCOSE 88 03/06/2020    PROT 6.2 03/06/2020    LABALBU 2.7 03/06/2020    CALCIUM 8.5 03/06/2020    BILITOT <0.2 03/04/2020    ALKPHOS 106 03/04/2020    AST 10 03/04/2020    ALT 7 03/04/2020     LDH:    Lab Results   Component Value Date     03/06/2020     PT/INR:  No results found for: PROTIME, INR  PTT:  No results found for: APTT[APTT  VITAMIN B12: No components found for: B12  FOLATE:    Lab Results   Component Value Date    FOLATE 8.0 03/05/2020     IRON:    Lab Results   Component Value Date    IRON 43 03/05/2020     Iron Saturation:  No components found for: PERCENTFE  TIBC:    Lab Results   Component Value Date    TIBC 361 03/05/2020     FERRITIN:    Lab Results   Component Value Date    FERRITIN 21 03/05/2020     ORAL:  No results found for: ANATITER, ORAL    A: & P   Anemia for evaluation. The CBC showed hemoglobin 7.5 gm at the time of admission with mild  leukocytosis, normal platelet count. Serum creatinine 2.2 mg. The  hemoglobin was 7.9 gm in 01/2020 and 10.8 gm in 12/2018. She has  received one unit of packed red blood cells this admission. History of  transfusion in 03/2018. serum ferritin  Low normal with iron sat. 12% , suggestive of iron def. Likely  GIB. Recommend  GI consult. Discussed with pt , daughter. paged Gael Bah. Normal  A76, folic, acid level, reticulocyte count, SPE. . UPE pending . blood smear. -- no blasts .     She gives history of weight loss of about 20 pounds. On talking with her son this is gradual over about 2 years . Chest x-ray, no  acute findings. The CT scan of the abdomen and pelvis in 12/2018 was  not remarkable. She denies any history of any colonoscopy.    Thank you.

## 2020-03-06 NOTE — PROGRESS NOTES
Chief Complaint:  Anemia, AS    Subjective: The patient is alert. Voices no complaints   No problems overnight. Denies chest pain & SOB . Denies abdominal pain. Tolerating diet. No nausea or vomiting. Daughter at the bedside    Objective:    Vitals:    20 0000   BP: 137/61   Pulse: 87   Resp: 18   Temp: 97.8 °F (36.6 °C)   SpO2: 98%     A&O x's 3  Heart:  RRR, ALFONSO murmur RUSB  No gallops  or rubs. Lungs:  CTA bilaterally, no wheeze, rales or rhonchi  Abd: bowel sounds present, nontender, nondistended, no masses  Extrem:  No clubbing, cyanosis, or edema  Tele: NSR    Lab Results   Component Value Date     2020    K 3.9 2020    K 4.3 2020     2020    CO2 14 2020    BUN 70 2020    CREATININE 1.6 2020    CALCIUM 8.5 2020      Lab Results   Component Value Date    WBC 11.2 2020    RBC 2.99 2020    HGB 7.9 2020    HCT 25.7 2020    MCV 86.0 2020    MCH 26.4 2020    MCHC 30.7 2020    RDW 15.0 2020     2020    MPV 10.3 2020     PT/INR:  No results found for: PROTIME, INR  No results for input(s): POCGLU in the last 72 hours. Recent Labs     20  1552 20  0449 20  0435    136 139   K 4.4 4.3 3.9    105 109*   CO2 16* 14* 14*   BUN 89* 86* 70*   LABALBU 3.9  --   --    CREATININE 2.2* 2.1* 1.6*   CALCIUM 9.4 8.9 8.5*   GFRAA 26 27 37   LABGLOM 21 22 31   GLUCOSE 109* 96 88       Xr Chest Portable    Result Date: 3/4/2020  Patient MRN: [id number] : 1935 Age:  81 years Gender: Female Order Date: 3/4/2020 3:45 PM Exam: XR CHEST PORTABLE Number of Images: 1 view Indication:  Chest pain Comparison: Two-view chest study 2018 Findings: The lungs are symmetrically expanded, and are clear. There is no evidence of pneumothorax or pleural effusion. Cardiovascular shadows show aortic intimal calcification, but otherwise are normal in appearance.  There is no evidence of cardiac enlargement or decompensation. Skeletal structures show anterior dislocation of the right humeral head (likely chronically unstable) and superior subluxation left humeral head. Hypertrophic degenerative spinal findings are noted. No evidence of acute cardiopulmonary pathology. 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 Scheduled Meds:   NIFEdipine  30 mg Oral Daily    sodium chloride flush  10 mL Intravenous 2 times per day    enoxaparin  30 mg Subcutaneous Daily     Continuous Infusions:   sodium chloride 75 mL/hr at 03/06/20 0017     PRN Meds:.sodium chloride flush, acetaminophen **OR** acetaminophen, polyethylene glycol, ondansetron **OR** ondansetron    I/O last 3 completed shifts: In: 214 [P.O.:260; I.V.:634]  Out: 950 [Urine:950]  No intake/output data recorded.     Intake/Output Summary (Last 24 hours) at 3/6/2020 0730  Last data filed at 3/6/2020 6016  Gross per 24 hour   Intake 894 ml   Output 950 ml   Net -56 ml       Assessment:    Active Hospital Problems    Diagnosis    HTN (hypertension) [I10]    NORBERTO (acute kidney injury) ([facility] [address] [N17.9]    Anemia [D64.9]    Aortic stenosis, severe [I35.0]       Plan:  Heme and cardiac consults reviewed             Stool OB negative             RFT slowly improving             Drop in Hgb       \    Transfuse again             Continue IVF            David Field DO  7:30 AM  3/6/2020

## 2020-03-06 NOTE — FLOWSHEET NOTE
Patient stated that she does not have advance directives of any kind but that her children know her healthcare wishes.  encouraged her to continue discussing her health with her children and provided her with AD documents. 03/06/20 1357   Encounter Summary   Services provided to: Patient   Referral/Consult From: [address] [place] [address] Road Visiting   ([id number])   Complexity of Encounter Moderate   Spiritual Assessment Completed Yes   Advance Care Planning Yes   Routine   Type Initial   Spiritual/Lutheran   Type Spiritual support   Assessment Calm; Approachable; Hopeful   Intervention Active listening;Explored feelings, thoughts, concerns;Nurtured hope;Prayer;Provided reading materials/devotional materials; Discussed death;Discussed relationship with God;Discussed belief system/Roman Catholic practices/edson;Discussed illness/injury and it's impact   Outcome Comfort;Expressed gratitude;Engaged in conversation;Expressed feelings/needs/concerns;Encouraged;Receptive   Advance Directives (For Healthcare)   Healthcare Directive No, patient does not have an advance directive for healthcare treatment   Advance Directives Documents given

## 2020-03-07 NOTE — PROGRESS NOTES
Chief Complaint:  Anemia, AS    Subjective: The patient is alert. Voices no complaints   No problems overnight. Denies chest pain & SOB . Denies abdominal pain. Tolerating diet. No nausea or vomiting. Daughter at the bedside    Objective:    Vitals:    20 0830   BP: (!) 150/68   Pulse: 79   Resp: 18   Temp: 97.7 °F (36.5 °C)   SpO2: 98%     A&O x's 3  Heart:  RRR, ALFONSO murmur RUSB  No gallops  or rubs. Lungs:  CTA bilaterally, no wheeze, rales or rhonchi  Abd: bowel sounds present, nontender, nondistended, no masses  Extrem:  No clubbing, cyanosis, or edema  Tele: NSR    Lab Results   Component Value Date     2020    K 3.6 2020    K 4.3 2020     2020    CO2 16 2020    BUN 51 2020    CREATININE 1.3 2020    CALCIUM 8.5 2020      Lab Results   Component Value Date    WBC 12.4 2020    RBC 3.32 2020    HGB 8.9 2020    HCT 28.5 2020    MCV 85.8 2020    MCH 26.8 2020    MCHC 31.2 2020    RDW 15.5 2020     2020    MPV 10.2 2020     PT/INR:  No results found for: PROTIME, INR  No results for input(s): POCGLU in the last 72 hours. Recent Labs     20  1552 20  0449 20  0435 20  0819    136 139 141   K 4.4 4.3 3.9 3.6    105 109* 109*   CO2 16* 14* 14* 16*   BUN 89* 86* 70* 51*   LABALBU 3.9  --  2.7*  --    CREATININE 2.2* 2.1* 1.6* 1.3*   CALCIUM 9.4 8.9 8.5* 8.5*   GFRAA 26 27 37 47   LABGLOM 21 22 31 39   GLUCOSE 109* 96 88 65*       Xr Chest Portable    Result Date: 3/4/2020  Patient MRN: [id number] : 1935 Age:  81 years Gender: Female Order Date: 3/4/2020 3:45 PM Exam: XR CHEST PORTABLE Number of Images: 1 view Indication:  Chest pain Comparison: Two-view chest study 2018 Findings: The lungs are symmetrically expanded, and are clear. There is no evidence of pneumothorax or pleural effusion.  Cardiovascular shadows show aortic intimal calcification, but otherwise are normal in appearance. There is no evidence of cardiac enlargement or decompensation. Skeletal structures show anterior dislocation of the right humeral head (likely chronically unstable) and superior subluxation left humeral head. Hypertrophic degenerative spinal findings are noted. No evidence of acute cardiopulmonary pathology. 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 Scheduled Meds:   sodium chloride  20 mL Intravenous Once    NIFEdipine  30 mg Oral Daily    sodium chloride flush  10 mL Intravenous 2 times per day    enoxaparin  30 mg Subcutaneous Daily     Continuous Infusions:   sodium chloride 75 mL/hr at 03/07/20 0841     PRN Meds:.mineral oil-hydrophilic petrolatum, sodium chloride flush, acetaminophen **OR** acetaminophen, polyethylene glycol, ondansetron **OR** ondansetron    I/O last 3 completed shifts: In: 2758 [P.O.:1020;  I.V.:1388; Blood:350]  Out: 0   I/O this shift:  In: 134 [I.V.:134]  Out: -     Intake/Output Summary (Last 24 hours) at 3/7/2020 0944  Last data filed at 3/7/2020 0841  Gross per 24 hour   Intake 2712 ml   Output 0 ml   Net 2712 ml       Assessment:    Active Hospital Problems    Diagnosis    HTN (hypertension) [I10]    NORBERTO (acute kidney injury) ([facility] [address] [N17.9]    Anemia [D64.9]    Aortic stenosis, severe [I35.0]       Plan:  Heme and cardiac consults reviewed             Stool OB negative             RFT slowly improving             Hgb improving       \    Transfuse again             Continue IVF--decrease rate             Discussed with Dr Trisha Buitrago c/w MAT             Will need endo at some point but with critical AS defer at this time              Recheck stool OB                     Lavelle Dub 37, DO  9:44 AM  3/7/2020

## 2020-03-07 NOTE — PROGRESS NOTES
PROGRESS NOTE       PATIENT PROBLEM LIST:  Active Problems: Aortic stenosis, severe    NORBERTO (acute kidney injury) ([address]    Anemia    HTN (hypertension)  Resolved Problems:    * No resolved hospital problems. *      SUBJECTIVE:  Allison Kuhn states she feels stronger and less short of breath. She denies  palpitations , chest discomfort nor lightheadedness. Her daughter is at her bedside. REVIEW OF SYSTEMS:  General ROS: positive for - fatigue. Psychological ROS: positive for - anxiety and mild depression  Ophthalmic ROS: negative for - decreased vision or visual distortion. ENT ROS: negative  Allergy and Immunology ROS: negative  Hematological and Lymphatic ROS: positive for - anemia  Endocrine: no heat or cold intolerance and no polyphagia, polydipsia, or polyuria  Respiratory ROS: positive for - shortness of breath  Cardiovascular ROS: positive for - dyspnea on exertion, edema, irregular heartbeat, murmur, palpitations and paroxysmal nocturnal dyspnea. Gastrointestinal ROS: no abdominal pain, change in bowel habits, or black or bloody stools  Genito-Urinary ROS: no nocturia, dysuria, trouble voiding, frequency or hematuria  Musculoskeletal ROS: negative for- myalgias, arthralgias, or claudication  Neurological ROS: no TIA or stroke symptoms otherwise no significant change in symptoms or problems since yesterday as documented in previous progress notes.     SCHEDULED MEDICATIONS:   sodium chloride  20 mL Intravenous Once    NIFEdipine  30 mg Oral Daily    sodium chloride flush  10 mL Intravenous 2 times per day    enoxaparin  30 mg Subcutaneous Daily       VITAL SIGNS:                                                                                                                          BP (!) 146/63   Pulse 89   Temp 99 °F (37.2 °C) (Temporal)   Resp 21   Ht 5' 5\" (1.651 m)   Wt 162 lb (73.5 kg)   SpO2 96%   BMI 26.96 kg/m²   Patient Vitals for the past 96 hrs (Last 3 readings):   Weight 03/05/20 1118 162 lb (73.5 kg)   03/04/20 2054 162 lb (73.5 kg)     OBJECTIVE:    HEENT: PERRL, EOM  Intact; sclera non-icteric, conjunctiva pink. Carotids are brisk in upstroke with normal contour. No carotid bruits. Normal jugular venous pulsation at 45°. No palpable cervical nor supraclavicular nodes. Thyroid not palpable. Trachea midline. Chest: Even excursion  Lungs: CTA B, no expiratory wheezes or rhonchi, no decreased tactile fremitus without inspiratory rales. Heart: Regular  rhythm; S1 > S2, no gallop; Grade 2 - 3/6 hard systolic ejection murmur second right and left intercostal space and apex. . No clicks, rub, palpable thrills   or heaves. PMI nondisplaced, 5th intercostal space MCL. Abdomen: Soft, nontender, nondistended,  moderately protuberant, no masses or organomegaly. Bowel sounds active. Extremities: Without clubbing, cyanosis 1-2+ pretibial and ankle edema. Extremity joint deformities noted. Pulses present 3+ upper extermities bilaterally; present 1+ DP and present 1+ PT bilaterally. Data:   Scheduled Meds: Reviewed  Continuous Infusions:    sodium chloride 75 mL/hr at 03/06/20 1210       Intake/Output Summary (Last 24 hours) at 3/6/2020 2149  Last data filed at 3/6/2020 1917  Gross per 24 hour   Intake 1565 ml   Output 550 ml   Net 1015 ml     CBC:   Recent Labs     03/04/20  1552 03/05/20  0449 03/06/20  0434   WBC 12.8* 11.0 11.2   HGB 7.5* 8.6* 7.9*   HCT 25.1* 28.3* 25.7*   * 426 403     BMP:  Recent Labs     03/04/20  1552 03/05/20  0449 03/06/20  0435    136 139   K 4.4 4.3 3.9    105 109*   CO2 16* 14* 14*   BUN 89* 86* 70*   CREATININE 2.2* 2.1* 1.6*   LABGLOM 21 22 31     ABGs: No results found for: PH, PO2, PCO2  INR: No results for input(s): INR in the last 72 hours.   PRO-BNP:   Lab Results   Component Value Date    PROBNP 1,713 (H) 03/04/2020    PROBNP 6,550 (H) 03/27/2018      TSH:   Lab Results   Component Value Date    TSH 1.290 03/21/2018      Cardiac Injury Profile:   Recent Labs     20  1552   TROPONINI <0.01      Lipid Profile:   Lab Results   Component Value Date    TRIG 157 2018    HDL 45 2018    LDLCALC 85 2018    CHOL 161 2018      Hemoglobin A1C: No components found for: HGBA1C     RAD:   Xre Chest PortableResult Date: 3/4/2020  Patient MRN: [id number] : 1935 Age:    81 years Gender: Female Order Date: 3/4/2020 3  :39 PM Exam: XR CHEST PORTABLE Numbr of Images: 1 view Indication:  Chest pain Comparison: Two-view chest study 2018 Findings: The lungs are symmetrically expanded, and are clear. There is no evidence of pneumothorax or pleural effusion. Cardiovascular shadows show aortic intimal calcification, but otherwise are normal in appearance. There is no evidence of cardiac enlargement or decompensation. Skeletal structures show anterior dislocation of the right humeral head (likely chronically unstable) and superior subluxation left humeral head. Hypertrophic degenerative spinal findings are noted. No evidence of acute cardiopulmonary pathology. 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 EKG: See Report  Echo: See Report      IMPRESSIONS:  Active Problems: Aortic stenosis, severe    NORBERTO (acute kidney injury) ([address]    Anemia    HTN (hypertension)  Resolved Problems:    * No resolved hospital problems.  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 Yenny Galeazzi as well as her daughter. Viviana Gutierrez, DO FACP,FACC,FSCAI      NOTE:  This report was transcribed using voice recognition software.   Every effort was made to ensure accuracy; however, inadvertent computerized transcription errors may be present

## 2020-03-08 NOTE — DISCHARGE SUMMARY
Physician Discharge Summary     Patient ID:  Ebenezer Howard  [id number]  81 y.o.  1935    Admit date: 3/4/2020    Discharge date and time: 3/8/2020     Admission Diagnoses: NORBERTO (acute kidney injury) 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 Hospital Course:  Admitted for evaluation and tx of new onset anemia reported on initial evaluation at [facility] for aortic stenosis and a possible TAVR candidate. The patient was transfused with 2 units pRBC's. Hemological consultant findings supports MAT. Stool heme, however, negative on admission with a repeat study pending on discharge. Ace inhibitors and diuretics for tx of HTN discontinued on admission d/t NORBERTO. Cautious IVF administered with improvement in RFT to baseline CKD on discharge. CT of the abdomen/pelvis and bleeding scan negative for source of blood loss. Spironolactone and torsemide added per cardiology consultant. The patient is discharged today in improved and stable condition with OP f/u as directed. Recheck BMP recommended on f/u.      CBC with Differential:    Lab Results   Component Value Date    WBC 10.5 03/08/2020    RBC 3.37 03/08/2020    HGB 8.8 03/08/2020    HCT 29.3 03/08/2020     03/08/2020    MCV 86.9 03/08/2020    MCH 26.1 03/08/2020    MCHC 30.0 03/08/2020    RDW 15.6 03/08/2020    LYMPHOPCT 12.2 03/08/2020    MONOPCT 9.0 03/08/2020    BASOPCT 0.4 03/08/2020 MONOSABS 0.95 03/08/2020    LYMPHSABS 1.28 03/08/2020    EOSABS 0.30 03/08/2020    BASOSABS 0.04 03/08/2020     CMP:    Lab Results   Component Value Date     03/08/2020    K 3.7 03/08/2020    K 4.3 03/05/2020     03/08/2020    CO2 15 03/08/2020    BUN 42 03/08/2020    CREATININE 1.3 03/08/2020    GFRAA 47 03/08/2020    LABGLOM 39 03/08/2020    GLUCOSE 85 03/08/2020    PROT 6.2 03/06/2020    LABALBU 2.7 03/06/2020    CALCIUM 8.2 03/08/2020    BILITOT <0.2 03/04/2020    ALKPHOS 106 03/04/2020    AST 10 03/04/2020    ALT 7 03/04/2020     BMP:    Lab Results   Component Value Date     03/08/2020    K 3.7 03/08/2020    K 4.3 03/05/2020     03/08/2020    CO2 15 03/08/2020    BUN 42 03/08/2020    LABALBU 2.7 03/06/2020    CREATININE 1.3 03/08/2020    CALCIUM 8.2 03/08/2020    GFRAA 47 03/08/2020    LABGLOM 39 03/08/2020    GLUCOSE 85 03/08/2020       Disposition: home    Patient Instructions:     Medication List      START taking these medications    Niferex Tabs  Take 1 capsule by mouth Daily        CONTINUE taking these medications    Nifedical XL 30 MG extended release tablet  Generic drug:  NIFEdipine     spironolactone 25 MG tablet  Commonly known as:  ALDACTONE  Take 1 tablet by mouth daily     torsemide 20 MG tablet  Commonly known as:  DEMADEX  Take 1 tablet by mouth daily        STOP taking these medications    enalapril-hydrochlorothiazide 10-25 MG per tablet  Commonly known as:  VASERETIC     Potassium 99 MG Tabs           Where to Get Your Medications      These medications were sent to [address], [address] [facility] [phone]  [facility] [address], [facility]    Phone:  [phone]   · Niferex Tabs          Activity: activity as tolerated   Diet: cardiac diet    Follow-up with Dr. Chinmay Chino in 5 days.     Note that over 30 minutes was spent in preparing discharge papers, discussing discharge with patient, medication review, etc.    Signed:  Krunal Field,   3/8/2020  1:19 PM

## 2020-03-08 NOTE — PROGRESS NOTES
Chief Complaint:  Anemia, AS    Subjective: The patient is alert. Voices no complaints   No problems overnight. Denies chest pain & SOB . Denies abdominal pain. Tolerating diet. No nausea or vomiting. Objective:    Vitals:    20 0825   BP: 131/64   Pulse: 99   Resp: 20   Temp: 99 °F (37.2 °C)   SpO2: 99%     A&O x's 3  Heart:  RRR, ALFONSO murmur RUSB  No gallops  or rubs. Lungs:  CTA bilaterally, no wheeze, rales or rhonchi  Abd: bowel sounds present, nontender, nondistended, no masses  Extrem:  No clubbing, cyanosis, or edema  Tele: NSR    Lab Results   Component Value Date     2020    K 3.7 2020    K 4.3 2020     2020    CO2 15 2020    BUN 42 2020    CREATININE 1.3 2020    CALCIUM 8.2 2020      Lab Results   Component Value Date    WBC 10.5 2020    RBC 3.37 2020    HGB 8.8 2020    HCT 29.3 2020    MCV 86.9 2020    MCH 26.1 2020    MCHC 30.0 2020    RDW 15.6 2020     2020    MPV 10.5 2020     PT/INR:  No results found for: PROTIME, INR  No results for input(s): POCGLU in the last 72 hours. Recent Labs     20  0435 20  0819 20  0448    141 140   K 3.9 3.6 3.7   * 109* 111*   CO2 14* 16* 15*   BUN 70* 51* 42*   LABALBU 2.7*  --   --    CREATININE 1.6* 1.3* 1.3*   CALCIUM 8.5* 8.5* 8.2*   GFRAA 37 47 47   LABGLOM 31 39 39   GLUCOSE 88 65* 85       Xr Chest Portable    Result Date: 3/4/2020  Patient MRN: [id number] : 1935 Age:  81 years Gender: Female Order Date: 3/4/2020 3:45 PM Exam: XR CHEST PORTABLE Number of Images: 1 view Indication:  Chest pain Comparison: Two-view chest study 2018 Findings: The lungs are symmetrically expanded, and are clear. There is no evidence of pneumothorax or pleural effusion. Cardiovascular shadows show aortic intimal calcification, but otherwise are normal in appearance.  There is no evidence of cardiac enlargement or decompensation. Skeletal structures show anterior dislocation of the right humeral head (likely chronically unstable) and superior subluxation left humeral head. Hypertrophic degenerative spinal findings are noted. No evidence of acute cardiopulmonary pathology. 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 Scheduled Meds:   sodium chloride  20 mL Intravenous Once    NIFEdipine  30 mg Oral Daily    sodium chloride flush  10 mL Intravenous 2 times per day    enoxaparin  30 mg Subcutaneous Daily     Continuous Infusions:   sodium chloride 50 mL/hr at 03/08/20 0825     PRN Meds:.mineral oil-hydrophilic petrolatum, sodium chloride flush, acetaminophen **OR** acetaminophen, polyethylene glycol, ondansetron **OR** ondansetron    I/O last 3 completed shifts: In: 1993 [P.O.:840;  I.V.:1153]  Out: -   I/O this shift:  In: 180 [P.O.:180]  Out: -     Intake/Output Summary (Last 24 hours) at 3/8/2020 1214  Last data filed at 3/8/2020 0825  Gross per 24 hour   Intake 1739 ml   Output --   Net 1739 ml       Assessment:    Active Hospital Problems    Diagnosis    HTN (hypertension) [I10]    [facility]BERTO (acute kidney injury) ([facility] [address] [N17.9]    Anemia [D64.9]    Aortic stenosis, severe [I35.0]       Plan:  Heme and cardiac consults reviewed             Stool OB negative             RFT slowly improving             Hgb improving       \    Transfuse again            Continue IVF--decrease rate            Discussed with Dr Lit Rosenthal c/w MAT            Will need endo at some point but with critical AS defer at this time            Recheck stool OB pending             Discharge today                     Sunday DO [place]  12:14 PM  3/8/2020

## 2020-03-09 NOTE — ADT AUTH CERT
prior study. NM GI Blood Loss   Impression   No indication for active GI bleeding to the end of 4   Hours.       Additional Orders:   Lovenox 30mg SQ daily   Telemetry   Consult to Cardiology, Hem/Onc   General Diet   VS routine      Internal Medicine Note   Assessment:       Active Hospital Problems     Diagnosis   · HTN (hypertension) [I10]   · NORBERTO (acute kidney injury) ([facility] [address] [N17.9]   · Anemia [D64.9]   · Aortic stenosis, severe [I35.0]           Plan:  Heme and cardiac consults reviewed              Stool OB negative              RFT slowly improving              Hgb improving             Transfuse again              Continue IVF--decrease rate              Discussed with Dr Trisha Buitrago c/w MAT              Will need endo at some point but with critical AS defer at this time               Recheck stool OB                   Renal Failure, Acute - Care Day 3 (3/6/2020) by Brendon Romnao RN         Review Status Review Entered   Completed 3/9/2020 10:34       Criteria Review      Care Day: 3 Care Date: 3/6/2020 Level of Care: Intermediate Care    Guideline Day 2    Level Of Care    (X) Floor    3/9/2020 10:34 AM EDT by Jordan Shine      INTERMEDIATE TELEMETRY UNIT    Clinical Status    (X) * Electrolyte abnormalities absent or improved    (X) * Acid-base abnormalities absent or improved    ( ) * Hypotension absent    3/9/2020 10:34 AM EDT by Jordan Shine      B/P 90/55    Activity    (X) Activity as tolerated    3/9/2020 10:34 AM EDT by Jordan Shine      Up with assistance    Routes    (X) Parenteral or oral hydration    3/9/2020 10:34 AM EDT by Jordan Shine      Normal Saline @ 50ml/h    (X) Renal diet as tolerated    3/9/2020 10:34 AM EDT by Jordan Shine      General Diet    Interventions    (X) Monitor electrolytes, renal function tests, acid-base, and volume status    * Milestone   Additional Notes   3/6/2020  Care Day 3      VS:  T.36.8, B/P 90/55, HR 83, R 20, SPO2 97% RA      Labs:      WBC: 11.2 RBC: 2.99 (L)   Hemoglobin Quant: 7.9 (L)   Hematocrit: 25.7 (L)   MCV: 86.0   MCH: 26.4   MCHC: 30.7 (L)   MPV: 10.3   RDW: 15.0   Platelet Count: 784   Neutrophils %: 72.9   Immature Granulocytes %: 0.6   Lymphocyte %: 13.0 (L)   Monocytes %: 9.7   Eosinophils %: 3.4   Basophils %: 0.4   Neutrophils Absolute: 8.13 (H)   Immature Granulocytes #: 0.07   Lymphocytes Absolute: 1.45 (L)   Monocytes Absolute: 1.08 (H)   Eosinophils Absolute: 0.38   Basophils Absolute: 0.04      Immature Retic Fract: 29.6 (H)   Retic HGB Equivalent: 22.9 (L)   Retic Ct Abs: 0.050   Retic Ct Pct: 1.7      IgA: 209   Total Ig   IgM: 155   Alpha-1-Globulin: 0.4   Alpha-2-Globulin: 1.2 (H)   Beta Globulin: 1.1   Gamma Globulin: 0.9      Sodium: 139   Potassium: 3.9   Chloride: 109 (H)   CO2: 14 (L)   BUN: 70 (H)   Creatinine: 1.6 (H)   Anion Gap: 16   GFR Non-: 31   GFR : 37   Glucose: 88   Calcium: 8.5 (L)   Total Protein: 6.2 (L)   LD: 138   Albumin: 2.7 (L)      Additional Orders:   Lovenox 30mg SQ daily   Telemetry   Consult to Cardiology, Hem/Onc   VS routine      Internal Medicine Note   Assessment:       Active Hospital Problems     Diagnosis   · HTN (hypertension) [I10]   · NORBERTO (acute kidney injury) ([facility] [address] [N17.9]   · Anemia [D64.9]   · Aortic stenosis, severe [I35.0]           Plan:  Heme and cardiac consults reviewed              Stool OB negative              RFT slowly improving              Drop in Hgb             Transfuse again              Continue IVF         Hem/Onc Note   SUBJECTIVE:  Admitted with dyspnea . Hailey Aguilar is over 2-3 years per son & daughter , not recent .    No new complaints.     S/p PRBCs      A: & P    Anemia for evaluation.        The CBC showed hemoglobin 7.5 gm at the time of admission with mild   leukocytosis, normal platelet count.  Serum creatinine 2.2 mg.  The   hemoglobin was 7.9 gm in 2020 and 10.8 gm in 2018.  She has   received one unit of packed red blood cells this admission.  History of   transfusion in 03/2018.      serum ferritin  Low normal with iron sat.  12% , suggestive of iron def.  Likely  GIB.  Recommend  GI consult. Discussed with pt , daughter. pari Nino. Normal  E72, folic, acid level, reticulocyte count, SPE. . UPE pending .   blood smear. -- no blasts .       She gives history of weight loss of about 20 pounds. On talking with her son this is gradual over about 2 years .  Chest x-ray, no   acute findings.  The CT scan of the abdomen and pelvis in 12/2018 was   not remarkable.  She denies any history of any colonoscopy.        Cardiology Note   SUBJECTIVE:   Onetha Hiss states she feels stronger and less short of breath. She denies  palpitations , chest discomfort nor lightheadedness.  Her daughter is at her bedside. IMPRESSIONS:   Active Problems:     Aortic stenosis, severe     NORBERTO (acute kidney injury) ([facility] [address]     Anemia     HTN (hypertension)   Resolved Problems:     * No resolved hospital problems. 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 ..           Note type: Physician Advisor Note level: Hospital Account       Note text: slr keep in    [address] [place] [address] Physician Advisor Recommendation               The information in this document is a recommendation to be used for utilization       review and utilization management purposes only. This recommendation is not an       order.  The recommendation is made based on the

## 2020-06-24 NOTE — TELEPHONE ENCOUNTER
[facility] calling patient was to have a cbc with diff every two weeks due to surgery and concerns with anemia. She was due today for repeat lab draw.     When orders placed please advise daughter Beatris [phone]

## 2020-07-02 NOTE — TELEPHONE ENCOUNTER
I spoke to [facility] daughter, Cydney Cash, and informed her of her mother's hemoglobin of 11.5 g, which is very good for her. She can proceed with her TAVR procedure at [facility].

## 2020-07-02 NOTE — TELEPHONE ENCOUNTER
Pt daughter Esha Gardner on [facility] [phone] calling to get cbc results in chart

## 2020-08-02 PROBLEM — I48.91 ATRIAL FIBRILLATION, NEW ONSET (HCC): Status: ACTIVE | Noted: 2020-01-01

## 2020-08-02 PROBLEM — E83.42 HYPOMAGNESEMIA: Status: ACTIVE | Noted: 2020-01-01

## 2020-08-02 PROBLEM — A41.9 SEPSIS (HCC): Status: ACTIVE | Noted: 2020-01-01

## 2020-08-02 PROBLEM — K35.80 ACUTE APPENDICITIS: Status: ACTIVE | Noted: 2020-01-01

## 2020-08-02 NOTE — ANESTHESIA PRE PROCEDURE
Department of Anesthesiology  Preprocedure Note       Name:  Mami Bernabe   Age:  81 y.o.  :  1935                                          MRN:  [id number]         Date:  2020      Surgeon: Bro Guajardo):  Orlando Alegria MD    Procedure: Procedure(s):  APPENDECTOMY LAPAROSCOPIC    Medications prior to admission:   Prior to Admission medications    Medication Sig Start Date End Date Taking?  Authorizing Provider   ferrous sulfate (IRON 325) 325 (65 Fe) MG tablet Take 325 mg by mouth daily (with breakfast)   Yes Historical Provider, MD   clopidogrel (PLAVIX) 75 MG tablet Take 75 mg by mouth daily   Yes Historical Provider, MD   enalapril-hydroCHLOROthiazide (VASERETIC) 10-25 MG per tablet Take 1 tablet by mouth daily   Yes Historical Provider, MD   aspirin 81 MG EC tablet Take 81 mg by mouth daily   Yes Historical Provider, MD   Iron Combinations (NIFEREX) TABS Take 1 capsule by mouth Daily 3/8/20   David Field,    spironolactone (ALDACTONE) 25 MG tablet Take 1 tablet by mouth daily 3/28/18   [facility]   torsemide (DEMADEX) 20 MG tablet Take 1 tablet by mouth daily 3/28/18   [facility]   NIFEdipine (NIFEDICAL XL) 30 MG extended release tablet Take 30 mg by mouth every morning    Historical Provider, MD       Current medications:    Current Facility-Administered Medications   Medication Dose Route Frequency Provider Last Rate Last Dose    lactated ringers infusion   Intravenous Continuous Prashant Prakash  mL/hr at 20 0153      sodium chloride flush 0.9 % injection 10 mL  10 mL Intravenous 2 times per day Cary Almeida MD        sodium chloride flush 0.9 % injection 10 mL  10 mL Intravenous PRN Cary Almeida MD        acetaminophen (TYLENOL) tablet 650 mg  650 mg Oral Q4H PRN Cary Almeida MD        morphine (PF) injection 2 mg  2 mg Intravenous Q2H PRN Cary Almeida MD   2 mg at 20 0247    Or    morphine sulfate (PF) injection 4 mg  4 mg Intravenous Q2H PRN Isela Pate MD   4 mg at 08/02/20 0501    polyethylene glycol (GLYCOLAX) packet 17 g  17 g Oral Daily PRN Isela Pate MD        heparin (porcine) injection 5,000 Units  5,000 Units Subcutaneous 3 times per day Isela Pate MD   5,000 Units at 08/02/20 0522    [START ON 8/3/2020] cefTRIAXone (ROCEPHIN) 2 g in sterile water 20 mL IV syringe  2 g Intravenous Q24H Isela Pate MD        metronidazole (FLAGYL) 500 mg in NaCl 100 mL IVPB premix  500 mg Intravenous Q8H Isela Pate MD        NIFEdipine (PROCARDIA XL) extended release tablet 30 mg  30 mg Oral QAM Isela Pate MD        aspirin EC tablet 81 mg  81 mg Oral Daily Isela Pate MD        clopidogrel (PLAVIX) tablet 75 mg  75 mg Oral Daily Isela Pate MD        trimethobenzamide Clearence Herter) injection 200 mg  200 mg Intramuscular Q6H PRN Carolann Long MD   200 mg at 08/02/20 5529       Allergies:  No Known Allergies    Problem List:    Patient Active Problem List   Diagnosis Code    Pneumonia J18.9    Acute respiratory failure with hypoxia (HCC) J96.01    Acute on chronic diastolic congestive heart failure (HCC) I50.33    Cellulitis L03.90    Hypokalemia E87.6    Acute blood loss anemia D62    Aortic stenosis, severe I35.0    Moderate aortic regurgitation I35.1    Mitral regurgitation I34.0    Sepsis due to methicillin susceptible Staphylococcus aureus ([facility]) A41.01    Class 3 obesity in adult [id number]    Primary osteoarthritis of right knee M17.11    Non-pressure chronic ulcer of right ankle, limited to breakdown of skin ([address] L97.311    Non-pressure chronic ulcer of left ankle, limited to breakdown of skin ([address] L97.321    Lymphedema of both lower extremities I89.0    Venous ulcer of left lower extremity without varicose veins (HCC) I87.2, L97.929    NORBERTO (acute kidney injury) ([address] N17.9    Anemia D64.9    HTN (hypertension) I10    Acute appendicitis K35.80    Sepsis ([place])  554  ms  Incomplete  08/01/2020 10:23 PM  [facility]    R Axis  26  degrees  Incomplete  08/01/2020 10:23 PM  [facility]    T Axis  -146  degrees  Incomplete  08/01/2020 10:23 PM  [facility]    Testing Performed By     Lab - [address]  Name  Director  Address  Valid Date Range    360-[facility]  [facility] MUSE  Unknown  Unknown  04/18/16 0721-Present    Narrative & Impression     Atrial fibrillation with rapid ventricular response with premature ventricular or aberrantly conducted complexes  Left bundle branch block  Abnormal ECG  When compared with ECG of 04-MAR-2020 15:50,  Significant changes have occurred     3/20/18 ECHO        Findings      Left Ventricle   Left ventricle mildly dilated. Proximal septal thickening. Normal LV segmental wall motion. Estimated left ventricular ejection fraction is 55±5%. <50% criteria for diastolic dysfunction. Right Ventricle   Normal right ventricular size and function. Left Atrium   The left atrium is mild-moderately dilated. The LAESV Index is <34ml/m2. Interatrial septum appears intact. Right Atrium   Right atrium is normal in size. Right atrial systolic collapse. Mitral Valve      Mild mitral regurgitation is present. Focal calcification mitral valve leaflets. Mild mitral annular calcification. Tricuspid Valve   The tricuspid valve appears structurally normal.   Unable to accurately assess RV systolic pressure. Physiologic and/or trace tricuspid regurgitation. Aortic Valve   The aortic valve is trileaflet. Decreased aortic valve leaflet excursion. The aortic valve appears severely sclerotic. Mild - moderate aortic regurgitation is noted. Moderate to severe aortic stenosis is present. The aortic valve dimensionless index is 0.31 . Pulmonic Valve   Pulmonic valve is structurally normal.   Physiologic and/or trace pulmonic regurgitation present.       Pericardial Effusion   No evidence of pericardial thickening/calcification present. No evidence of pericardial effusion. Aorta   Aortic root dimension within normal limits. Aorta appears sclerotic and/or calcified. Miscellaneous   Normal Inferior Vena Cava diameter and respiratory variation. Conclusions      Summary   Left ventricle mildly dilated. Proximal septal thickening. Normal LV segmental wall motion. Estimated left ventricular ejection fraction is 55±5%. <50% criteria for diastolic dysfunction. The LAESV Index is <34ml/m2. Normal right ventricular size and function. Mild mitral regurgitation is present. Mild - moderate aortic regurgitation is noted. Moderate to severe aortic stenosis is present. The aortic valve dimensionless index is 0.31 . Unable to accurately assess RV systolic pressure. Physiologic and/or trace tricuspid regurgitation   Physiologic and/or trace pulmonic regurgitation present. Technically good quality study. No comparison study available. Suggest clinical correlation. Signature      ----------------------------------------------------------------   Electronically signed by Gertrude Cash DO(Interpreting   physician) on 03/20/2018 09:23 PM   ----------------------------------------------------------------      Anesthesia Evaluation  Patient summary reviewed and Nursing notes reviewed no history of anesthetic complications:   Airway: Mallampati: III  TM distance: <3 FB   Neck ROM: full  Mouth opening: < 3 FB Dental:      Comment: Pt states had recent dental work prior to TAVR and teeth pulled, denies any loose teeth.  Poor dentition    Pulmonary:Negative Pulmonary ROS and normal exam  breath sounds clear to auscultation      (-) pneumonia                           Cardiovascular:    (+) hypertension:, valvular problems/murmurs (recent TAVR at [facility], july 17th 2020): MR, dysrhythmias (new onset AFIB): atrial fibrillation, CHF: diastolic, hyperlipidemia      ECG reviewed  Rhythm: irregular    Echocardiogram reviewed         Beta Blocker:  Not on Beta Blocker         Neuro/Psych:   Negative Neuro/Psych ROS              GI/Hepatic/Renal:            ROS comment: n/v    FOR STAT APPY TODAY 8/2/20. Endo/Other:    (+) blood dyscrasia: anemia, arthritis:., electrolyte abnormalities, . Pt had no PAT visit        ROS comment: Cellulitis BLE Abdominal:       Abdomen: tender. Vascular: negative vascular ROS. Anesthesia Plan      general     ASA 4 - emergent       Induction: intravenous. BIS  MIPS: Postoperative opioids intended and Prophylactic antiemetics administered. Anesthetic plan and risks discussed with patient and child/children. Use of blood products discussed with patient and child/children whom consented to blood products. Plan discussed with attending. KULDIP Herrera CRNA   8/2/2020    Pt seen, examined, chart reviewed, plan discussed.   Alexa Potts  8/2/2020  7:06 AM

## 2020-08-02 NOTE — ED NOTES
Per Dr. Christine Chavez, labs to be drawn when central line inserted.       Jhonny Fournier RN  08/02/20 5167

## 2020-08-02 NOTE — ED NOTES
Bed: 18A-18  Expected date:   Expected time:   Means of arrival:   Comments:  Triage      Pina Morton RN  08/01/20 3308

## 2020-08-02 NOTE — PROCEDURES
Cristobal Hernandez is a 81 y.o. female patient. 1. Acute appendicitis with perforation and localized peritonitis, unspecified whether abscess present, unspecified whether gangrene present    2. New onset atrial fibrillation ([address]    3. Hypokalemia    4. Hypomagnesemia      Past Medical History:   Diagnosis Date    Acute blood loss anemia 3/21/2018    Acute on chronic diastolic congestive heart failure ([address] 3/20/2018    Acute respiratory failure with hypoxia ([address] 3/20/2018    Aortic stenosis, severe 03/2018    Arthritis     Cellulitis 3/20/2018    Right and left lower extremity    Class 3 obesity in adult 3/22/2018    History of blood transfusion     Hypertension     Hypokalemia 3/21/2018    Mitral regurgitation 03/2018    Moderate aortic regurgitation 03/2018    Sepsis due to methicillin susceptible Staphylococcus aureus ([facility] [address] 3/22/2018     Blood pressure (!) 190/66, pulse 95, temperature 97.1 °F (36.2 °C), temperature source Infrared, resp. rate 18, height 5' 5\" (1.651 m), weight 188 lb (85.3 kg), SpO2 98 %. Central Line    Date/Time: 8/2/2020 1:58 AM  Performed by: David Chen MD  Authorized by: David Chen MD   Consent: Written consent obtained.   Risks and benefits: risks, benefits and alternatives were discussed  Consent given by: patient  Patient identity confirmed: verbally with patient  Indications: vascular access  Anesthesia: local infiltration    Anesthesia:  Local Anesthetic: lidocaine 1% without epinephrine  Preparation: skin prepped with 2% chlorhexidine  Skin prep agent dried: skin prep agent completely dried prior to procedure  Sterile barriers: all five maximum sterile barriers used - cap, mask, sterile gown, sterile gloves, and large sterile sheet  Hand hygiene: hand hygiene performed prior to central venous catheter insertion  Location details: left internal jugular  Patient position: Trendelenburg  Catheter type: triple lumen  Catheter size: 7 Fr  Pre-procedure: landmarks identified  Ultrasound guidance: yes  Sterile ultrasound techniques: sterile gel and sterile probe covers were used  Number of attempts: 1  Successful placement: yes  Post-procedure: line sutured and dressing applied  Assessment: blood return through all ports and free fluid flow  Patient tolerance: Patient tolerated the procedure well with no immediate complications          Tal Estrella MD  8/2/2020

## 2020-08-02 NOTE — ANESTHESIA POSTPROCEDURE EVALUATION
Department of Anesthesiology  Postprocedure Note    Patient: Mami Bernabe  MRN: [id number]  YOB: 1935  Date of evaluation: 8/2/2020  Time:  10:21 AM     Procedure Summary     Date:  08/02/20 Room / Location:  [facility] OR 08 / [facility]    Anesthesia Start:  8653 Anesthesia Stop:      Procedure:  APPENDECTOMY LAPAROSCOPIC (N/A ) Diagnosis:  (APPENDICITIS)    Surgeon:  Orlando Alegria MD Responsible Provider:      Anesthesia Type:  general ASA Status:  4 - Emergent          Anesthesia Type: general    Julio C Phase I: Julio C Score: 10    Julio C Phase II:      Last vitals: Reviewed and per EMR flowsheets.        Anesthesia Post Evaluation    Patient location during evaluation: ICU  Patient participation: complete - patient participated  Level of consciousness: awake  Pain score: 3  Airway patency: patent  Nausea & Vomiting: no nausea and no vomiting  Complications: no  Cardiovascular status: blood pressure returned to baseline  Respiratory status: acceptable  Hydration status: euvolemic

## 2020-08-02 NOTE — CONSULTS
Surgical Intensive Care Unit  Daily Progress Note  Date of admission:  8/1/2020  Reason for ICU transfer: Perforated appendicitis with severe electrolyte abnormalities including hypokalemia and hypomagnesia. Physical Exam:  BP (!) 177/103   Pulse 83   Temp 97.5 °F (36.4 °C) (Temporal)   Resp 19   Ht 5' 5\" (1.651 m)   Wt 163 lb 1.6 oz (74 kg)   SpO2 99%   BMI 27.14 kg/m²     General: Awake, Pleasant, oriented  Head: Normocephalic, no abnormalities  HEENT: Pupils equal round and reactive, EOMI  Neck: No JVD. Left internal jugular central venous catheter  Heart: New onset A. fib. No murmurs rubs or gallops  Lungs: Clear to auscultation bilaterally, regular respiratory effort  Abdomen: Right lower quadrant tenderness, no rigidity rebound or guarding   no Seaman catheter in place, no CVA tenderness  Neuro: MAS 4, no focal deficits  Psych: Normal mood, normal affect    ASSESSMENT / PLAN:  · Neuro:  Pain -morphine 2/4 mg  · CV: Continuous telemetry. Continue to monitor rate in the face of new onset A. fib. Continue to monitor hemodynamics. Aspirin and Plavix for recent TAVR on 7/17  · Pulm: Continue to monitor respiratory rate, SPO2  · GI:  N.p.o. for OR  · Renal: Monitor urine output electrolytes. Severe hypokalemia and hypomagnesia. Electrolytes were replaced. Recheck at 5 AM this morning  · ID:  Ceftriaxone/Flagyl for ruptured appendicitis  · Endo: None  · MSK: None    Total fluid rate: 100 LR  Bowel regimen: PRN MiraLAX  DVT proph:  SCDs, subcu heparin  GI proph:  None indicated   Glucose protocol: Not indicated  Mouth/eye care: None  Seaman:   None  CVC sites:  Left internal jugular day 1  Ancillary consults: None  Family Update: Family at bedside. Daughter         Hospital course:  8/2/20  -admitted with perforated appendicitis and severe electrolyte abnormalities.

## 2020-08-02 NOTE — ED NOTES
Dr. Odilon Talbot at bedside informing patient and family of need for central line. Dr. Andres Cline at bedside and states that central line will be placed in SICU.      Cuba Sandoval RN  08/02/20 0932

## 2020-08-02 NOTE — OP NOTE
Operative Note      Patient: Miri Ware  YOB: 1935  MRN: [id number]    Date of Procedure: 8/2/2020    Pre-Op Diagnosis: APPENDICITIS    Post-Op Diagnosis: Same PERFORATED, SBO with transition zone in RLQ after bowel was ran  no mass, no identifiable hernia causing the transition zone was most likely due to RLQ abscess from her appendicitis. Procedure(s):  APPENDECTOMY LAPAROSCOPIC    Surgeon(s):  Jadyn Escalante MD    Assistant:   Resident: Gwyn Currie MD    Anesthesia: General    Estimated Blood Loss (mL): Minimal    Complications: None    Specimens:   ID Type Source Tests Collected by Time Destination   1 : CULTURE OF APPENDIX FOR AEROBIC, ANAEROBIC, GRAM STAIN AND FUNGUS Body Fluid Fluid CULTURE, ANAEROBIC, CULTURE, FUNGUS, GRAM STAIN, CULTURE, BODY FLUID Jadyn Escalante MD 8/2/2020 4800    A : A) APPENDIX Tissue Tissue SURGICAL PATHOLOGY Jadyn Escalante MD 8/2/2020 8309        Implants:  * No implants in log *      Drains:   Closed/Suction Drain Lateral RLQ Bulb 19 Armenian (Active)   Dressing Status Clean 08/02/20 0844   Drainage Appearance Serous 08/02/20 0844   Status Other (Comment) 08/02/20 0844       Urethral Catheter Non-latex 16 fr (Active)   Catheter Indications Perioperative use in selected surgeries including but not limited to urologic, pelvic or need for intraoperative monitoring of urinary output due to prolonged surgery, large volume infusion or need for diuretic therapy in surgery 08/02/20 0756   Site Assessment No urethral drainage;Moist;Pink 08/02/20 0756   Urine Color Yellow 08/02/20 0756   Urine Appearance Clear 08/02/20 0756       Indications:  72-year-old female who presents with clinical picture and CT scan suggestive of perforated appendicitis. Patient was started on antibiotics. The details of her likely diagnosis were communicated to the patient.   The risks and benefits of antibiotic therapy alone versus surgical intervention were described in detail. The patient opted for surgical intervention. Findings: perforated appendicitis    Detailed Description of Procedure: The patient was brought to the operating room and after confirmation of patient identifiers and procedure the patient was placed on the operating room table and placed under general anesthesia. Abdomen was prepped with ChloraPrep and draped in the usual sterile fashion. A 10 mm incision was made at the superior abdomen approximately 10 cm above the umbilicus. Using a Bonilla technique S retractors were used to separate the subcutaneous fat until fascia was appreciated. 2 Kocher retractors were used to lift the fascia and an 11 scalpel blade was used to gavin this and the peritoneum. A 10 mm port was placed through this incision. At this point insufflation was connected to the 10 mm port and the abdomen filled with CO2 gas. A 5 mm 30 degree scope was used to survey the peritoneal cavity. There were several adhesions noted throughout the abdomen specifically in the right lower quadrant and in the right upper and epigastrium area. Some of these adhesions in the RLQ were taken down sharply with endoshears to allow adequate visualization of the anatomy of interest.  Using the 11 scalpel blade again, a 5 mm incision was made just superior to the umbilicus and another at the left hemiabdomen and 5 mm trochars were inserted under direct visualization. Using a suction  and a bowel grasper the appendix was identified and was found to be perforated. After blunt dissection the base of the terminal ileum, appendix and the mesoappendix were identified. The supraumbilical 5 mm incision was upsized to a 10 mm incision and a 10 mm trocar was inserted into that space.   Once adequate blunt dissection was performed of the appendix a blue load SARA stapler was inserted into the inferior most 10 mm port and the appendix and mesoappendix were taken as one with the stapler minding

## 2020-08-02 NOTE — ED NOTES
Dr. Hunt Sheets states to run magnesium over 20 minutes and then start potassium.       Roro Izaguirre RN  08/02/20 0021

## 2020-08-02 NOTE — ED NOTES
Received report from Isaias Mota, [facility].       Yoli Chatterjee RN  08/01/20 5194

## 2020-08-02 NOTE — H&P
GENERAL SURGERY  H&P NOTE  8/2/2020        HPI  Kashif Stephen is a 81 y.o. female w/ a recent Hx of TAVR at [facility] who presents for evaluation of 4 days of food intolerance, 3 days of diarrhea, 2 days of periumbilical abd pain, 1 day of nausea and vomiting. She had labs drawn on 7/28 that showed a white count of 15. She denies any Hx of abd surgery. Past Medical History:   Diagnosis Date    Acute blood loss anemia 3/21/2018    Acute on chronic diastolic congestive heart failure ([address] 3/20/2018    Acute respiratory failure with hypoxia ([address] 3/20/2018    Aortic stenosis, severe 03/2018    Arthritis     Cellulitis 3/20/2018    Right and left lower extremity    Class 3 obesity in adult 3/22/2018    History of blood transfusion     Hypertension     Hypokalemia 3/21/2018    Mitral regurgitation 03/2018    Moderate aortic regurgitation 03/2018    Sepsis due to methicillin susceptible Staphylococcus aureus ([address] 3/22/2018       Past Surgical History:   Procedure Laterality Date    CARDIAC CATHETERIZATION  01/10/2020    Dr. Aneesh Heck  Left and Right Heart Catheterization       Medications Prior to Admission:    Prior to Admission medications    Medication Sig Start Date End Date Taking? Authorizing Provider   Iron Combinations (NIFEREX) TABS Take 1 capsule by mouth Daily 3/8/20   David Field, DO   spironolactone (ALDACTONE) 25 MG tablet Take 1 tablet by mouth daily 3/28/18   Gilberto Workman, DO   torsemide (DEMADEX) 20 MG tablet Take 1 tablet by mouth daily 3/28/18   Gilberto Workman,    NIFEdipine (NIFEDICAL XL) 30 MG extended release tablet Take 30 mg by mouth every morning    Historical Provider, MD       No Known Allergies    History reviewed. No pertinent family history.     Social History     Tobacco Use    Smoking status: Never Smoker    Smokeless tobacco: Never Used   Substance Use Topics    Alcohol use: No    Drug use: No         Review of Systems   General ROS: fever (-), chills some of the segments of the small bowel are dilated up to 3.6 cm . Digital is not precisely identified, does not appears to be direct relate with the appendicitis process. There is some whorling of the mesenteric vessels. The colon is decompressed. Uncomplicated extensive diverticulosis seen in the sigmoid colon. There is no free intraperitoneal air outside of the akshat-appendiceal spaces. There are normal size and the density for the liver and spleen. The multiple gallstones are seen in the gallbladder. The biliary tree and pancreatic ductal systems are not dilated. Pancreas has atrophy. There is a moderate to large size hiatal hernia. There is a nodule in the left adrenal gland which is stable back to the study of December 14, 2018. Kidneys have preserved size and cortical thickness, there is no renal calculus. There is no obstruction. The bladder has unremarkable appearance. The calcified myomatous changes are seen in the uterus. Left ovary has unremarkable appearance. The right ovary cannot be conspicuously identified as separate from adjacent bowel segments. There are unremarkable appearance for the bladder. Calcified atheroma changes are seen in the abdominal aorta with a more prominent plaque in the upper abdominal aorta where the origin of the celiac axis and SMA are located. There are areas of chronic pulmonary fibrosis in the bases are. These were seen previously. Degenerative changes are seen throughout the lumbar spine with the degenerative disc disease and facet hypertrophy. These are long-standing findings. Patient previous endovascular repair for the aortic root/aortic valve. 1. Acute perforated appendicitis as above described perforation is well contained in the periappendiceal spaces. 2. There is also a component of small bowel obstruction which etiology is not precisely determined that time the present study, does not appears to be directly related with the appendicitis.  There is some whorling of the mesenteric vessels. 3. Multiple gallstones. . Preliminary report was given to Dr. Flavio Wong, ER physician at the time the present study. ALERT:  ABNORMAL REPORT.         ASSESSMENT:  81 y.o. female with acute perforated appendicitis    PLAN:  Admit to ICU for resuscitation  Replete electrolytes  NPO  IVF  Abx  OR this morning for lap appy    Electronically signed by Tal Estrella MD on 8/2/20 at 1:27 AM EDT

## 2020-08-02 NOTE — ED PROVIDER NOTES
reviewed. Allergies: Patient has no known allergies.     -------------------------------------------------- RESULTS -------------------------------------------------  All laboratory and radiology results have been personally reviewed by myself   LABS:  Results for orders placed or performed during the hospital encounter of 08/01/20   CBC Auto Differential   Result Value Ref Range    WBC 17.5 (H) 4.5 - 11.5 E9/L    RBC 4.07 3.50 - 5.50 E12/L    Hemoglobin 10.9 (L) 11.5 - 15.5 g/dL    Hematocrit 34.2 34.0 - 48.0 %    MCV 84.0 80.0 - 99.9 fL    MCH 26.8 26.0 - 35.0 pg    MCHC 31.9 (L) 32.0 - 34.5 %    RDW 15.1 (H) 11.5 - 15.0 fL    Platelets 956 (H) 677 - 450 E9/L    MPV 10.3 7.0 - 12.0 fL    Neutrophils % 87.9 (H) 43.0 - 80.0 %    Immature Granulocytes % 1.4 0.0 - 5.0 %    Lymphocytes % 6.2 (L) 20.0 - 42.0 %    Monocytes % 4.2 2.0 - 12.0 %    Eosinophils % 0.1 0.0 - 6.0 %    Basophils % 0.2 0.0 - 2.0 %    Neutrophils Absolute 15.31 (H) 1.80 - 7.30 E9/L    Immature Granulocytes # 0.25 E9/L    Lymphocytes Absolute 1.09 (L) 1.50 - 4.00 E9/L    Monocytes Absolute 0.74 0.10 - 0.95 E9/L    Eosinophils Absolute 0.02 (L) 0.05 - 0.50 E9/L    Basophils Absolute 0.04 0.00 - 0.20 E9/L   Comprehensive Metabolic Panel w/ Reflex to MG   Result Value Ref Range    Sodium 136 132 - 146 mmol/L    Potassium reflex Magnesium 3.4 (L) 3.5 - 5.0 mmol/L    Chloride 96 (L) 98 - 107 mmol/L    CO2 19 (L) 22 - 29 mmol/L    Anion Gap 21 (H) 7 - 16 mmol/L    Glucose 134 (H) 74 - 99 mg/dL    BUN 29 (H) 8 - 23 mg/dL    CREATININE 1.0 0.5 - 1.0 mg/dL    GFR Non-African American 53 >=60 mL/min/1.73    GFR African American >60     Calcium 9.6 8.6 - 10.2 mg/dL    Total Protein 8.2 6.4 - 8.3 g/dL    Alb 3.9 3.5 - 5.2 g/dL    Total Bilirubin 0.2 0.0 - 1.2 mg/dL    Alkaline Phosphatase 109 (H) 35 - 104 U/L    ALT 10 0 - 32 U/L    AST 19 0 - 31 U/L   Lipase   Result Value Ref Range    Lipase 23 13 - 60 U/L   Magnesium   Result Value Ref Range    Magnesium 1.1 (LL) 1.6 - 2.6 mg/dL   Potassium   Result Value Ref Range    Potassium 2.6 (LL) 3.5 - 5.0 mmol/L   EKG 12 Lead   Result Value Ref Range    Ventricular Rate 119 BPM    Atrial Rate 119 BPM    QRS Duration 144 ms    Q-T Interval 394 ms    QTc Calculation (Bazett) 554 ms    R Axis 26 degrees    T Axis -146 degrees       RADIOLOGY:  Interpreted by Radiologist.  CT ABDOMEN PELVIS WO CONTRAST Additional Contrast? None   Final Result   1. Acute perforated appendicitis as above described perforation is   well contained in the periappendiceal spaces. 2. There is also a component of small bowel obstruction which etiology   is not precisely determined that time the present study, does not   appears to be directly related with the appendicitis. There is some   whorling of the mesenteric vessels. 3. Multiple gallstones. .       Preliminary report was given to Dr. Chary Neal, ER physician at the   time the present study. ALERT:  ABNORMAL REPORT.          ------------------------- NURSING NOTES AND VITALS REVIEWED ---------------------------   The nursing notes within the ED encounter and vital signs as below have been reviewed. BP (!) 181/92   Pulse 92   Temp 97.5 °F (36.4 °C) (Infrared)   Resp 14   Ht 5' 5\" (1.651 m)   Wt 188 lb (85.3 kg)   SpO2 98%   BMI 31.28 kg/m²   Oxygen Saturation Interpretation: Normal      ---------------------------------------------------PHYSICAL EXAM--------------------------------------      Constitutional/General: Alert and oriented x3, appears uncomfortable, non toxic in NAD  Head: Normocephalic and atraumatic  Eyes: PERRL, EOMI  Mouth: Oropharynx clear, handling secretions, no trismus  Neck: Supple, full ROM,   Pulmonary: Lungs clear to auscultation bilaterally, no wheezes, rales, or rhonchi. Not in respiratory distress  Cardiovascular:  Irregularly irregular rhythm, no murmurs, gallops, or rubs.  2+ distal pulses  Abdomen: Soft, non distended, epigastric tenderness, no

## 2020-08-03 NOTE — PLAN OF CARE
Problem: Pain:  Goal: Control of acute pain  Description: Control of acute pain  Outcome: Met This Shift      Problem: Falls - Risk of:  Goal: Will remain free from falls  Description: Will remain free from falls  Outcome: Met This Shift

## 2020-08-03 NOTE — PROGRESS NOTES
[facility]  ATTENDING PHYSICIAN PROGRESS NOTE     I have examined the patient and  reviewed the record. I have reviewed all relevant labs and imaging data. The following summarizes my clinical findings and independent assessment. CC: Perforated appendicitis    S. Patient was transferred out of the SICU last night. Patient is complaining of burping and nausea. Hospital course: Lap appendectomy on August 2    O. No apparent distress  Lungs clear  Heart is regular rate rhythm  Abdomen is soft, tympanic, distended, EDI is serosanguineous  Extremities moves all      ASSESSMENT:  Active Problems:    Hypokalemia    Acute appendicitis    Sepsis (HCC)    Hypomagnesemia    Atrial fibrillation, new onset ([facility]  Resolved Problems:    * No resolved hospital problems. *       PLAN:  Postop day 1 status post lap appendectomy for perforated appendicitis  Continue n.p.o. We will place an NG tube due to the abdominal distention and nausea to reduce the risk of aspiration  Place Seaman due to urinary retention  Continue IV antibiotics. History of TAVR  Continue aspirin Plavix    DVT Proph: SCDS/heparin 3 times daily    PT OT       Zia Cheung MD, FACS  8/3/2020  12:13 PM    NOTE: This report was transcribed using voice recognition software. Every effort was made to ensure accuracy; however, inadvertent computerized transcription errors may be present.

## 2020-08-03 NOTE — PROGRESS NOTES
Physical Therapy    Physical Therapy Initial Assessment     Name: Mami Bernabe  :   MRN: [id number]    Referring Provider:  Ivan Murphy MD    Date of Service: 8/3/2020    Evaluating PT:  Sebastien Casanova PT, DPT    Room #:  1233/0170-G  Diagnosis:  Acute appendicitis  PMHx/PSHx:  HTN, Cellulitis, CHF, Arthritis, Cardiac catheterization 1/10/2020, [facility]  Procedure/Surgery:  Laparoscopic appendectomy 2020  Precautions:  Falls, EDI drain, NGT to wall suction, Seaman catheter  Equipment Needs:  Has SPC    SUBJECTIVE:    Pt lives with her daughter in a 2 story home with 3 stairs to enter and no rail(s). Bed is on 1st floor and bath is in the basement with flight and 1 rail(s). Pt ambulated with no AD in the home, [facility] in the [facility] P[facility]. Pt reported independent with ADLs. Pt is not actively driving. OBJECTIVE:   Initial Evaluation  Date: 8/3/2020 Treatment  NA Short Term/ Long Term   Goals   AM-PAC 6 Clicks      Was pt agreeable to Eval/treatment? Yes      Does pt have pain? Reported minimal pain at surgical site.      Bed Mobility  Rolling: NT  Supine to sit: Mod A  Sit to supine: NT  Scooting: Min A  Supervision   Transfers Sit to stand: Min A  Stand to sit: Min A  Stand pivot: Min A  Supervision   Ambulation    2 feet laterally with no AD with Min A  >50 feet with no AD with Supervision   Stair negotiation: ascended and descended  NT  10 steps with 1 rail with SBA   ROM BUE:  Per OT  BLE:  WFL     Strength BUE:  Per OT  BLE:  4/5  Increase 1/3 grade MMT   Balance Sitting EOB:  SBA  Dynamic Standing:  Min A with no AD  Sitting EOB:  Independent  Dynamic Standing:  Supervision     Pt is A & O x 4  Sensation:  Pt denied numbness and tingling to extremities  Edema:  None noted    Therapeutic Exercises:  Instructed on APs, LAQs and provided return demo    Patient education  Pt educated on purpose of PT assessment, importance of mobility, safety with mobility, line management/awareness, task. Verbal cues for posture, line management, safety.  Therapeutic exercises: As noted above   Neuromuscular education: NA     Pt's/ family goals   1. To feel better. Patient and or family understand(s) diagnosis, prognosis, and plan of care. Yes     PLAN:    PT care will be provided in accordance with the objectives noted above. Exercises and functional mobility practice will be used as well as appropriate assistive devices or modalities to obtain goals. Patient and family education will also be administered as needed. Frequency of treatments: 2-5x/week x 1-2 weeks. Time in  1056  Time out  1120    Total Treatment Time  14 minutes     Evaluation Time includes thorough review of current medical information, gathering information on past medical history/social history and prior level of function, completion of standardized testing/informal observation of tasks, assessment of data and education on plan of care and goals.     CPT codes:  [] Low Complexity PT evaluation 51661  [x] Moderate Complexity PT evaluation 21836  [] High Complexity PT evaluation 28262  [] PT Re-evaluation 58914  [] Gait training 14004 - minutes  [] Manual therapy 07700 - minutes  [x] Therapeutic activities 20331 14 minutes  [] Therapeutic exercises 05260 - minutes  [] Neuromuscular reeducation 59288 - minutes     Harish Bueno, PT, DPT  License [id number]

## 2020-08-03 NOTE — PROGRESS NOTES
Occupational Therapy  OCCUPATIONAL THERAPY INITIAL EVALUATION      Date:8/3/2020  Patient Name: Nathalie Nieto  MRN: [id number]  : 1935  Room: [address] [place], [place] [address]      Evaluating OT: Amaacosta Proctore OTR/L #[id number]    AM-PAC Daily Activity Raw Score:     Recommended Adaptive Equipment: AE for LB dressing/bathing; TBD     Diagnosis: Acute appendicitis [K35.80]  Referring Provider: Elise Cheng MD   Patient presented to ED for abdominal pain    Surgery:  APPENDECTOMY LAPAROSCOPIC   Pertinent Medical History: acute blood loss anemia, CHF, arthritis, cellulitis, HTN, hypokalemia, aortic stenosis,        Precautions:  Falls, NGT, EDI drain     Home Living: Pt lives with daughter in 2 story house w/ 3 OSIRIS (no handrail); bed on 1st floor and bathroom in basement w/ full flight w/ handrail  Bathroom setup: walk-in shower    Equipment owned: [facility]    Prior Level of Function: Independent with ADLs , Assistance with IADLs; ambulated w/ no AD around house and [facility] outside  Driving: No-daughter provides transportation  Occupation: not stated    Pain Level: Pt c/o no pain at this time  Cognition: A&O: 4/4; Follows 1-2 step directions   Memory:  good    Sequencing:  good    Problem solving: good    Judgement/safety: good      Functional Assessment:   Initial Eval Status  Date: 8/3/20 Treatment Status  Date: STGs/LTGs  Treatment frequency: 1-4x/wk   Feeding NPO (NGT)  Continue to assess   Grooming Minimal Assist   Independent    UB Dressing Minimal Assist   Independent    LB Dressing Maximal Assist to don/doff socks  Minimal Assist w/ AE as needed   Bathing Moderate Assist (simulated)  Stand by Assist w/ AE as needed   Toileting Minimal Assist (simulated)  Independent    Bed Mobility  Supine to sit: Moderate Assist (HOB elevated)  Sit to supine: NT  Supine to sit:  Independent   Sit to supine: Independent    Functional Transfers Minimal Assist   Independent    Functional Mobility Minimal Assist w/ HHA (to bedside chair) Modified [place]    Balance Sitting:     Static: SBA    Dynamic:Min. A  Standing: w/ HHA    Static: SBA    Dynamic: Min. A     Activity Tolerance fair  good   Visual/  Perceptual Glasses: Yes        Safety          Hand dominance: R     Strength ROM Additional Info:    RUE  4/5  WFL good  and wfl FMC/dexterity noted during ADL tasks       LUE 4/5  WFL good  and wfl FMC/dexterity noted during ADL tasks         Hearing: WFL   Sensation:  No c/o numbness or tingling   Tone: WFL   Edema: none noted            Treatment: Upon arrival, patient lying in bed and agreeable to OT session at this time in collaboration with PT. RN approved. Therapist facilitated bed mobility(educated pt on hand placement), functional transfers (EOB, sit>stand; bedside chair, stand>sit), standing tolerance tasks and functional mobility task with HHA (cuing on posture and safety). Therapist facilitated self-care retraining: UB/LB self-care tasks(attempted to use cross over leg technique to don socks, pt unable to perform--might benefit from AE), simulated toileting task and grooming task while educating pt on modified techniques, posture, safety and energy conservation techniques. Skilled monitoring of HR, O2 sats and pts response to treatment. At end of session, patient seated in bedside chair with call light and phone within reach, all lines and tubes intact. Comments:  Overall pt demonstrated decreased independence and safety during completion of ADL/functional transfers/mobility tasks. Pt demonstrating fair understanding of education/techniques, requiring additional training / education. Pt would benefit from continued skilled OT to increase functional independence and quality of life.       Eval Complexity: Mod  mod  Profile and History- med (extensive chart review)  Assessment of Occupational Performance and Identification of Deficits- med  Clinical Decision Making- med    (Evaluation time includes thorough review of current medical information, gathering information on past medical history/social history and prior level of function, completion of standardized testing/informal observation of tasks, assessment of data, and development of POC/Goals.)      Assessment of current deficits   Functional mobility [x]  ADLs [x] Strength [x]  Cognition []  Functional transfers  [x] IADLs [x] Safety Awareness [x]  Endurance [x]  Fine Motor Coordination [] Balance [x] Vision/perception [] Sensation []   Gross Motor Coordination [] ROM [] Delirium []                  Motor Control []    Plan of Care (5-7 days):  ADL retraining [x]   Equipment needs [x]   Neuromuscular re-education [x] Energy Conservation Techniques [x]  Functional Transfer training [x] Patient and/or Family Education [x]  Functional Mobility training [x]  Environmental Modifications [x]  Cognitive re-training []   Compensatory techniques for ADLs [x]  Splinting Needs []   Positioning to improve overall function [x]   Therapeutic Activity [x]  Therapeutic Exercise  [x]  Visual/Perceptual: []    Delirium prevention/treatment  []   Other:  []    Rehab Potential: Good for established goals     Patient / Family Goal: Not stated     Patient and/or family were instructed diagnosis, prognosis/goals and plan of care. Demonstrated good understanding.         Mod Evaluation +   Treatment Time In:11:05            Treatment Time Out: 11:18              Treatment Charges: Mins Units   Ther Ex  23021     Manual Therapy 79534     Thera Activities 26685 10 1   ADL/Home Mgt 89580 3    Neuro Re-ed 81103     Group Therapy      Orthotic manage/training  29941     Non-Billable Time     Total Timed Treatment 13 117 [facility], OTR/L #[id number]

## 2020-08-03 NOTE — CARE COORDINATION
Met with pt at bedside, pt sitting up in chair (therapy assisted / demonstrates good trunk control), to discuss discharge / transition of care plan. Pt reports from home with daughter Monica Barrera, verified PCP and pharmacy, reports she has a cane which she uses in the community only, denies further DME needs, independent prior to arrival, plan is home at discharge, no needs identified at this time, [place] to provide transportation when medically stable.

## 2020-08-04 NOTE — PLAN OF CARE
Problem: Falls - Risk of:  Goal: Will remain free from falls  Description: Will remain free from falls  8/4/2020 0610 by Rubina Granados RN  Outcome: Met This Shift  8/3/2020 1715 by Jessica Christopher RN  Outcome: Met This Shift  Goal: Absence of physical injury  Description: Absence of physical injury  Outcome: Met This Shift     Problem: Pain:  Goal: Pain level will decrease  Description: Pain level will decrease  Outcome: Met This Shift  Goal: Control of acute pain  Description: Control of acute pain  8/4/2020 0610 by Rubina Granados RN  Outcome: Met This Shift  8/3/2020 1716 by Jessica Christopher RN  Outcome: Met This Shift  Goal: Control of chronic pain  Description: Control of chronic pain  8/4/2020 0610 by Rubina Granados RN  Outcome: Met This Shift  8/3/2020 1715 by Jessica Christopher RN  Outcome: Met This Shift     Problem: Infection - Risk of, Surgical Site Infection  Goal: Absence of surgical site infection  Outcome: Met This Shift     Problem: Skin Integrity:  Goal: Will show no infection signs and symptoms  Description: Will show no infection signs and symptoms  Outcome: Met This Shift  Goal: Absence of new skin breakdown  Description: Absence of new skin breakdown  Outcome: Met This Shift

## 2020-08-04 NOTE — PLAN OF CARE
Problem: Falls - Risk of:  Goal: Will remain free from falls  Description: Will remain free from falls  8/4/2020 1553 by Eber Laboy RN  Outcome: Met This Shift  8/4/2020 1443 by Eber Laboy RN  Outcome: Met This Shift  8/4/2020 0610 by Anant Guidry RN  Outcome: Met This Shift  Goal: Absence of physical injury  Description: Absence of physical injury  8/4/2020 1553 by Eber Laboy RN  Outcome: Met This Shift  8/4/2020 1443 by Eber Laboy RN  Outcome: Met This Shift  8/4/2020 0610 by Anant Guidry RN  Outcome: Met This Shift     Problem: Pain:  Goal: Pain level will decrease  Description: Pain level will decrease  8/4/2020 1553 by Eber Laboy RN  Outcome: Met This Shift  8/4/2020 1443 by Eber Laboy RN  Outcome: Met This Shift  8/4/2020 0610 by Anant Guidry RN  Outcome: Met This Shift  Goal: Control of acute pain  Description: Control of acute pain  8/4/2020 1553 by Eber Laboy RN  Outcome: Met This Shift  8/4/2020 1443 by Eber Laboy RN  Outcome: Met This Shift  8/4/2020 0610 by Anant Guidry RN  Outcome: Met This Shift  Goal: Control of chronic pain  Description: Control of chronic pain  8/4/2020 1553 by Eber Laboy RN  Outcome: Met This Shift  8/4/2020 1443 by Eber Laboy RN  Outcome: Met This Shift  8/4/2020 0610 by Anant Guidry RN  Outcome: Met This Shift

## 2020-08-04 NOTE — PROGRESS NOTES
Physical Therapy    Physical Therapy Treatment Note     Name: Jadyn Westfall  :   MRN: [id number]    Referring Provider:  Amira Matos MD    Date of Service: 2020    Evaluating PT:  Harish Bueno PT, DPT    Room #:  6715/7199-H  Diagnosis:  Acute appendicitis  PMHx/PSHx:  HTN, Cellulitis, CHF, Arthritis, Cardiac catheterization 1/10/2020, [facility]  Procedure/Surgery:  Laparoscopic appendectomy 2020  Precautions:  Falls, EDI drain, NGT to wall suction, Seaman catheter  Equipment Needs:  Has SPC    SUBJECTIVE:    Pt lives with her daughter in a 2 story home with 3 stairs to enter and no rail(s). Bed is on 1st floor and bath is in the basement with flight and 1 rail(s). Pt ambulated with no AD in the home, [facility] in the [facility]. Pt reported independent with ADLs. Pt is not actively driving. OBJECTIVE:   Initial Evaluation  Date: 8/3/2020 Treatment Date: 2020 Short Term/ Long Term   Goals   AM-PAC 6 Clicks 80/24 48/55    Was pt agreeable to Eval/treatment? Yes  Yes     Does pt have pain? Reported minimal pain at surgical site.  Reported mild pain    Bed Mobility  Rolling: NT  Supine to sit: Mod A  Sit to supine: NT  Scooting: Min A Rolling: SBA  Supine to sit: [place]  Sit to supine: NT  Scooting: SBA Supervision   Transfers Sit to stand: Min A  Stand to sit: Min A  Stand pivot: Min A Sit to stand: Min A  Stand to sit: Min A  Stand pivot: Min A Supervision   Ambulation    2 feet laterally with no AD with Min A 3 feet forward, laterally with [employer] with Min A >50 feet with no AD with Supervision   Stair negotiation: ascended and descended  NT NT 10 steps with 1 rail with SBA   ROM BUE:  Per OT  BLE:  WFL     Strength BUE:  Per OT  BLE:  4/5  Increase 1/3 grade MMT   Balance Sitting EOB:  SBA  Dynamic Standing:  Min A with no AD Sitting EOB:  SBA  Dynamic Standing:  Min A with AD Sitting EOB:  Independent  Dynamic Standing:  Supervision     Pt is A & O x 4  Sensation:  Pt denied numbness and tingling to extremities  Edema:  None noted    Therapeutic Exercises:  STS x5, Marching seated/standing 10x ea, LAQs, APs 10x ea    Patient education  Pt educated on importance of mobility, safety with mobility, line management/awareness, transfers, gait    Patient response to education:   Pt verbalized understanding Pt demonstrated skill Pt requires further education in this area   Yes  Yes with verbal cues and assist Yes      ASSESSMENT:    Comments:  Patient found in high [place]'s and eager to get OOB. Patient continues to be concerned with NGT and dislodging. Extra care taken in line management while patient assisted with supine to sit task. Patient required less assist with trunk righting this date. Patient denied dizziness with positional change. Performed exercises as noted above. Patient with poor walker safety/approximation due to never using the device in the past and verbal cues give for safe use. Patient demonstrated slow gait speed with forward flexed posture, and short/choppy stride. Patient very fearful of lines dislodging during gait and assisted to seated in the bedside chair. Patient performed seated exercises again while in the chair. Call light and tray table in reach. Treatment:  Patient practiced and was instructed in the following treatment:     Bed mobility: Verbal/tactile cues for sequencing BUEs/BLEs for safe technique with rolling/supine<>sit task. Verbal cues for line management/awareness.  Transfer training: Verbal/tactile cues to facilitate proper hand placement, technique and safety during sit to stand task. Verbal cues for line management/ awareness.  Gait training: Verbal and tactile cues to facilitate upright posture and safety as well as provided with physical assistance to complete task. Verbal cues for posture, line management, safety.  Therapeutic exercises: As noted above   Neuromuscular education: NA     PLAN:    Patient is making steady progress toward set goals. Continue with current PT plan of care.     Time in  0923  Time out  0946    Total Treatment Time 23 minutes     CPT codes:  [] Gait training 93515 - minutes  [] Manual therapy [id number] - minutes  [x] Therapeutic activities 40064 23 minutes  [] Therapeutic exercises 17327 - minutes  [] Neuromuscular reeducation 23911 - minutes     Stacie Lynch PT, DPT  License [id number]

## 2020-08-04 NOTE — PLAN OF CARE
Problem: Falls - Risk of:  Goal: Will remain free from falls  Description: Will remain free from falls  8/4/2020 1443 by Rafa Loera RN  Outcome: Met This Shift  8/4/2020 0610 by Ishmael Mg RN  Outcome: Met This Shift  Goal: Absence of physical injury  Description: Absence of physical injury  8/4/2020 1443 by Rafa Loera RN  Outcome: Met This Shift  8/4/2020 0610 by Ishmael Mg RN  Outcome: Met This Shift     Problem: Pain:  Goal: Pain level will decrease  Description: Pain level will decrease  8/4/2020 1443 by Rafa Loera RN  Outcome: Met This Shift  8/4/2020 0610 by Ishmael Mg RN  Outcome: Met This Shift  Goal: Control of acute pain  Description: Control of acute pain  8/4/2020 1443 by Rafa Loera RN  Outcome: Met This Shift  8/4/2020 0610 by Ishmael Mg RN  Outcome: Met This Shift  Goal: Control of chronic pain  Description: Control of chronic pain  8/4/2020 1443 by Rafa Loera RN  Outcome: Met This Shift  8/4/2020 0610 by Ishmael Mg RN  Outcome: Met This Shift

## 2020-08-04 NOTE — PROGRESS NOTES
[facility]  ATTENDING PHYSICIAN PROGRESS NOTE     I have examined the patient and  reviewed the record. I have reviewed all relevant labs and imaging data. The following summarizes my clinical findings and independent assessment. CC: Perforated appendicitis    S. Pt feels better with NG tube. No flatus    Hospital course: Lap appendectomy on August 2    O. No apparent distress  Lungs clear  Heart is regular rate rhythm  Abdomen is soft, tympanic, less distended, EDI is serosanguineous  Extremities moves all      ASSESSMENT:  Active Problems:    Hypokalemia    Acute appendicitis    Sepsis (HCC)    Hypomagnesemia    Atrial fibrillation, new onset ([address]  Resolved Problems:    * No resolved hospital problems. *       PLAN:  Postop day 2 status post lap appendectomy for perforated appendicitis  Continue n.p.o. Continue NG  Await bowel function    Seaman due to urinary retention (inserted on 8/3)  Continue IV antibiotics. cx showing klebsiella    History of TAVR  Continue aspirin Plavix    DVT Proph: SCDS/heparin 3 times daily    PT OT       Lm Garcia MD, FACS  8/4/2020  2:08 PM    NOTE: This report was transcribed using voice recognition software. Every effort was made to ensure accuracy; however, inadvertent computerized transcription errors may be present.

## 2020-08-05 NOTE — PLAN OF CARE
Problem: Falls - Risk of:  Goal: Will remain free from falls  Description: Will remain free from falls  8/5/2020 1659 by Carlos Randall RN  Outcome: Met This Shift  8/5/2020 1451 by Valerie Sy RN  Outcome: Met This Shift     Problem: Pain:  Goal: Control of acute pain  Description: Control of acute pain  8/5/2020 1659 by Carlos Randall RN  Outcome: Met This Shift  8/5/2020 1451 by Valerie Sy RN  Outcome: Met This Shift     Problem: Infection - Risk of, Surgical Site Infection  Goal: Absence of surgical site infection  8/5/2020 1451 by Valerie Sy RN  Outcome: Met This Shift

## 2020-08-05 NOTE — PROGRESS NOTES
[facility]  ATTENDING PHYSICIAN PROGRESS NOTE     I have examined the patient and  reviewed the record. I have reviewed all relevant labs and imaging data. The following summarizes my clinical findings and independent assessment. CC: Perforated appendicitis    S.  NG is putting out over 2 L. Patient still has no bowel function. She has been getting out of bed and sitting in a chair. Hospital course: Lap appendectomy on August 2    O. No apparent distress  NG tube present  Lungs clear  Heart is regular rate rhythm  Abdomen is soft, tympanic, less distended, EDI is serosanguineous         ASSESSMENT:  Active Problems:    Hypokalemia    Acute appendicitis    Sepsis (HCC)    Hypomagnesemia    Atrial fibrillation, new onset ([address]  Resolved Problems:    * No resolved hospital problems. *       PLAN:  Postop day3 status post lap appendectomy for perforated appendicitis  Continue n.p.o. Continue NG  Await bowel function    Seaman due to urinary retention (inserted on 8/3)--DC Seaman today  Continue IV antibiotics--stop on August 7  cx showing klebsiella    Change IV fluids to D5 half-normal +40 of KCl due to potassium of 3.2 at 125 cc/h    History of TAVR  Continue aspirin Plavix    DVT Proph: SCDS/heparin 3 times daily    PT OT       Isamar Norwood MD, FACS  8/5/2020  9:20 AM    NOTE: This report was transcribed using voice recognition software. Every effort was made to ensure accuracy; however, inadvertent computerized transcription errors may be present.

## 2020-08-05 NOTE — PLAN OF CARE
Problem: Falls - Risk of:  Goal: Will remain free from falls  Description: Will remain free from falls  Outcome: Met This Shift  Goal: Absence of physical injury  Description: Absence of physical injury  Outcome: Met This Shift     Problem: Pain:  Description: Pain management should include both nonpharmacologic and pharmacologic interventions.   Goal: Pain level will decrease  Description: Pain level will decrease  Outcome: Met This Shift  Goal: Control of acute pain  Description: Control of acute pain  Outcome: Met This Shift     Problem: Infection - Risk of, Surgical Site Infection  Goal: Absence of surgical site infection  Outcome: Met This Shift

## 2020-08-05 NOTE — CARE COORDINATION
Met with pt and daughter(Beatris) at bedside, discussed PT/OT both 16/24, offered hhc. Pt is declining and daughter is in agreement. They have exercises from another time hhc came into home. No needs or concerns addressed by pt or daughter. Plan is home with daughter to transport. Elias MENDEZ

## 2020-08-05 NOTE — PROGRESS NOTES
Independent    Functional Mobility Minimal Assist w/ HHA (to bedside chair)  Min A,w/walker   Steps next to bed   Cueing for walker tech and safety  Modified [place]    Balance Sitting:     Static: SBA    Dynamic:Min. A  Standing: w/ HHA    Static: SBA    Dynamic: Min. A Sitting - supervision   Standing - Min A      Activity Tolerance fair  fair - with light activity  good   Visual/  Perceptual Glasses: Yes          Safety        UE AROM WFLS for ADL activity     Education: safety awareness, importance of activity     Comments: Upon arrival pt sitting EOB . At end of session sitting in chair  all lines and tubes intact, call light within reach. · Pt has made fair  progress towards set goals.    · Continue with current plan of care        Treatment Time In:1132            Treatment Time Out: 9096               Treatment Charges: Mins Units   Ther Ex  93415     Manual Therapy Susanna Rothman [id number]    ADL/Home Mgt 15182     Neuro Re-ed 44435     Group Therapy      Orthotic manage/training  45095     Non-Billable Time     Total Timed Treatment 15 1          Saqib Pan OTR/L [id number]

## 2020-08-05 NOTE — PROGRESS NOTES
Physical Therapy    Physical Therapy Treatment Note     Name: Bing Davies  : 7114  MRN: [id number]    Referring Provider:  Marylen Canning, MD    Date of Service: 2020    Evaluating PT:  Romero Del Castillo, PT, DPT    Room #:  6530/7444-F  Diagnosis:  Acute appendicitis  PMHx/PSHx:  HTN, Cellulitis, CHF, Arthritis, Cardiac catheterization 1/10/2020, [facility]  Procedure/Surgery:  Laparoscopic appendectomy 2020  Precautions:  Falls, EDI drain, NGT to wall suction, Seaman catheter  Equipment Needs:  Has SPC    SUBJECTIVE:    Pt lives with her daughter in a 2 story home with 3 stairs to enter and no rail(s). Bed is on 1st floor and bath is in the basement with flight and 1 rail(s). Pt ambulated with no AD in the home, [facility] in the [facility] [place]. Pt reported independent with ADLs. Pt is not actively driving. OBJECTIVE:   Initial Evaluation  Date: 8/3/2020 Treatment Date: 2020 Short Term/ Long Term   Goals   AM-PAC 6 Clicks 48/86 10/96    Was pt agreeable to Eval/treatment? Yes  Yes     Does pt have pain? Reported minimal pain at surgical site.  No c/o pain    Bed Mobility  Rolling: NT  Supine to sit: Mod A  Sit to supine: NT  Scooting: Min A NT, patient found seated EOB Supervision   Transfers Sit to stand: Min A  Stand to sit: Min A  Stand pivot: Min A Sit to stand: Min A  Stand to sit: Min A  Stand pivot: Min A Supervision   Ambulation    2 feet laterally with no AD with Min A 10 feet with [employer] with Min A >50 feet with no AD with Supervision   Stair negotiation: ascended and descended  NT NT 10 steps with 1 rail with SBA   ROM BUE:  Per OT  BLE:  WFL     Strength BUE:  Per OT  BLE:  4/5  Increase 1/3 grade MMT   Balance Sitting EOB:  SBA  Dynamic Standing:  Min A with no AD Sitting EOB: Independent  Dynamic Standing:  Min A with [employer] Sitting EOB:  Independent  Dynamic Standing:  Supervision     Pt is A & O x 4  Sensation:  Pt denied numbness and tingling to extremities  Edema:  None noted    Therapeutic Exercises:  STS x5, Marching seated/standing 10x ea, LAQs, APs 10x ea    Patient education  Pt educated on importance of mobility, safety with mobility, line management/awareness, transfers, gait    Patient response to education:   Pt verbalized understanding Pt demonstrated skill Pt requires further education in this area   Yes  Yes with verbal cues and assist Yes      ASSESSMENT:    Comments:  Patient found seated EOB and reported getting there without assist. Patient always eager to participate and get OOB. Patient performed exercises prior to mobility and reported fatigue in B knees during STS task. RN arrived and removed Seaman catheter and disconnect NGT from the wall suction. Patient demonstrated very slow gait speed, with forward flexed posture and short/choppy stride length but steady on her feet with WW. Patient required repeated verbal/tactile cues for walker approximation during gait. Patient assisted to seated in the bedside chair following treatment and NGT reconnected to the wall suction. Call light and tray table in reach. Treatment:  Patient practiced and was instructed in the following treatment:     Bed mobility: NA   Transfer training: Verbal/tactile cues to facilitate proper hand placement, technique and safety during sit to stand task. Verbal cues for line management/ awareness.  Gait training: Verbal and tactile cues to facilitate upright posture and safety as well as provided with physical assistance to complete task. Verbal cues for posture, line management, safety, and walker approximation.  Therapeutic exercises: As noted above   Neuromuscular education: NA     PLAN:    Patient is making steady progress toward set goals. Continue with current PT plan of care.     Time in  1130  Time out  1153    Total Treatment Time 23 minutes     CPT codes:  [] Gait training 41574 - minutes  [] Manual therapy [id number] - minutes  [x] Therapeutic activities 15548 23 minutes  [] Therapeutic exercises 20831 - minutes  [] Neuromuscular reeducation 97305 - minutes     Osiel Prakash PT, DPT  License [id number]

## 2020-08-06 NOTE — FLOWSHEET NOTE
Patient stated that she has a POA and that her adult children know her health care wishes. 08/06/20 0814   Encounter Summary   Services provided to: Patient   Referral/Consult From: [address] [place] [address] Road [facility]   ([id number])   Complexity of Encounter Moderate   Spiritual Assessment Completed Yes   Advance Care Planning Yes   Routine   Type Initial   Spiritual/Taoist   Type Spiritual support   Assessment Calm; Approachable   Intervention Active listening;Explored feelings, thoughts, concerns;Nurtured hope;Prayer;Scripture;Provided reading materials/devotional materials; Discussed relationship with God;Discussed belief system/Muslim practices/edson;Discussed illness/injury and it's impact   Outcome Comfort;Expressed gratitude;Engaged in conversation;Expressed feelings/needs/concerns;Encouraged;Receptive   Advance Directives (For Healthcare)   Healthcare Directive Yes, patient has an advance directive for healthcare treatment   Type of Healthcare Directive Durable power of  for health care   Healthcare Agent Appointed Adult Children

## 2020-08-06 NOTE — PLAN OF CARE
Problem: Falls - Risk of:  Goal: Will remain free from falls  Description: Will remain free from falls  8/6/2020 0405 by Maria M Betancourt RN  Outcome: Met This Shift  8/5/2020 1659 by Kimmy Anthony RN  Outcome: Met This Shift  8/5/2020 1451 by Bhavani Viera RN  Outcome: Met This Shift  Goal: Absence of physical injury  Description: Absence of physical injury  8/6/2020 0405 by Maria M Betancourt RN  Outcome: Met This Shift  8/5/2020 1659 by Kimmy Anthony RN  Outcome: Met This Shift  8/5/2020 1451 by Bhavani Viera RN  Outcome: Met This Shift

## 2020-08-06 NOTE — CARE COORDINATION
Met with pt to discuss probable discharge home with EDI drain, and home health care. Pt is declining home health care, and states that her daughter is at home with her and she can do it. Daughter to be taught EDI drain care prior to discharge. Herson Hernandez \[facility]\""

## 2020-08-06 NOTE — PROGRESS NOTES
[facility]  ATTENDING PHYSICIAN PROGRESS NOTE     I have examined the patient and  reviewed the record. I have reviewed all relevant labs and imaging data. The following summarizes my clinical findings and independent assessment. CC: Perforated appendicitis    S.  NG is putting out over 2 L. Patient still has no bowel function. Voiding without a Seaman    Hospital course: Lap appendectomy on August 2    O. No apparent distress  NG tube present  Lungs clear  Heart is regular rate rhythm  Abdomen is soft, tympanic, less distended, EDI is serosanguineous         ASSESSMENT:  Active Problems:    Hypokalemia    Acute appendicitis    Sepsis (HCC)    Hypomagnesemia    Atrial fibrillation, new onset ([address]    Perforated appendicitis  Resolved Problems:    * No resolved hospital problems. *       PLAN:  Postop day4 status post lap appendectomy for perforated appendicitis  Continue n.p.o. Continue NG  Await bowel function  Abdominal x-ray to make sure that NG tube is not postpyloric    Seaman removed-patient is voiding  Continue IV antibiotics--stop on August 7  cx showing klebsiella     D5 half-normal +40KCl at 100 cc/hr--monitor Uop  Monitor K    History of TAVR  Continue aspirin Plavix    DVT Proph: SCDS/heparin 3 times daily    PT OT       Tuan Franklin MD, FACS  8/6/2020  12:42 PM    NOTE: This report was transcribed using voice recognition software. Every effort was made to ensure accuracy; however, inadvertent computerized transcription errors may be present.

## 2020-08-07 NOTE — PROGRESS NOTES
Comprehensive Nutrition Assessment    Type and Reason for Visit:  Initial(LOS)    Nutrition Recommendations/Plan: Advance diet when medically appropriate. Recommend and start Ensure High Protein supplement BID when diet advances to help meet increased nutritional needs. Nutrition Assessment:  Patient at nutritional risk d/t NPO x 5 days since admission ; pt admitted with poor po intake x 3 days PTA ; pt at further nutritional compromise AEB increased needs from surgical wound healing (s/p lap appendectomy for perforated appendicitis) ; will start OS when diet advances    Malnutrition Assessment:  Malnutrition Status: At risk for malnutrition (Comment)    Context:  Acute Illness     Findings of the 6 clinical characteristics of malnutrition:  Energy Intake:  7 - 50% or less of estimated energy requirements for 5 or more days  Weight Loss:  No significant weight loss     Body Fat Loss:  Unable to assess(data not available to assess at this time)     Muscle Mass Loss:  Unable to assess(data not available to assess at this time)    Fluid Accumulation:  No significant fluid accumulation     Strength:  Not Performed    Estimated Daily Nutrient Needs:  Energy (kcal):  0105-8263 (REE 1255 x 1.2 SF); Weight Used for Energy Requirements:  Current     Protein (g):  75-85 (1.3-1.5g/kg IBW); Weight Used for Protein Requirements:  Ideal        Fluid (ml/day):  2499-7245;  Weight Used for Fluid Requirements:  [place]      Nutrition Related Findings:  I&Os WNL, 1+ edema, A&O x 4, nausea, missing teeth, dry mucous membranes, active BS, distended/rounded abd, EDI drain, redness to buttocks, boggy heels      Wounds:  Surgical Wound(Incision x 1 noted)       Current Nutrition Therapies:    Diet NPO Effective Now Exceptions are: Sips with Meds    Anthropometric Measures:  · Height: 5' 5\" (165.1 cm)  · Current Body Weight: 177 lb (80.3 kg)(8/7/20, bedscale)   · Admission Body Weight: 163 lb (73.9 kg)(8/1/20, first bedscale) · Usual Body Weight: 177 lb (80.3 kg)(1/10/20, no method ; EMR also shows past weight of 162# bedscale on 3/4/20)     · Ideal Body Weight: 125 lbs; % Ideal Body Weight 141.6 %   · BMI: 29.5  · BMI Categories: Overweight (BMI 25.0-29. 9)       Nutrition Diagnosis:   · Inadequate oral intake related to inadequate protein-energy intake as evidenced by NPO or clear liquid status due to medical condition, GI abnormality, poor intake prior to admission      Nutrition Interventions:   Food and/or Nutrient Delivery:  Continue NPO  Nutrition Education/Counseling:  Education not indicated   Coordination of Nutrition Care:  Continued Inpatient Monitoring    Goals:  Advance diet when medically appropriate       Nutrition Monitoring and Evaluation:   Behavioral-Environmental Outcomes:  Knowledge or Skill   Food/Nutrient Intake Outcomes:  Diet Advancement/Tolerance  Physical Signs/Symptoms Outcomes:  Biochemical Data, Chewing or Swallowing, GI Status, Nausea or Vomiting, Fluid Status or Edema, Hemodynamic Status, Meal Time Behavior, Nutrition Focused Physical Findings, Skin, Weight     Discharge Planning:     Too soon to determine     Electronically signed by Mickey Álvarez RD, LD on 8/7/20 at 2:03 PM EDT    Contact: [phone]

## 2020-08-07 NOTE — PLAN OF CARE
Problem: Falls - Risk of:  Goal: Will remain free from falls  Description: Will remain free from falls  8/7/2020 1446 by Rafa Loera RN  Outcome: Met This Shift  8/7/2020 0316 by Ayo Turner RN  Outcome: Met This Shift  Goal: Absence of physical injury  Description: Absence of physical injury  8/7/2020 1446 by Rafa Loera RN  Outcome: Met This Shift  8/7/2020 0316 by Ayo Turner RN  Outcome: Met This Shift     Problem: Pain:  Goal: Pain level will decrease  Description: Pain level will decrease  8/7/2020 1446 by Rafa Loera RN  Outcome: Met This Shift  8/7/2020 0316 by Ayo Turner RN  Outcome: Met This Shift  Goal: Control of acute pain  Description: Control of acute pain  8/7/2020 1446 by Rafa Loera RN  Outcome: Met This Shift  8/7/2020 0316 by Ayo Turner RN  Outcome: Met This Shift  Goal: Control of chronic pain  Description: Control of chronic pain  8/7/2020 1446 by Rafa Loera RN  Outcome: Met This Shift  8/7/2020 0316 by Ayo Turner RN  Outcome: Met This Shift

## 2020-08-07 NOTE — PROGRESS NOTES
SROC messaged via perfect serve of patient requesting something for nausea other then Tigan already ordered.       Cassy Jefferson RN

## 2020-08-08 NOTE — PROGRESS NOTES
[facility]  ATTENDING PHYSICIAN PROGRESS NOTE     I have examined the patient and  reviewed the record. I have reviewed all relevant labs and imaging data. The following summarizes my clinical findings and independent assessment. CC: Perforated appendicitis    S. Patient slipped out of her chair early this morning. NG tube  output is decreasing to 1200 cc over 24 hours. Hospital course: Lap appendectomy on August 2    O. No apparent distress  NG tube present  Lungs clear  Heart is regular rate rhythm  Abdomen is soft, tympanic, less distended, EDI SS         ASSESSMENT:  Active Problems:    Hypokalemia    Acute appendicitis    Sepsis (HCC)    Hypomagnesemia    Atrial fibrillation, new onset ([address]    Perforated appendicitis  Resolved Problems:    * No resolved hospital problems. *       PLAN:  Postop day 6 status post lap appendectomy for perforated appendicitis  Continue n.p.o. Continue NG  Await bowel function  NG tube output has decreased. It is down to 1200 cc over 24 hours down from over 2 L  Patient has a slow return of bowel function secondary to the perforated appendicitis. I spoke to her son Emir Del Valle on the telephone. Moderate to severe protein calorie protein malnutrition --if Patient is not able to tolerate diet in the next 2448 hrs., we will have to start TPN. Continue IVF    History of TAVR  Continue aspirin Plavix    DVT Proph: SCDS/heparin 3 times daily    PT OT       Fiordaliza Rubio MD, FACS  8/8/2020  11:19 AM    NOTE: This report was transcribed using voice recognition software. Every effort was made to ensure accuracy; however, inadvertent computerized transcription errors may be present.

## 2020-08-08 NOTE — PLAN OF CARE
Problem: Falls - Risk of:  Goal: Will remain free from falls  Description: Will remain free from falls  8/8/2020 0143 by Noelle Sena  Outcome: Met This Shift  8/7/2020 1446 by Renny Salas RN  Outcome: Met This Shift  Goal: Absence of physical injury  Description: Absence of physical injury  8/8/2020 0143 by Noelle Sena  Outcome: Met This Shift  8/7/2020 1446 by Renny Salas RN  Outcome: Met This Shift     Problem: Pain:  Goal: Pain level will decrease  Description: Pain level will decrease  8/7/2020 1446 by Renny Salas RN  Outcome: Met This Shift  Goal: Control of acute pain  Description: Control of acute pain  8/7/2020 1446 by Renny Salas RN  Outcome: Met This Shift  Goal: Control of chronic pain  Description: Control of chronic pain  8/7/2020 1446 by Renny Salas RN  Outcome: Met This Shift     Problem: Skin Integrity:  Goal: Absence of new skin breakdown  Description: Absence of new skin breakdown  Outcome: Met This Shift     Problem: Inadequate oral food/beverage intake (NI-2.1)  Goal: Food and/or Nutrient Delivery  Description: Individualized approach for food/nutrient provision.   8/7/2020 1403 by Bijan Mane RD, LD  Outcome: Met This Shift

## 2020-08-08 NOTE — PROGRESS NOTES
Patient placed call light on stating she slipped out of the chair and onto the floor. Re-assessment of patient done, with no new injuries or head trauma     Vital signs Temp 98.3, , /68 (manual), O2 97&, and no complaints of pain. Patient was helped up off the floor and helped into bed, where the call light was given to the patient, and bed alarm placed on the bed. The patient was educated at bedside the risk of falling and to call if wanting to get out of bed or chair. SROC Dr. Uyen Artis notified, no new orders given. Supervisor on call Jerry. Patients son Lee Varela notified. This nurse will continue to monitor patient.      Prabhu Alvarenga RN

## 2020-08-09 NOTE — PLAN OF CARE
Problem: Falls - Risk of:  Goal: Will remain free from falls  Description: Will remain free from falls  Outcome: Met This Shift  Goal: Absence of physical injury  Description: Absence of physical injury  Outcome: Met This Shift     Problem: Pain:  Goal: Control of acute pain  Description: Control of acute pain  Outcome: Met This Shift     Problem: Skin Integrity:  Goal: Will show no infection signs and symptoms  Description: Will show no infection signs and symptoms  Outcome: Met This Shift  Goal: Absence of new skin breakdown  Description: Absence of new skin breakdown  Outcome: Met This Shift

## 2020-08-09 NOTE — PROGRESS NOTES
[facility]  ATTENDING PHYSICIAN PROGRESS NOTE     I have examined the patient and  reviewed the record. I have reviewed all relevant labs and imaging data. The following summarizes my clinical findings and independent assessment. CC: Perforated appendicitis    S. Patient slipped out of her chair early this morning. NG tube  output is 1200 cc over 24 hours. Patient notes passing some gas. Abdomen is still distended but less so  Hospital course: Lap appendectomy on August 2    O. No apparent distress  NG tube present  Lungs clear  Heart is regular rate rhythm  Abdomen is soft, tympanic, less distended, EDI SS         ASSESSMENT:  Active Problems:    Hypokalemia    Acute appendicitis    Sepsis (HCC)    Hypomagnesemia    Atrial fibrillation, new onset ([address]    Perforated appendicitis  Resolved Problems:    * No resolved hospital problems. *       PLAN:  Postop day 7status post lap appendectomy for perforated appendicitis  Continue n.p.o. Continue NG  Await bowel function  NG tube output has decreased. It is down to 1200 cc over 24 hours down from over 2 L  Patient has a slow return of bowel function secondary to the perforated appendicitis. Moderate to severe protein calorie protein malnutrition --if Patient is not able to tolerate diet in the next 24 hrs., we will have to start TPN. Will Consider CT scan tomorrow      Continue IVF    History of TAVR  Continue aspirin Plavix    DVT Proph: SCDS/heparin 3 times daily    PT OT       Jihan Williamson MD, FACS  8/9/2020  12:52 PM    NOTE: This report was transcribed using voice recognition software. Every effort was made to ensure accuracy; however, inadvertent computerized transcription errors may be present.

## 2020-08-10 NOTE — PROGRESS NOTES
General Surgery Progress Note:    CC: post op     S: doing well + BM no n/v karan some clears,       Objective:  @BP (!) 105/47   Pulse 93   Temp 97.6 °F (36.4 °C) (Temporal)   Resp 18   Ht 5' 5\" (1.651 m)   Wt 178 lb 6.4 oz (80.9 kg)   SpO2 96%   BMI 29.69 kg/m² @    Physical -     Gen: NAD  Resp: Breathing Comfortably, no resp distress  CV: Reg Rate  Abd: Soft wounds cdi   EXT nvi     Assessment/Plan: s/p lap appy with perforated appendicitis complicated by post op ileus     - remove ng, clears adat,   - ambulate  - multimodal pain control   - home meds, asa, plavix, procardia,   - d/c planning       Pamela Fischer MD FACS     12:37 PM

## 2020-08-10 NOTE — CARE COORDINATION
Pt has started on clear diet, some n/v. Plan is home with daughter. Pt is declining hhc, and daughter is in agreement. Plan is home at discharge. dtr to transport home. Riddhi MENDEZ

## 2020-08-10 NOTE — PLAN OF CARE
Problem: Falls - Risk of:  Goal: Will remain free from falls  Description: Will remain free from falls  Outcome: Met This Shift  Goal: Absence of physical injury  Description: Absence of physical injury  Outcome: Met This Shift     Problem: Pain:  Goal: Control of acute pain  Description: Control of acute pain  Outcome: Met This Shift     Problem: Skin Integrity:  Goal: Absence of new skin breakdown  Description: Absence of new skin breakdown  Outcome: Met This Shift

## 2020-08-11 NOTE — PROGRESS NOTES
Physician Progress Note      PATIENTLenor Res  CSN #:                  [id number]  :                       1935  ADMIT DATE:       2020 9:51 PM  [address] DATE:  RESPONDING  PROVIDER #:        Natalya Haro MD          QUERY TEXT:    Dear Dr Litzy Mosquera and General Surgery,    Pt admitted with Sepsis-Perforated appendicitis  s/p lap appy  . Pt noted   to have ngt with slow return of bowel function POD 7 -post op ileus 8/10. If   possible, please document in progress notes and discharge summary:    The medical record reflects the following:  Risk Factors: Perforated appendicitis , AGE  Clinical Indicators:  Note continue NPO, await bowel function, ng tube has   dec it was down 1200cc/24 hours down from over 2l, pt has slow return of bowel   function / perf appendicitis, KUB nonspecific gas pattern  Treatment: NGT remains in place POD 7, pt NPO, dietary consult, Kub , labs   and monitoring  Thank you, Emmy Morales RN BSN CDE  contact number [phone]  Options provided:  -- Postop Ileus as an postoperative complication  -- Postop Ileus as an expected/inherent condition that occurred   postoperatively and not a complication  -- Other - I will add my own diagnosis  -- Disagree - Not applicable / Not valid  -- Disagree - Clinically unable to determine / Unknown  -- Refer to Clinical Documentation Reviewer    PROVIDER RESPONSE TEXT:    Patient has a Post op ileus as an expected condition that occurred   postoperatively and not a complication.     Query created by: Sj Lynch on 2020 6:45 AM      Electronically signed by:  Natalya Haro MD 2020 1:26 PM

## 2020-08-11 NOTE — PLAN OF CARE
Problem: Falls - Risk of:  Goal: Will remain free from falls  Description: Will remain free from falls  Outcome: Met This Shift  Goal: Absence of physical injury  Description: Absence of physical injury  Outcome: Met This Shift     Problem: Pain:  Goal: Control of acute pain  Description: Control of acute pain  Outcome: Met This Shift

## 2020-08-11 NOTE — CARE COORDINATION
Met with pt, pt states she is nauseated and feels weak. Called #[phone] for updated therapy. Plan is home today with daughter. Chandni MENDEZ

## 2020-08-11 NOTE — PROGRESS NOTES
Message resident to check if it was ok to give Zofran with patient's QTc: 506. Resident asked to give Tigan for nausea.

## 2020-08-11 NOTE — PROGRESS NOTES
sit: Independent   Sit to supine: Independent    Functional Transfers Minimal Assist  SBA    Independent    Functional Mobility Minimal Assist w/ HHA (to bedside chair)  Min A w/walker   (ambulated to/from bathroom doorway in room) Modified [place]    Balance Sitting:     Static: SBA    Dynamic:Min. A  Standing: w/ HHA    Static: SBA    Dynamic: Min. A Sitting - supervision   Standing - Min A      Activity Tolerance fair  fair - with light activity  good   Visual/  Perceptual Glasses: Yes          Safety              Treatment: Upon arrival pt seated on [facility] and agreeable to OT session at this time in collaboration with PT. RN approved. Therapist facilitated functional transfers (BSC, sit<>stand; bedside chair, sit<>stand w/ cueing on hand placement), standing tolerance tasks and functional mobility task with ww (cuing on posture, w/w management and safety). Therapist facilitated self-care retraining: UB/LB self-care tasks(donned/doffed socks, pt declined use of AE d/t pt states her daughter will help her), toileting task(on BSC for hygiene and clothing management) and grooming task(combed hair seated) while educating pt on modified techniques, posture, safety and energy conservation techniques. Skilled monitoring of HR, O2 sats and pts response to treatment. Pt reported dizziness/nausea when ambulating, assisted pt safely back to bedside chair. Educated pt on rest breaks and PLB to ease dizziness/nausea. Pt reports symptoms were subsiding with seated rest break. At end of session, pt seated in bedside chair with all lines and tubes intact, call light within reach. Comments: If discharged home, recommend 24/7 supervision/assistance for safety. Overall pt demonstrated decreased independence and safety during completion of ADL/functional transfers/mobility tasks. Pt demonstrating fair understanding of education/techniques, requiring additional training / education.  Pt would benefit from continued skilled OT to increase functional independence and quality of life. · Pt has made fair progress towards set goals.    · Continue with current plan of care    Treatment Time In: 9:35            Treatment Time Out: 10:00               Treatment Charges: Mins Units   Ther Ex  [id number]     Manual Therapy [id number]     Thera Activities 03312 10 1   ADL/Home Mgt 89231 15 1   Neuro Re-ed [id number]     Group Therapy      Orthotic manage/training  60993     Non-Billable Time     Total Timed Treatment 25 3033  Blaine Dumont OTR/L, #[id number]

## 2020-08-11 NOTE — PLAN OF CARE
Problem: Falls - Risk of:  Goal: Absence of physical injury  Description: Absence of physical injury  8/11/2020 1049 by Jazmín Aviles RN  Outcome: Met This Shift     Problem: Falls - Risk of:  Goal: Will remain free from falls  Description: Will remain free from falls  8/11/2020 1049 by Jazmín Aviles RN  Outcome: Met This Shift    Problem: Pain:  Goal: Control of acute pain  Description: Control of acute pain  8/11/2020 1049 by Jazmín Aviles RN  Outcome: Met This Shift

## 2020-08-11 NOTE — PROGRESS NOTES
Exercises:  STS x5, gait    Patient education  Pt educated on importance of mobility, safety with mobility, transfers, [employer] approximation/safety, gait    Patient response to education:   Pt verbalized understanding Pt demonstrated skill Pt requires further education in this area   Yes  Yes with verbal cues and assist Yes      ASSESSMENT:    Comments:  Patient found seated on BSC and reported being nauseated and having diarrhea. Patient assisted to standing several times for hygiene and demonstrated fair balance with static standing without AD. Patient demonstrated slow gait speed with forward flexed posture and step to gait pattern. Repeated verbal cues needed for walker approximation and safety during gait. Patient repeatedly released grasp on walker to reach for the environment to steady self and educated on safe walker use. Patient reported \"my legs feel funny\" and \"I'm feeling lightheaded\" after 20 feet of ambulation and needed to return to the bedside for safety. Extensive education given on walker use/safety as patient does not use a walker at home. Patient has a [employer] and encouraged to use at discharge. Education provided on energy conservation and home modifications to improve safety in the home. Patient's daughter to come in this afternoon and will educate daughter as well at that time. Patient assisted to seated in the bedside chair with call light and tray table in reach. Treatment:  Patient practiced and was instructed in the following treatment:     Bed mobility: NA   Transfer training: Verbal/tactile cues to facilitate proper hand placement, technique and safety during sit to stand task.  Gait training: Verbal and tactile cues to facilitate upright posture and safety as well as provided with physical assistance to complete task. Verbal cues for posture, safety, and walker approximation.    Therapeutic exercises: As noted above   Neuromuscular education: NA     PLAN:    Patient is making steady progress toward set goals. Continue with current PT plan of care.     Time in  0935  Time out  0958    Total Treatment Time 23 minutes     CPT codes:  [] Gait training 65652 - minutes  [] Manual therapy [id number] - minutes  [x] Therapeutic activities 06595 23 minutes  [] Therapeutic exercises 20193 - minutes  [] Neuromuscular reeducation 20412 - minutes     Godfrey Gonsalez, PT, DPT  License [id number]

## 2020-08-11 NOTE — PROGRESS NOTES
Message sent to general surgery resident to come re-evaluate pt. Pt has a discharge order but states having nausea and feeling dizzy.

## 2020-08-12 NOTE — CARE COORDINATION
Anticipate discharge home today. Met with pt to offer hhc again, pt continues to decline hhc. Pt states she is still nauseated and is having diarrhea. Discharge plan is home with daughter to transport. Elias MENDEZ

## 2020-08-12 NOTE — PROGRESS NOTES
General Surgery Progress Note:    CC: post op     S: some nausea ++ liquid stools       Objective:  @BP (!) 149/65   Pulse 77   Temp 98 °F (36.7 °C) (Temporal)   Resp 15   Ht 5' 5\" (1.651 m)   Wt 167 lb 6.4 oz (75.9 kg)   SpO2 98%   BMI 27.86 kg/m² @    Physical -     Gen: NAD  Resp: Breathing Comfortably, no resp distress  CV: Reg Rate  Abd: Soft wounds cdi   EXT nvi     Assessment/Plan: s/p lap appy with perforated appendicitis complicated by post op ileus     - cont diet, monitor nausea,    - CT ruled out abscess, check C diff.   - cont plans for discharge when she is karan PO and feels better.        Aimee Tuttle MD FACS     1:24 PM

## 2020-08-13 NOTE — ANESTHESIA PRE PROCEDURE
5/15 - Standard Electrolytes - Central Line   Intravenous Continuous TPN KULDIP Esteves - CNS        fat emulsion 20 % infusion 250 mL  250 mL Intravenous Continuous KULDIP Esteves - CNS        pantoprazole (PROTONIX) injection 40 mg  40 mg Intravenous BID Lori Barrett MD   40 mg at 08/12/20 1727    And    sodium chloride (PF) 0.9 % injection 10 mL  10 mL Intravenous BID Lori Barrett MD   10 mL at 08/12/20 1727    0.9 % sodium chloride infusion   Intravenous Continuous Lori Barrett  mL/hr at 08/12/20 1716      calcium carbonate (TUMS) chewable tablet 500 mg  500 mg Oral TID PRN Lori Barrett MD   500 mg at 08/11/20 2125    oxyCODONE (ROXICODONE) immediate release tablet 5 mg  5 mg Oral Q4H PRN Andrzej Nunez MD        Or   Gladystine Mower oxyCODONE HCl (OXY-IR) immediate release tablet 10 mg  10 mg Oral Q4H PRN Andrzej Nunez MD        methocarbamol (ROBAXIN) tablet 500 mg  500 mg Oral 4x Daily Andrzej Nunez MD   Stopped at 08/12/20 1455    acetaminophen (TYLENOL) tablet 650 mg  650 mg Oral Q4H While awake Andrzej Nunez MD   Stopped at 08/12/20 1454    sodium chloride flush 0.9 % injection 10 mL  10 mL Intravenous 2 times per day Gianfranco Atkinson MD   10 mL at 08/12/20 2240    sodium chloride flush 0.9 % injection 10 mL  10 mL Intravenous PRN Gianfranco Atkinson MD        NIFEdipine (PROCARDIA XL) extended release tablet 30 mg  30 mg Oral QAM Gianfranco Atkinson MD   30 mg at 08/12/20 1002    aspirin EC tablet 81 mg  81 mg Oral Daily Gianfranco Atkinson MD   81 mg at 08/12/20 1002    clopidogrel (PLAVIX) tablet 75 mg  75 mg Oral Daily Gianfranco Atkinson MD   75 mg at 08/12/20 1001    trimethobenzamide (TIGAN) injection 200 mg  200 mg Intramuscular Q6H PRN Gianfranco Atkinson MD   200 mg at 08/11/20 1038       Allergies:  No Known Allergies    Problem List:    Patient Active Problem List   Diagnosis Code    Pneumonia J18.9    Acute respiratory failure with hypoxia ([facility]) J96.01    Acute on chronic diastolic congestive heart failure ([facility]) I50.33    Cellulitis L03.90    Hypokalemia E87.6    Acute blood loss anemia D62    Aortic stenosis, severe I35.0    Moderate aortic regurgitation I35.1    Mitral regurgitation I34.0    Sepsis due to methicillin susceptible Staphylococcus aureus ([facility]) A41.01    Class 3 obesity in adult [id number]    Primary osteoarthritis of right knee M17.11    Non-pressure chronic ulcer of right ankle, limited to breakdown of skin ([address] L97.311    Non-pressure chronic ulcer of left ankle, limited to breakdown of skin ([address] L97.321    Lymphedema of both lower extremities I89.0    Venous ulcer of left lower extremity without varicose veins ([facility]) I87.2, L97.929    NORBERTO (acute kidney injury) ([facility]) N17.9    Anemia D64.9    HTN (hypertension) I10    Acute appendicitis K35.80    Sepsis ([facility]) A41.9    Hypomagnesemia E83.42    Atrial fibrillation, new onset ([facility]) I48.91    Perforated appendicitis K35.32       Past Medical History:        Diagnosis Date    Acute blood loss anemia 3/21/2018    Acute on chronic diastolic congestive heart failure (HCC) 3/20/2018    Acute respiratory failure with hypoxia ([address] 3/20/2018    Aortic stenosis, severe 03/2018    Arthritis     Cellulitis 3/20/2018    Right and left lower extremity    Class 3 obesity in adult 3/22/2018    History of blood transfusion     Hypertension     Hypokalemia 3/21/2018    Mitral regurgitation 03/2018    Moderate aortic regurgitation 03/2018    Sepsis due to methicillin susceptible Staphylococcus aureus ([address] 3/22/2018       Past Surgical History:        Procedure Laterality Date    CARDIAC CATHETERIZATION  01/10/2020    Dr. James Harrington  Left and Right Heart Catheterization    LAPAROSCOPIC APPENDECTOMY N/A 8/2/2020    APPENDECTOMY LAPAROSCOPIC performed by Milagros Holliday MD at [facility] OR       Social History:    Social History     Tobacco Use    Smoking status: Value Date    COVID19 Not Detected 07/15/2020       8/12/20 CXR    Findings: There is a central venous catheter noted the tip is in the superior    vena cava there is no evidence to suggest pneumothorax    The heart is unremarkable    The lung fields demonstrate no significant pulmonary vascular    congestion and edema. The aorta is tortuous ectatic    There are findings throughout the lung fields which are likely chronic    There appears to be an anterior dislocation of the right shoulder    which appears chronic         Impression    Findings compatible with atherosclerotic disease    There is minimal infiltrate at both lung bases, at the right lung base    representing a change suspicious for pneumonia. At the lung base on    the left relatively stable.  There is likely a component of pneumonia    superimposed on chronic scarring and fibrosis                     8/11/2020  6:09 PM - Den, Mhy Incoming Ekg Results From [facility]     Component  Value  Ref Range & Units  Status  Collected  Lab    Ventricular Rate  79  BPM  Final  08/11/2020  7:45 AM  [facility]    Atrial Rate  79  BPM  Final  08/11/2020  7:45 AM  [facility]    P-R Interval  168  ms  Final  08/11/2020  7:45 AM  [facility]    QRS Duration  138  ms  Final  08/11/2020  7:45 AM  [facility]    Q-T Interval  442  ms  Final  08/11/2020  7:45 AM  [facility]    QTc Calculation (Bazett)  506  ms  Final  08/11/2020  7:45 AM  [facility]    P Axis  31  degrees  Final  08/11/2020  7:45 AM  [facility]    R Axis  -7  degrees  Final  08/11/2020  7:45 AM  [facility]    T Axis  112  degrees  Final  08/11/2020  7:45 AM  [facility]    Testing Performed By     Lab - Abbreviation  Name  Director  Address  Valid Date Range    360-[facility]  [facility]  Unknown  Unknown  04/18/16 0721-Present    Narrative & Impression     Normal sinus rhythm  Left bundle branch block  Abnormal ECG  When compared with ECG of 01-AUG-2020 22:23,  Significant changes have occurred  Confirmed by Giacomo Steele present. Mild - moderate aortic regurgitation is noted. Moderate to severe aortic stenosis is present. The aortic valve dimensionless index is 0.31 . Unable to accurately assess RV systolic pressure. Physiologic and/or trace tricuspid regurgitation   Physiologic and/or trace pulmonic regurgitation present. Technically good quality study. No comparison study available. Suggest clinical correlation. Signature      ----------------------------------------------------------------   Electronically signed by Michael Toth DO(Interpreting   physician) on 03/20/2018 09:23 PM   ----------------------------------------------------------------      Anesthesia Evaluation  Patient summary reviewed and Nursing notes reviewed no history of anesthetic complications:   Airway: Mallampati: III  TM distance: <3 FB   Neck ROM: full  Mouth opening: < 3 FB Dental:      Comment: Pt states had recent dental work prior to TAVR and teeth pulled, denies any loose teeth. Poor dentition    Pulmonary:   (+) pneumonia:  decreased breath sounds,                             Cardiovascular:    (+) hypertension:, valvular problems/murmurs (recent T[facility] at [facility], july 17th 2020): MR, dysrhythmias (new onset AFIB): atrial fibrillation, CHF: diastolic, hyperlipidemia      ECG reviewed  Rhythm: irregular  Rate: normal  Echocardiogram reviewed         Beta Blocker:  Not on Beta Blocker         Neuro/Psych:   Negative Neuro/Psych ROS              GI/Hepatic/Renal:   (+) hiatal hernia,          ROS comment: R/O GI BLEED    For EGD today 8/13/20  . Endo/Other:    (+) blood dyscrasia: anemia, arthritis:., electrolyte abnormalities, . Pt had no PAT visit        ROS comment: Cellulitis BLE Abdominal:           Vascular: negative vascular ROS. Anesthesia Plan      MAC     ASA 4       Induction: intravenous. Anesthetic plan and risks discussed with patient.       Plan discussed with attending

## 2020-08-13 NOTE — PLAN OF CARE
Problem: Falls - Risk of:  Goal: Will remain free from falls  Description: Will remain free from falls  8/13/2020 1932 by Freddy Anaya RN  Outcome: Met This Shift  8/13/2020 1302 by Rajani Napoles RN  Outcome: Met This Shift  Goal: Absence of physical injury  Description: Absence of physical injury  8/13/2020 1932 by Freddy Anaya RN  Outcome: Met This Shift  8/13/2020 1302 by Rajani Napoles RN  Outcome: Met This Shift     Problem: Pain:  Goal: Pain level will decrease  Description: Pain level will decrease  8/13/2020 1302 by Rajani Napoles RN  Outcome: Met This Shift  Goal: Control of acute pain  Description: Control of acute pain  8/13/2020 1932 by Freddy Anaya RN  Outcome: Met This Shift  8/13/2020 1302 by Rajani Napoles RN  Outcome: Met This Shift  Goal: Control of chronic pain  Description: Control of chronic pain  8/13/2020 1302 by Rajani Napoles RN  Outcome: Met This Shift

## 2020-08-13 NOTE — CONSULTS
Comprehensive Nutrition Assessment    Type and Reason for Visit:  Reassess, Consult    Nutrition Recommendations/Plan: Modify Parenteral Nutrition  TPN recommendation:   3-in-1 central standard @ 66.7 ml/hr  Will provide 1600 ml tv, 1656 kcals, 80 gm AA  Regimen will meet 100% estimated nutrient needs  Note: monitor electrolytes closely as pt at risk for refeeding syndrome    Nutrition Assessment:  Pt declining from a nutritional standpoint w/ minimal PO intakes since admit 2/2 acute appendicitis s/p lap appy w/ perforated appendicitis, post-op ileus. Will provide updated TPN recs and monitor. Malnutrition Assessment:  Malnutrition Status: At risk for malnutrition (Comment)    Context:  Acute Illness     Findings of the 6 clinical characteristics of malnutrition:  Energy Intake:  7 - 50% or less of estimated energy requirements for 5 or more days  Weight Loss:  Unable to assess(d/t fluids)     Body Fat Loss:  Unable to assess     Muscle Mass Loss:  Unable to assess    Fluid Accumulation:  No significant fluid accumulation     Strength:  Not Performed    Estimated Daily Nutrient Needs:  Energy (kcal):  ; Weight Used for Energy Requirements:  Current     Protein (g):  75-85(1.3-1.5);  Weight Used for Protein Requirements:  Ideal        Fluid (ml/day):  ; Weight Used for Fluid Requirements:  Current      Nutrition Related Findings:  pt alert, active BS, abd distention, noted N/V/D s/p lap appy w/ perf appendicitis, post-op ileus, +2 pitting edema, +I/Os      Wounds:  Surgical Wound       Current Nutrition Therapies:    PN-Adult Premix 5/15 - Standard Electrolytes - Central Line  DIET PEPTIC ULCER;  Current Parenteral Nutrition Orders:  · Type and Formula: Premix Central   · Duration: Continuous  · Rate/Volume: 50 ml/hr; 2000 ml  · Current PN Order Provides: 1420 kcals, 100 gm AA    Anthropometric Measures:  · Height: 5' 5\" (165.1 cm)  · Current Body Weight: 169 lb (76.7 kg)   · Admission Body Weight: 163 lb (73.9 kg)(8/2 bed scale)    · Usual Body Weight: 162 lb (73.5 kg)(3/2020 bed scale, per EMR)     · Ideal Body Weight: 125 lbs; % Ideal Body Weight 135.2 %   · BMI: 28.1  · BMI Categories: Overweight (BMI 25.0-29. 9)       Nutrition Diagnosis:   · Inadequate oral intake related to altered GI function(perforated appendicitis w/ post-op ileus) as evidenced by NPO or clear liquid status due to medical condition    Nutrition Interventions:   Food and/or Nutrient Delivery:  Modify Parenteral Nutrition(TPN recommendation: 3-in-1 central standard @ 66.7 ml/hr to provide 1600 ml tv, 1656 kcals, 80 gm AA)  Nutrition Education/Counseling:  Education not indicated   Coordination of Nutrition Care:  Continued Inpatient Monitoring    Goals:  tolerance to TPN at goal       Nutrition Monitoring and Evaluation:   Behavioral-Environmental Outcomes:  Knowledge or Skill   Food/Nutrient Intake Outcomes:  Diet Advancement/Tolerance, Parenteral Nutrition Intake/Tolerance  Physical Signs/Symptoms Outcomes:  Biochemical Data, Diarrhea, GI Status, Nausea or Vomiting, Fluid Status or Edema, Nutrition Focused Physical Findings, Skin, Weight     Discharge Planning:     Too soon to determine     Electronically signed by Faby Ricks, MS, RD, LD on 8/13/20 at 12:08 PM EDT    Contact: [phone]

## 2020-08-13 NOTE — PROGRESS NOTES
Physical Therapy  Facility/Department: [facility]  Daily Treatment Note  NAME: Jeancarlos Shafer  :   MRN: [id number]    Date of Service: 2020    Physical therapy treatment attempted several times today. Patient being transported off the unit on first attempt for EGD. On second and third attempt, patient on bedpan or being placed on the bedpan due to diarrhea and not able to be seen. Will attempt at a later time/date as able. Thank you for the opportunity to assist in the care of this patient.     Christopher Gallagher, PT, DPT  License [id number]

## 2020-08-13 NOTE — DISCHARGE SUMMARY
Physician Discharge Summary     Patient ID:  Sal Krishnamurthy  [id number]  81 y.o.  1935    Admit date: 8/1/2020    Discharge date and time: 8/22/2020  2:16 PM     Admitting Physician: Lianne Mccann MD     Admission Diagnoses: Acute appendicitis [K35.80]    Discharge Diagnoses: Active Problems:    Hypokalemia    Acute appendicitis    Sepsis ([address]    Hypomagnesemia    Atrial fibrillation, new onset (HCC)    Perforated appendicitis    Gastrointestinal hemorrhage    Gastric ulcer    Ileus ([address]  Resolved Problems:    * No resolved hospital problems. *      Admission Condition: serious    Discharged Condition: stable    Indication for Admission: Perforated appendicitis    Hospital Course/Procedures/Operation/treatments:   8/1: presented with acute appendicitis, perforated; started on antibiotics, in afib RVR, admited to SICU  8/2: went to OR for laparoscopic appendectomy; salinas catheter removed, NPO until bowel function, converted to sinus rhythm. 8/3: Patient straight cathed then unable to void post procedure. Bladder scan 185. Bolus ordered. Complaining of burping, no nausea or emesis. NG and salinas were placed today. 8/4: Patient still not having flatus and continues burping. Continue monitoring and have patient walk. [facility] 15/24  8/5: No abdominal pain but still no flatus. NG otuput over 2.3L yesterday, still burping. DC salinas  8/6: States she finally had flatus overnight NG output 1.4L, no BM. Able to urinate after salinas removed. States she is feeling a little better. 8/7: Patient having occasional nausea. Prolonged QT stable--no anti-emetics. NG output 750  8/8: Patient having liquid BMs with no blood. NG output as of this am 400cc. 8/9: BM x 3, 1.3 L out by 24 hours dark nasogastric output, + appetite  8/10: Patient tolerated clear liquids with minimal nausea, denies emesis. 2 BM yesterday. Will pull NGT   8/11: Patient tolerated general diet, no complaints this morning.   8/12: Patient kept for nausea. Had a large bowel movement overnight. If tolerating diet this morning will be discharged home this afternoon. 8/13: 1 episode of coffee ground emesis at 9 pm last night and 2 dark loose stools around 4 am. 2nd unit of pRBC was hung this am. WBC still 18.4 this am. Scheduled for EGD today. 8/14: EGD showed multiple gastric antral ulcers, distal duodenitis, large hiatal hernia with vianey's ulcerations. She was started on TPN. States she feels full this morning, denies abdominal pain. Had one small stool since EGD. Tolerating diet without emesis. 8/15: Hematemesis overnight, NGT placed. Bilious/bloody output. STAT H/H stable. Consult to cardiology regarding if plavix may be held despite recent TAVR. Awaiting final recommendations. Patient states she has been feeling much better since placement of NGT. Stopped protonix gtt. Cardiology did not see patient. 8/16: No acute events overnight. Pt states she feels great today. Need to speak with cardiology about possibly stopping Plavix despite recent TAVR. 8/17: Patient had some nausea after NGT fell out but was relieved by reglan. She hasn't had a BM in the past 2 days and is not passing gas. She has been tolerating her clear liquids. Hemoglobin stable, soft BPs. Cardio recommends to hold her ASA and Plavix until she stabilizes GI wise. 8/18: No events overnight. No nausea or vomiting with PUD diet. No BM for 3 days will try suppository today. Stable Hgb, daily labs. Will monitor tray % eaten with nursing to evaluate need for TPN with possible decrease tomorrow. 8/19: No acute events overnight. Great news had a bowel movement rather large yesterday. Feeling a lot better. Still one episode of emesis so we will watch her diet progresses today  8/20: one small emesis yesterday. 3BMs recorded although she does not recall this. Appetite poor - will try off TPN  8/21: Bout of nausea overnight, but no vomiting.  Able to hold down some mashed potatoes last night, but not much else. Still not passing any gas or had a BM since yesterday. Had some reflux this am but is better now. Belly was mildly distended. : Patient had a bowel movement overnight. Tolerating diet without nausea, vomiting. [facility] set up for discharge, discharge planning. Consults:   IP CONSULT TO GENERAL SURGERY  IP CONSULT TO SOCIAL WORK  IP CONSULT TO CRITICAL CARE  IP CONSULT TO DIETITIAN  IP CONSULT TO DIETITIAN  IP CONSULT TO CARDIOLOGY  IP CONSULT TO SOCIAL WORK  IP CONSULT TO HOME CARE NEEDS    Significant Diagnostic Studies:   Ct Abdomen Pelvis Wo Contrast Additional Contrast? None    Result Date: 2020  Patient MRN:  [id number] : 1935 Age: 81 years Gender: Female Order Date:  2020 11:45 PM TECHNIQUE/NUMBER OF IMAGES/COMPARISON/CLINICAL HISTORY: CT abdomen pelvis no contrast Axial images obtained sagittal and coronal reconstructions Through 4 8 images Comparison  study. The 79-year-old female patient with a history of vomiting and abdominal pain and diarrhea. FINDINGS: Findings of acute perforated appendicitis. There are inflammatory changes in the appendix and in the periappendiceal fat planes with the areas of air outside the appendiceal lumen which are walled contained perforation by the akshat-appendical inflammatory reaction present. At the base of the appendix the appendix measures up to 12 mm in cross-section diameter. There is a component of small bowel obstruction with fecal-like contents in the small bowel some of the segments of the small bowel are dilated up to 3.6 cm . Digital is not precisely identified, does not appears to be direct relate with the appendicitis process. There is some whorling of the mesenteric vessels. The colon is decompressed. Uncomplicated extensive diverticulosis seen in the sigmoid colon. There is no free intraperitoneal air outside of the akshat-appendiceal spaces. There are normal size and the density for the liver and spleen.  The internal jugular central venous catheter tip in SVC. The no pneumothorax on the right on the left-sided. No sizable infiltrates or consolidations are seen. Discrete atelectasis in the base. The heart has normal size. Left internal jugular central venous catheter tip in SVC. No pneumothorax on the right or on the left. Discharge Exam:  Gen: NAD  Resp: Breathing Comfortably, no resp distress  CV: Reg Rate  Abd: Soft wounds cdi   EXT nvi      Disposition: home    In process/preliminary results:  Outstanding Order Results     No orders found from 7/3/2020 to 8/2/2020. Patient Instructions:   Discharge Medication List as of 8/22/2020  1:21 PM           Details   sucralfate (CARAFATE) 1 GM tablet Take 1 tablet by mouth 4 times daily, Disp-120 tablet,R-3Normal      pantoprazole (PROTONIX) 40 MG tablet Take 1 tablet by mouth 2 times daily (before meals), Disp-60 tablet,R-0Normal      methocarbamol (ROBAXIN) 500 MG tablet Take 1 tablet by mouth 4 times daily for 10 days, Disp-40 tablet,R-0Normal              Details   ferrous sulfate (IRON 325) 325 (65 Fe) MG tablet Take 325 mg by mouth daily (with breakfast)Historical Med      clopidogrel (PLAVIX) 75 MG tablet Take 75 mg by mouth dailyHistorical Med      enalapril-hydroCHLOROthiazide (VASERETIC) 10-25 MG per tablet Take 1 tablet by mouth dailyHistorical Med      aspirin 81 MG EC tablet Take 81 mg by mouth dailyHistorical Med      Iron Combinations (NIFEREX) TABS Take 1 capsule by mouth Daily, Disp-100 tablet, R-0Normal      spironolactone (ALDACTONE) 25 MG tablet Take 1 tablet by mouth daily, Disp-30 tablet, R-3Normal      torsemide (DEMADEX) 20 MG tablet Take 1 tablet by mouth daily, Disp-30 tablet, R-3Normal      NIFEdipine (NIFEDICAL XL) 30 MG extended release tablet Take 30 mg by mouth every morningHistorical Med             SURGERY DISCHARGE INSTRUCTIONS     You may be drowsy or lightheaded after receiving sedation or anesthesia.     A responsible the operative site       · Unrelieved pain                   · Blood soaked dressing (Some oozing may be normal)      Your information:  Name: Jeancarlos Shafer  :     Your instructions:  Home Care for dressing change: coccyx    What to do after you leave the hospital:    Recommended diet: regular diet    Recommended activity: activity as tolerated        The following personal items were collected during your admission and were returned to you:    Valuables  Dentures: None  Vision - Corrective Lenses: Glasses  Hearing Aid: None  Jewelry: None(given to daughter derrick to take home)  Body Piercings Removed: N/A  Clothing: Pants, Footwear, Shirt, Undergarments (Comment)  Were All Patient Medications Collected?: Not Applicable  Other Valuables: None(daughter taking purse and other belongings home)  Valuables Given To: Patient    Information obtained by:  By signing below, I understand that if any problems occur once I leave the hospital I am to contact . I understand and acknowledge receipt of the instructions indicated above. SURGERY DISCHARGE INSTRUCTIONS    You may be drowsy or lightheaded after receiving sedation or anesthesia. A responsible person should be with you for the next 24 hours. · FOLLOW UP: Call office to schedule follow-up appointment in 1 week. · DIET: Advance your diet as tolerated. Start with light diet and progress to your normal diet as you feel like eating. If you experience nausea or repeated episodes of vomiting which persist beyond 12-24 hours, notify your doctor. · ACTIVITY: Rest today. Increase activity gradually. No driving while on prescription pain medication. No heavy lifting for 4 weeks - nothing over 10 lbs, or heavier than a milk jug.    · SHOWER/BATHING: Okay to shower in 24 hours after procedure. No tub bathing, swimming or soaking for 2 weeks. · WOUND CARE: You have a clean dressing applied. You may remove this dressing in 24 hours.  You do not need a new dressing but may apply one if you feel that your incisions are oozing. You do not need to apply anything over them. Avoid directly applying lotions, creams or oils to your incision. Keep incisions clean and dry. Always ensure you and your care giver clean hands before and after caring for the wound. You may place ice on incisions to decrease the pain and bruising. · MEDICATIONS: Take as prescribed. You may take over the counter ibuprofen or tylenol for pain as directed, limit total amount of acetaminophen (tylenol) to 3 grams per 24-hour period. Okay to resume anticoagulant medication after 24hrs. You may experience constipation while taking pain medication, you may take over the counter stool softeners (Docusate/Colace or Senokot-S) as directed.        SPECIAL INSTRUCTIONS:   Call physician if they or any other problems occur:  - Call the office if you have a fever over >101F, or if your incision becomes red, tender, or drains more than a small amount of clear fluid  - Fever over 101°    - Redness, swelling, hardness or warmth at the operative site  - Unrelieved nausea    - Foul smelling or cloudy drainage at the operative site   - Unrelieved pain    - Blood soaked dressing (Some oozing may be normal)         Follow up:   Marlon Mares MD  [facility] [address] [facility]          Ranjith Liang MD  [address] [facility] [id number]    Schedule an appointment as soon as possible for a visit in 1 week  For routine follow up after surgery       Signed:  Ranjith Liang MD  8/24/2020  12:06 AM

## 2020-08-13 NOTE — PLAN OF CARE
Problem: Falls - Risk of:  Goal: Will remain free from falls  Description: Will remain free from falls  8/13/2020 1302 by Jamie Frankel, RN  Outcome: Met This Shift  8/13/2020 0453 by Nathaly Lewis RN  Outcome: Met This Shift  Goal: Absence of physical injury  Description: Absence of physical injury  8/13/2020 1302 by Jamie Frankel, RN  Outcome: Met This Shift  8/13/2020 0453 by Nathaly Lewis RN  Outcome: Met This Shift     Problem: Pain:  Goal: Pain level will decrease  Description: Pain level will decrease  8/13/2020 1302 by Jamie Frankel, RN  Outcome: Met This Shift  8/13/2020 0453 by Nathaly Lewis RN  Outcome: Met This Shift  Goal: Control of acute pain  Description: Control of acute pain  8/13/2020 1302 by Jamie Frankel, RN  Outcome: Met This Shift  8/13/2020 0453 by Nathaly Lewis RN  Outcome: Met This Shift  Goal: Control of chronic pain  Description: Control of chronic pain  Outcome: Met This Shift     Problem: Inadequate oral food/beverage intake (NI-2.1)  Goal: Food and/or Nutrient Delivery  Description: Individualized approach for food/nutrient provision.   8/13/2020 1208 by Akanksha Zepeda, MS, RD, LD  Outcome: Ongoing

## 2020-08-13 NOTE — PROGRESS NOTES
Patient observed first 15 minutes of 2nd unit of blood, no signs of any adverse reaction. Patient tolerating well. Will continue to monitor throughout remainder of transfusion.

## 2020-08-13 NOTE — PROGRESS NOTES
Due to critical national TPN shortage, all new orders for parenteral nutrition will be reviewed by clinical dietitian and clinical pharmacist to determine if the order meets the indication guidelines as follows:      Check box if present Approved indication/criteria for total parenteral nutrition   []  patient   [] Malnourished adult patient without nutrition for 3 - 5 days without ability to take enteral nutrition (examples below table)   [x] Previously well-nourished adult without nutrition for 7 days without ability to take enteral nutrition (examples below in table)     Example Disease states Requiring Parenteral Nutrition    Disease Category Examples   Impaired absorption Short bowel syndrome, NEC, meconium ileus, trauma   Mechanical bowel obstruction Intrinsic or extrinsic blockage of intestinal lumen (stenosis, strictures, inflammatory disease)   Need to restrict PO or enteral intake Ischemic bowel, severe pancreatitis, chylous fistula, preoperative status   Motility disorders Prolonged ileus, pseudo-obstruction, visceral organ myopathy   Inability to achieve or maintain enteral access Varies with clinical circumstances       Shay Pacheco 2020. 11:55 AM

## 2020-08-14 NOTE — CARE COORDINATION
Received call from [facility]) TPN has coverage, out of pocket would be $43.45/day. called daughter Beatris to updated, left message for return call. Per general surgery they do not anticipate discharging home with TPN, [place] will follow just in case. Elias MENDEZ

## 2020-08-14 NOTE — PLAN OF CARE
Problem: Falls - Risk of:  Goal: Will remain free from falls  Description: Will remain free from falls  8/14/2020 0512 by Mary Balbuena RN  Outcome: Met This Shift  8/13/2020 1932 by Chester Neal RN  Outcome: Met This Shift  Goal: Absence of physical injury  Description: Absence of physical injury  8/14/2020 0512 by Mary Balbuena RN  Outcome: Met This Shift  8/13/2020 1932 by Chester Neal RN  Outcome: Met This Shift     Problem: Pain:  Goal: Pain level will decrease  Description: Pain level will decrease  Outcome: Met This Shift  Goal: Control of acute pain  Description: Control of acute pain  8/14/2020 0512 by Mary Balbuena RN  Outcome: Met This Shift  8/13/2020 1932 by Chester Neal RN  Outcome: Met This Shift  Goal: Control of chronic pain  Description: Control of chronic pain  Outcome: Met This Shift     Problem: Infection - Risk of, Surgical Site Infection  Goal: Absence of surgical site infection  Outcome: Met This Shift     Problem: Skin Integrity:  Goal: Will show no infection signs and symptoms  Description: Will show no infection signs and symptoms  Outcome: Met This Shift  Goal: Absence of new skin breakdown  Description: Absence of new skin breakdown  Outcome: Met This Shift

## 2020-08-14 NOTE — ANESTHESIA POSTPROCEDURE EVALUATION
Department of Anesthesiology  Postprocedure Note    Patient: Nathalie Nieto  MRN: [id number]  YOB: 1935  Date of evaluation: 8/14/2020  Time:  6:11 AM     Procedure Summary     Date:  08/13/20 Room / Location:  [facility] 01 / [facility]    Anesthesia Start:  7279 Anesthesia Stop:  1027    Procedure:  EGD DIAGNOSTIC ONLY (N/A ) Diagnosis:  (GI bleed)    Surgeon:  Viry Sepulveda MD Responsible Provider:  Zeferino Hawkins DO    Anesthesia Type:  MAC ASA Status:  4          Anesthesia Type: MAC    Julio C Phase I: Julio C Score: 10    Julio C Phase II:      Last vitals: Reviewed and per EMR flowsheets.        Anesthesia Post Evaluation    Patient location during evaluation: PACU  Patient participation: complete - patient participated  Level of consciousness: awake and alert  Pain score: 1  Airway patency: patent  Nausea & Vomiting: no nausea and no vomiting  Complications: no  Cardiovascular status: hemodynamically stable  Respiratory status: acceptable  Hydration status: euvolemic

## 2020-08-14 NOTE — PLAN OF CARE
Problem: Falls - Risk of:  Goal: Will remain free from falls  Description: Will remain free from falls  8/14/2020 1740 by Aura Medel RN  Outcome: Met This Shift     Problem: Falls - Risk of:  Goal: Absence of physical injury  Description: Absence of physical injury  8/14/2020 1740 by Aura Medel RN  Outcome: Met This Shift     Problem: Pain:  Goal: Pain level will decrease  Description: Pain level will decrease  8/14/2020 1740 by Aura Medel RN  Outcome: Met This Shift     Problem: Pain:  Goal: Control of acute pain  Description: Control of acute pain  8/14/2020 1740 by Aura Medel RN  Outcome: Met This Shift     Problem: Infection - Risk of, Surgical Site Infection  Goal: Absence of surgical site infection  8/14/2020 1740 by Aura Medel RN  Outcome: Met This Shift     Problem: Skin Integrity:  Goal: Will show no infection signs and symptoms  Description: Will show no infection signs and symptoms  8/14/2020 1740 by Aura Medel RN  Outcome: Met This Shift     Problem: Skin Integrity:  Goal: Absence of new skin breakdown  Description: Absence of new skin breakdown  8/14/2020 1740 by Aura Medel RN  Outcome: Met This Shift

## 2020-08-14 NOTE — HOME CARE
Referral received for home health care for possible TPN needs. Benefits ran with out of pocket cost as follows:  [facility]  ORDERED THERAPY AT TIME OF QUOTE: TPN 3-IN-1 1700 ML/DAY  COVERAGE: 80%  TOTAL PT RESPONSIBILITY: $43.45 PER DAY    * Call placed to patients room as well as to emergency contact Beatris to discuss. No answer. Left detailed vml. Will follow for plan/orders if indicated. Ana Soliz LPN/[facility]    Addendum 10:55am:  Spoke with daughter Beatris. Per beatris she states she does not think her mother will go home on TPN, however explained process and in agreement if needed. Will continue to follow.    Ana Soliz LPN/[facility]

## 2020-08-14 NOTE — PROGRESS NOTES
Messaged Kanika Guerra NP with General Surgery to notify patient is still having severe abdominal pain.

## 2020-08-14 NOTE — PROGRESS NOTES
General Surgery Progress Note:    CC: post op     S: feels a little better, nausea improved       Objective:  @/70   Pulse 82   Temp 97.2 °F (36.2 °C) (Temporal)   Resp 16   Ht 5' 5\" (1.651 m)   Wt 171 lb 4.8 oz (77.7 kg)   SpO2 100%   BMI 28.51 kg/m² @    Physical -     Gen: NAD  Resp: Breathing Comfortably, no resp distress  CV: Reg Rate  Abd: Soft wounds cdi   EXT nvi     Assessment/Plan: s/p lap appy with perforated appendicitis complicated by post op ileus     - cont diet, monitor nausea,   - PPI carfate, reglan,    - ro c diff sent ( she has no abd pain and diarrhea slowed down likely this was all form GIB)   - TPN till PO is better, I don't anticipate sending her home with TPN  - d/c when she is eating better.        Evert Wall MD FACS     11:15 AM

## 2020-08-14 NOTE — PROGRESS NOTES
Physical Therapy  Facility/Department: [facility]  Daily Treatment Note  NAME: Bing Davies  :   MRN: [id number]    Date of Service: 2020    Physical therapy orders received/treatment attempted and chart review completed. Patient receiving medications on first attempt, and on second attempt patient having sterile procedure (PICC line) and not able to be seen. Will attempt at a later time/date as able. Thank you for the opportunity to assist in the care of this patient.     Romero Del Castillo, PT, DPT  License [id number]

## 2020-08-14 NOTE — PROGRESS NOTES
Vascular Access Procedure Note    Procedure Date:   8/14/2020    Pre-procedure Verification/Time-Out:  The proposed risks versus benefits of this procedure were discussed in detail by the physician with patient. written consent was obtained from the patient. Relevant documentation was reviewed prior to procedure including signed consent form and medications. All necessary equipment for procedure is available at time of procedure yes. An audible time out was done at 1040AM by [facility], correctly identifying patients name, medical record number, correct side, correct site, and correct procedure to be performed with registered nurse members of the procedure team all in agreement.         Indication for Procedure:   Reason for Insertion: total parenteral nutrition    ASA Assessment (Required for Moderate & Deep Sedation):  ASA 3 - Patient with moderate systemic disease with functional limitations    Procedure:   Reason for Consultation: power PICC line    Clinician Performing Procedure:   Leopoldo Dimitri RN    Assistant:  NONE    Sedation:   Analgesia Used: lidocaine 1%    Procedure Details/Findings:  Catheter Maltese Size: 5 FR DL  Lot Number: [id number]  Product #: [id number]  Expiration Date: 05/31/2021  Maximum Barrier Precautions: cap, eye shield, full body drape, gloves, gown, handwashing and mask  Skin Prep: chlorhexidine  Technique: modified seldinger and ultrasound guided with VPS  Attempts: 1  Exposed (cm): 1  Total (cm): 39  Placement Site: RT. BRACHIOAXILLRY VEIN  Vessel Size: 0.55 CM  Dressing: securement device, transparent dressing and biopatch  Blood Return: Yes   Ultrasound Guidance: Yes   Arm Circumference Mid-Bicep (cm): 27 CM  Chest X-Ray Ordered: VasoNova VPS  End Placement: CAJ    Complications:   NONE IMMEDIATE     Post-operative Condition:  stable  Patient Tolerated Procedure: well     Comments/Post-operative Education:   Post Procedure Interventions: no blood pressure sign placed above mckenzie Grimm  08/14/20  11:21 AM

## 2020-08-15 NOTE — PLAN OF CARE
Problem: Falls - Risk of:  Goal: Will remain free from falls  Description: Will remain free from falls  Outcome: Met This Shift     Problem: Falls - Risk of:  Goal: Absence of physical injury  Description: Absence of physical injury  Outcome: Met This Shift     Problem: Pain:  Goal: Pain level will decrease  Description: Pain level will decrease  Outcome: Met This Shift     Problem: Pain:  Goal: Control of acute pain  Description: Control of acute pain  Outcome: Met This Shift     Problem: Infection - Risk of, Surgical Site Infection  Goal: Absence of surgical site infection  Outcome: Met This Shift     Problem: Skin Integrity:  Goal: Absence of new skin breakdown  Description: Absence of new skin breakdown  Outcome: Met This Shift

## 2020-08-15 NOTE — PROGRESS NOTES
General Surgery Progress Note:    CC: post op     S: Had n/v some dark output over night had NG placed feels better with it       Objective:  @BP (!) 98/45   Pulse 92   Temp 98.9 °F (37.2 °C) (Axillary)   Resp 18   Ht 5' 5\" (1.651 m)   Wt 170 lb 3.2 oz (77.2 kg)   SpO2 97%   BMI 28.32 kg/m² @    Physical -     Gen: NAD  Resp: Breathing Comfortably, no resp distress  CV: Reg Rate  Abd: Soft wounds cdi   EXT nvi     Assessment/Plan: s/p lap appy with perforated appendicitis complicated by post op ileus and GI Bleed.     - NG, LIS for now,   - PPI drip stop this evening,  carfate, reglan, HH drifted to 6.8 will transfuse,   - BP soft, IVF, blood products, monitor closely    - Consult cardiology, if possible I think it may be helpful to stop the asa and plavix. ...   - TPN till PO is better, I don't anticipate sending her home with TPN  - d/c when she is eating better.        Myron Lowry MD FACS     8:57 AM

## 2020-08-16 NOTE — PROGRESS NOTES
General Surgery Progress Note:    CC: post op     S: feels much better today, no n/v having some flatus and stool       Objective:  @BP (!) 108/53   Pulse 77   Temp 97.3 °F (36.3 °C) (Temporal)   Resp 16   Ht 5' 5\" (1.651 m)   Wt 169 lb 12.8 oz (77 kg)   SpO2 100%   BMI 28.26 kg/m² @    Physical -     Gen: NAD  Resp: Breathing Comfortably, no resp distress  CV: Reg Rate  Abd: Soft wounds cdi   EXT nvi     Assessment/Plan: s/p lap appy with perforated appendicitis complicated by post op ileus and GI Bleed. - clears   - PPI carfate, reglan, HH 7.4 today   - BP better,   - Consult cardiology, if possible I think it may be helpful to stop the asa and plavix. ... still waiting.   - TPN till PO is better, I don't anticipate sending her home with TPN  - d/c when she is eating better and no signs of bleeding.        Lula Ortega MD FACS     12:51 PM

## 2020-08-16 NOTE — CONSULTS
methocarbamol (ROBAXIN) 500 MG tablet Take 1 tablet by mouth 4 times daily for 10 days 8/11/20 8/21/20 Yes Quinn MD Campbell   ferrous sulfate (IRON 325) 325 (65 Fe) MG tablet Take 325 mg by mouth daily (with breakfast)   Yes Historical Provider, MD   clopidogrel (PLAVIX) 75 MG tablet Take 75 mg by mouth daily   Yes Historical Provider, MD   enalapril-hydroCHLOROthiazide (VASERETIC) 10-25 MG per tablet Take 1 tablet by mouth daily   Yes Historical Provider, MD   aspirin 81 MG EC tablet Take 81 mg by mouth daily   Yes Historical Provider, MD   Iron Combinations (NIFEREX) TABS Take 1 capsule by mouth Daily 3/8/20   David Field, DO   spironolactone (ALDACTONE) 25 MG tablet Take 1 tablet by mouth daily 3/28/18   [facility],    torsemide (DEMADEX) 20 MG tablet Take 1 tablet by mouth daily 3/28/18   [facility],    NIFEdipine (NIFEDICAL XL) 30 MG extended release tablet Take 30 mg by mouth every morning    Historical Provider, MD       Allergies:  Patient has no known allergies. Review of Systems:   · Constitutional: there has been no unanticipated weight loss. There's been no significant change in energy level, sleep pattern or activity level. No fever chills or rigors. · Eyes: No visual changes or diplopia. No scleral icterus. · ENT: No Headaches, hearing loss or vertigo. No mouth sores or sore throat. No change in taste or smell. · Cardiovascular: No chest discomfort, dyspnea on exertion, palpitations, loss of consciousness, no phlebitis, no claudication. · Respiratory: Denies shortness of breath no cough or wheezing, no sputum production. No hemoptysis, pleuritic pain. · Gastrointestinal: Mild abdominal pain, but no obvious appetite loss, blood in stools. No change in bowel habits. No hematemesis  · Genitourinary: No dysuria, trouble voiding or hematuria. No nocturia or increased frequency.   · Musculoskeletal:  +leg swelling since birth of her children No gait disturbance, weakness or joint complaints. · Integumentary: No rash or pruritis. · Neurological: No headache, diplopia, change in muscle strength, numbness or tingling. No change in gait, balance, coordination, mood, affect, memory, mentation, behavior. · Psychiatric: No anxiety or depression. · Endocrine: No temperature intolerance. No excessive thirst, fluid intake, or urination. No tremor. · Hematologic/Lymphatic: No abnormal bruising or bleeding, blood clots or swollen lymph nodes. · Allergic/Immunologic: No nasal congestion or hives. Physical Examination:    Vital Signs: BP (!) 108/53   Pulse 77   Temp 97.3 °F (36.3 °C) (Temporal)   Resp 16   Ht 5' 5\" (1.651 m)   Wt 169 lb 12.8 oz (77 kg)   SpO2 100%   BMI 28.26 kg/m²   General appearance: Well preserved, mesomorphic body habitus, alert, no distress. Skin: Skin color, texture, turgor normal. No rashes or lesions. No induration or tightening palpated. Head: Normocephalic. No masses, lesions, tenderness or abnormalities  Eyes: Conjunctivae/corneas clear. PERRL, EOMs intact. Sclera non icteric. Ears: External ears normal. Canals clear. TM's clear bilaterally. Hearing normal to finger rub. Nose/Sinuses: Nares normal. Septum midline. Mucosa normal. No drainage or sinus tenderness. Oropharynx: Lips, mucosa, and tongue normal. Oropharynx clear with no exudate seen. Neck: Neck supple and symmetric. No adenopathy. Thyroid symmetric, normal size, without nodules. Trachea is midline. Carotids brisk in upstroke without bruits, no abnormal JVP noted at 45°. Chest: Even excursion  Lungs: Lungs grossly clear to auscultation bilaterally. No retractions or use of accessory muscles. No tactile vocal fremitus. No rhonchi, crackles or rales. Heart:  S1 > S2. Regular rhythm. Grade 2/6 early systolic murmur present at the right and left 2nd intercostal space. No S3 noted. No rub, palpable thrill or heave noted. PMI 5th intercostal space midclavicular line.   Abdomen: Abdomen soft, moderately protuberant abdomen, non-tender. BS normal. No masses, organomegaly. No hernia noted. Extremities: Extremities normal. No deformities, edema, or skin discoloration. No cyanosis or clubbing noted to the nails. Peripheral pulses present 2+ upper extremities and present 2+  lower extremities. Musculoskeletal: Spine ROM normal. Muscular strength intact. Neuro: Cranial nerves intact. Motor: Strength 5/5 in all extremities. Reflexes 2+ in all extremities. No focal weakness. Sensory: grossly normal to touch. Coordination intact. Pertinent Labs:  CBC:   Recent Labs     20  1509  08/15/20  0550 08/15/20  1148 20  0330   WBC 19.7*  --  18.6*  --  16.5*   HGB 7.8*   < > 6.8* 8.0* 7.4*     --  322  --  300    < > = values in this interval not displayed. BMP:  Recent Labs     20  1509 08/15/20  0730 20  0330    142 139   K 4.3 3.9 3.8   * 109* 108*   CO2 21* 20* 20*   BUN 26* 28* 28*   CREATININE 0.7 0.7 0.7   GLUCOSE 210* 109* 116*   LABGLOM >60 >60 >60     ABGs:   Lab Results   Component Value Date    PH 7.298 2020     INR: No results for input(s): INR in the last 72 hours. PRO-BNP:   Lab Results   Component Value Date    PROBNP 1,713 (H) 2020    PROBNP 6,550 (H) 2018      Cardiac Injury Profile:   No results for input(s): CKTOTAL, CKMB, CKMBINDEX, TROPONINI in the last 72 hours. Lipid Profile:   Lab Results   Component Value Date    TRIG 219 2020    HDL 45 2018    LDLCALC 85 2018    CHOL 161 2018      Hemoglobin A1C: No components found for: HGBA1C   ECG:  See report    Radiology:  Xr Chest Portable    Result Date: 3/4/2020  Patient MRN: [id number] : 1935 Age:  81 years Gender: Female Order Date: 3/4/2020 3:45 PM Exam: XR CHEST PORTABLE Number of Images: 1 view Indication:  Chest pain Comparison: Two-view chest study 2018 Findings: The lungs are symmetrically expanded, and are clear.  There is no evidence of pneumothorax or pleural effusion. Cardiovascular shadows show aortic intimal calcification, but otherwise are normal in appearance. There is no evidence of cardiac enlargement or decompensation. Skeletal structures show anterior dislocation of the right humeral head (likely chronically unstable) and superior subluxation left humeral head. Hypertrophic degenerative spinal findings are noted. No evidence of acute cardiopulmonary pathology. 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 Assessment:    Active Problems:    Hypokalemia    Acute appendicitis    Sepsis ([address]    Hypomagnesemia    Atrial fibrillation, new onset ([address]    Perforated appendicitis  Resolved Problems:    * No resolved hospital problems. *      Plan:  Based upon Mrs. XIOGN recent history I would continue both low-dose aspirin and clopidogrel for. Of 3 months followed by clopidogrel daily for another 3 months. Unfortunately, following an observed bout of gross hematemesis her antiplatelet agents will need to be withheld presently. Once her hemoglobin has stabilized I would certainly consider an EGD and once there is no evidence of active bleeding and suggestion of healing, I would then reinitiate clopidogrel individually. We need to maintain strict bacterial endocarditis prophylaxis as well. I am most concerned that her white blood cell count remains elevated. Unfortunately, risk of TAVR are thrombosis is highest in the first 1-3 months following procedure  Additionally nifedipine has been discontinued as her heart and peripheral vasculature is adapting to her new hemodynamic state following TAVR implant. Will utilize amlodipine and reassess her blood pressure trends. Supplement electrolytes as needed.     I have spent more than 45 minutes face to face with Juanis Toussaint reviewing notes and laboratory data with greater than 50% of this time instructing and counseling the patient regarding my findings and recommendations and I have answered all questions as posed to me by Ms. Nick Andrade. Thank you, Jackie Cano MD for allowing me to consult in the care of this patient. Ambrocio Desai DO, FACP, FACC, [facility]    NOTE:  This report was transcribed using voice recognition software. Every effort was made to ensure accuracy; however, inadvertent computerized transcription errors may be present.

## 2020-08-16 NOTE — PLAN OF CARE
Problem: Falls - Risk of:  Goal: Will remain free from falls  Description: Will remain free from falls  Outcome: Met This Shift     Problem: Falls - Risk of:  Goal: Absence of physical injury  Description: Absence of physical injury  Outcome: Met This Shift     Problem: Pain:  Goal: Pain level will decrease  Description: Pain level will decrease  Outcome: Met This Shift     Problem: Pain:  Goal: Control of acute pain  Description: Control of acute pain  Outcome: Met This Shift     Problem: Skin Integrity:  Goal: Will show no infection signs and symptoms  Description: Will show no infection signs and symptoms  Outcome: Met This Shift     Problem: Skin Integrity:  Goal: Absence of new skin breakdown  Description: Absence of new skin breakdown  Outcome: Met This Shift

## 2020-08-17 NOTE — CARE COORDINATION
8/17 Care Coordination: Discharge plan remains home with [facility]. Patient and daughter refuses PARRISH. Patient had some nausea after NGT fell out but was relieved by reglan. She hasn't had a BM in the past 2 days and was not passing gas but did start to later in the morning . She has been tolerating her clear liquids. Will start General diet. Cont on TPN. CM/SW will continue to follow for discharge planning.    Dorinda DUMASN,RN--[phone]

## 2020-08-17 NOTE — PROGRESS NOTES
Physical Therapy    Physical Therapy Treatment Note     Name: Joan Astudillo  : [birth year]  MRN: [id number]    Referring Provider:  Shahida Patrick MD    Date of Service: 2020    Evaluating PT:  Mesha Brown, PT, DPT    Room #:  7605/1196-I  Diagnosis:  Acute appendicitis  PMHx/PSHx:  HTN, Cellulitis, CHF, Arthritis, Cardiac catheterization 1/10/2020, [facility]  Procedure/Surgery:  Laparoscopic appendectomy 2020  Precautions:  Falls,  TPN  Equipment Needs:  Has SPC, Rollator [employer]    SUBJECTIVE:    Pt lives with her daughter in a 2 story home with 3 stairs to enter and no rail(s). Bed is on 1st floor and bath is in the basement with flight and 1 rail(s). Pt ambulated with no AD in the home, [facility] in the [facility] PTA. Pt reported independent with ADLs. Pt is not actively driving. OBJECTIVE:   Initial Evaluation  Date: 8/3/2020 Treatment Date: 2020 Short Term/ Long Term   Goals   AM-PAC 6 Clicks  73/14    Was pt agreeable to Eval/treatment? Yes  Yes     Does pt have pain? Reported minimal pain at surgical site. No c/o pain    Bed Mobility  Rolling: NT  Supine to sit: Mod A  Sit to supine: NT  Scooting: Min A NT, patient found seated in bedside chair Supervision   Transfers Sit to stand: Min A  Stand to sit: Min A  Stand pivot: Min A Sit to stand:  Max A from chair, Mod A from [facility]  Stand to sit: Min A  Stand pivot: Min A Supervision   Ambulation    2 feet laterally with no AD with Min A 3 feet x 2 with [employer] with Mod A >50 feet with no AD with Supervision   Stair negotiation: ascended and descended  NT NT 10 steps with 1 rail with SBA   ROM BUE:  Per OT  BLE:  WFL     Strength BUE:  Per OT  BLE:  4/5  Increase 1/3 grade MMT   Balance Sitting EOB:  SBA  Dynamic Standing:  Min A with no AD Sitting EOB: Supervision  Dynamic Standing:  Min/Mod A with [employer] Sitting EOB:  Independent  Dynamic Standing:  Supervision     Pt is A & O x 4  Sensation:  Pt denied numbness and tingling to extremities  Edema: above   Neuromuscular education: NA     PLAN:    Patient is making slow progress toward set goals. Continue with current PT plan of care.     Time in  1345  Time out  1413    Total Treatment Time 28 minutes     CPT codes:  [] Gait training 69829 - minutes  [] Manual therapy 53994 - minutes  [x] Therapeutic activities 92325 28 minutes  [] Therapeutic exercises 23338 - minutes  [] Neuromuscular reeducation 91679 - minutes     Romero Del Castillo, PT, DPT  License [id number]

## 2020-08-17 NOTE — PLAN OF CARE
Problem: Falls - Risk of:  Goal: Will remain free from falls  Description: Will remain free from falls  Outcome: Met This Shift     Problem: Falls - Risk of:  Goal: Absence of physical injury  Description: Absence of physical injury  Outcome: Met This Shift     Problem: Pain:  Goal: Pain level will decrease  Description: Pain level will decrease  Outcome: Met This Shift     Problem: Pain:  Goal: Control of acute pain  Description: Control of acute pain  Outcome: Met This Shift     Problem: Pain:  Goal: Control of chronic pain  Description: Control of chronic pain  Outcome: Met This Shift     Problem: Inadequate oral food/beverage intake (NI-2.1)  Goal: Food and/or Nutrient Delivery  Description: Individualized approach for food/nutrient provision.   8/17/2020 1358 by Tanisha Burnham RD, LD  Outcome: Ongoing

## 2020-08-17 NOTE — PROGRESS NOTES
Comprehensive Nutrition Assessment    Type and Reason for Visit:  Reassess    Nutrition Recommendations/Plan: Recommend to continue current TPN orders at this time. This rate meets 100% of patients calorie and protein needs. Start nutritional supplementation when TPN is discontinued to help better meet increased nutritional needs. Nutrition Assessment:  Diet was advanced to general (starting w/ toast, oatmeal, broth) ; TPN still running without difficulty ; pt at nutritional risk d/t increased needs from wound healing ; s/p appendectomy w/ post op ileus ; noted sepsis ; s/p EGD ; will recommend to continue TPN    Malnutrition Assessment:  Malnutrition Status:  Insufficient data    Context:  Acute Illness     Findings of the 6 clinical characteristics of malnutrition:  Energy Intake:  No significant decrease in energy intake(via TPN)  Weight Loss:  No significant weight loss     Body Fat Loss:  Unable to assess(data not available to assess at this time)     Muscle Mass Loss:  Unable to assess(data not available to assess at this time)    Fluid Accumulation:  No significant fluid accumulation     Strength:  Not Performed    Estimated Daily Nutrient Needs:  Energy (kcal):  8346-5437 (REE 1219 x 1.3 SF); Weight Used for Energy Requirements:  Current     Protein (g):  75-85 (1.3-1.5g/kg IBW); Weight Used for Protein Requirements:  Ideal        Fluid (ml/day):  1444-0737;  Weight Used for Fluid Requirements:  [place]      Nutrition Related Findings:  +I&Os (+7.5 L), 1+ edema, hypoactive BS, rounded abd, N/V PTA, missing teeth, dry mucous membranes, A&O x 4, [place], boggy heels, blister, redness to BLEs      Wounds:  Open Wounds, Unstageable, Surgical Wound(Incision x 1 ; wound x 1 noted to coccyx)       Current Nutrition Therapies:    Current Parenteral Nutrition Orders:  · Type and Formula: 3-in-1 Standard   · Lipids: None  · Duration: Continuous  · Rate/Volume: @70.8/hr  · Current PN Order Provides: 1700ml,

## 2020-08-17 NOTE — FLOWSHEET NOTE
Inpatient Wound Care ( Initial consult) [id number]    Admit Date: 8/1/2020  9:51 PM    Reason for consult:  Coccyx    Significant history: Per H&P     82 yo female with 3-4 days abdominal pain, food intolerance, nausea vomiting and diarrhea. She saw her PCP 3 days ago which she ha labs drawn and a leukocytosis of 15. She now has a leukocytosis of 17.5 and perforated appendicitis ( local no abscess).      Findings:     08/17/20 0846   Skin Integrity   Skin Integrity   (scab)   Location   (Left 2nd toe)   Skin Integrity Site 2   Skin Integrity Location 2   (discoloration, dryness)   Location 2 BLE   Skin Integrity Site 3   Skin Integrity Location 3   (redness, blanchable, soft)    Location 3   (bilateral heels)   Skin Integrity Site 4   Skin Integrity Location 4   (bruising)   Location 4 BUE   Wound 08/12/20 Coccyx   Date First Assessed/Time First Assessed: 08/12/20 2000   Present on Hospital Admission: No  Location: Coccyx   Wound Image    Wound Pressure Unstageable   Dressing/Treatment Alginate  (stratasorb)   Wound Length (cm) 1.3 cm   Wound Width (cm) 0.8 cm   Wound Depth (cm) 0.6 cm   Wound Surface Area (cm^2) 1.04 cm^2   Change in Wound Size % (l*w) -160   Wound Volume (cm^3) 0.62 cm^3   Wound Healing % -1450   Wound Assessment Yellow;Pink   Drainage Amount Scant   Drainage Description Serous   Odor None   Marisela-wound Assessment Intact   Pink%Wound Bed 20   Yellow%Wound Bed 80   Incision 08/02/20 Abdomen Medial;Lateral;Upper   Date First Assessed/Time First Assessed: 08/02/20 0853   Primary Wound Type: Surgical Type  Location: Abdomen  Wound Location Orientation: Medial;Lateral;Upper  Wound Description (Comments): TROCAR SITES FROM LAPAROSCOPIC SURGERY  Wound Outcome: Healed   Dressing Status Intact       **Informed Consent**    The patient has given verbal consent to have photos taken of wound and inserted into their chart as part of their permanent medical record for purposes of documentation, treatment management and/or medical review. All Images taken on 8/17/20 of patient name: Jhonny Cosby were transmitted and stored on secured [employer] located within [facility] by a registered [employer] Mobile Application Device. Impression:  Coccyx: unstageable pressure injury    Plan:Opticell/ Stratasorb  Comfort glide  Heel protectors  Patient will need continued preventative care.  notified of occurrence via perfect serve.       Marylou Johnson 8/17/2020 8:51 AM

## 2020-08-17 NOTE — PROGRESS NOTES
[facility]  ATTENDING PHYSICIAN SURGERY PROGRESS NOTE     I have examined the patient, reviewed the record, and discussed the case with the APN/  Resident. I have reviewed all relevant labs and imaging data. The following summarizes my clinical findings and independent assessment. Chief Complaint   Patient presents with    Abdominal Pain     x 3 days, +emesis +diarrhea +nausea states she has not ate since thursday        S:   8/1: presented with acute appendicitis, perforated; started on antibiotics, in afib RVR, admited to SICU  8/2: went to OR for laparoscopic appendectomy; salinas catheter removed, NPO until bowel function, converted to sinus rhythm. 8/3: Patient straight cathed then unable to void post procedure. Bladder scan 185. Bolus ordered. Complaining of burping, no nausea or emesis. NG and salinas were placed today. 8/4: Patient still not having flatus and continues burping. Continue monitoring and have patient walk. [facility] 15/24  8/5: No abdominal pain but still no flatus. NG otuput over 2.3L yesterday, still burping. DC salinas  8/6: States she finally had flatus overnight NG output 1.4L, no BM. Able to urinate after salinas removed. States she is feeling a little better. 8/7: Patient having occasional nausea. Prolonged QT stable--no anti-emetics. NG output 750  8/8: Patient having liquid BMs with no blood. NG output as of this am 400cc. 8/9: BM x 3, 1.3 L out by 24 hours dark nasogastric output, + appetite  8/10: Patient tolerated clear liquids with minimal nausea, denies emesis. 2 BM yesterday. Will pull NGT   8/11: Patient tolerated general diet, no complaints this morning. 8/12: Patient kept for nausea. Had a large bowel movement overnight. If tolerating diet this morning will be discharged home this afternoon.   8/13: 1 episode of coffee ground emesis at 9 pm last night and 2 dark loose stools around 4 am. 2nd unit of pRBC was hung this am. WBC still 18.4 this am. Scheduled for EGD today. 8/14: EGD showed multiple gastric antral ulcers, distal duodenitis, large hiatal hernia with vianey's ulcerations. She was started on TPN. States she feels full this morning, denies abdominal pain. Had one small stool since EGD. Tolerating diet without emesis. 8/15: Hematemesis overnight, NGT placed. Bilious/bloody output. STAT H/H stable. Consult to cardiology regarding if plavix may be held despite recent TAVR. Awaiting final recommendations. Patient states she has been feeling much better since placement of NGT. Stopped protonix gtt. Cardiology did not see patient. 8/16: No acute events overnight. Pt states she feels great today. Need to speak with cardiology about possibly stopping Plavix despite recent TAVR. 8/17: Patient had some nausea after NGT fell out but was relieved by reglan. She hasn't had a BM in the past 2 days and was not passing gas but did start to later in the morning . She has been tolerating her clear liquids. Hemoglobin stable, soft BPs. Cardio recommends to hold her ASA and Plavix until she stabilizes GI wise.       O:  Physical Exam  HENT:      Head: Normocephalic. Eyes:      Pupils: Pupils are equal, round, and reactive to light. Neck:      Musculoskeletal: Neck supple. Cardiovascular:      Rate and Rhythm: Normal rate. Pulmonary:      Effort: Pulmonary effort is normal. No respiratory distress. Abdominal:      General: There is distension ( mild to moderate). Tenderness: There is no abdominal tenderness. There is no guarding or rebound. Comments: Incisions c/d/i    Genitourinary:     Comments: 1.5cm sacral ulcer stage II   Musculoskeletal: Normal range of motion. Skin:     General: Skin is warm. Neurological:      General: No focal deficit present. Mental Status: She is alert. Psychiatric:         Mood and Affect: Mood normal.         A: 8/2  s/p lap appy with perforated appendicitis complicated by post op ileus and GI Bleed.     P: Increase to gen diet, but explained to the patient to take it easy and stop if she feels nauseous.    Reglan   Change tylenol to prn and stop oxy ( has not used it in over 3 days )   TPN   Wound care to see for sacral ulcer   Pain control with tylenol   PT/OT OOB   Hold ASA and plavix secondary to GIB  PPI and Carafate  Will get H pylori Ag as patient had multiple ulcerations   Deidre Hager MD

## 2020-08-18 NOTE — PROGRESS NOTES
[facility]  ATTENDING PHYSICIAN SURGERY PROGRESS NOTE     I have examined the patient, reviewed the record, and discussed the case with the APN/  Resident. I have reviewed all relevant labs and imaging data. The following summarizes my clinical findings and independent assessment. Chief Complaint   Patient presents with    Abdominal Pain     x 3 days, +emesis +diarrhea +nausea states she has not ate since thursday        S:   8/1: presented with acute appendicitis, perforated; started on antibiotics, in afib RVR, admited to SICU  8/2: went to OR for laparoscopic appendectomy; salinas catheter removed, NPO until bowel function, converted to sinus rhythm. 8/3: Patient straight cathed then unable to void post procedure. Bladder scan 185. Bolus ordered. Complaining of burping, no nausea or emesis. NG and salinas were placed today. 8/4: Patient still not having flatus and continues burping. Continue monitoring and have patient walk. [facility] 15/24  8/5: No abdominal pain but still no flatus. NG otuput over 2.3L yesterday, still burping. DC salinas  8/6: States she finally had flatus overnight NG output 1.4L, no BM. Able to urinate after salinas removed. States she is feeling a little better. 8/7: Patient having occasional nausea. Prolonged QT stable--no anti-emetics. NG output 750  8/8: Patient having liquid BMs with no blood. NG output as of this am 400cc. 8/9: BM x 3, 1.3 L out by 24 hours dark nasogastric output, + appetite  8/10: Patient tolerated clear liquids with minimal nausea, denies emesis. 2 BM yesterday. Will pull NGT   8/11: Patient tolerated general diet, no complaints this morning. 8/12: Patient kept for nausea. Had a large bowel movement overnight. If tolerating diet this morning will be discharged home this afternoon.   8/13: 1 episode of coffee ground emesis at 9 pm last night and 2 dark loose stools around 4 am. 2nd unit of pRBC was hung this am. WBC still 18.4 this am. Scheduled for EGD today. 8/14: EGD showed multiple gastric antral ulcers, distal duodenitis, large hiatal hernia with vianey's ulcerations. She was started on TPN. States she feels full this morning, denies abdominal pain. Had one small stool since EGD. Tolerating diet without emesis. 8/15: Hematemesis overnight, NGT placed. Bilious/bloody output. STAT H/H stable. Consult to cardiology regarding if plavix may be held despite recent TAVR. Awaiting final recommendations. Patient states she has been feeling much better since placement of NGT. Stopped protonix gtt. Cardiology did not see patient. 8/16: No acute events overnight. Pt states she feels great today. Need to speak with cardiology about possibly stopping Plavix despite recent TAVR. 8/17: Patient had some nausea after NGT fell out but was relieved by reglan. She hasn't had a BM in the past 2 days and was not passing gas but did start to later in the morning . She has been tolerating her clear liquids. Hemoglobin stable, soft BPs. Cardio recommends to hold her ASA and Plavix until she stabilizes GI wise.   8/18: No events overnight. No nausea or vomiting with PUD diet. No BM for 3 days will try suppository today. Stable Hgb, daily labs. Will monitor tray % eaten with nursing to evaluate need for TPN with possible decrease tomorrow.       O:  Physical Exam  HENT:      Head: Normocephalic. Eyes:      Pupils: Pupils are equal, round, and reactive to light. Neck:      Musculoskeletal: Neck supple. Cardiovascular:      Rate and Rhythm: Normal rate. Pulmonary:      Effort: Pulmonary effort is normal. No respiratory distress. Abdominal:      General: There is distension ( mild to moderate). Tenderness: There is no abdominal tenderness. There is no guarding or rebound. Comments: Incisions c/d/i    Genitourinary:     Comments: 1.5cm sacral ulcer stage II   Musculoskeletal: Normal range of motion. Skin:     General: Skin is warm.    Neurological: General: No focal deficit present. Mental Status: She is alert. Psychiatric:         Mood and Affect: Mood normal.         A: 8/2  s/p lap appy with perforated appendicitis complicated by post op ileus and GI Bleed.     P:   PUD  Reglan   PRN tylenol   TPN - 1/2 tomorrow pending diet tolerance today   Monitor hyponatremia will need to adjust TPN tomorrow - will recheck BMP as glucose was 441 likely drawn from the line   Wound care for sacral ulcer   Pain control with tylenol   PT/OT OOB   Hold ASA and plavix secondary to GIB  PPI and Carafate  H pylori Ab as patient had multiple ulcerations     Jadyn Escalante MD

## 2020-08-18 NOTE — PLAN OF CARE
Problem: Pain:  Goal: Control of acute pain  Description: Control of acute pain  Outcome: Met This Shift     Problem: Falls - Risk of:  Goal: Absence of physical injury  Description: Absence of physical injury  Outcome: Met This Shift     Problem: Falls - Risk of:  Goal: Will remain free from falls  Description: Will remain free from falls  Outcome: Met This Shift

## 2020-08-18 NOTE — CARE COORDINATION
8/18/2020 NG tube remains out. Pt started on general diet yesterday after tolerating clear liquids. TPN continued @ 70.8 cc/hr. IV protonix bid, IV reglan 4 times a day. IV 1/2 NS @ 50 cc/hr. Plan remains discharge home with [facility] when medically stable. SW/HAILEE will continue to follow.

## 2020-08-18 NOTE — PROGRESS NOTES
PROGRESS NOTE       PATIENT PROBLEM LIST:  Active Problems:    Hypokalemia    Acute appendicitis    Sepsis ([address]    Hypomagnesemia    Atrial fibrillation, new onset ([address]    Perforated appendicitis  Resolved Problems:    * No resolved hospital problems. *      SUBJECTIVE:  [place] states she feels somewhat better and is sitting up in a chair currently. She denies any shortness of breath, palpitations nor lightheadedness. REVIEW OF SYSTEMS:  General ROS: positive for - fatigue, malaise. Psychological ROS: negative for - anxiety , depression  Ophthalmic ROS: positive for - decreased vision and utilizes corrective lenses for visual acuity. ENT ROS: negative  Allergy and Immunology ROS: negative  Hematological and Lymphatic ROS: negative  Endocrine: no heat or cold intolerance and no polyphagia, polydipsia, or polyuria  Respiratory ROS: negative for - pleuritic pain, shortness of breath and wheezing  Cardiovascular ROS: positive for - irregular heartbeat and murmur. Gastrointestinal ROS: + Residual abdominal pain, but no new change in bowel habits, or black or bloody stools presently (was recently positive). Genito-Urinary ROS: no nocturia, dysuria, trouble voiding, frequency or hematuria  Musculoskeletal ROS: negative for- myalgias, arthralgias, or claudication  Neurological ROS: no TIA or stroke symptoms otherwise no significant change in symptoms or problems since yesterday as documented in previous progress notes.     SCHEDULED MEDICATIONS:   pantoprazole  40 mg Intravenous BID    And    sodium chloride (PF)  10 mL Intravenous BID    sucralfate  1 g Oral 4 times per day    sodium chloride flush  10 mL Intravenous 2 times per day    sodium chloride  20 mL Intravenous Once    metoclopramide  5 mg Intravenous 4 times per day    methocarbamol  500 mg Oral 4x Daily       VITAL SIGNS: BP (!) 110/52   Pulse 90   Temp 98 °F (36.7 °C) (Temporal)   Resp 18   Ht 5' 5\" (1.651 m)   Wt 169 lb 12.8 oz (77 kg)   SpO2 97%   BMI 28.26 kg/m²   Patient Vitals for the past 96 hrs (Last 3 readings):   Weight   08/16/20 0615 169 lb 12.8 oz (77 kg)   08/15/20 0559 170 lb 3.2 oz (77.2 kg)   08/14/20 0639 171 lb 4.8 oz (77.7 kg)     OBJECTIVE:    HEENT: PERRL, EOM  Intact; sclera non-icteric, conjunctiva pink. Carotids are brisk in upstroke with normal contour. No carotid bruits. Normal jugular venous pulsation at 45°. No palpable cervical nor supraclavicular nodes. Thyroid not palpable. Trachea midline. Chest: Even excursion  Lungs: Grossly clear to auscultation bilaterally, no expiratory wheezes or rhonchi, no decreased tactile fremitus without inspiratory rales. Heart: Regular  rhythm; S1 > S2, no gallop grade 2/6 systolic murmur second right intercostal spaces as well as the apex. No clicks, rub, palpable thrills   or heaves. PMI nondisplaced, 5th intercostal space MCL. Abdomen: Soft, nontender, nondistended,  moderately protuberant, no masses or organomegaly. Bowel sounds active. Extremities: Without clubbing, cyanosis or edema. Pulses present 3+ upper extermities bilaterally; present 1+ DP and present 1+ PT bilaterally.      Data:   Scheduled Meds: Reviewed  Continuous Infusions:    PN-Adult  3 IN 1 Central Line (Standard) 70.8 mL/hr at 08/17/20 1813    sodium chloride 50 mL/hr at 08/17/20 0616       Intake/Output Summary (Last 24 hours) at 8/17/2020 2223  Last data filed at 8/17/2020 2114  Gross per 24 hour   Intake 3953 ml   Output 750 ml   Net 3203 ml     CBC:   Recent Labs     08/14/20  2316 08/15/20  0550 08/15/20  1148 08/16/20  0330 08/16/20  1848 08/17/20  0150 08/17/20  0845 08/17/20  1355   WBC  --  18.6*  --  16.5*  --  14.3* 11.9* 12.1*   HGB 7.5* 6.8* 8.0* 7.4* 7.6* 7.4* 7.8* 8.2*   HCT 23.0* 21.8* 24.0* 23.8*  --  23.4* 23.9* 26.4*   PLT  --  322  --  300  --  297 573 343 BMP:  Recent Labs     08/15/20  0730 20  0330 20  0845 20  1355    139 133 139   K 3.9 3.8 5.6* 4.4   * 108* 102 106   CO2 20* 20* 17* 21*   BUN 28* 28* 31* 32*   CREATININE 0.7 0.7 0.6 0.7   LABGLOM >60 >60 >60 >60     ABGs:   Lab Results   Component Value Date    PH 7.298 2020     INR: No results for input(s): INR in the last 72 hours. PRO-BNP:   Lab Results   Component Value Date    PROBNP 1,713 (H) 2020    PROBNP 6,550 (H) 2018      TSH:   Lab Results   Component Value Date    TSH 1.290 2018      Cardiac Injury Profile: No results for input(s): CKTOTAL, CKMB, TROPONINI in the last 72 hours. Lipid Profile:   Lab Results   Component Value Date    TRIG 219 2020    HDL 45 2018    LDLCALC 85 2018    CHOL 161 2018      Hemoglobin A1C: No components found for: HGBA1C     RAD:   Ct Abdomen Pelvis Wo Contrast Additional Contrast? None    Result Date: 2020  Patient MRN:  [id number] : 1935 Age: 81 years Gender: Female Order Date:  2020 11:45 PM TECHNIQUE/NUMBER OF IMAGES/COMPARISON/CLINICAL HISTORY: CT abdomen pelvis no contrast Axial images obtained sagittal and coronal reconstructions Through 4 8 images Comparison  study. The 52-year-old female patient with a history of vomiting and abdominal pain and diarrhea. FINDINGS: Findings of acute perforated appendicitis. There are inflammatory changes in the appendix and in the periappendiceal fat planes with the areas of air outside the appendiceal lumen which are walled contained perforation by the akshat-appendical inflammatory reaction present. At the base of the appendix the appendix measures up to 12 mm in cross-section diameter. There is a component of small bowel obstruction with fecal-like contents in the small bowel some of the segments of the small bowel are dilated up to 3.6 cm .  Digital is not precisely identified, does not appears to be direct relate with the appendicitis process. There is some whorling of the mesenteric vessels. The colon is decompressed. Uncomplicated extensive diverticulosis seen in the sigmoid colon. There is no free intraperitoneal air outside of the akshat-appendiceal spaces. There are normal size and the density for the liver and spleen. The multiple gallstones are seen in the gallbladder. The biliary tree and pancreatic ductal systems are not dilated. Pancreas has atrophy. There is a moderate to large size hiatal hernia. There is a nodule in the left adrenal gland which is stable back to the study of December 14, 2018. Kidneys have preserved size and cortical thickness, there is no renal calculus. There is no obstruction. The bladder has unremarkable appearance. The calcified myomatous changes are seen in the uterus. Left ovary has unremarkable appearance. The right ovary cannot be conspicuously identified as separate from adjacent bowel segments. There are unremarkable appearance for the bladder. Calcified atheroma changes are seen in the abdominal aorta with a more prominent plaque in the upper abdominal aorta where the origin of the celiac axis and SMA are located. There are areas of chronic pulmonary fibrosis in the bases are. These were seen previously. Degenerative changes are seen throughout the lumbar spine with the degenerative disc disease and facet hypertrophy. These are long-standing findings. Patient previous endovascular repair for the aortic root/aortic valve. 1. Acute perforated appendicitis as above described perforation is well contained in the periappendiceal spaces. 2. There is also a component of small bowel obstruction which etiology is not precisely determined that time the present study, does not appears to be directly related with the appendicitis. There is some whorling of the mesenteric vessels. 3. Multiple gallstones. . Preliminary report was given to Dr. Kashif Mcduffie, ER physician at the time the present study.  ALERT: ABNORMAL REPORT. Xr Abdomen (kub) (single Ap View)    Result Date: 2020  Patient MRN:  [id number] : 1935 Age: 81 years Gender: Female Order Date:  2020 4:17 PM EXAM: XR ABDOMEN (KUB) (SINGLE AP VIEW) INDICATION:  distension distension  COMPARISON: CT the abdomen and pelvis, 2020 FINDINGS: There is gaseous distention of small bowel loops to maximum caliber of 5.5 cm. The extent of small bowel distention has increased as of the prior CT from 2020. Small amount of gas as well. Increased gaseous distention of small bowel loops. Obstruction cannot be excluded. Xr Abdomen (kub) (single Ap View)    Result Date: 2020  Patient MRN: [id number] : 1935 Age:  81 years Gender: Female Order Date: 2020 7:01 PM Exam: XR ABDOMEN (KUB) (SINGLE AP VIEW) Number of Images: 2 views Indication:   eval bowel distension eval bowel distension Comparison: Prior study from 2020 is available. Findings: The bowel gas pattern is normal. The nasogastric tube with the tip in the body of the stomach. There appears to be an esophageal stent seen at the edge of the study. There is a calcified density with lucent center seen in the right lower pelvis. Mild osteopenia of the osseous structure. . The osseous structures of the abdomen and pelvis demonstrate multilevel osteoarthritic changes disease of the lumbar spine. .     NON-SPECIFIC GAS PATTERN.      Ct Abdomen Pelvis W Iv Contrast Additional Contrast? None    Result Date: 2020  Patient MRN:  [id number] : 1935 Age: 81 years Gender: Female Order Date:  2020 11:47 AM EXAM: CT ABDOMEN PELVIS W IV CONTRAST NUMBER OF IMAGES \ views:  5 INDICATION: Ongoing abdominal pelvic pain after perforated appendicitis sp  laparoscopic appendectomy, eval for abscess COMPARISON: 2020 TECHNIQUE: Axial computerized tomography sections of the abdomen and pelvis with sagittal and coronal MPR reconstructions were obtained from the top of the diaphragm to the pelvis. Contrast dose: 110 ml. Isovue 370 intravenously injected. Scanning performed post IV contrast administration. Low-dose CT  acquisition technique included one of following options; 1 . Automated exposure control, 2. Adjustment of MA and or KV according to patient's size or 3. Use of iterative reconstruction. FINDINGS: THORACIC BASE: There are small bilateral pleural effusions as well as probable interstitial fibrosis and there is somewhat increasing atelectasis at the posterior lower lobes. There is a hiatus hernia with a fluid filled esophagus compatible with on going gastroesophageal reflux. LIVER: Unremarkable. BILIARY: Multiple gallstones present. PANCREAS: Unremarkable. SPLEEN: Unremarkable. ADRENALS:  Unremarkable. KIDNEYS:  Unremarkable. GI: There are roughly 2 borderline dilated distal small bowel loops without a pattern suggestive of small bowel obstruction. The contents of the lower GI tract are liquid indicating an underlying diarrheal illness. The possibility of an antibiotic induced diarrhea is raised. There is rather pronounced colonic diverticulosis especially in the sigmoid region. PELVIS: Calcified uterine fibroid noted MSK: No acute osseous findings. 1. No evidence of abdominal or pelvic abscess. 2. Liquid contents of the lower GI tract suggesting a developing with diarrheal illness which could potentially be antibiotic induced. 3. New small bilateral pleural effusions and adjacent atelectasis. 4. Hiatus hernia with a fluid-filled distal esophagus compatible with ongoing GE reflux. 5. Cholelithiasis. 6. Colonic diverticulosis. Xr Chest Portable    Result Date: 2020  Patient MRN: [id number] : 1935 Age:  81 years Gender: Female Order Date: 2020 11:05 AM Exam: XR CHEST PORTABLE Number of Images: 1 view Indication:   wbc wbc Comparison: 2020 Findings:  There is a central venous catheter noted the tip is in the superior vena cava there is no evidence to suggest pneumothorax The heart is unremarkable The lung fields demonstrate no significant pulmonary vascular congestion and edema. The aorta is tortuous ectatic There are findings throughout the lung fields which are likely chronic There appears to be an anterior dislocation of the right shoulder which appears chronic    Findings compatible with atherosclerotic disease There is minimal infiltrate at both lung bases, at the right lung base representing a change suspicious for pneumonia. At the lung base on the left relatively stable. There is likely a component of pneumonia superimposed on chronic scarring and fibrosis     Xr Chest Portable    Result Date: 2020  Patient MRN:  [id number] : 1935 Age: 81 years Gender: Female Order Date:  2020 2:00 AM TECHNIQUE/NUMBER OF IMAGES/COMPARISON/CLINICAL HISTORY: Chest AP 1 imaging 2 views of History Central venous line placement. FINDINGS: Right internal jugular central venous catheter tip in SVC. The no pneumothorax on the right on the left-sided. No sizable infiltrates or consolidations are seen. Discrete atelectasis in the base. The heart has normal size. Left internal jugular central venous catheter tip in SVC. No pneumothorax on the right or on the left. Xr Chest Abdomen Ng Placement    Result Date: 2020  Patient MRN:  [id number] : 1935 Age: 81 years Gender: Female Order Date:  2020 10:12 PM EXAM: XR CHEST ABDOMEN NG PLACEMENT COMPARISON: August 3, 2020 INDICATION:  ng placement ng placement FINDINGS: Nasogastric tube courses below the level of the diaphragm and appears in the expected location of the stomach. Satisfactory position of nasogastric tube. Xr Chest Abdomen Ng Placement    Result Date: 8/3/2020  Patient MRN:  [id number] : 1935 Age: 81 years Gender: Female Order Date:  8/3/2020 9:29 AM EXAM: XR CHEST ABDOMEN NG PLACEMENT 0938 hours INDICATION:  NG tube placement  COMPARISON: Portable chest 3/4/2020.

## 2020-08-19 PROBLEM — K92.2 GASTROINTESTINAL HEMORRHAGE: Status: ACTIVE | Noted: 2020-01-01

## 2020-08-19 PROBLEM — K25.9 GASTRIC ULCER: Status: ACTIVE | Noted: 2020-01-01

## 2020-08-19 PROBLEM — K56.7 ILEUS (HCC): Status: ACTIVE | Noted: 2020-01-01

## 2020-08-19 NOTE — PROGRESS NOTES
PROGRESS NOTE       PATIENT PROBLEM LIST:  Active Problems:    Hypokalemia    Acute appendicitis    Sepsis ([address]    Hypomagnesemia    Atrial fibrillation, new onset ([address]    Perforated appendicitis  Resolved Problems:    * No resolved hospital problems. *      SUBJECTIVE:  Cristobal Hernandez states she feels better  But wants to be able to eat more and feels  her muscle tone is extremely  diminished . REVIEW OF SYSTEMS:  General ROS: positive for - fatigue, malaise. Psychological ROS: negative for - anxiety , depression  Ophthalmic ROS: positive for - decreased vision and utilizes corrective lenses for visual acuity. ENT ROS: negative  Allergy and Immunology ROS: negative  Hematological and Lymphatic ROS: negative  Endocrine: no heat or cold intolerance and no polyphagia, polydipsia, or polyuria  Respiratory ROS: negative for - pleuritic pain, shortness of breath and wheezing  Cardiovascular ROS: positive for - irregular heartbeat and murmur. Gastrointestinal ROS: + Residual abdominal pain, but no new change in bowel habits, or black or bloody stools presently (was recently positive). Genito-Urinary ROS: no nocturia, dysuria, trouble voiding, frequency or hematuria  Musculoskeletal ROS: negative for- myalgias, arthralgias, or claudication  Neurological ROS: no TIA or stroke symptoms otherwise no significant change in symptoms or problems since yesterday as documented in previous progress notes.     SCHEDULED MEDICATIONS:   pantoprazole  40 mg Intravenous BID    And    sodium chloride (PF)  10 mL Intravenous BID    sucralfate  1 g Oral 4 times per day    sodium chloride flush  10 mL Intravenous 2 times per day    sodium chloride  20 mL Intravenous Once    metoclopramide  5 mg Intravenous 4 times per day    methocarbamol  500 mg Oral 4x Daily       VITAL SIGNS:                                                                                                                          BP (!) 108/46   Pulse 92 Temp 97.5 °F (36.4 °C) (Temporal)   Resp 18   Ht 5' 5\" (1.651 m)   Wt 172 lb 9.6 oz (78.3 kg)   SpO2 96%   BMI 28.72 kg/m²   Patient Vitals for the past 96 hrs (Last 3 readings):   Weight   08/18/20 0553 172 lb 9.6 oz (78.3 kg)   08/16/20 0615 169 lb 12.8 oz (77 kg)   08/15/20 0559 170 lb 3.2 oz (77.2 kg)     OBJECTIVE:    HEENT: PERRL, EOM  Intact; sclera non-icteric, conjunctiva pink. Carotids are brisk in upstroke with normal contour. No carotid bruits. Normal jugular venous pulsation at 45°. No palpable cervical nor supraclavicular nodes. Thyroid not palpable. Trachea midline. Chest: Even excursion  Lungs: Grossly clear to auscultation bilaterally, no expiratory wheezes or rhonchi, no decreased tactile fremitus without inspiratory rales. Heart: Regular  rhythm; S1 > S2, no gallop grade 2/6 systolic murmur second right intercostal spaces as well as the apex. No clicks, rub, palpable thrills   or heaves. PMI nondisplaced, 5th intercostal space MCL. Abdomen: Soft, nontender, nondistended,  moderately protuberant, no masses or organomegaly. Bowel sounds active. Extremities: Without clubbing, cyanosis or edema. Pulses present 3+ upper extermities bilaterally; present 1+ DP and present 1+ PT bilaterally.      Data:   Scheduled Meds: Reviewed  Continuous Infusions:    PN-Adult  3 IN 1 Central Line (Standard) 70.8 mL/hr at 08/18/20 1914    sodium chloride 50 mL/hr at 08/18/20 1915       Intake/Output Summary (Last 24 hours) at 8/18/2020 2341  Last data filed at 8/18/2020 2132  Gross per 24 hour   Intake 1602 ml   Output 950 ml   Net 652 ml     CBC:   Recent Labs     08/17/20  0150 08/17/20  0845 08/17/20  1355 08/18/20  0039 08/18/20  0530 08/18/20  1010 08/18/20  1947   WBC 14.3* 11.9* 12.1* 11.5 10.1 9.3 11.0   HGB 7.4* 7.8* 8.2* 7.6* 7.5* 7.7* 7.9*   HCT 23.4* 23.9* 26.4* 24.4* 23.4* 24.2* 25.2*    291 342 323 313 335 351     BMP:  Recent Labs     08/16/20  0330 08/17/20  0845 08/17/20  1353 20  0530 20  1010    133 139 137 130*   K 3.8 5.6* 4.4 4.1 5.0   * 102 106 107 101   CO2 20* 17* 21* 19* 16*   BUN 28* 31* 32* 28* 27*   CREATININE 0.7 0.6 0.7 0.7 0.7   LABGLOM >60 >60 >60 >60 >60     ABGs:   Lab Results   Component Value Date    PH 7.298 2020     INR: No results for input(s): INR in the last 72 hours. PRO-BNP:   Lab Results   Component Value Date    PROBNP 1,713 (H) 2020    PROBNP 6,550 (H) 2018      TSH:   Lab Results   Component Value Date    TSH 1.290 2018      Cardiac Injury Profile: No results for input(s): CKTOTAL, CKMB, TROPONINI in the last 72 hours. Lipid Profile:   Lab Results   Component Value Date    TRIG 219 2020    HDL 45 2018    LDLCALC 85 2018    CHOL 161 2018      Hemoglobin A1C: No components found for: HGBA1C     RAD:   Ct Abdomen Pelvis Wo Contrast Additional Contrast? None    Result Date: 2020  Patient MRN:  [id number] : 1935 Age: 81 years Gender: Female Order Date:  2020 11:45 PM TECHNIQUE/NUMBER OF IMAGES/COMPARISON/CLINICAL HISTORY: CT abdomen pelvis no contrast Axial images obtained sagittal and coronal reconstructions Through 4 8 images Comparison  study. The 71-year-old female patient with a history of vomiting and abdominal pain and diarrhea. FINDINGS: Findings of acute perforated appendicitis. There are inflammatory changes in the appendix and in the periappendiceal fat planes with the areas of air outside the appendiceal lumen which are walled contained perforation by the akshat-appendical inflammatory reaction present. At the base of the appendix the appendix measures up to 12 mm in cross-section diameter. There is a component of small bowel obstruction with fecal-like contents in the small bowel some of the segments of the small bowel are dilated up to 3.6 cm . Digital is not precisely identified, does not appears to be direct relate with the appendicitis process.  There is some whorling of the mesenteric vessels. The colon is decompressed. Uncomplicated extensive diverticulosis seen in the sigmoid colon. There is no free intraperitoneal air outside of the akshat-appendiceal spaces. There are normal size and the density for the liver and spleen. The multiple gallstones are seen in the gallbladder. The biliary tree and pancreatic ductal systems are not dilated. Pancreas has atrophy. There is a moderate to large size hiatal hernia. There is a nodule in the left adrenal gland which is stable back to the study of December 14, 2018. Kidneys have preserved size and cortical thickness, there is no renal calculus. There is no obstruction. The bladder has unremarkable appearance. The calcified myomatous changes are seen in the uterus. Left ovary has unremarkable appearance. The right ovary cannot be conspicuously identified as separate from adjacent bowel segments. There are unremarkable appearance for the bladder. Calcified atheroma changes are seen in the abdominal aorta with a more prominent plaque in the upper abdominal aorta where the origin of the celiac axis and SMA are located. There are areas of chronic pulmonary fibrosis in the bases are. These were seen previously. Degenerative changes are seen throughout the lumbar spine with the degenerative disc disease and facet hypertrophy. These are long-standing findings. Patient previous endovascular repair for the aortic root/aortic valve. 1. Acute perforated appendicitis as above described perforation is well contained in the periappendiceal spaces. 2. There is also a component of small bowel obstruction which etiology is not precisely determined that time the present study, does not appears to be directly related with the appendicitis. There is some whorling of the mesenteric vessels. 3. Multiple gallstones. . Preliminary report was given to Dr. Serjio Biggs, ER physician at the time the present study. ALERT:  ABNORMAL REPORT.     Marylou Candelaria Abdomen (kub) (single Ap View)    Result Date: 2020  Patient MRN:  [id number] : 1935 Age: 81 years Gender: Female Order Date:  2020 4:17 PM EXAM: XR ABDOMEN (KUB) (SINGLE AP VIEW) INDICATION:  distension distension  COMPARISON: CT the abdomen and pelvis, 2020 FINDINGS: There is gaseous distention of small bowel loops to maximum caliber of 5.5 cm. The extent of small bowel distention has increased as of the prior CT from 2020. Small amount of gas as well. Increased gaseous distention of small bowel loops. Obstruction cannot be excluded. Xr Abdomen (kub) (single Ap View)    Result Date: 2020  Patient MRN: [id number] : 1935 Age:  81 years Gender: Female Order Date: 2020 7:01 PM Exam: XR ABDOMEN (KUB) (SINGLE AP VIEW) Number of Images: 2 views Indication:   eval bowel distension eval bowel distension Comparison: Prior study from 2020 is available. Findings: The bowel gas pattern is normal. The nasogastric tube with the tip in the body of the stomach. There appears to be an esophageal stent seen at the edge of the study. There is a calcified density with lucent center seen in the right lower pelvis. Mild osteopenia of the osseous structure. . The osseous structures of the abdomen and pelvis demonstrate multilevel osteoarthritic changes disease of the lumbar spine. .     NON-SPECIFIC GAS PATTERN.      Ct Abdomen Pelvis W Iv Contrast Additional Contrast? None    Result Date: 2020  Patient MRN:  [id number] : 1935 Age: 81 years Gender: Female Order Date:  2020 11:47 AM EXAM: CT ABDOMEN PELVIS W IV CONTRAST NUMBER OF IMAGES \ views:  5 INDICATION: Ongoing abdominal pelvic pain after perforated appendicitis sp  laparoscopic appendectomy, eval for abscess COMPARISON: 2020 TECHNIQUE: Axial computerized tomography sections of the abdomen and pelvis with sagittal and coronal MPR reconstructions were obtained from the top of the diaphragm to the pneumothorax The heart is unremarkable The lung fields demonstrate no significant pulmonary vascular congestion and edema. The aorta is tortuous ectatic There are findings throughout the lung fields which are likely chronic There appears to be an anterior dislocation of the right shoulder which appears chronic    Findings compatible with atherosclerotic disease There is minimal infiltrate at both lung bases, at the right lung base representing a change suspicious for pneumonia. At the lung base on the left relatively stable. There is likely a component of pneumonia superimposed on chronic scarring and fibrosis     Xr Chest Portable    Result Date: 2020  Patient MRN:  [id number] : 1935 Age: 81 years Gender: Female Order Date:  2020 2:00 AM TECHNIQUE/NUMBER OF IMAGES/COMPARISON/CLINICAL HISTORY: Chest AP 1 imaging 2 views of History Central venous line placement. FINDINGS: Right internal jugular central venous catheter tip in SVC. The no pneumothorax on the right on the left-sided. No sizable infiltrates or consolidations are seen. Discrete atelectasis in the base. The heart has normal size. Left internal jugular central venous catheter tip in SVC. No pneumothorax on the right or on the left. Xr Chest Abdomen Ng Placement    Result Date: 2020  Patient MRN:  [id number] : 1935 Age: 81 years Gender: Female Order Date:  2020 10:12 PM EXAM: XR CHEST ABDOMEN NG PLACEMENT COMPARISON: August 3, 2020 INDICATION:  ng placement ng placement FINDINGS: Nasogastric tube courses below the level of the diaphragm and appears in the expected location of the stomach. Satisfactory position of nasogastric tube. Xr Chest Abdomen Ng Placement    Result Date: 8/3/2020  Patient MRN:  [id number] : 1935 Age: 81 years Gender: Female Order Date:  8/3/2020 9:29 AM EXAM: XR CHEST ABDOMEN NG PLACEMENT 0938 hours INDICATION:  NG tube placement  COMPARISON: Portable chest 3/4/2020.  Portable chest 8/2/2020 at 0205 hours. FINDINGS:  A new nasogastric tube with tip in the proximal gastric body. Stomach is nondistended, minimal residual gas. No obvious free air. Mild gas distention of a couple mid abdomen small bowel loops. Mild gas distention of the proximal transverse colon. Partial inclusion of the lung bases. Shallow inspiration with moderate left basilar atelectasis. Old aortic valve stent. 1. New nasogastric tube in the gastric body. 2. Minimal small bowel ileus. 3. Left basilar atelectasis. EKG: See Report  Echo: See Report      IMPRESSIONS:  Active Problems:    Hypokalemia    Acute appendicitis    Sepsis ([address]    Hypomagnesemia    Atrial fibrillation, new onset ([address]    Perforated appendicitis  Resolved Problems:    * No resolved hospital problems. *      RECOMMENDATIONS:  Mrs. Kojo Briseno appears clinically improved But certainly frustrated with her present clinical status. Hopefully physical therapy will increase interaction and improved tone in her musculature. Cardiac status presently stable. I have spent more than 25 minutes face to face with Darius Mckeon and reviewing notes and laboratory data, with greater than 50% of this time instructing and counseling the patient face to face regarding my findings and recommendations and I have answered all questions as posed to me by Ms. Kojo Briseno. Liana Smith, DO FACP,FACC,FSCAI      NOTE:  This report was transcribed using voice recognition software.   Every effort was made to ensure accuracy; however, inadvertent computerized transcription errors may be present

## 2020-08-19 NOTE — PROGRESS NOTES
Physical Therapy  Facility/Department: [facility]  Daily Treatment Note  NAME: Lynn Dixon  :   MRN: [id number]    Date of Service: 2020    Physical therapy orders received/treatment attempted and chart review completed. Patient found seated in the bedside chair and reported \"they are trying to get my blood sugar up. ..can you come back later\" and not able to be seen. Will attempt at a later time/date as able. Thank you for the opportunity to assist in the care of this patient.     Gena Murillo, PT, DPT  License [id number]

## 2020-08-19 NOTE — PLAN OF CARE
Problem: Falls - Risk of:  Goal: Absence of physical injury  Description: Absence of physical injury  Outcome: Met This Shift     Problem: Falls - Risk of:  Goal: Will remain free from falls  Description: Will remain free from falls  Outcome: Met This Shift

## 2020-08-19 NOTE — PROGRESS NOTES
[facility]  ATTENDING PHYSICIAN SURGERY PROGRESS NOTE     I have examined the patient, reviewed the record, and discussed the case with the APN/  Resident. I have reviewed all relevant labs and imaging data. The following summarizes my clinical findings and independent assessment. Chief Complaint   Patient presents with    Abdominal Pain     x 3 days, +emesis +diarrhea +nausea states she has not ate since thursday        S:   8/1: presented with acute appendicitis, perforated; started on antibiotics, in afib RVR, admited to SICU  8/2: went to OR for laparoscopic appendectomy; salinas catheter removed, NPO until bowel function, converted to sinus rhythm. 8/3: Patient straight cathed then unable to void post procedure. Bladder scan 185. Bolus ordered. Complaining of burping, no nausea or emesis. NG and salinas were placed today. 8/4: Patient still not having flatus and continues burping. Continue monitoring and have patient walk. [facility] 15/24  8/5: No abdominal pain but still no flatus. NG otuput over 2.3L yesterday, still burping. DC salinas  8/6: States she finally had flatus overnight NG output 1.4L, no BM. Able to urinate after salinas removed. States she is feeling a little better. 8/7: Patient having occasional nausea. Prolonged QT stable--no anti-emetics. NG output 750  8/8: Patient having liquid BMs with no blood. NG output as of this am 400cc. 8/9: BM x 3, 1.3 L out by 24 hours dark nasogastric output, + appetite  8/10: Patient tolerated clear liquids with minimal nausea, denies emesis. 2 BM yesterday. Will pull NGT   8/11: Patient tolerated general diet, no complaints this morning. 8/12: Patient kept for nausea. Had a large bowel movement overnight. If tolerating diet this morning will be discharged home this afternoon.   8/13: 1 episode of coffee ground emesis at 9 pm last night and 2 dark loose stools around 4 am. 2nd unit of pRBC was hung this am. WBC still 18.4 this am. Scheduled for EGD today. 8/14: EGD showed multiple gastric antral ulcers, distal duodenitis, large hiatal hernia with vianey's ulcerations. She was started on TPN. States she feels full this morning, denies abdominal pain. Had one small stool since EGD. Tolerating diet without emesis. 8/15: Hematemesis overnight, NGT placed. Bilious/bloody output. STAT H/H stable. Consult to cardiology regarding if plavix may be held despite recent TAVR. Awaiting final recommendations. Patient states she has been feeling much better since placement of NGT. Stopped protonix gtt. Cardiology did not see patient. 8/16: No acute events overnight. Pt states she feels great today. Need to speak with cardiology about possibly stopping Plavix despite recent TAVR. 8/17: Patient had some nausea after NGT fell out but was relieved by reglan. She hasn't had a BM in the past 2 days and was not passing gas but did start to later in the morning . She has been tolerating her clear liquids. Hemoglobin stable, soft BPs. Cardio recommends to hold her ASA and Plavix until she stabilizes GI wise.   8/18: No events overnight. No nausea or vomiting with PUD diet. No BM for 3 days will try suppository today. Stable Hgb, daily labs. Will monitor tray % eaten with nursing to evaluate need for TPN with possible decrease tomorrow.   8/19 + BM No nausea tolerated diet yesterday and this am     O:  Physical Exam  HENT:      Head: Normocephalic. Eyes:      Pupils: Pupils are equal, round, and reactive to light. Neck:      Musculoskeletal: Neck supple. Cardiovascular:      Rate and Rhythm: Normal rate. Pulmonary:      Effort: Pulmonary effort is normal. No respiratory distress. Abdominal:      General: There is distension ( mild ). Tenderness: There is no abdominal tenderness. There is no guarding or rebound. Comments: Incisions c/d/i    Genitourinary:     Comments: 1.5cm sacral ulcer stage II   Musculoskeletal: Normal range of motion.    Skin: General: Skin is warm. Neurological:      General: No focal deficit present. Mental Status: She is alert. Psychiatric:         Mood and Affect: Mood normal.         A: 8/2  s/p lap appy with perforated appendicitis complicated by post op ileus and GI Bleed.     P:   PUD  Reglan   PRN tylenol   1/2 TPN    Monitor hyponatremia   Wound care for sacral ulcer   Pain control with tylenol   PT/OT OOB   Hold ASA and plavix secondary to GIB  PPI and Carafate  H pylori Ab (-)    Beatriz Byrd MD

## 2020-08-20 NOTE — PROGRESS NOTES
[facility]  ATTENDING PHYSICIAN SURGERY PROGRESS NOTE     I have examined the patient, reviewed the record, and discussed the case with the APN/  Resident. I have reviewed all relevant labs and imaging data. The following summarizes my clinical findings and independent assessment. Chief Complaint   Patient presents with    Abdominal Pain     x 3 days, +emesis +diarrhea +nausea states she has not ate since thursday        S:   8/1: presented with acute appendicitis, perforated; started on antibiotics, in afib RVR, admited to SICU  8/2: went to OR for laparoscopic appendectomy; salinas catheter removed, NPO until bowel function, converted to sinus rhythm. 8/3: Patient straight cathed then unable to void post procedure. Bladder scan 185. Bolus ordered. Complaining of burping, no nausea or emesis. NG and salinas were placed today. 8/4: Patient still not having flatus and continues burping. Continue monitoring and have patient walk. [facility] 15/24  8/5: No abdominal pain but still no flatus. NG otuput over 2.3L yesterday, still burping. DC salinas  8/6: States she finally had flatus overnight NG output 1.4L, no BM. Able to urinate after salinas removed. States she is feeling a little better. 8/7: Patient having occasional nausea. Prolonged QT stable--no anti-emetics. NG output 750  8/8: Patient having liquid BMs with no blood. NG output as of this am 400cc. 8/9: BM x 3, 1.3 L out by 24 hours dark nasogastric output, + appetite  8/10: Patient tolerated clear liquids with minimal nausea, denies emesis. 2 BM yesterday. Will pull NGT   8/11: Patient tolerated general diet, no complaints this morning. 8/12: Patient kept for nausea. Had a large bowel movement overnight. If tolerating diet this morning will be discharged home this afternoon.   8/13: 1 episode of coffee ground emesis at 9 pm last night and 2 dark loose stools around 4 am. 2nd unit of pRBC was hung this am. WBC still 18.4 this am. Scheduled for EGD today. 8/14: EGD showed multiple gastric antral ulcers, distal duodenitis, large hiatal hernia with vianey's ulcerations. She was started on TPN. States she feels full this morning, denies abdominal pain. Had one small stool since EGD. Tolerating diet without emesis. 8/15: Hematemesis overnight, NGT placed. Bilious/bloody output. STAT H/H stable. Consult to cardiology regarding if plavix may be held despite recent TAVR. Awaiting final recommendations. Patient states she has been feeling much better since placement of NGT. Stopped protonix gtt. Cardiology did not see patient. 8/16: No acute events overnight. Pt states she feels great today. Need to speak with cardiology about possibly stopping Plavix despite recent TAVR. 8/17: Patient had some nausea after NGT fell out but was relieved by reglan. She hasn't had a BM in the past 2 days and was not passing gas but did start to later in the morning . She has been tolerating her clear liquids. Hemoglobin stable, soft BPs. Cardio recommends to hold her ASA and Plavix until she stabilizes GI wise.   8/18: No events overnight. No nausea or vomiting with PUD diet. No BM for 3 days will try suppository today. Stable Hgb, daily labs. Will monitor tray % eaten with nursing to evaluate need for TPN with possible decrease tomorrow.   8/19 + BM No nausea tolerated diet yesterday and this am   8/20: one small emesis yesterday. 3BMs recorded although she does not recall this. Appetite poor - will try off TPN  O:  Physical Exam  HENT:      Head: Normocephalic. Eyes:      Pupils: Pupils are equal, round, and reactive to light. Neck:      Musculoskeletal: Neck supple. Cardiovascular:      Rate and Rhythm: Normal rate. Pulmonary:      Effort: Pulmonary effort is normal. No respiratory distress. Abdominal:      General: There is distension ( mild ). Tenderness: There is no abdominal tenderness. There is no guarding or rebound.       Comments: Incisions c/d/i Genitourinary:     Comments: 1.5cm sacral ulcer stage II   Musculoskeletal: Normal range of motion. Skin:     General: Skin is warm. Neurological:      General: No focal deficit present. Mental Status: She is alert. Psychiatric:         Mood and Affect: Mood normal.         A: 8/2  s/p lap appy with perforated appendicitis complicated by post op ileus and GI Bleed.     P:   PUD  Reglan   PRN tylenol   Stop TPN to see if her appetite improves    Monitor hyponatremia - improving   Wound care for sacral ulcer   Pain control with tylenol   PT/OT OOB   Hold ASA and plavix secondary to GIB  PPI and Carafate  H pylori Ab (-)    Harvinder Ramirez MD

## 2020-08-20 NOTE — CARE COORDINATION
8/20/2020 Pt sitting up in chair, eating a salad. TPN infusing @ 35 cc/hr. PT/OT done today. Reviewed discharge plan with pt and plan remains home with her family when medically stable. SW/CM will follow.

## 2020-08-21 NOTE — PROGRESS NOTES
PROGRESS NOTE       PATIENT PROBLEM LIST:  Active Problems:    Hypokalemia    Acute appendicitis    Sepsis ([address]    Hypomagnesemia    Atrial fibrillation, new onset (HCC)    Perforated appendicitis    Gastrointestinal hemorrhage    Gastric ulcer    Ileus ([address]  Resolved Problems:    * No resolved hospital problems. *      SUBJECTIVE:  Sylvia Castillo states she feels better  But wants to be able to eat more and feels  her muscle tone is extremely  diminished . REVIEW OF SYSTEMS:  General ROS: positive for - fatigue, malaise. Psychological ROS: negative for - anxiety , depression  Ophthalmic ROS: positive for - decreased vision and utilizes corrective lenses for visual acuity. ENT ROS: negative  Allergy and Immunology ROS: negative  Hematological and Lymphatic ROS: negative  Endocrine: no heat or cold intolerance and no polyphagia, polydipsia, or polyuria  Respiratory ROS: negative for - pleuritic pain, shortness of breath and wheezing  Cardiovascular ROS: positive for - irregular heartbeat and murmur. Gastrointestinal ROS: + Residual abdominal pain, but no new change in bowel habits, or black or bloody stools presently (was recently positive). Genito-Urinary ROS: no nocturia, dysuria, trouble voiding, frequency or hematuria  Musculoskeletal ROS: negative for- myalgias, arthralgias, or claudication  Neurological ROS: no TIA or stroke symptoms otherwise no significant change in symptoms or problems since yesterday as documented in previous progress notes.     SCHEDULED MEDICATIONS:   pantoprazole  40 mg Intravenous BID    And    sodium chloride (PF)  10 mL Intravenous BID    sucralfate  1 g Oral 4 times per day    sodium chloride flush  10 mL Intravenous 2 times per day    sodium chloride  20 mL Intravenous Once    metoclopramide  5 mg Intravenous 4 times per day    methocarbamol  500 mg Oral 4x Daily       VITAL SIGNS: /60  Pulse 85  Temp 98.6 °F (37 °C) (Temporal)   Resp 18   Ht 5' 5\" (1.651 m)   Wt 184 lb 3 oz (83.5 kg)   SpO2 97%   BMI 30.65 kg/m²   Patient Vitals for the past 96 hrs (Last 3 readings):   Weight   08/19/20 0535 185 lb 6.4 oz (84.1 kg)   08/18/20 0553 172 lb 9.6 oz (78.3 kg)     OBJECTIVE:    HEENT: PERRL, EOM  Intact; sclera non-icteric, conjunctiva pink. Carotids are brisk in upstroke with normal contour. No carotid bruits. Normal jugular venous pulsation at 45°. No palpable cervical nor supraclavicular nodes. Thyroid not palpable. Trachea midline. Chest: Even excursion  Lungs: Grossly clear to auscultation bilaterally, no expiratory wheezes or rhonchi, no decreased tactile fremitus without inspiratory rales. Heart: Regular  rhythm; S1 > S2, no gallop grade 2/6 systolic murmur second right intercostal spaces as well as the apex. No clicks, rub, palpable thrills   or heaves. PMI nondisplaced, 5th intercostal space MCL. Abdomen: Soft, nontender, nondistended,  moderately protuberant, no masses or organomegaly. Bowel sounds active. Extremities: Without clubbing, cyanosis or edema. Pulses present 3+ upper extermities bilaterally; present 1+ DP and present 1+ PT bilaterally. Data:   Scheduled Meds: Reviewed  Continuous Infusions:       CBC:     08/18/20  1947 08/19/20  2127 08/20/20  0612   WBC 11.0 13.0* 11.2   HGB 7.9* 7.9* 7.6*   HCT 25.2* 25.4* 24.6*    376 366     BMP:    08/18/20  2306 08/19/20  0628 08/20/20  0612    131* 133   K 4.2 4.1 4.1    103 103   CO2 19* 16* 18*   BUN 26* 25* 20   CREATININE 0.6 0.6 0.7   LABGLOM >60 >60 >60     ABGs:   Lab Results   Component Value Date    PH 7.298 08/02/2020     INR: No results for input(s): INR in the last 72 hours.   PRO-BNP:   Lab Results   Component Value Date    PROBNP 1,713 (H) 03/04/2020    PROBNP 6,550 (H) 03/27/2018      TSH:   Lab Results   Component Value Date    TSH 1.290 2018      Cardiac Injury Profile: No results for input(s): CKTOTAL, CKMB, TROPONINI in the last 72 hours. Lipid Profile:   Lab Results   Component Value Date    TRIG 219 2020    HDL 45 2018    LDLCALC 85 2018    CHOL 161 2018      Hemoglobin A1C: No components found for: HGBA1C     RAD:   Ct Abdomen Pelvis Wo Contrast Additional Contrast? None    Result Date: 2020  Patient MRN:  [id number] : 1935 Age: 81 years Gender: Female Order Date:  2020 11:45 PM TECHNIQUE/NUMBER OF IMAGES/COMPARISON/CLINICAL HISTORY: CT abdomen pelvis no contrast Axial images obtained sagittal and coronal reconstructions Through  8 images Comparison  study. The 29-year-old female patient with a history of vomiting and abdominal pain and diarrhea. FINDINGS: Findings of acute perforated appendicitis. There are inflammatory changes in the appendix and in the periappendiceal fat planes with the areas of air outside the appendiceal lumen which are walled contained perforation by the akshat-appendical inflammatory reaction present. At the base of the appendix the appendix measures up to 12 mm in cross-section diameter. There is a component of small bowel obstruction with fecal-like contents in the small bowel some of the segments of the small bowel are dilated up to 3.6 cm . Digital is not precisely identified, does not appears to be direct relate with the appendicitis process. There is some whorling of the mesenteric vessels. The colon is decompressed. Uncomplicated extensive diverticulosis seen in the sigmoid colon. There is no free intraperitoneal air outside of the akshat-appendiceal spaces. There are normal size and the density for the liver and spleen. The multiple gallstones are seen in the gallbladder. The biliary tree and pancreatic ductal systems are not dilated. Pancreas has atrophy. There is a moderate to large size hiatal hernia.  There is a nodule in the left adrenal gland which is stable back to the study of 2018. Kidneys have preserved size and cortical thickness, there is no renal calculus. There is no obstruction. The bladder has unremarkable appearance. The calcified myomatous changes are seen in the uterus. Left ovary has unremarkable appearance. The right ovary cannot be conspicuously identified as separate from adjacent bowel segments. There are unremarkable appearance for the bladder. Calcified atheroma changes are seen in the abdominal aorta with a more prominent plaque in the upper abdominal aorta where the origin of the celiac axis and SMA are located. There are areas of chronic pulmonary fibrosis in the bases are. These were seen previously. Degenerative changes are seen throughout the lumbar spine with the degenerative disc disease and facet hypertrophy. These are long-standing findings. Patient previous endovascular repair for the aortic root/aortic valve. 1. Acute perforated appendicitis as above described perforation is well contained in the periappendiceal spaces. 2. There is also a component of small bowel obstruction which etiology is not precisely determined that time the present study, does not appears to be directly related with the appendicitis. There is some whorling of the mesenteric vessels. 3. Multiple gallstones. . Preliminary report was given to Dr. Destini Queen, ER physician at the time the present study. ALERT:  ABNORMAL REPORT. Xr Abdomen (kub) (single Ap View)    Result Date: 2020  Patient MRN:  [id number] : 1935 Age: 81 years Gender: Female Order Date:  2020 4:17 PM EXAM: XR ABDOMEN (KUB) (SINGLE AP VIEW) INDICATION:  distension distension  COMPARISON: CT the abdomen and pelvis, 2020 FINDINGS: There is gaseous distention of small bowel loops to maximum caliber of 5.5 cm. The extent of small bowel distention has increased as of the prior CT from 2020. Small amount of gas as well.      Increased gaseous distention of small bowel loops. Obstruction cannot be excluded. Xr Abdomen (kub) (single Ap View)    Result Date: 2020  Patient MRN: [id number] : 1935 Age:  81 years Gender: Female Order Date: 2020 7:01 PM Exam: XR ABDOMEN (KUB) (SINGLE AP VIEW) Number of Images: 2 views Indication:   eval bowel distension eval bowel distension Comparison: Prior study from 2020 is available. Findings: The bowel gas pattern is normal. The nasogastric tube with the tip in the body of the stomach. There appears to be an esophageal stent seen at the edge of the study. There is a calcified density with lucent center seen in the right lower pelvis. Mild osteopenia of the osseous structure. . The osseous structures of the abdomen and pelvis demonstrate multilevel osteoarthritic changes disease of the lumbar spine. .     NON-SPECIFIC GAS PATTERN. Ct Abdomen Pelvis W Iv Contrast Additional Contrast? None    Result Date: 2020  Patient MRN:  [id number] : 1935 Age: 81 years Gender: Female Order Date:  2020 11:47 AM EXAM: CT ABDOMEN PELVIS W IV CONTRAST NUMBER OF IMAGES \ views:  5 INDICATION: Ongoing abdominal pelvic pain after perforated appendicitis sp  laparoscopic appendectomy, eval for abscess COMPARISON: 2020 TECHNIQUE: Axial computerized tomography sections of the abdomen and pelvis with sagittal and coronal MPR reconstructions were obtained from the top of the diaphragm to the pelvis. Contrast dose: 110 ml. Isovue 370 intravenously injected. Scanning performed post IV contrast administration. Low-dose CT  acquisition technique included one of following options; 1 . Automated exposure control, 2. Adjustment of MA and or KV according to patient's size or 3. Use of iterative reconstruction. FINDINGS: THORACIC BASE: There are small bilateral pleural effusions as well as probable interstitial fibrosis and there is somewhat increasing atelectasis at the posterior lower lobes.  There is a hiatus hernia with a fluid filled esophagus compatible with on going gastroesophageal reflux. LIVER: Unremarkable. BILIARY: Multiple gallstones present. PANCREAS: Unremarkable. SPLEEN: Unremarkable. ADRENALS:  Unremarkable. KIDNEYS:  Unremarkable. GI: There are roughly 2 borderline dilated distal small bowel loops without a pattern suggestive of small bowel obstruction. The contents of the lower GI tract are liquid indicating an underlying diarrheal illness. The possibility of an antibiotic induced diarrhea is raised. There is rather pronounced colonic diverticulosis especially in the sigmoid region. PELVIS: Calcified uterine fibroid noted MSK: No acute osseous findings. 1. No evidence of abdominal or pelvic abscess. 2. Liquid contents of the lower GI tract suggesting a developing with diarrheal illness which could potentially be antibiotic induced. 3. New small bilateral pleural effusions and adjacent atelectasis. 4. Hiatus hernia with a fluid-filled distal esophagus compatible with ongoing GE reflux. 5. Cholelithiasis. 6. Colonic diverticulosis. Xr Chest Portable    Result Date: 2020  Patient MRN: [id number] : 1935 Age:  81 years Gender: Female Order Date: 2020 11:05 AM Exam: XR CHEST PORTABLE Number of Images: 1 view Indication:   wbc wbc Comparison: 2020 Findings: There is a central venous catheter noted the tip is in the superior vena cava there is no evidence to suggest pneumothorax The heart is unremarkable The lung fields demonstrate no significant pulmonary vascular congestion and edema. The aorta is tortuous ectatic There are findings throughout the lung fields which are likely chronic There appears to be an anterior dislocation of the right shoulder which appears chronic    Findings compatible with atherosclerotic disease There is minimal infiltrate at both lung bases, at the right lung base representing a change suspicious for pneumonia.  At the lung base on the left relatively stable. There is likely a component of pneumonia superimposed on chronic scarring and fibrosis     Xr Chest Portable    Result Date: 2020  Patient MRN:  [id number] : 1935 Age: 81 years Gender: Female Order Date:  2020 2:00 AM TECHNIQUE/NUMBER OF IMAGES/COMPARISON/CLINICAL HISTORY: Chest AP 1 imaging 2 views of History Central venous line placement. FINDINGS: Right internal jugular central venous catheter tip in SVC. The no pneumothorax on the right on the left-sided. No sizable infiltrates or consolidations are seen. Discrete atelectasis in the base. The heart has normal size. Left internal jugular central venous catheter tip in SVC. No pneumothorax on the right or on the left. Xr Chest Abdomen Ng Placement    Result Date: 2020  Patient MRN:  [id number] : 1935 Age: 81 years Gender: Female Order Date:  2020 10:12 PM EXAM: XR CHEST ABDOMEN NG PLACEMENT COMPARISON: August 3, 2020 INDICATION:  ng placement ng placement FINDINGS: Nasogastric tube courses below the level of the diaphragm and appears in the expected location of the stomach. Satisfactory position of nasogastric tube. Xr Chest Abdomen Ng Placement    Result Date: 8/3/2020  Patient MRN:  [id number] : 1935 Age: 81 years Gender: Female Order Date:  8/3/2020 9:29 AM EXAM: XR CHEST ABDOMEN NG PLACEMENT 0938 hours INDICATION:  NG tube placement  COMPARISON: Portable chest 3/4/2020. Portable chest 2020 at 0205 hours. FINDINGS:  A new nasogastric tube with tip in the proximal gastric body. Stomach is nondistended, minimal residual gas. No obvious free air. Mild gas distention of a couple mid abdomen small bowel loops. Mild gas distention of the proximal transverse colon. Partial inclusion of the lung bases. Shallow inspiration with moderate left basilar atelectasis. Old aortic valve stent. 1. New nasogastric tube in the gastric body. 2. Minimal small bowel ileus. 3. Left basilar atelectasis.        EKG: See Report  Echo: See Report      IMPRESSIONS:  Active Problems:    Hypokalemia    Acute appendicitis    Sepsis ([address]    Hypomagnesemia    Atrial fibrillation, new onset (HCC)    Perforated appendicitis    Gastrointestinal hemorrhage    Gastric ulcer    Ileus ([address]  Resolved Problems:    * No resolved hospital problems. *      RECOMMENDATIONS:  Mrs. Ayla Bejarano seems to be improving remarkably over the past 24 hours. Hopefully will be able to traverse to oral food and off hyperalimentation and carefully monitor hemoglobin to make sure it remains stable. Hopefully will be discharged by the weekend. I have spent more than 25 minutes face to face with Pat Simpson and reviewing notes and laboratory data, with greater than 50% of this time instructing and counseling the patient face to face regarding my findings and recommendations and I have answered all questions as posed to me by Ms. Ayla Bejarano. Hussein Lay, DO FACP,FACC,FSCAI      NOTE:  This report was transcribed using voice recognition software.   Every effort was made to ensure accuracy; however, inadvertent computerized transcription errors may be present

## 2020-08-21 NOTE — PROGRESS NOTES
/62   Pulse 76   Temp 98.1 °F (36.7 °C) (Temporal)   Resp 16   Ht 5' 5\" (1.651 m)   Wt 184 lb 3 oz (83.5 kg)   SpO2 97%   BMI 30.65 kg/m²   Patient Vitals for the past 96 hrs (Last 3 readings):   Weight   08/21/20 0600 184 lb 3 oz (83.5 kg)   08/19/20 0535 185 lb 6.4 oz (84.1 kg)   08/18/20 0553 172 lb 9.6 oz (78.3 kg)     OBJECTIVE:    HEENT: PERRL, EOM  Intact; sclera non-icteric, conjunctiva pink. Carotids are brisk in upstroke with normal contour. No carotid bruits. Normal jugular venous pulsation at 45°. No palpable cervical nor supraclavicular nodes. Thyroid not palpable. Trachea midline. Chest: Even excursion  Lungs: Grossly clear to auscultation bilaterally, no expiratory wheezes or rhonchi, no decreased tactile fremitus without inspiratory rales. Heart: Regular  rhythm; S1 > S2, no gallop grade 2/6 systolic murmur second right intercostal spaces as well as the apex. No clicks, rub, palpable thrills   or heaves. PMI nondisplaced, 5th intercostal space MCL. Abdomen: Soft, nontender, nondistended,  moderately protuberant, no masses or organomegaly. Bowel sounds active. Extremities: Without clubbing, cyanosis or edema. Pulses present 3+ upper extermities bilaterally; present 1+ DP and present 1+ PT bilaterally.      Data:   Scheduled Meds: Reviewed  Continuous Infusions:       Intake/Output Summary (Last 24 hours) at 8/21/2020 1806  Last data filed at 8/21/2020 1457  Gross per 24 hour   Intake 10 ml   Output 0 ml   Net 10 ml     CBC:   Recent Labs     08/18/20  1947 08/19/20  2127 08/20/20  0612 08/21/20  0430   WBC 11.0 13.0* 11.2 9.6   HGB 7.9* 7.9* 7.6* 7.1*   HCT 25.2* 25.4* 24.6* 22.8*    376 366 374     BMP:  Recent Labs     08/18/20  2306 08/19/20  0628 08/20/20  0612 08/21/20  0430    131* 133 136   K 4.2 4.1 4.1 3.9    103 103 105   CO2 19* 16* 18* 16*   BUN 26* 25* 20 17   CREATININE 0.6 0.6 0.7 0.7   LABGLOM >60 >60 >60 >60     ABGs:   Lab Results   Component Value Date    PH 7.298 2020     INR: No results for input(s): INR in the last 72 hours. PRO-BNP:   Lab Results   Component Value Date    PROBNP 1,713 (H) 2020    PROBNP 6,550 (H) 2018      TSH:   Lab Results   Component Value Date    TSH 1.290 2018      Cardiac Injury Profile: No results for input(s): CKTOTAL, CKMB, TROPONINI in the last 72 hours. Lipid Profile:   Lab Results   Component Value Date    TRIG 219 2020    HDL 45 2018    LDLCALC 85 2018    CHOL 161 2018      Hemoglobin A1C: No components found for: HGBA1C     RAD:   Ct Abdomen Pelvis Wo Contrast Additional Contrast? None    Result Date: 2020  Patient MRN:  [id number] : 1935 Age: 81 years Gender: Female Order Date:  2020 11:45 PM TECHNIQUE/NUMBER OF IMAGES/COMPARISON/CLINICAL HISTORY: CT abdomen pelvis no contrast Axial images obtained sagittal and coronal reconstructions Through 4 8 images Comparison  study. The 56-year-old female patient with a history of vomiting and abdominal pain and diarrhea. FINDINGS: Findings of acute perforated appendicitis. There are inflammatory changes in the appendix and in the periappendiceal fat planes with the areas of air outside the appendiceal lumen which are walled contained perforation by the akshat-appendical inflammatory reaction present. At the base of the appendix the appendix measures up to 12 mm in cross-section diameter. There is a component of small bowel obstruction with fecal-like contents in the small bowel some of the segments of the small bowel are dilated up to 3.6 cm . Digital is not precisely identified, does not appears to be direct relate with the appendicitis process. There is some whorling of the mesenteric vessels. The colon is decompressed. Uncomplicated extensive diverticulosis seen in the sigmoid colon.  There is no free intraperitoneal air outside of the akshat-appendiceal spaces. There are normal size and the density for the liver and spleen. The multiple gallstones are seen in the gallbladder. The biliary tree and pancreatic ductal systems are not dilated. Pancreas has atrophy. There is a moderate to large size hiatal hernia. There is a nodule in the left adrenal gland which is stable back to the study of 2018. Kidneys have preserved size and cortical thickness, there is no renal calculus. There is no obstruction. The bladder has unremarkable appearance. The calcified myomatous changes are seen in the uterus. Left ovary has unremarkable appearance. The right ovary cannot be conspicuously identified as separate from adjacent bowel segments. There are unremarkable appearance for the bladder. Calcified atheroma changes are seen in the abdominal aorta with a more prominent plaque in the upper abdominal aorta where the origin of the celiac axis and SMA are located. There are areas of chronic pulmonary fibrosis in the bases are. These were seen previously. Degenerative changes are seen throughout the lumbar spine with the degenerative disc disease and facet hypertrophy. These are long-standing findings. Patient previous endovascular repair for the aortic root/aortic valve. 1. Acute perforated appendicitis as above described perforation is well contained in the periappendiceal spaces. 2. There is also a component of small bowel obstruction which etiology is not precisely determined that time the present study, does not appears to be directly related with the appendicitis. There is some whorling of the mesenteric vessels. 3. Multiple gallstones. . Preliminary report was given to Dr. Verdis Schaumann, ER physician at the time the present study. ALERT:  ABNORMAL REPORT.     Xr Abdomen (kub) (single Ap View)    Result Date: 2020  Patient MRN:  [id number] : 1935 Age: 81 years Gender: Female Order Date:  2020 4:17 PM EXAM: XR ABDOMEN (KUB) (SINGLE AP VIEW) INDICATION:  distension distension  COMPARISON: CT the abdomen and pelvis, 2020 FINDINGS: There is gaseous distention of small bowel loops to maximum caliber of 5.5 cm. The extent of small bowel distention has increased as of the prior CT from 2020. Small amount of gas as well. Increased gaseous distention of small bowel loops. Obstruction cannot be excluded. Xr Abdomen (kub) (single Ap View)    Result Date: 2020  Patient MRN: [id number] : 1935 Age:  81 years Gender: Female Order Date: 2020 7:01 PM Exam: XR ABDOMEN (KUB) (SINGLE AP VIEW) Number of Images: 2 views Indication:   eval bowel distension eval bowel distension Comparison: Prior study from 2020 is available. Findings: The bowel gas pattern is normal. The nasogastric tube with the tip in the body of the stomach. There appears to be an esophageal stent seen at the edge of the study. There is a calcified density with lucent center seen in the right lower pelvis. Mild osteopenia of the osseous structure. . The osseous structures of the abdomen and pelvis demonstrate multilevel osteoarthritic changes disease of the lumbar spine. .     NON-SPECIFIC GAS PATTERN. Ct Abdomen Pelvis W Iv Contrast Additional Contrast? None    Result Date: 2020  Patient MRN:  [id number] : 1935 Age: 81 years Gender: Female Order Date:  2020 11:47 AM EXAM: CT ABDOMEN PELVIS W IV CONTRAST NUMBER OF IMAGES \ views:  5 INDICATION: Ongoing abdominal pelvic pain after perforated appendicitis sp  laparoscopic appendectomy, eval for abscess COMPARISON: 2020 TECHNIQUE: Axial computerized tomography sections of the abdomen and pelvis with sagittal and coronal MPR reconstructions were obtained from the top of the diaphragm to the pelvis. Contrast dose: 110 ml. Isovue 370 intravenously injected. Scanning performed post IV contrast administration.  Low-dose CT  acquisition technique findings throughout the lung fields which are likely chronic There appears to be an anterior dislocation of the right shoulder which appears chronic    Findings compatible with atherosclerotic disease There is minimal infiltrate at both lung bases, at the right lung base representing a change suspicious for pneumonia. At the lung base on the left relatively stable. There is likely a component of pneumonia superimposed on chronic scarring and fibrosis     Xr Chest Portable    Result Date: 2020  Patient MRN:  [id number] : 1935 Age: 81 years Gender: Female Order Date:  2020 2:00 AM TECHNIQUE/NUMBER OF IMAGES/COMPARISON/CLINICAL HISTORY: Chest AP 1 imaging 2 views of History Central venous line placement. FINDINGS: Right internal jugular central venous catheter tip in SVC. The no pneumothorax on the right on the left-sided. No sizable infiltrates or consolidations are seen. Discrete atelectasis in the base. The heart has normal size. Left internal jugular central venous catheter tip in SVC. No pneumothorax on the right or on the left. Xr Chest Abdomen Ng Placement    Result Date: 2020  Patient MRN:  [id number] : 1935 Age: 81 years Gender: Female Order Date:  2020 10:12 PM EXAM: XR CHEST ABDOMEN NG PLACEMENT COMPARISON: August 3, 2020 INDICATION:  ng placement ng placement FINDINGS: Nasogastric tube courses below the level of the diaphragm and appears in the expected location of the stomach. Satisfactory position of nasogastric tube. Xr Chest Abdomen Ng Placement    Result Date: 8/3/2020  Patient MRN:  [id number] : 1935 Age: 81 years Gender: Female Order Date:  8/3/2020 9:29 AM EXAM: XR CHEST ABDOMEN NG PLACEMENT 0938 hours INDICATION:  NG tube placement  COMPARISON: Portable chest 3/4/2020. Portable chest 2020 at 0205 hours. FINDINGS:  A new nasogastric tube with tip in the proximal gastric body. Stomach is nondistended, minimal residual gas. No obvious free air. Mild gas distention of a couple mid abdomen small bowel loops. Mild gas distention of the proximal transverse colon. Partial inclusion of the lung bases. Shallow inspiration with moderate left basilar atelectasis. Old aortic valve stent. 1. New nasogastric tube in the gastric body. 2. Minimal small bowel ileus. 3. Left basilar atelectasis. EKG: See Report  Echo: See Report      IMPRESSIONS:  Active Problems:    Hypokalemia    Acute appendicitis    Sepsis ([address]    Hypomagnesemia    Atrial fibrillation, new onset ([address]    Perforated appendicitis    Gastrointestinal hemorrhage    Gastric ulcer    Ileus ([address]  Resolved Problems:    * No resolved hospital problems. *      RECOMMENDATIONS:  Once again strongly recommend to carefully monitor hemoglobin for any sudden changes. Maintain present medications and will continue to hold clopidogrel and aspirin for a total of 1 month and then restart clopidogrel only for the next 2 months. Maintain strict SBE prophylaxis. I have spent more than 25 minutes face to face with Nathalie Nieto and reviewing notes and laboratory data, with greater than 50% of this time instructing and counseling the patient face to face regarding my findings and recommendations and I have answered all questions as posed to me by Ms. Elias Orellana. Watt Gosselin, DO FACP,FACC,FSCAI      NOTE:  This report was transcribed using voice recognition software.   Every effort was made to ensure accuracy; however, inadvertent computerized transcription errors may be present

## 2020-08-21 NOTE — PROGRESS NOTES
[facility]  ATTENDING PHYSICIAN SURGERY PROGRESS NOTE     I have examined the patient, reviewed the record, and discussed the case with the APN/  Resident. I have reviewed all relevant labs and imaging data. The following summarizes my clinical findings and independent assessment. Chief Complaint   Patient presents with    Abdominal Pain     x 3 days, +emesis +diarrhea +nausea states she has not ate since thursday        S:   8/1: presented with acute appendicitis, perforated; started on antibiotics, in afib RVR, admited to SICU  8/2: went to OR for laparoscopic appendectomy; sal[place] catheter removed, NPO until bowel function, converted to sinus rhythm. 8/3: Patient straight cathed then unable to void post procedure. Bladder scan 185. Bolus ordered. Complaining of burping, no nausea or emesis. NG and salinas were placed today. 8/4: Patient still not having flatus and continues burping. Continue monitoring and have patient walk. [facility] 15/24  8/5: No abdominal pain but still no flatus. NG otuput over 2.3L yesterday, still burping. DC salinas  8/6: States she finally had flatus overnight NG output 1.4L, no BM. Able to urinate after salinas removed. States she is feeling a little better. 8/7: Patient having occasional nausea. Prolonged QT stable--no anti-emetics. NG output 750  8/8: Patient having liquid BMs with no blood. NG output as of this am 400cc. 8/9: BM x 3, 1.3 L out by 24 hours dark nasogastric output, + appetite  8/10: Patient tolerated clear liquids with minimal nausea, denies emesis. 2 BM yesterday. Will pull NGT   8/11: Patient tolerated general diet, no complaints this morning. 8/12: Patient kept for nausea. Had a large bowel movement overnight. If tolerating diet this morning will be discharged home this afternoon.   8/13: 1 episode of coffee ground emesis at 9 pm last night and 2 dark loose stools around 4 am. 2nd unit of pRBC was hung this am. WBC still 18.4 this am. Scheduled for EGD today. 8/14: EGD showed multiple gastric antral ulcers, distal duodenitis, large hiatal hernia with vianey's ulcerations. She was started on TPN. States she feels full this morning, denies abdominal pain. Had one small stool since EGD. Tolerating diet without emesis. 8/15: Hematemesis overnight, NGT placed. Bilious/bloody output. STAT H/H stable. Consult to cardiology regarding if plavix may be held despite recent TAVR. Awaiting final recommendations. Patient states she has been feeling much better since placement of NGT. Stopped protonix gtt. Cardiology did not see patient. 8/16: No acute events overnight. Pt states she feels great today. Need to speak with cardiology about possibly stopping Plavix despite recent TAVR. 8/17: Patient had some nausea after NGT fell out but was relieved by reglan. She hasn't had a BM in the past 2 days and was not passing gas but did start to later in the morning . She has been tolerating her clear liquids. Hemoglobin stable, soft BPs. Cardio recommends to hold her ASA and Plavix until she stabilizes GI wise.   8/18: No events overnight. No nausea or vomiting with PUD diet. No BM for 3 days will try suppository today. Stable Hgb, daily labs. Will monitor tray % eaten with nursing to evaluate need for TPN with possible decrease tomorrow.   8/19 + BM No nausea tolerated diet yesterday and this am   8/20: one small emesis yesterday. 3BMs recorded although she does not recall this. Appetite poor - will try off TPN  8/21: Bout of nausea overnight, but no vomiting. Able to hold down some mashed potatoes last night, but not much else. Still not passing any gas or had a BM since yesterday. Had some reflux this am but is better now. Belly was mildly distended. O:  Physical Exam  HENT:      Head: Normocephalic. Eyes:      Pupils: Pupils are equal, round, and reactive to light. Neck:      Musculoskeletal: Neck supple. Cardiovascular:      Rate and Rhythm: Normal rate. Pulmonary:      Effort: Pulmonary effort is normal. No respiratory distress. Abdominal:      General: There is distension ( mild ). Tenderness: There is no abdominal tenderness. There is no guarding or rebound. Comments: Incisions c/d/i    Genitourinary:     Comments: 1.5cm sacral ulcer stage II   Musculoskeletal: Normal range of motion. Skin:     General: Skin is warm. Neurological:      General: No focal deficit present. Mental Status: She is alert. Psychiatric:         Mood and Affect: Mood normal.         A: 8/2  s/p lap appy with perforated appendicitis complicated by post op ileus and GI Bleed. P:   PUD  Reglan   PRN tylenol   Wound care for sacral ulcer   Pain control with tylenol   PT/OT OOB   Hold ASA and plavix secondary to GIB  PPI and Carafate  H pylori Ab (-)  Discuss DC planning with daughter.  If patient tolerates lunch will be stable for DC pending want for Home [facility]     Jadyn Escalante MD

## 2020-08-21 NOTE — PROGRESS NOTES
Comprehensive Nutrition Assessment    Type and Reason for Visit:  Reassess    Nutrition Recommendations/Plan: Recommend and start Ensure Enlive supplement BID and Boost Pudding supplement BID to help meet increased nutritional needs and d/t poor po intake of meals. Nutrition Assessment:  TPN was discontinued 8/19 ; patient at nutritional risk d/t poor poor po intake of meals (1-25%) 2/2 to nausea ; pt at further nutritional compromise d/t increased needs from wound healing ; s/p appendectomy w/ post op ileus ; noted sepsis ; s/p EGD ; will start ONS    Malnutrition Assessment:  Malnutrition Status: At risk for malnutrition (Comment)    Context:  Acute Illness     Findings of the 6 clinical characteristics of malnutrition:  Energy Intake:  7 - 50% or less of estimated energy requirements for 5 or more days  Weight Loss:  Unable to assess(d/t fluid shifts)     Body Fat Loss:  Unable to assess(data not available to assess at this time)     Muscle Mass Loss:  Unable to assess(data not available to assess at this time)    Fluid Accumulation:  No significant fluid accumulation     Strength:  Not Performed    Estimated Daily Nutrient Needs:  Energy (kcal):  4163-1368 (REE 1287 x 1.3 SF); Weight Used for Energy Requirements:  Current     Protein (g):   (1.5-1.8g/kg IBW); Weight Used for Protein Requirements:  Ideal        Fluid (ml/day):  8674-1929; Weight Used for Fluid Requirements:  [place]      Nutrition Related Findings:  +I&Os (+11.9 L), 2+ edema, A&O x 4, [place], hypoactive BS, rounded abd, nausea, red/excoriation to buttocks      Wounds:  Open Wounds, Surgical Wound(wound x 1 noted to coccyx ; Incision x 1 noted to abd)       Current Nutrition Therapies:    DIET GENERAL;     Anthropometric Measures:  · Height: 5' 5\" (165.1 cm)  · Current Body Weight: 184 lb (83.5 kg)(8/21/20, no method)   · Admission Body Weight: 188 lb (85.3 kg)(8/1/20, no method)    · Usual Body Weight: (Weight has been flucuating since adm w/ noted fluid shifts ; EMR shows past weights of 162# bedscale on 3/4/20 and 177# no method on 1/10/20)     · Ideal Body Weight: 125 lbs; % Ideal Body Weight 147.2 %   · BMI: 30.6    · BMI Categories: Obese Class 1 (BMI 30.0-34. 9)       Nutrition Diagnosis:   · Inadequate oral intake related to altered GI function(perforated appendicitis w/ post-op ileus) as evidenced by poor intake prior to admission, intake 0-25%, nausea, vomiting      Nutrition Interventions:   Food and/or Nutrient Delivery:  Continue Current Diet, Start Oral Nutrition Supplement  Nutrition Education/Counseling:  Education not indicated   Coordination of Nutrition Care:  Continued Inpatient Monitoring    Goals:  Patient will consume 50-75% of meals served       Nutrition Monitoring and Evaluation:   Behavioral-Environmental Outcomes:  Knowledge or Skill   Food/Nutrient Intake Outcomes:  Food and Nutrient Intake, Supplement Intake  Physical Signs/Symptoms Outcomes:  Biochemical Data, Chewing or Swallowing, GI Status, Nausea or Vomiting, Fluid Status or Edema, Hemodynamic Status, Meal Time Behavior, Nutrition Focused Physical Findings, Skin, Weight     Discharge Planning:    Continue current diet     Electronically signed by Amanda Davidson RD, LD on 8/21/20 at 10:41 AM EDT    Contact: [phone]

## 2020-08-21 NOTE — PROGRESS NOTES
educated on importance of mobility, safety with mobility, transfers, [employer] approximation/safety, gait    Patient response to education:   Pt verbalized understanding Pt demonstrated skill Pt requires further education in this area   Yes  Yes with verbal cues and assist Yes      ASSESSMENT:    Comments:  Patient found seated in the bedside chair and eager to participate. Patient required increased time, effort, and assist to achieve standing from the chair. Once standing, patient exhibited moderate forward flexed posture that she was able to self correct with verbal cues, but not maintain. Patient with slow gait speed, and short stride length and laborious in nature. Patient with poor walker safety/approximation as she would let go of the walker and reach for the environment to steady self despite verbal cues. Patient with seated rest break between gait reps to recover breathing and fatigue. Patient aware of decreased endurance and mobility but with poor understanding of safety concerns, especially letting go of the walker during gait/transfers. Patient assisted back to seated in the bedside chair following treatment. Highly recommend [facility] PT services to continue to improve strength, endurance, and safety with mobility. Instructed patient when discharged to always have someone with her when ambulating in the home for safety. Patient voiced understanding. Treatment:  Patient practiced and was instructed in the following treatment:     Bed mobility: NA   Transfer training: Verbal/tactile cues to facilitate proper hand placement, technique and safety during sit to stand task.  Gait training: Verbal and tactile cues to facilitate upright posture and safety as well as provided with physical assistance to complete task. Verbal cues for posture, safety, and walker approximation.  Therapeutic exercises: As noted above    PLAN:    Patient is making limited progress toward set goals.  Continue with current PT plan of care.    Time in  1056  Time out  1120    Total Treatment Time 24 minutes     CPT codes:  [] Gait training 48100 - minutes  [] Manual therapy 46647 - minutes  [x] Therapeutic activities 10973 24 minutes  [] Therapeutic exercises 47063 - minutes  [] Neuromuscular reeducation 79251 - minutes     Jordana Maradiaga, PT, DPT  License [id number]

## 2020-08-22 PROBLEM — R53.1 WEAKNESS: Status: ACTIVE | Noted: 2020-01-01

## 2020-08-22 NOTE — PROGRESS NOTES
for EGD today. 8/14: EGD showed multiple gastric antral ulcers, distal duodenitis, large hiatal hernia with vianey's ulcerations. She was started on TPN. States she feels full this morning, denies abdominal pain. Had one small stool since EGD. Tolerating diet without emesis. 8/15: Hematemesis overnight, NGT placed. Bilious/bloody output. STAT H/H stable. Consult to cardiology regarding if plavix may be held despite recent TAVR. Awaiting final recommendations. Patient states she has been feeling much better since placement of NGT. Stopped protonix gtt. Cardiology did not see patient. 8/16: No acute events overnight. Pt states she feels great today. Need to speak with cardiology about possibly stopping Plavix despite recent TAVR. 8/17: Patient had some nausea after NGT fell out but was relieved by reglan. She hasn't had a BM in the past 2 days and was not passing gas but did start to later in the morning . She has been tolerating her clear liquids. Hemoglobin stable, soft BPs. Cardio recommends to hold her ASA and Plavix until she stabilizes GI wise.   8/18: No events overnight. No nausea or vomiting with PUD diet. No BM for 3 days will try suppository today. Stable Hgb, daily labs. Will monitor tray % eaten with nursing to evaluate need for TPN with possible decrease tomorrow.   8/19 + BM No nausea tolerated diet yesterday and this am   8/20: one small emesis yesterday. 3BMs recorded although she does not recall this. Appetite poor - will try off TPN  8/21: Bout of nausea overnight, but no vomiting. Able to hold down some mashed potatoes last night, but not much else. Still not passing any gas or had a BM since yesterday. Had some reflux this am but is better now. Belly was mildly distended. 8/22: Patient had a bowel movement overnight. Tolerating diet without nausea, vomiting. Vasu Knox set up for discharge, discharge planning. O:  Physical Exam  HENT:      Head: Normocephalic.    Eyes:      Pupils: Pupils are equal, round, and reactive to light. Neck:      Musculoskeletal: Neck supple. Cardiovascular:      Rate and Rhythm: Normal rate. Pulmonary:      Effort: Pulmonary effort is normal. No respiratory distress. Abdominal:      General: There is distension ( mild ). Tenderness: There is no abdominal tenderness. There is no guarding or rebound. Comments: Incisions c/d/i    Genitourinary:     Comments: 1.5cm sacral ulcer stage II   Musculoskeletal: Normal range of motion. Skin:     General: Skin is warm. Neurological:      General: No focal deficit present. Mental Status: She is alert. Psychiatric:         Mood and Affect: Mood normal.       A: 8/2  s/p lap appy with perforated appendicitis complicated by post op ileus and GI Bleed.     P:   PUD  Reglan   PRN tylenol   Wound care for sacral ulcer   Pain control with tylenol   PT/OT OOB   Hold ASA and plavix secondary to GIB  PPI and Carafate   H pylori Ab (-)  DC home with C today     Deidre Hager MD

## 2020-08-22 NOTE — ED PROVIDER NOTES
ED Attending  CC: Daisy         Department of Emergency Medicine   ED  Provider Note  Admit Date/RoomTime: 8/22/2020  7:00 PM  ED Room: 34/34  MRN: [id number]  Chief Complaint:   Fatigue (dc 2 hours ago from hospital. pt is too weak to get out of the car and into the house) and Leg Swelling (bilateral feet swelling)       History of Present Illness   Source of history provided by:  patient and daughter. History/Exam Limitations: none. Garo Astudillo is a 81 y.o. female who has a past medical history of:   Past Medical History:   Diagnosis Date    Acute blood loss anemia 3/21/2018    Acute on chronic diastolic congestive heart failure ([address] 3/20/2018    Acute respiratory failure with hypoxia ([address] 3/20/2018    Aortic stenosis, severe 03/2018    Arthritis     Cellulitis 3/20/2018    Right and left lower extremity    Class 3 obesity in adult 3/22/2018    History of blood transfusion     Hypertension     Hypokalemia 3/21/2018    Mitral regurgitation 03/2018    Moderate aortic regurgitation 03/2018    Sepsis due to methicillin susceptible Staphylococcus aureus ([facility] 3/22/2018    presents to the ED accompanied by her daughter for complaint of weakness. Daughter reports that she was just discharged from the hospital and she was attempting to take her home to care for her, but she is unable to get her out of the car into the house. She reports that she just spent 21 days in the hospital after having emergent appendectomy for perforated appendicitis. She reports that she was unaware of how weak her mother was when she attempted to take her home. She reports that she was only offered home health care upon discharge. She reports concern that her lower extremities are extremely edematous. Reports that at this time she knows that she needs some type of rehabilitation before going home.   Denies fever, chills, nausea, vomiting, abdominal pain, chest pain, shortness of breath, lightheadedness, dizziness, syncope, or any other complaints at this time. ROS   Pertinent positives and negatives are stated within HPI, all other systems reviewed and are negative. Past Surgical History:   Procedure Laterality Date    CARDIAC CATHETERIZATION  01/10/2020    Dr. Ruben Johansen  Left and Right Heart Catheterization    LAPAROSCOPIC APPENDECTOMY N/A 8/2/2020    APPENDECTOMY LAPAROSCOPIC performed by Ronak Antoine MD at [address] [place] [address] Rd 8/13/2020    EGD DIAGNOSTIC ONLY performed by Lio Garrett MD at [address] History:  reports that she has never smoked. She has never used smokeless tobacco. She reports that she does not drink alcohol or use drugs. Family History: family history is not on file. Allergies: Patient has no known allergies. Physical Exam Section   Oxygen Saturation Interpretation: Normal.   ED Triage Vitals   BP Temp Temp Source Pulse Resp SpO2 Height Weight   08/22/20 1757 08/22/20 1750 08/22/20 1750 08/22/20 1750 -- 08/22/20 1750 -- --   (!) 113/50 100.5 °F (38.1 °C) Temporal 93  94 %         Physical Exam  · Constitutional/General: Alert, weak, non toxic. · HEENT:  NC/NT. PERRLA,  Airway patent. · Neck: Supple, full ROM, non tender to palpation in the midline, no stridor, no crepitus, no meningeal signs  · Respiratory: Breath sounds audible bilaterally. No wheezes, rales, or rhonchi. Not in respiratory distress. Decreased in left lower lobe. · CV:  Regular rate. Regular rhythm. No murmurs, gallops, or rubs. Peripheral pulses palpable  · Chest: No chest wall tenderness  · GI:  Abdomen Soft, Non tender, Non distended. +BS. No rebound, guarding, or rigidity. No pulsatile masses. Small laparoscopic incisions well approximated. No surrounding erythema. No drainage. · Musculoskeletal: Moves all extremities x 4. Warm and well perfused, no clubbing, cyanosis. Lateral 3+ pitting edema to lower extremities.   There is a 2 cm shallow open area to left lateral calf with mild oozing of clear fluid. Mild erythema noted bilaterally. · Integument: skin warm and dry. No rashes. · Lymphatic: no lymphadenopathy noted  · Neurologic: No focal deficits. Moves all extremities equally but weak. Obeys to simple commands.   · Psychiatric: Normal Affect      Lab / Imaging Results   (All laboratory and radiology results have been personally reviewed by myself)  Labs:  Results for orders placed or performed during the hospital encounter of 08/22/20   CBC Auto Differential   Result Value Ref Range    WBC 11.2 4.5 - 11.5 E9/L    RBC 2.58 (L) 3.50 - 5.50 E12/L    Hemoglobin 7.2 (L) 11.5 - 15.5 g/dL    Hematocrit 23.5 (L) 34.0 - 48.0 %    MCV 91.1 80.0 - 99.9 fL    MCH 27.9 26.0 - 35.0 pg    MCHC 30.6 (L) 32.0 - 34.5 %    RDW 17.2 (H) 11.5 - 15.0 fL    Platelets 784 694 - 888 E9/L    MPV 10.4 7.0 - 12.0 fL    Neutrophils % 83.3 (H) 43.0 - 80.0 %    Immature Granulocytes % 1.5 0.0 - 5.0 %    Lymphocytes % 9.2 (L) 20.0 - 42.0 %    Monocytes % 5.6 2.0 - 12.0 %    Eosinophils % 0.2 0.0 - 6.0 %    Basophils % 0.2 0.0 - 2.0 %    Neutrophils Absolute 9.36 (H) 1.80 - 7.30 E9/L    Immature Granulocytes # 0.17 E9/L    Lymphocytes Absolute 1.03 (L) 1.50 - 4.00 E9/L    Monocytes Absolute 0.63 0.10 - 0.95 E9/L    Eosinophils Absolute 0.02 (L) 0.05 - 0.50 E9/L    Basophils Absolute 0.02 0.00 - 0.20 E9/L   Comprehensive Metabolic Panel w/ Reflex to MG   Result Value Ref Range    Sodium 137 132 - 146 mmol/L    Potassium reflex Magnesium 3.7 3.5 - 5.0 mmol/L    Chloride 107 98 - 107 mmol/L    CO2 14 (L) 22 - 29 mmol/L    Anion Gap 16 7 - 16 mmol/L    Glucose 70 (L) 74 - 99 mg/dL    BUN 14 8 - 23 mg/dL    CREATININE 0.8 0.5 - 1.0 mg/dL    GFR Non-African American >60 >=60 mL/min/1.73    GFR African American >60     Calcium 8.7 8.6 - 10.2 mg/dL    Total Protein 5.7 (L) 6.4 - 8.3 g/dL    Alb 2.2 (L) 3.5 - 5.2 g/dL    Total Bilirubin 0.2 0.0 - 1.2 mg/dL    Alkaline Phosphatase 188 (H) 35 - 104 ensure accuracy; however, inadvertent computerized transcription errors may be present. END OF PROVIDER NOTE      KULDIP Portillo - CNP  08/22/20 0478  ATTENDING PROVIDER ATTESTATION:     I have personally performed and/or participated in the history, exam, medical decision making, and procedures and agree with all pertinent clinical information. I have also reviewed and agree with the past medical, family and social history unless otherwise noted. My findings/Plan: Patient presenting because of weakness. Patient was just discharged from the hospital.  Patient does complain of increased swelling to legs. Patient reporting some mild cough and sinus congestion. Patient reporting no fever chills. Patient reporting unable to ambulate. Patient was just discharged today patient was here for appendicitis. Patient is awake alert oriented heart and lung exam normal abdomen is soft and nontender she has noted edema bilaterally she is able to raise her upper extremities limited range motion of lower extremities. Patient reporting no headache. She reports no fall or injury. Labs and EKG reviewed as well as chest x-ray. Chest x-ray does show effusion as well as suspected infiltrate. Patient is afebrile here. But patient does have edema. She was ordered IV Lasix she was also ordered IV antibiotics because of possible infiltrate. Patient and family made aware of findings and plan.   We did speak to on-call internal medicine patient will be admitted       Petra Alatorre MD  08/22/20 [address] [facility] Kiki Villanueva MD  08/22/20 [id number]

## 2020-08-22 NOTE — ED TRIAGE NOTES
FIRST PROVIDER CONTACT ASSESSMENT NOTE      Department of Emergency Medicine   Admit Date: No admission date for patient encounter. Chief Complaint: Fatigue (dc 2 hours ago from hospital. pt is too weak to get out of the car and into the house) and Leg Swelling (bilateral feet swelling)      History of Present Illness:   Zane Verdin is a 81 y.o. female who presents to the ED for weakness. Reports she was just discharged from hospital several hours ago. They were not able to get patient out of the car when they got home due to weakness. Also reports leg swelling to BL legs.          -----------------END OF FIRST PROVIDER CONTACT ASSESSMENT NOTE--------------  Electronically signed by BEVERLEY Lee   DD: 8/22/20

## 2020-08-22 NOTE — PROGRESS NOTES
CLINICAL PHARMACY NOTE: MEDS TO [address] [place] [address] Drive Select Patient?: No  Total # of Prescriptions Filled: 2   The following medications were delivered to the patient:  · Pantoprazole 40mg  · Sucralfate 1gram  Total # of Interventions Completed: 5  Time Spent (min): 45    Additional Documentation:

## 2020-08-23 NOTE — PROGRESS NOTES
Comprehensive Nutrition Assessment    Type and Reason for Visit:  Initial, Positive Nutrition Screen    Nutrition Recommendations/Plan: Recommend NPO status per surgery recommendations  Noted prior need for TPN 2/2 post op ileus, please consult for appropriate recs if plan to restart TPN    Nutrition Assessment:  Pt nutritionally at risk w/ surgery-recommended NPO status despite current cardiac diet order, recent d/c after prolonged hospital stay s/p lap appy w/ post op ileus & need for TPN. Pt w/ multiple open areas/surgical sites. Will monitor for nutrition progression. Malnutrition Assessment:  Malnutrition Status:  Insufficient data    Context:  Acute Illness     Findings of the 6 clinical characteristics of malnutrition:  Energy Intake:  7 - 50% or less of estimated energy requirements for 5 or more days  Weight Loss:  Unable to assess(d/t likely fluid-related wt gain)     Body Fat Loss:  Unable to assess(d/t lack of proper PPE)     Muscle Mass Loss:  Unable to assess    Fluid Accumulation:  No significant fluid accumulation     Strength:  Not Performed    Estimated Daily Nutrient Needs:  Energy (kcal):  ; Weight Used for Energy Requirements:  Current     Protein (g):  75-85; Weight Used for Protein Requirements:  Ideal(1.3-1.5)        Fluid (ml/day):  ; Weight Used for Fluid Requirements:  Current      Nutrition Related Findings:  pt alert, abd distention, active BS, +3 pitting edema, -I/Os, post op ileus s/p recent lap appy w/ prior need for TPN      Wounds:  Multiple, Open Wounds, Surgical Wound       Current Nutrition Therapies:    DIET CARDIAC;     Anthropometric Measures:  · Height: 5' 5\" (165.1 cm)  · Current Body Weight: 179 lb (81.2 kg)   · Weight Change: Noted likely fluid-related 16# wt gain x ~3 weeks  · Usual Body Weight: 163 lb (73.9 kg)(measured per EMR 8/2/20)     · Ideal Body Weight: 125 lbs; % Ideal Body Weight 143.2 %   · BMI: 29.8  · BMI Categories: Overweight (BMI 25.0-29. 9)       Nutrition Diagnosis:   · Inadequate oral intake related to altered GI function(post op ileus) as evidenced by intake 0-25%, poor intake prior to admission      Nutrition Interventions:   Food and/or Nutrient Delivery:  Continue NPO(per surgery recommendations)  Nutrition Education/Counseling:  Education not indicated   Coordination of Nutrition Care:  Continued Inpatient Monitoring    Goals:  Nutrition progression       Nutrition Monitoring and Evaluation:   Food/Nutrient Intake Outcomes:  Diet Advancement/Tolerance  Physical Signs/Symptoms Outcomes:  Biochemical Data, GI Status, Fluid Status or Edema, Nutrition Focused Physical Findings, Skin, Weight     Discharge Planning:     Too soon to determine     Electronically signed by Anabell Lane MS, RD, LD on 8/23/20 at 1:59 PM EDT    Contact: [phone]

## 2020-08-23 NOTE — CONSULTS
wheezing. Heart:  Heart sounds are normal.  Regular rate and rhythm without murmur, gallop or rub. Abdomen:  Soft, non-tender, mild distention  Extremities: Extremities warm to touch, pink, with no edema. Female Rectal: Stool assess: Guiac positive. No masses or hemorrhoids    LABS:    CBC  Recent Labs     08/23/20  0805   WBC 11.2   HGB 6.6*   HCT 21.2*        BMP  Recent Labs     08/23/20  0805      K 3.5      CO2 17*   BUN 14   CREATININE 0.8   CALCIUM 8.3*     Liver Function  Recent Labs     08/22/20  1949   BILITOT 0.2   AST 17   ALT 17   ALKPHOS 188*   PROT 5.7*   LABALBU 2.2*     No results for input(s): LACTATE in the last 72 hours. No results for input(s): INR, PTT in the last 72 hours. Invalid input(s): PT    RADIOLOGY    Xr Chest Portable    Result Date: 8/22/2020  Clinical indications: Cough. Shortness of breath. TECHNIQUE: Single frontal projection of the chest (1 view). COMPARISON: August 12, 2020. FINDINGS: The heart is enlarged. There is calcification within thoracic aorta. Acromioclavicular arthropathy. Bilateral pleural effusions and contiguous atelectasis or infiltrate. Chronic dislocation right shoulder. Left jugular central venous catheter is no longer present. The heart, lungs, mediastinum and regional skeleton are otherwise unremarkable. Bilateral pleural effusions and contiguous atelectasis or infiltrate. ASSESSMENT:  81 y.o. female with anemia, nausea, and abdominal distention. Admitted to medicine. Transfuse 1 unit packed red blood cells ordered by medicine. PLAN:    Postop ileus  N.p.o. for now  IV fluids  IV Reglan every 6 hours  KUB ordered to assess distention  Currently passing gas but minimal bowel movements.   Suppositories as needed/bowel regimen  Monitor electrolytes and correct    GI bleed  Every 6 H&H  PPI twice daily, Carafate    Electronically signed by Ivan Stark MD on 8/23/20 at 1:06 PM EDT

## 2020-08-23 NOTE — H&P
Admission History and Physical                                                                                    Betsy Stone MD, FACP                   Patient Name: Zane Verdin                   Age:  81 y.o. Gender:   female    CC: Extreme weakness inability to walk    HPI: This patient is new to my service. Her history was originally obtained from the emergency room and then confirmed with interview and examination. Apparently yesterday she was discharged from the hospital after 21 days hospitalization for an emergent appendectomy for perforated appendix. She was discharged on after her ride home was unable to get out of the car because of weakness. This morning she confirms the above history. She states she has been having anorexia but no vomiting no abdominal pain. She denies any fevers or chills however her record demonstrates that she had an elevated temperature last p.m. Initial review demonstrates her labs to be satisfactory except her hemoglobin is 7.2 however this is baseline at her discharge as well.   Electrolytes were normal and her sugar was low  Vitals were normal except her temperature one time last p.m. was 100.5  She had a chest x-ray upon admission and it demonstrated bilateral pleural effusions and contiguous atelectasis versus infiltrate      The record of her previous admission was reviewed  There is a detailed chronology  Her [facility] prior to discharge was 15/24 however this was on 8/4  Subsequently the patient had hematemesis and after cardiology review her aspirin and Plavix was held in spite of a TAVR  On 8/21 she was noted to have nausea overnight she was not able to keep anything down she had not had any gas or BMs since the day before and her belly was distended  Apparently she was discharged anyway    She was also followed by cardiology  She was seen on 8/21 and the plan was to hold the Plavix and aspirin for a total of 1 month then restart the Plavix only for the next 2 months    This morning she is animated awake alert just generally does not feel well    Past Medical History:   Diagnosis Date    Acute blood loss anemia 3/21/2018    Acute on chronic diastolic congestive heart failure ([address] 3/20/2018    Acute respiratory failure with hypoxia ([address] 3/20/2018    Aortic stenosis, severe 03/2018    Arthritis     Cellulitis 3/20/2018    Right and left lower extremity    Class 3 obesity in adult 3/22/2018    History of blood transfusion     Hypertension     Hypokalemia 3/21/2018    Mitral regurgitation 03/2018    Moderate aortic regurgitation 03/2018    Sepsis due to methicillin susceptible Staphylococcus aureus ([address] 3/22/2018       Past Surgical History:   Procedure Laterality Date    CARDIAC CATHETERIZATION  01/10/2020    Dr. Salinas Held  Left and Right Heart Catheterization    LAPAROSCOPIC APPENDECTOMY N/A 8/2/2020    APPENDECTOMY LAPAROSCOPIC performed by Gila Bucio MD at [place] [address] N/A 8/13/2020    EGD DIAGNOSTIC ONLY performed by Pamela Fischer MD at [address] [place] [address]       No family status information on file. Prior to Admission medications    Medication Sig Start Date End Date Taking?  Authorizing Provider   sucralfate (CARAFATE) 1 GM tablet Take 1 tablet by mouth 4 times daily 8/20/20   Cyndi Vizcaino, DO   pantoprazole (PROTONIX) 40 MG tablet Take 1 tablet by mouth 2 times daily (before meals) 8/20/20   Cyndi Vizcaino, DO   ferrous sulfate (IRON 325) 325 (65 Fe) MG tablet Take 325 mg by mouth daily (with breakfast)    Historical Provider, MD   clopidogrel (PLAVIX) 75 MG tablet Take 75 mg by mouth daily    Historical Provider, MD   enalapril-hydroCHLOROthiazide (VASERETIC) 10-25 MG per tablet Take 1 tablet by mouth daily    Historical Provider, MD   aspirin 81 MG EC tablet Take 81 mg by mouth daily    Historical Provider, MD   Iron Combinations (NIFEREX) TABS Take 1 capsule by mouth Daily 3/8/20   David Field,    spironolactone (ALDACTONE) 25 MG tablet Take 1 tablet by mouth daily 3/28/18   Aren Workman DO   torsemide (DEMADEX) 20 MG tablet Take 1 tablet by mouth daily 3/28/18   Aren Workman DO   NIFEdipine (NIFEDICAL XL) 30 MG extended release tablet Take 30 mg by mouth every morning    Historical Provider, MD        Social History     Socioeconomic History    Marital status:      Spouse name: None    Number of children: None    Years of education: None    Highest education level: None   Occupational History    None   Social Needs    Financial resource strain: None    Food insecurity     Worry: None     Inability: None    Transportation needs     Medical: None     Non-medical: None   Tobacco Use    Smoking status: Never Smoker    Smokeless tobacco: Never Used   Substance and Sexual Activity    Alcohol use: No    Drug use: No    Sexual activity: None   Lifestyle    Physical activity     Days per week: None     Minutes per session: None    Stress: None   Relationships    Social connections     Talks on phone: None     Gets together: None     Attends [employer] service: None     Active member of club or organization: None     Attends meetings of clubs or organizations: None     Relationship status: None    Intimate partner violence     Fear of current or ex partner: None     Emotionally abused: None     Physically abused: None     Forced sexual activity: None   Other Topics Concern    None   Social History Narrative    None       No Known Allergies    The patient's medical records have been reviewed contingent on availability        Review of Systems:   · General: Extreme malaise and weakness  · Eyes: No visual changes or diplopia. No swelling or pain. · ENT: No Headaches, tinnitus or vertigo. No mouth sores or sore throat.   · Cardiovascular: No chest pain, dyspnea on exertion, palpitations, syncope. · Respiratory: No cough or wheezing, hemoptysis, sob, pleuritic pain. · Gastrointestinal: As per HPI. · Genitourinary: No dysuria, trouble voiding, or hematuria. No change in urination. · Musculoskeletal:  No joint pain or inflammation. No limb weakness. · Integumentary: No rash or pruritis. No abnormal pigmentation,  masses, hair or nail changes  Neurological: No focal symptoms but inability to ambulate  · Psychiatric: No anxiety, or depression. Mood and affect reported as normal  · Endocrine: No temperature intolerance. No polydipsia or polyuria. · Hematologic: No abnormal bruising or bleeding, blood clots or swollen lymph nodes. no anemia, no fever,chills, night sweats, swollen glands. · Allergic/Immunologic: No nasal congestion or hives. Physical Examination:      Wt Readings from Last 3 Encounters:   08/23/20 179 lb (81.2 kg)   08/22/20 177 lb 4.8 oz (80.4 kg)   03/05/20 162 lb (73.5 kg)     Temp Readings from Last 3 Encounters:   08/23/20 97.7 °F (36.5 °C) (Oral)   08/22/20 98 °F (36.7 °C) (Temporal)   08/02/20 96.6 °F (35.9 °C)     BP Readings from Last 3 Encounters:   08/23/20 (!) 153/66   08/22/20 (!) 121/58   08/13/20 132/61     Pulse Readings from Last 3 Encounters:   08/23/20 94   08/22/20 91   03/08/20 99       General appearance: Normal, awake, alert no distress. Skin: Color mildly sallow, texture, turgor normal. No rashes or lesions. Head: Normocephalic. No masses, lesions, tenderness or abnormalities   Face: Symmetric no visible lesions  Eyes: Conjunctivae/cornea clear. Colletta Nia. Sclera non icteric. Conjunctiva somewhat pale  Ears: External appearance normal.  Hearing grossly normal  Nose/Sinuses: Nares normal. No paranasal sinus tenderness. Mouth: Lips and tongue appear normal. Dentition noted  Neck:  Symmetric. No adenopathy. Chest: Even excursion but limited ventilatory excursion  Lungs: Clear to auscultation.  No rhonchi, Weakness       PLAN:  Patient had a fever last p.m. however it is not clear where this is coming from  He is oxygenating well at 99% on room air  Vitals are normal with no evidence of tachycardia or hypotension  There is no leukocytosis  Because of her cardiovascular status she would benefit from transfusion  Encourage incentive spirometry  Continue her diuretic for the chronic diastolic congestive heart failure  Blood cultures because of the risk of SBE  Physical and Occupational Therapy evaluations and plan to place for rehab        See  Orders  Car Colvin MD, Tom Maldonado, American Board of Internal Medicine  Diplomate, [address] [place] [address] Rd[address] of Internal Medicine  8:08 AM  8/23/2020

## 2020-08-24 NOTE — CONSULTS
CARDIOLOGY CONSULTATION    Patient Name:  Nathalie Nieto    :  [age]    Reason for Consultation:   S/P TAVR with recurrent decrease in hemoglobin    History of Present Illness:   Nathalie Nieto returns to [facility] following history of  Just having been released from the hospital following a complicated history associated with a ruptured appendix. As soon as she got home she was too weak to ambulate and was immediately brought back to the emergency room for further evaluation. She admits to shortness of breath with minimal exertion but denies any chest discomfort presently. Ast Medical History:   has a past medical history of Acute blood loss anemia, Acute on chronic diastolic congestive heart failure ([address] Acute respiratory failure with hypoxia ([address] Aortic stenosis, severe, Arthritis, Cellulitis, Class 3 obesity in adult, History of blood transfusion, Hypertension, Hypokalemia, Mitral regurgitation, Moderate aortic regurgitation, and Sepsis due to methicillin susceptible Staphylococcus aureus ([address] Surgical History:   has a past surgical history that includes Cardiac catheterization (01/10/2020); laparoscopic appendectomy (N/A, 2020); and Upper gastrointestinal endoscopy (N/A, 2020). Social History:   reports that she has never smoked. She has never used smokeless tobacco. She reports that she does not drink alcohol or use drugs. Family History:  family history is significant for a massive MI in her father in his old age, mother  secondary to complications of gallbladder surgery, sister with cancer, sister with pulmonary embolism     Medications:  Prior to Admission medications    Medication Sig Start Date End Date Taking?  Authorizing Provider   sucralfate (CARAFATE) 1 GM tablet Take 1 tablet by mouth 4 times daily 20   Chasity May,    pantoprazole (PROTONIX) 40 MG tablet Take 1 tablet by mouth 2 times daily (before meals) 20 Ar Patient, DO   ferrous sulfate (IRON 325) 325 (65 Fe) MG tablet Take 325 mg by mouth daily (with breakfast)    Historical Provider, MD   clopidogrel (PLAVIX) 75 MG tablet Take 75 mg by mouth daily    Historical Provider, MD   enalapril-hydroCHLOROthiazide (VASERETIC) 10-25 MG per tablet Take 1 tablet by mouth daily    Historical Provider, MD   aspirin 81 MG EC tablet Take 81 mg by mouth daily    Historical Provider, MD   Iron Combinations (NIFEREX) TABS Take 1 capsule by mouth Daily 3/8/20   David Field, DO   spironolactone (ALDACTONE) 25 MG tablet Take 1 tablet by mouth daily 3/28/18   Clarence Workman,    torsemide (DEMADEX) 20 MG tablet Take 1 tablet by mouth daily 3/28/18   Clarence Workman,    NIFEdipine (NIFEDICAL XL) 30 MG extended release tablet Take 30 mg by mouth every morning    Historical Provider, MD       Allergies:  Patient has no known allergies. Review of Systems:   · Constitutional: there has been no unanticipated weight loss. There's been no significant change in energy level, sleep pattern or activity level. No fever chills or rigors. · Eyes: No visual changes or diplopia. No scleral icterus. · ENT: No Headaches, hearing loss or vertigo. No mouth sores or sore throat. No change in taste or smell. · Cardiovascular: No chest discomfort, dyspnea on exertion  Now as well at rest.  Denies, palpitations, loss of consciousness, no phlebitis, no claudication. · Respiratory: Denies shortness of breath no cough or wheezing, no sputum production. No hemoptysis, pleuritic pain. · Gastrointestinal: Mild abdominal pain, but no obvious appetite loss, blood in stools. No change in bowel habits. No hematemesis  · Genitourinary: No dysuria, trouble voiding or hematuria. No nocturia or increased frequency. · Musculoskeletal:  +leg swelling since birth of her children No gait disturbance, weakness or joint complaints. · Integumentary: No rash or pruritis.   · Neurological: No headache, diplopia, change in muscle strength, numbness or tingling. No change in gait, balance, coordination, mood, affect, memory, mentation, behavior. · Psychiatric: No anxiety or depression. · Endocrine: No temperature intolerance. No excessive thirst, fluid intake, or urination. No tremor. · Hematologic/Lymphatic: No abnormal bruising or bleeding, blood clots or swollen lymph nodes. · Allergic/Immunologic: No nasal congestion or hives. Physical Examination:    Vital Signs: BP (!) 142/61   Pulse 84   Temp 97.2 °F (36.2 °C) (Temporal)   Resp 18   Ht 5' 5\" (1.651 m)   Wt 179 lb (81.2 kg)   SpO2 96%   BMI 29.79 kg/m²   General appearance: Well preserved, mesomorphic body habitus, alert, no distress. Skin: Skin color, texture, turgor normal. No rashes or lesions. No induration or tightening palpated. Head: Normocephalic. No masses, lesions, tenderness or abnormalities  Eyes: Conjunctivae/corneas clear. PERRL, EOMs intact. Sclera non icteric. Ears: External ears normal. Canals clear. TM's clear bilaterally. Hearing normal to finger rub. Nose/Sinuses: Nares normal. Septum midline. Mucosa normal. No drainage or sinus tenderness. Oropharynx: Lips, mucosa, and tongue normal. Oropharynx clear with no exudate seen. Neck: Neck supple and symmetric. No adenopathy. Thyroid symmetric, normal size, without nodules. Trachea is midline. Carotids brisk in upstroke without bruits, no abnormal JVP noted at 45°. Chest: Even excursion  Lungs: Lungs grossly clear to auscultation bilaterally. No retractions or use of accessory muscles. No tactile vocal fremitus. No rhonchi, crackles or rales. Heart:  S1 > S2. Regular rhythm. Grade 2/6 early systolic murmur present at the right and left 2nd intercostal space. No S3 noted. No rub, palpable thrill or heave noted. PMI 5th intercostal space midclavicular line. Abdomen: Abdomen soft, moderately protuberant abdomen, non-tender. BS normal. No masses, organomegaly.  No hernia decompensation. Skeletal structures show anterior dislocation of the right humeral head (likely chronically unstable) and superior subluxation left humeral head. Hypertrophic degenerative spinal findings are noted. No evidence of acute cardiopulmonary pathology. 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 Assessment:    Active Problems:    General weakness  Resolved Problems:    * No resolved hospital problems. *      Plan:   despite psychological stress Mrs. Elias Orellana would most likely benefit more from attending a rehabilitation center where she could be closely monitored. Obviously if her hemoglobin drops once again would strongly consider endoscopy to focus on the source if indeed present. In the interim both clopidogrel and aspirin have been withheld    I have spent more than 45 minutes face to face with Ruth Gordillo reviewing notes and laboratory data with greater than 50% of this time instructing and counseling the patient regarding my findings and recommendations and I have answered all questions as posed to me by Ms. Elias Orellana. Thank you, Chad Busby MD for allowing me to consult in the care of this patient. Watt Gosselin DO, FACP, FACC, [facility]    NOTE:  This report was transcribed using voice recognition software. Every effort was made to ensure accuracy; however, inadvertent computerized transcription errors may be present.

## 2020-08-24 NOTE — PLAN OF CARE
Problem: Skin Integrity:  Goal: Will show no infection signs and symptoms  Description: Will show no infection signs and symptoms  8/24/2020 0936 by Nava Bell RN  Outcome: Met This Shift  8/24/2020 0038 by Gemini Persaud RN  Outcome: Met This Shift  Goal: Absence of new skin breakdown  Description: Absence of new skin breakdown  8/24/2020 0936 by Nava Bell RN  Outcome: Met This Shift  8/24/2020 0038 by Gemini Persaud RN  Outcome: Met This Shift     Problem: Falls - Risk of:  Goal: Will remain free from falls  Description: Will remain free from falls  8/24/2020 0936 by Nava Bell RN  Outcome: Met This Shift  8/24/2020 0038 by Gemini Persaud RN  Outcome: Met This Shift  Goal: Absence of physical injury  Description: Absence of physical injury  8/24/2020 0936 by Nava Bell RN  Outcome: Met This Shift  8/24/2020 0038 by Gemini Persaud RN  Outcome: Met This Shift

## 2020-08-24 NOTE — PROGRESS NOTES
ampule Inhalation Q4H [facility]       VITAL SIGNS:                                                                                                                          BP (!) 97/56   Pulse 78   Temp 97.8 °F (36.6 °C) (Infrared)   Resp 18   Ht 5' 5\" (1.651 m)   Wt 179 lb (81.2 kg)   SpO2 100%   BMI 29.79 kg/m²   Patient Vitals for the past 96 hrs (Last 3 readings):   Weight   08/23/20 0005 179 lb (81.2 kg)   08/22/20 1954 155 lb (70.3 kg)     OBJECTIVE:    HEENT: PERRL, EOM  Intact; sclera non-icteric, conjunctiva pink. Carotids are brisk in upstroke with normal contour. No carotid bruits. Normal jugular venous pulsation at 45°. No palpable cervical nor supraclavicular nodes. Thyroid not palpable. Trachea midline. Chest: Even excursion  Lungs: CTA B, no expiratory wheezes or rhonchi, no decreased tactile fremitus without inspiratory rales. Heart: Regular  rhythm; S1 > S2, no gallop or murmur. No clicks, rub, palpable thrills   or heaves. PMI nondisplaced, 5th intercostal space MCL. Abdomen: Soft, nontender, nondistended,  moderately protuberant, no masses or organomegaly. Bowel sounds active. Extremities: Without clubbing, cyanosis or edema. Pulses present 3+ upper extermities bilaterally; present 1+ DP and present 1+ PT bilaterally.      Data:   Scheduled Meds: Reviewed  Continuous Infusions:     Intake/Output Summary (Last 24 hours) at 8/24/2020 1453  Last data filed at 8/24/2020 1356  Gross per 24 hour   Intake 1530.83 ml   Output 3850 ml   Net -2319.17 ml     CBC:   Recent Labs     08/21/20 2000 08/22/20 1949 08/23/20  0805 08/23/20  1935 08/24/20  0202 08/24/20  0459   WBC 10.8 11.2 11.2  --   --  11.4   HGB 7.7* 7.2* 6.6* 7.6* 7.8* 8.1*   HCT 24.5* 23.5* 21.2* 24.3* 24.8* 25.6*    388 377  --   --  364     BMP:  Recent Labs     08/22/20 1949 08/23/20 0805 08/24/20  0459    135 139   K 3.7 3.5 2.7*    103 103   CO2 14* 17* 19*   BUN 14 14 12   CREATININE 0.8 0.8 0.8   LABGLOM >60 and the density for the liver and spleen. The multiple gallstones are seen in the gallbladder. The biliary tree and pancreatic ductal systems are not dilated. Pancreas has atrophy. There is a moderate to large size hiatal hernia. There is a nodule in the left adrenal gland which is stable back to the study of 2018. Kidneys have preserved size and cortical thickness, there is no renal calculus. There is no obstruction. The bladder has unremarkable appearance. The calcified myomatous changes are seen in the uterus. Left ovary has unremarkable appearance. The right ovary cannot be conspicuously identified as separate from adjacent bowel segments. There are unremarkable appearance for the bladder. Calcified atheroma changes are seen in the abdominal aorta with a more prominent plaque in the upper abdominal aorta where the origin of the celiac axis and SMA are located. There are areas of chronic pulmonary fibrosis in the bases are. These were seen previously. Degenerative changes are seen throughout the lumbar spine with the degenerative disc disease and facet hypertrophy. These are long-standing findings. Patient previous endovascular repair for the aortic root/aortic valve. 1. Acute perforated appendicitis as above described perforation is well contained in the periappendiceal spaces. 2. There is also a component of small bowel obstruction which etiology is not precisely determined that time the present study, does not appears to be directly related with the appendicitis. There is some whorling of the mesenteric vessels. 3. Multiple gallstones. . Preliminary report was given to Dr. Dustin Holley, ER physician at the time the present study. ALERT:  ABNORMAL REPORT.     Xr Abdomen (kub) (single Ap View)    Result Date: 2020  Patient MRN: [id number] : 1935 Age:  81 years Gender: Female Order Date: 2020 1:08 PM Exam: XR ABDOMEN (KUB) (SINGLE AP VIEW) Number of Images: 2 views Indication:   ok for portable ok for portable Comparison: None. Findings: The bowel gas pattern. Dilated loops of small bowel air-filled suggesting of adynamic ileus. There is a large calcific density seen in the right lower pelvis. . There is no evidence of any organomegaly. . The osseous structures of the abdomen and pelvis demonstrate osteopenia. Adynamic ileus. Xr Abdomen (kub) (single Ap View)    Result Date: 2020  Patient MRN:  [id number] : 1935 Age: 81 years Gender: Female Order Date:  2020 4:17 PM EXAM: XR ABDOMEN (KUB) (SINGLE AP VIEW) INDICATION:  distension distension  COMPARISON: CT the abdomen and pelvis, 2020 FINDINGS: There is gaseous distention of small bowel loops to maximum caliber of 5.5 cm. The extent of small bowel distention has increased as of the prior CT from 2020. Small amount of gas as well. Increased gaseous distention of small bowel loops. Obstruction cannot be excluded. Xr Abdomen (kub) (single Ap View)    Result Date: 2020  Patient MRN: [id number] : 1935 Age:  81 years Gender: Female Order Date: 2020 7:01 PM Exam: XR ABDOMEN (KUB) (SINGLE AP VIEW) Number of Images: 2 views Indication:   eval bowel distension eval bowel distension Comparison: Prior study from 2020 is available. Findings: The bowel gas pattern is normal. The nasogastric tube with the tip in the body of the stomach. There appears to be an esophageal stent seen at the edge of the study. There is a calcified density with lucent center seen in the right lower pelvis. Mild osteopenia of the osseous structure. . The osseous structures of the abdomen and pelvis demonstrate multilevel osteoarthritic changes disease of the lumbar spine. .     NON-SPECIFIC GAS PATTERN.      Ct Abdomen Pelvis W Iv Contrast Additional Contrast? None    Result Date: 2020  Patient MRN:  [id number] : 1935 Age: 81 years Gender: Female Order Date:  2020 11:47 AM EXAM: CT ABDOMEN PELVIS W IV CONTRAST NUMBER OF IMAGES \ views:  5 INDICATION: Ongoing abdominal pelvic pain after perforated appendicitis sp 8/2 laparoscopic appendectomy, eval for abscess COMPARISON: August 1, 2020 TECHNIQUE: Axial computerized tomography sections of the abdomen and pelvis with sagittal and coronal MPR reconstructions were obtained from the top of the diaphragm to the pelvis. Contrast dose: 110 ml. Isovue 370 intravenously injected. Scanning performed post IV contrast administration. Low-dose CT  acquisition technique included one of following options; 1 . Automated exposure control, 2. Adjustment of MA and or KV according to patient's size or 3. Use of iterative reconstruction. FINDINGS: THORACIC BASE: There are small bilateral pleural effusions as well as probable interstitial fibrosis and there is somewhat increasing atelectasis at the posterior lower lobes. There is a hiatus hernia with a fluid filled esophagus compatible with on going gastroesophageal reflux. LIVER: Unremarkable. BILIARY: Multiple gallstones present. PANCREAS: Unremarkable. SPLEEN: Unremarkable. ADRENALS:  Unremarkable. KIDNEYS:  Unremarkable. GI: There are roughly 2 borderline dilated distal small bowel loops without a pattern suggestive of small bowel obstruction. The contents of the lower GI tract are liquid indicating an underlying diarrheal illness. The possibility of an antibiotic induced diarrhea is raised. There is rather pronounced colonic diverticulosis especially in the sigmoid region. PELVIS: Calcified uterine fibroid noted MSK: No acute osseous findings. 1. No evidence of abdominal or pelvic abscess. 2. Liquid contents of the lower GI tract suggesting a developing with diarrheal illness which could potentially be antibiotic induced. 3. New small bilateral pleural effusions and adjacent atelectasis. 4. Hiatus hernia with a fluid-filled distal esophagus compatible with ongoing GE reflux. 5. Cholelithiasis. 6. Colonic diverticulosis. Xr Chest Portable    Result Date: 2020  Clinical indications: Cough. Shortness of breath. TECHNIQUE: Single frontal projection of the chest (1 view). COMPARISON: 2020. FINDINGS: The heart is enlarged. There is calcification within thoracic aorta. Acromioclavicular arthropathy. Bilateral pleural effusions and contiguous atelectasis or infiltrate. Chronic dislocation right shoulder. Left jugular central venous catheter is no longer present. The heart, lungs, mediastinum and regional skeleton are otherwise unremarkable. Bilateral pleural effusions and contiguous atelectasis or infiltrate. Xr Chest Portable    Result Date: 2020  Patient MRN: [id number] : 1935 Age:  81 years Gender: Female Order Date: 2020 11:05 AM Exam: XR CHEST PORTABLE Number of Images: 1 view Indication:   wbc wbc Comparison: 2020 Findings: There is a central venous catheter noted the tip is in the superior vena cava there is no evidence to suggest pneumothorax The heart is unremarkable The lung fields demonstrate no significant pulmonary vascular congestion and edema. The aorta is tortuous ectatic There are findings throughout the lung fields which are likely chronic There appears to be an anterior dislocation of the right shoulder which appears chronic    Findings compatible with atherosclerotic disease There is minimal infiltrate at both lung bases, at the right lung base representing a change suspicious for pneumonia. At the lung base on the left relatively stable. There is likely a component of pneumonia superimposed on chronic scarring and fibrosis     Xr Chest Portable    Result Date: 2020  Patient MRN:  [id number] : 1935 Age: 81 years Gender: Female Order Date:  2020 2:00 AM TECHNIQUE/NUMBER OF IMAGES/COMPARISON/CLINICAL HISTORY: Chest AP 1 imaging 2 views of History Central venous line placement. FINDINGS: Right internal jugular central venous catheter tip in SVC.  The no pneumothorax on the right on the left-sided. No sizable infiltrates or consolidations are seen. Discrete atelectasis in the base. The heart has normal size. Left internal jugular central venous catheter tip in SVC. No pneumothorax on the right or on the left. Xr Chest Abdomen Ng Placement    Result Date: 2020  Patient MRN:  [id number] : 1935 Age: 81 years Gender: Female Order Date:  2020 10:12 PM EXAM: XR CHEST ABDOMEN NG PLACEMENT COMPARISON: August 3, 2020 INDICATION:  ng placement ng placement FINDINGS: Nasogastric tube courses below the level of the diaphragm and appears in the expected location of the stomach. Satisfactory position of nasogastric tube. Xr Chest Abdomen Ng Placement    Result Date: 8/3/2020  Patient MRN:  [id number] : 1935 Age: 81 years Gender: Female Order Date:  8/3/2020 9:29 AM EXAM: XR CHEST ABDOMEN NG PLACEMENT 0938 hours INDICATION:  NG tube placement  COMPARISON: Portable chest 3/4/2020. Portable chest 2020 at 0205 hours. FINDINGS:  A new nasogastric tube with tip in the proximal gastric body. Stomach is nondistended, minimal residual gas. No obvious free air. Mild gas distention of a couple mid abdomen small bowel loops. Mild gas distention of the proximal transverse colon. Partial inclusion of the lung bases. Shallow inspiration with moderate left basilar atelectasis. Old aortic valve stent. 1. New nasogastric tube in the gastric body. 2. Minimal small bowel ileus. 3. Left basilar atelectasis. EKG: See Report  Echo: See Report      IMPRESSIONS:  Active Problems:    General weakness  Resolved Problems:    * No resolved hospital problems. *      RECOMMENDATIONS:  Continue present medical regimen, however, if Mrs. Raudel Bell continues to demonstrate ongoing blood loss  I would consider appropriate endoscopy.   Once again she will need to be placed  Backed on antiplatelet agents as soon as we are assured that there is no further active bleeding and signs of significant healing. I have spent more than  25 minutes face to face with Randall Nieto and reviewing notes and laboratory data, with greater than 50% of this time instructing and counseling the patient and her  Charge nurse face to face regarding my findings and recommendations and I have answered all questions as posed to me by Ms. Raudel Bell as well as the floor   Charge nurse  Maria Antonia Velasquez, DO FACP,FACC,[facility]      NOTE:  This report was transcribed using voice recognition software.   Every effort was made to ensure accuracy; however, inadvertent computerized transcription errors may be present

## 2020-08-24 NOTE — CARE COORDINATION
8/24/2020 Care Coordination - spoke with pt in her room. She was discharged from hospital on Saturday 8/22 to go home with her family and readmitted Saturday evening due to weakness. Discussed [facility] with pt and she is in agreement to going to [facility]. She requests a list of facilities so her daughter and son can review and make choices. Left a [facility] list for Coco Hernandez in room for them. Left a voicemail message for daughterBeatris regarding [facility] lis left in pt room. SW/CM will follow.

## 2020-08-24 NOTE — PROGRESS NOTES
Physical Therapy    Physical Therapy Initial Assessment     Name: Nathalie Nieto  : [birth year]  MRN: [id number]    Referring Provider:  Eric Dye MD     Date of Service: 2020    Evaluating PT:  Annika Mejia, PT, DPT [id number]    Room #:  4427/0381-W  Diagnosis:  Weakness  PMHx/PSHx:  HTN, acute respiratory failure with hypoxia, cellulitis, hypokalemia, aortic stenosis, acute on chronic diastolic CHF, mitral regurgitation, arthritis  Procedure/Surgery:  None this admission  Precautions:  Falls, bed alarm  Equipment Needs:  TBD    SUBJECTIVE:    Pt lives with daughter in a 2 story house with 3 stairs to enter and 1 rail. Bed is on first floor and bath is on first floor. Pt ambulated with no AD in the home and [facility] for community mobility prior to admission. Pt also owns rollator. OBJECTIVE:   Initial Evaluation  Date: 20 Treatment Short Term/ Long Term   Goals   AM-PAC 6 Clicks 37/32     Was pt agreeable to Eval/treatment? yes     Does pt have pain? No c/o pain     Bed Mobility  Rolling: Min A  Supine to sit: Mod A  Sit to supine: NT  Scooting: Min A to EOB  Rolling: Supervision  Supine to sit: Supervision  Sit to supine: Supervision  Scooting: Supervision to EOB   Transfers Sit to stand: Mod A from EOB, Max A from chair with arm rests  Stand to sit: Mod A  Stand pivot: Mod A with FWW  Sit to stand: Supervision  Stand to sit: Supervision  Stand pivot: Supervision with AAD   Ambulation    3 feet with FWW with Mod A for bed>chair  >50 feet with AAD with Supervision   Stair negotiation: ascended and descended  NT  3 steps with 1 rail Min A   ROM BUE:  WNL  BLE:  WNL     Strength BUE:  Mild functional weakness  BLE:  4-/5 overall     Balance Sitting EOB:  SBA  Dynamic Standing:   Mod A with FWW  Sitting EOB:  [place]  Dynamic Standing:  Supervision with FWW     Pt is A & O x 3  Sensation:  Pt denies numbness and tingling to extremities  Edema:  unremarkable    Patient education  Pt educated on role of therapy, safety with mobility, gait mechanics and use of FWW    Patient response to education:   Pt verbalized understanding Pt demonstrated skill Pt requires further education in this area   yes yes yes     ASSESSMENT:    Comments:  Pt resting semi-supine upon arrival, agreeable to PT eval. Pt requires manual assist for trunk lift and initiation of B LE progression over EOB. Pt denies c/o dizziness with changes in position. She requires max cues for hand placement and B LE placement for initiation of sit<>stand transfers. She requires increased assist from chair vs EOB due to increased fatigue with light mobility this date. Pt maintained forward flexed  posture with short, shuffling steps and decreased step height during very short distance ambulation from bed>chair. Additional distance deferred due to pt fatigue. Pt seated in chair upon completion of session with all needs in reach and RN notified of pt performance and positioning at end of session. She will benefit from continued skilled PT services to improve independence with all functional mobility, balance, and overall endurance. Treatment:  Patient practiced and was instructed in the following treatment:     Bed mobility: cues for improved sequencing and technique, manual assist for trunk lift and B LE management for supine>sit   Transfer training: max verbal/tactile cues for hand placement and manual assist for lift/lower from EOB and chair with arm rests   Gait training: cues for upright posture and walker placement, manual assist for walker management and balance, cues for increased step height and length    Pt's/ family goals   1. To return home with daughter    Patient and or family understand(s) diagnosis, prognosis, and plan of care. yes    PLAN:    PT care will be provided in accordance with the objectives noted above.  Exercises and functional mobility practice will be used as well as appropriate assistive devices or modalities to obtain goals. Patient and family education will also be administered as needed. Frequency of treatments: 2-5x/week x 1-2 weeks. Time in  0905  Time out  0925    Total Treatment Time  15 minutes     Evaluation Time includes thorough review of current medical information, gathering information on past medical history/social history and prior level of function, completion of standardized testing/informal observation of tasks, assessment of data and education on plan of care and goals.     CPT codes:  [] Low Complexity PT evaluation 89457  [x] Moderate Complexity PT evaluation 23021  [] High Complexity PT evaluation 93806  [] PT Re-evaluation 55338  [] Gait training 00759 0 minutes  [] Manual therapy 16204 0 minutes  [x] Therapeutic activities 35973 15 minutes  [] Therapeutic exercises 62219 0 minutes  [] Neuromuscular reeducation 87821 0 minutes     Srinivasa Neville, PT, DPT  [id number]

## 2020-08-24 NOTE — PROGRESS NOTES
Emma Rodriguez MD, [place]                   Patient Name: Kashif Quale                   Age:  81 y.o. Gender:   female    CC: Extreme weakness inability to walk    HPI: This patient is new to my service. Her history was originally obtained from the emergency room and then confirmed with interview and examination. Apparently yesterday she was discharged from the hospital after 21 days hospitalization for an emergent appendectomy for perforated appendix. She was discharged on after her ride home was unable to get out of the car because of weakness. This morning she confirms the above history. She states she has been having anorexia but no vomiting no abdominal pain. She denies any fevers or chills however her record demonstrates that she had an elevated temperature last p.m. Initial review demonstrates her labs to be satisfactory except her hemoglobin is 7.2 however this is baseline at her discharge as well.   Electrolytes were normal and her sugar was low  Vitals were normal except her temperature one time last p.m. was 100.5  She had a chest x-ray upon admission and it demonstrated bilateral pleural effusions and contiguous atelectasis versus infiltrate      The record of her previous admission was reviewed  There is a detailed chronology  Her [facility] prior to discharge was 15/24 however this was on 8/4  Subsequently the patient had hematemesis and after cardiology review her aspirin and Plavix was held in spite of a TAVR  On 8/21 she was noted to have nausea overnight she was not able to keep anything down she had not had any gas or BMs since the day before and her belly was distended  Apparently she was discharged anyway    She was also followed by cardiology  She was seen on 8/21 and the plan was to hold the Plavix and aspirin for a total of 1 month then restart the Plavix only for the next 2 months    8/24  Patient is awake alert sitting up and eating in a chair  He has been evaluated by surgery and has been treated for AN ileus  He has been given something to promote her bowels  She has been evaluated by cardiology as well    Past Medical History:   Diagnosis Date    Acute blood loss anemia 3/21/2018    Acute on chronic diastolic congestive heart failure ([address] 3/20/2018    Acute respiratory failure with hypoxia ([address] 3/20/2018    Aortic stenosis, severe 03/2018    Arthritis     Cellulitis 3/20/2018    Right and left lower extremity    Class 3 obesity in adult 3/22/2018    History of blood transfusion     Hypertension     Hypokalemia 3/21/2018    Mitral regurgitation 03/2018    Moderate aortic regurgitation 03/2018    Sepsis due to methicillin susceptible Staphylococcus aureus ([address] 3/22/2018       Past Surgical History:   Procedure Laterality Date    CARDIAC CATHETERIZATION  01/10/2020    Dr. Dom Douglass  Left and Right Heart Catheterization    LAPAROSCOPIC APPENDECTOMY N/A 8/2/2020    APPENDECTOMY LAPAROSCOPIC performed by Pankaj Ann MD at [facility] N/A 8/13/2020    EGD DIAGNOSTIC ONLY performed by Shandra Laurent MD at [facility]         The patient's medical records have been reviewed contingent on availability        Review of Systems:   · General: She currently feels much better  · She has no cardiovascular symptoms      Physical Examination:      Wt Readings from Last 3 Encounters:   08/23/20 179 lb (81.2 kg)   08/22/20 177 lb 4.8 oz (80.4 kg)   03/05/20 162 lb (73.5 kg)     Temp Readings from Last 3 Encounters:   08/24/20 98.4 °F (36.9 °C) (Infrared)   08/22/20 98 °F (36.7 °C) (Temporal)   08/02/20 96.6 °F (35.9 °C)     BP Readings from Last 3 Encounters:   08/24/20 (!) 145/63   08/22/20 (!) 121/58   08/13/20 132/61     Pulse Readings from Last 3 Encounters:   08/24/20 81   08/22/20 91   03/08/20 99 General appearance: Normal, awake, alert no distress. Eyes: Conjunctivae/cornea clear. Kesah Lamprey. Sclera non icteric. Conjunctiva somewhat pale  Mouth: Lips and tongue appear normal. Dentition noted  Neck:  Symmetric. No adenopathy. Chest: Even excursion but limited ventilatory excursion  Lungs: Clear to auscultation. No rhonchi, crackles or rales. Basilar hypoventilation  Heart: S1 > S2. Rhythm is regular and rate is normal. No gallop rub there is a 2/6 systolic murmur at the left base  Abdomen: Less protuberant soft bowel sounds are present  Extremities: Severe distal skin changes with edema  Musculoskeletal: As per HPI  Neuro:   · Cranial nerves grossly intact. · Motor: Strength grossly normal. No focal weakness. · Aside from the lower extremities there is no focal neurologic abnormality  Mental status: Awake, alert, cognizant of person, place, time.     Patient appears capable of directing self care   Mood: Normal and appropriate affect  Gait & balance: Prior to her initial presentation she was ambulatory     Labs     CBC:   Lab Results   Component Value Date    WBC 11.4 08/24/2020    RBC 2.92 08/24/2020    HGB 8.1 08/24/2020    HCT 25.6 08/24/2020     08/24/2020    MCV 87.7 08/24/2020     BMP:    Lab Results   Component Value Date     08/24/2020    K 2.7 08/24/2020     08/24/2020    CO2 19 08/24/2020    BUN 12 08/24/2020    CREATININE 0.8 08/24/2020    GLUCOSE 62 08/24/2020    CALCIUM 8.0 08/24/2020     Hepatic Function Panel:    Lab Results   Component Value Date    ALKPHOS 188 08/22/2020    AST 17 08/22/2020    ALT 17 08/22/2020    PROT 5.7 08/22/2020    LABALBU 2.2 08/22/2020    BILITOT 0.2 08/22/2020     Magnesium:    Lab Results   Component Value Date    MG 1.5 08/24/2020     Cardiac Enzymes:   Lab Results   Component Value Date    TROPONINI <0.01 08/22/2020    TROPONINI <0.01 03/04/2020    TROPONINI <0.01 12/14/2018     LDH:    Lab Results   Component Value Date times per day Matthew Merino MD   10 mL at 08/23/20 2103    sodium chloride flush 0.9 % injection 10 mL  10 mL Intravenous PRN Matthew Merino MD        acetaminophen (TYLENOL) tablet 650 mg  650 mg Oral Q6H PRN Matthew Merino MD        Or    acetaminophen (TYLENOL) suppository 650 mg  650 mg Rectal Q6H PRN Matthew Merino MD        polyethylene glycol [facility]) packet 17 g  17 g Oral Daily PRN Matthew Merino MD        promethazine (PHENERGAN) tablet 12.5 mg  12.5 mg Oral Q6H PRN Matthew Merino MD   12.5 mg at 08/23/20 0906    enoxaparin (LOVENOX) injection 40 mg  40 mg Subcutaneous Daily Matthew Merino MD        enalapril (VASOTEC) tablet 10 mg  10 mg Oral Daily Matthew Merino MD   10 mg at 08/24/20 4888    And    hydroCHLOROthiazide (HYDRODIURIL) tablet 25 mg  25 mg Oral Daily Matthew Merino MD   25 mg at 08/24/20 0809    docusate sodium (COLACE) capsule 100 mg  100 mg Oral Daily Matthew Merino MD   100 mg at 08/24/20 0810    senna (SENOKOT) tablet 8.6 mg  1 tablet Oral Nightly Matthew Merino MD   8.6 mg at 08/23/20 2104    lactated ringers infusion   Intravenous Continuous Charleen Nuñez MD 75 mL/hr at 08/24/20 0550      metoclopramide (REGLAN) injection 10 mg  10 mg Intravenous Q6H Charleen Nuñez MD   10 mg at 08/24/20 0810    polyethylene glycol (GLYCOLAX) packet 17 g  17 g Oral Daily Charleen Nuñez MD   17 g at 08/24/20 0809    sennosides-docusate sodium (SENOKOT-S) 8.6-50 MG tablet 2 tablet  2 tablet Oral Daily PRN Charleen Nuñez MD        bisacodyl (DULCOLAX) suppository 10 mg  10 mg Rectal Daily PRN Charleen Nuñez MD        ipratropium-albuterol (DUONEB) nebulizer solution 1 ampule  1 ampule Inhalation Q4H SMITHA Merino MD           Current  Infusions   lactated ringers 75 mL/hr at 08/24/20 0550       Prn Meds  sodium chloride flush, acetaminophen **OR** acetaminophen, polyethylene glycol, promethazine **OR** [DISCONTINUED] ondansetron, sennosides-docusate sodium, bisacodyl    Radiology Review:  XR ABDOMEN (KUB) (SINGLE AP VIEW)   Final Result      Adynamic ileus. XR CHEST PORTABLE   Final Result      Bilateral pleural effusions and contiguous atelectasis or infiltrate.                   ASSESSMENT:  Active diagnoses treated at this admission:  · Extreme debility and weakness following prolonged hospitalization  · Postoperative anemia  · Anemia secondary to acute GI blood loss  · Bilateral atelectasis  · ileus in resolution  · Multiple electrolytic deficiencies    Problem list:  Patient Active Problem List   Diagnosis    Pneumonia    Acute respiratory failure with hypoxia (HCC)    Acute on chronic diastolic congestive heart failure (HCC)    Cellulitis    Hypokalemia    Acute blood loss anemia    Aortic stenosis, severe    Moderate aortic regurgitation    Mitral regurgitation    Sepsis due to methicillin susceptible Staphylococcus aureus ([facility])    Class 3 obesity in adult    Primary osteoarthritis of right knee    Non-pressure chronic ulcer of right ankle, limited to breakdown of skin ([address]    Non-pressure chronic ulcer of left ankle, limited to breakdown of skin ([address]    Lymphedema of both lower extremities    Venous ulcer of left lower extremity without varicose veins (HCC)    NORBERTO (acute kidney injury) ([address]    Anemia    HTN (hypertension)    Acute appendicitis    Sepsis ([address]    Hypomagnesemia    Atrial fibrillation, new onset ([address]    Perforated appendicitis    Gastrointestinal hemorrhage    Gastric ulcer    Ileus ([address]    General weakness       PLAN:  Patient has received a transfusion her hemoglobin is 8.1 and she is doing much better  Potassium is 2.7 this morning and needs addressed  Her magnesium also needs addressed  CO2 is climbing and is now 19  Her blood pressure also was running quite low is now back up  He had been reevaluated by surgery and was given medications to provoke bowel motility otherwise seems to be okay  Have also ordered incentive spirometry for the atelectasis  There still is no evidence of pneumonia          See  Orders  Dave Merritt MD, Ana Quivers, American Board of Internal Medicine  Diplomate, Geriatric Medicine, [address] [place] [address] Rd., [address] of Internal Medicine  10:17 AM  8/24/2020

## 2020-08-24 NOTE — FLOWSHEET NOTE
Patients potassium is critically low at 2.7. MD notified at this time, Coral Ratliff. No orders obtained.

## 2020-08-25 NOTE — CARE COORDINATION
CARE COORDINATION: RN has Left message with attending to check for dc disposition as pt has been accepted to [facility]. Awaiting response    Selena Dodge    Addendum: Passar and transport envelope on chart, awaiting clearance from attending for dc. No response back to Rn, will plan for am discharge if okay with attending.     Selena Dodge

## 2020-08-25 NOTE — PLAN OF CARE
Problem: Skin Integrity:  Goal: Will show no infection signs and symptoms  Description: Will show no infection signs and symptoms  8/25/2020 1053 by Anup Galvan RN  Outcome: Ongoing  8/24/2020 2341 by Dulce Aguirre RN  Outcome: Met This Shift  Goal: Absence of new skin breakdown  Description: Absence of new skin breakdown  8/25/2020 1053 by Anup Galvan RN  Outcome: Ongoing  8/24/2020 2341 by Dulce Aguirre RN  Outcome: Met This Shift     Problem: Falls - Risk of:  Goal: Will remain free from falls  Description: Will remain free from falls  8/25/2020 1053 by Anup Galvan RN  Outcome: Ongoing  8/24/2020 2341 by Dulce Aguirre RN  Outcome: Met This Shift  Goal: Absence of physical injury  Description: Absence of physical injury  8/25/2020 1053 by Anup Galvan RN  Outcome: Ongoing  8/24/2020 2341 by Dulce Aguirre RN  Outcome: Met This Shift

## 2020-08-25 NOTE — PROGRESS NOTES
Cecilia Capps MD, [address]                   Patient Name: Oscar Cao                   Age:  81 y.o. Gender:   female    CC: Extreme weakness inability to walk    HPI: This patient is new to my service. Her history was originally obtained from the emergency room and then confirmed with interview and examination. Apparently yesterday she was discharged from the hospital after 21 days hospitalization for an emergent appendectomy for perforated appendix. She was discharged on after her ride home was unable to get out of the car because of weakness. This morning she confirms the above history. She states she has been having anorexia but no vomiting no abdominal pain. She denies any fevers or chills however her record demonstrates that she had an elevated temperature last p.m. Initial review demonstrates her labs to be satisfactory except her hemoglobin is 7.2 however this is baseline at her discharge as well.   Electrolytes were normal and her sugar was low  Vitals were normal except her temperature one time last p.m. was 100.5  She had a chest x-ray upon admission and it demonstrated bilateral pleural effusions and contiguous atelectasis versus infiltrate      The record of her previous admission was reviewed  There is a detailed chronology  Her [facility] prior to discharge was 15/24 however this was on 8/4  Subsequently the patient had hematemesis and after cardiology review her aspirin and Plavix was held in spite of a TAVR  On 8/21 she was noted to have nausea overnight she was not able to keep anything down she had not had any gas or BMs since the day before and her belly was distended  Apparently she was discharged anyway    She was also followed by cardiology  She was seen on 8/21 and the plan was to hold the Plavix and aspirin for a total of 1 month then restart the Plavix only for the next 2 months    8/24  Patient is awake alert sitting up and eating in a chair  He has been evaluated by surgery and has been treated for AN ileus  He has been given something to promote her bowels  She has been evaluated by cardiology as well    8/25  Patient generally feels okay  She feels her appetite is not good  She has had accelerated bowel movements associated with the provocation provided by surgery  There is no indication of ileus any further  Her potassium and magnesium remain low  She has no specific cardiovascular symptoms  Cardiology has not changed her regimen  Surgery has signed off    Past Medical History:   Diagnosis Date    Acute blood loss anemia 3/21/2018    Acute on chronic diastolic congestive heart failure ([address] 3/20/2018    Acute respiratory failure with hypoxia ([address] 3/20/2018    Aortic stenosis, severe 03/2018    Arthritis     Cellulitis 3/20/2018    Right and left lower extremity    Class 3 obesity in adult 3/22/2018    History of blood transfusion     Hypertension     Hypokalemia 3/21/2018    Mitral regurgitation 03/2018    Moderate aortic regurgitation 03/2018    Sepsis due to methicillin susceptible Staphylococcus aureus ([facility] [address] [facility] 3/22/2018       Past Surgical History:   Procedure Laterality Date    CARDIAC CATHETERIZATION  01/10/2020    Dr. Haile Rincon  Left and Right Heart Catheterization    LAPAROSCOPIC APPENDECTOMY N/A 8/2/2020    APPENDECTOMY LAPAROSCOPIC performed by Tani Domingo MD at [address] N/A 8/13/2020    EGD DIAGNOSTIC ONLY performed by Selam Perez MD at [address]         The patient's medical records have been reviewed contingent on availability        Review of Systems:   · General: She currently feels much better  · She has no cardiovascular symptoms      Physical Examination:      Wt Readings from Last 3 Encounters:   08/23/20 179 lb (81.2 kg)   08/22/20 177 lb 4.8 oz (80.4 kg)   03/05/20 162 lb (73.5 kg)     Temp Readings from Last 3 Encounters:   08/24/20 98.7 °F (37.1 °C) (Oral)   08/22/20 98 °F (36.7 °C) (Temporal)   08/02/20 96.6 °F (35.9 °C)     BP Readings from Last 3 Encounters:   08/24/20 (!) 174/71   08/22/20 (!) 121/58   08/13/20 132/61     Pulse Readings from Last 3 Encounters:   08/24/20 99   08/22/20 91   03/08/20 99       General appearance: Normal, awake, alert no distress. Eyes: Conjunctivae/cornea clear. Heather July. Sclera non icteric. Conjunctiva somewhat pale  Mouth: Lips and tongue appear normal. Dentition noted  Neck:  Symmetric. No adenopathy. Chest: Even excursion but limited ventilatory excursion  Lungs: Clear to auscultation. No rhonchi, crackles or rales. Basilar hypoventilation  Heart: S1 > S2. Rhythm is regular and rate is normal. No gallop rub there is a 2/6 systolic murmur at the left base  Abdomen: Less protuberant soft bowel sounds are present  Extremities: Severe distal skin changes with edema  Musculoskeletal: As per HPI  Neuro:   · There is no focal neurologic sign or symptom  Mental status: Awake, alert, cognizant of person, place, time.     Patient appears capable of directing self care   Mood: Normal and appropriate affect  Gait & balance: Prior to her initial presentation she was ambulatory     Labs     CBC:   Lab Results   Component Value Date    WBC 14.0 08/25/2020    RBC 2.92 08/25/2020    HGB 8.0 08/25/2020    HCT 24.9 08/25/2020     08/25/2020    MCV 85.3 08/25/2020     BMP:    Lab Results   Component Value Date     08/25/2020    K 2.6 08/25/2020     08/25/2020    CO2 23 08/25/2020    BUN 13 08/25/2020    CREATININE 0.9 08/25/2020    GLUCOSE 77 08/25/2020    CALCIUM 8.3 08/25/2020     Hepatic Function Panel:    Lab Results   Component Value Date    ALKPHOS 188 08/22/2020    AST 17 08/22/2020    ALT 17 08/22/2020    PROT 5.7 08/22/2020    LABALBU 2.2 08/22/2020    BILITOT 0.2 08/22/2020     Magnesium:    Lab Results   Component Value Date    MG 1.7 08/25/2020     Cardiac Enzymes:   Lab Results   Component Value Date    TROPONINI <0.01 08/22/2020    TROPONINI <0.01 03/04/2020    TROPONINI <0.01 12/14/2018     LDH:    Lab Results   Component Value Date     03/06/2020     PT/INR:    Lab Results   Component Value Date    PROTIME 13.0 08/12/2020    INR 1.2 08/12/2020     BNP: No results for input(s): BNP in the last 72 hours.    TSH:   Lab Results   Component Value Date    TSH 1.290 03/21/2018      Cardiac Injury Profile:   Recent Labs     08/22/20  1949   TROPONINI <0.01      Lipid Profile:   Lab Results   Component Value Date    TRIG 219 08/14/2020    HDL 45 03/21/2018    LDLCALC 85 03/21/2018    CHOL 161 03/21/2018      Hemoglobin A1C: No components found for: HGBA1C   U/A:   Lab Results   Component Value Date    LEUKOCYTESUR Negative 08/13/2020    PHUR 5.0 08/13/2020    WBCUA 0-1 08/13/2020    RBCUA 2-5 08/13/2020    BACTERIA MODERATE 08/13/2020    SPECGRAV 1.015 08/13/2020    BLOODU LARGE 08/13/2020    GLUCOSEU Negative 08/13/2020         ADMISSION SCHEDULED MEDS:   Current Facility-Administered Medications   Medication Dose Route Frequency Provider Last Rate Last Dose    ferrous sulfate (IRON 325) tablet 325 mg  325 mg Oral Daily with breakfast Ryan Santana MD   325 mg at 08/24/20 0810    iron polysaccharides (NIFEREX) capsule 150 mg  1 capsule Oral Daily Ryan Santana MD   150 mg at 08/25/20 0709    NIFEdipine (PROCARDIA XL) extended release tablet 30 mg  30 mg Oral QAM Ryan Santana MD   30 mg at 08/24/20 0810    pantoprazole (PROTONIX) tablet 40 mg  40 mg Oral BID AC Ryan Santana MD   40 mg at 08/25/20 4129    spironolactone (ALDACTONE) tablet 25 mg  25 mg Oral Daily Ryan Santana MD   25 mg at 08/24/20 0809    sucralfate (CARAFATE) tablet 1 g  1 g Oral 4x Daily Ryan Santana MD   1 g at 08/24/20 2043    torsemide (DEMADEX) tablet 20 mg  20 mg Oral Daily Ryan Santana MD   20 mg at 08/24/20 0925    sodium chloride flush 0.9 % injection 10 mL  10 mL Intravenous 2 times per day Robinson Sibley MD   10 mL at 08/24/20 2043    sodium chloride flush 0.9 % injection 10 mL  10 mL Intravenous PRN Robinson Sibley MD   10 mL at 08/25/20 1950    acetaminophen (TYLENOL) tablet 650 mg  650 mg Oral Q6H PRN Robinson Sibley MD        Or    acetaminophen (TYLENOL) suppository 650 mg  650 mg Rectal Q6H PRN Robinson Sibley MD        polyethylene glycol [facility]) packet 17 g  17 g Oral Daily PRN Robinson Sibley MD        promethazine (PHENERGAN) tablet 12.5 mg  12.5 mg Oral Q6H PRN Robinson Sibley MD   12.5 mg at 08/23/20 0906    enoxaparin (LOVENOX) injection 40 mg  40 mg Subcutaneous Daily Robinson Sibley MD        enalapril (VASOTEC) tablet 10 mg  10 mg Oral Daily Robinson Sibley MD   10 mg at 08/24/20 2979    And    hydroCHLOROthiazide (HYDRODIURIL) tablet 25 mg  25 mg Oral Daily Robinson Sibley MD   25 mg at 08/24/20 0809    docusate sodium (COLACE) capsule 100 mg  100 mg Oral Daily Robinson Sibley MD   100 mg at 08/24/20 0810    senna (SENOKOT) tablet 8.6 mg  1 tablet Oral Nightly Robinson Sibley MD   8.6 mg at 08/24/20 2049    metoclopramide (REGLAN) injection 10 mg  10 mg Intravenous Q6H Martin Dumas MD   10 mg at 08/25/20 5814    polyethylene glycol (GLYCOLAX) packet 17 g  17 g Oral Daily Martin Dumas MD   17 g at 08/24/20 0809    sennosides-docusate sodium (SENOKOT-S) 8.6-50 MG tablet 2 tablet  2 tablet Oral Daily PRN Martin Dumas MD        bisacodyl (DULCOLAX) suppository 10 mg  10 mg Rectal Daily PRN Martin Dumas MD        ipratropium-albuterol (DUONEB) nebulizer solution 1 ampule  1 ampule Inhalation Q4H WA Robinson Sibley MD           Current  Infusions      Prn Meds  sodium chloride flush, acetaminophen **OR** acetaminophen, polyethylene glycol, promethazine **OR** [DISCONTINUED] ondansetron, sennosides-docusate sodium, bisacodyl    Radiology Review:  XR ABDOMEN (KUB) (SINGLE AP VIEW)   Final Result      Adynamic ileus. XR CHEST PORTABLE   Final Result      Bilateral pleural effusions and contiguous atelectasis or infiltrate.                   ASSESSMENT:  Active diagnoses treated at this admission:  · Extreme debility and weakness following prolonged hospitalization  · Postoperative anemia  · Anemia secondary to acute GI blood loss  · Bilateral atelectasis  · ileus in resolution  · Multiple electrolytic deficiencies    Problem list:  Patient Active Problem List   Diagnosis    Pneumonia    Acute respiratory failure with hypoxia (HCC)    Acute on chronic diastolic congestive heart failure (HCC)    Cellulitis    Hypokalemia    Acute blood loss anemia    Aortic stenosis, severe    Moderate aortic regurgitation    Mitral regurgitation    Sepsis due to methicillin susceptible Staphylococcus aureus ([facility])    Class 3 obesity in adult    Primary osteoarthritis of right knee    Non-pressure chronic ulcer of right ankle, limited to breakdown of skin ([address]    Non-pressure chronic ulcer of left ankle, limited to breakdown of skin (HCC)    Lymphedema of both lower extremities    Venous ulcer of left lower extremity without varicose veins (HCC)    NORBERTO (acute kidney injury) ([address]    Anemia    HTN (hypertension)    Acute appendicitis    Sepsis ([address]    Hypomagnesemia    Atrial fibrillation, new onset ([address]    Perforated appendicitis    Gastrointestinal hemorrhage    Gastric ulcer    Ileus ([address]    General weakness       PLAN:  Patient is holding her hemoglobin level and doing much better  Potassium again needs supplemented  There is no evidence of ongoing gastrointestinal bleeding  Surgery has signed off  This patient can be transferred to [facility] however we are awaiting placement          See  Orders  Aj Savage MD, FACP  Diplomate, American Board of Internal Medicine  Diplomate, Geriatric Medicine, American Board of Internal Medicine  8:31 AM  8/25/2020

## 2020-08-25 NOTE — CARE COORDINATION
Care Coordination: Spoke with Daughter Nain Rain is plan. Choice #1 Wilmer nieto, # 2 so[facility] gerson # 3 sov michael. Miri Fernandez has bed, click six and will not require precert. Pt is observation. Nursing messaging Dr Gauthier Back to check if okay for dc today. Will check with facility to see if need covid test as well.  Spoke to Daughter and she will be here at 3 pm    Alejandra Duran

## 2020-08-26 PROBLEM — D50.0 ANEMIA DUE TO GI BLOOD LOSS: Status: ACTIVE | Noted: 2020-01-01

## 2020-08-26 PROBLEM — E87.8 ELECTROLYTE DISTURBANCE: Status: ACTIVE | Noted: 2020-01-01

## 2020-08-26 PROBLEM — D72.829 LEUKOCYTOSIS: Status: ACTIVE | Noted: 2020-01-01

## 2020-08-26 NOTE — PROGRESS NOTES
ID consult called to office. Awaiting call back.     ID office returned call; Dr. Ariadna Fowler to see the pt

## 2020-08-26 NOTE — CONSULTS
Ceftriaxone for pneumonia. Seen by Dr. Devante Saenz. Ceftriaxone was changed over to Cefepime for possible lower extremity cellulitis. Wound cultures grew oxidase positive GNR and MSSA. After a couple of days antibiotics were consolidated to Levofloxacin and Doxycycline. Past Surgical History:        Procedure Laterality Date    CARDIAC CATHETERIZATION  01/10/2020    Dr. Haile Rincon  Left and Right Heart Catheterization    LAPAROSCOPIC APPENDECTOMY N/A 8/2/2020    APPENDECTOMY LAPAROSCOPIC performed by Tani Domingo MD at [facility] [address] N/A 8/13/2020    EGD DIAGNOSTIC ONLY performed by Selam Perez MD at [facility] ENDOSCOPY     Current Medications:   Scheduled Meds:   potassium chloride  20 mEq Oral BID WC    ferrous sulfate  325 mg Oral Daily with breakfast    iron polysaccharides  1 capsule Oral Daily    NIFEdipine  30 mg Oral QAM    pantoprazole  40 mg Oral BID AC    spironolactone  25 mg Oral Daily    sucralfate  1 g Oral 4x Daily    torsemide  20 mg Oral Daily    sodium chloride flush  10 mL Intravenous 2 times per day    enoxaparin  40 mg Subcutaneous Daily    enalapril  10 mg Oral Daily    And    hydroCHLOROthiazide  25 mg Oral Daily    docusate sodium  100 mg Oral Daily    senna  1 tablet Oral Nightly    metoclopramide  10 mg Intravenous Q6H    polyethylene glycol  17 g Oral Daily    ipratropium-albuterol  1 ampule Inhalation Q4H WA     Continuous Infusions:  PRN Meds:sodium chloride flush, acetaminophen **OR** acetaminophen, polyethylene glycol, promethazine **OR** [DISCONTINUED] ondansetron, sennosides-docusate sodium, bisacodyl    Allergies:  Patient has no known allergies. Social History:   Social History     Socioeconomic History    Marital status:       Spouse name: None    Number of children: None    Years of education: None    Highest education level: None   Occupational History    None   Social Needs    Financial resource strain: None  Food insecurity     Worry: None     Inability: None    Transportation needs     Medical: None     Non-medical: None   Tobacco Use    Smoking status: Never Smoker    Smokeless tobacco: Never Used   Substance and Sexual Activity    Alcohol use: No    Drug use: No    Sexual activity: None   Lifestyle    Physical activity     Days per week: None     Minutes per session: None    Stress: None   Relationships    Social connections     Talks on phone: None     Gets together: None     Attends Anabaptist service: None     Active member of club or organization: None     Attends meetings of clubs or organizations: None     Relationship status: None    Intimate partner violence     Fear of current or ex partner: None     Emotionally abused: None     Physically abused: None     Forced sexual activity: None   Other Topics Concern    None   Social History Narrative    None      Pets: None  Travel: No  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    Family History:   History reviewed. No pertinent family history. . Otherwise non-pertinent to the chief complaint. REVIEW OF SYSTEMS:    Constitutional: Negative for fevers, chills, diaphoresis  Neurologic: Negative   Psychiatric: Negative  Rheumatologic: Negative   Endocrine: Negative  Hematologic: Negative  Immunologic: Negative  ENT: Negative  Respiratory: Negative   Cardiovascular: Negative  GI: As in the HPI  : Negative  Musculoskeletal: Negative  Skin: No rashes. PHYSICAL EXAM:    Vitals:   /72   Pulse 91   Temp 98.8 °F (37.1 °C) (Infrared)   Resp 16   Ht 5' 5\" (1.651 m)   Wt 179 lb (81.2 kg)   SpO2 95%   BMI 29.79 kg/m²   Constitutional: The patient is awake, alert, and oriented. Lying in bed. No distress. She appears to be fatigued. Skin: Warm and dry. No rashes were noted. HEENT: Eyes show round, and reactive pupils. No jaundice. Moist mucous membranes, no ulcerations, no thrush. Neck: Supple to movements.  No lymphadenopathy. Chest: No use of accessory muscles to breathe. Symmetrical expansion. Auscultation reveals no wheezing, crackles, or rhonchi. Cardiovascular: S1 and S2 are rhythmic and regular. No murmurs appreciated. Abdomen: Distended and tympanitic. Positive bowel sounds. Diffusely uncomfortable but not really tender to palpation. No rebound tenderness. Extremities: Minimal edema. Venous stasis dermatitis bilaterally.   Lines: peripheral      CBC+dif:  Recent Labs     08/24/20  0459 08/25/20  0543 08/26/20  0426   WBC 11.4 14.0* 15.6*   HGB 8.1* 8.0* 8.2*   HCT 25.6* 24.9* 25.5*   MCV 87.7 85.3 85.0    377 366   NEUTROABS 9.80* 12.43* 13.75*     Lab Results   Component Value Date    CRP 4.2 (H) 03/21/2018      No results found for: CRPHS  No results found for: SEDRATE  Lab Results   Component Value Date    ALT 17 08/22/2020    AST 17 08/22/2020    ALKPHOS 188 (H) 08/22/2020    BILITOT 0.2 08/22/2020     Lab Results   Component Value Date     08/26/2020    K 3.0 08/26/2020    CL 97 08/26/2020    CO2 24 08/26/2020    BUN 18 08/26/2020    CREATININE 1.0 08/26/2020    GFRAA >60 08/26/2020    LABGLOM 53 08/26/2020    GLUCOSE 81 08/26/2020    PROT 5.7 08/22/2020    LABALBU 2.2 08/22/2020    CALCIUM 8.2 08/26/2020    BILITOT 0.2 08/22/2020    ALKPHOS 188 08/22/2020    AST 17 08/22/2020    ALT 17 08/22/2020       Lab Results   Component Value Date    PROTIME 13.0 08/12/2020    INR 1.2 08/12/2020       Lab Results   Component Value Date    TSH 1.290 03/21/2018       Lab Results   Component Value Date    COLORU Yellow 08/13/2020    PHUR 5.0 08/13/2020    LABCAST RARE 12/14/2018    WBCUA 0-1 08/13/2020    RBCUA 2-5 08/13/2020    YEAST Present 08/13/2020    BACTERIA MODERATE 08/13/2020    CLARITYU Clear 08/13/2020    SPECGRAV 1.015 08/13/2020    LEUKOCYTESUR Negative 08/13/2020    UROBILINOGEN 0.2 08/13/2020    BILIRUBINUR Negative 08/13/2020    BLOODU LARGE 08/13/2020    GLUCOSEU Negative 08/13/2020 Lab Results   Component Value Date    BE -5.7 08/02/2020    O2SAT 83.2 08/02/2020    PH 7.298 08/02/2020     Radiology:  KUB and chest x-ray reviewed    Microbiology:  Pending  No results for input(s): BC in the last 72 hours. No results for input(s): ORG in the last 72 hours. No results for input(s): [place] in the last 72 hours. No results for input(s): STREPNEUMAGU in the last 72 hours. No results for input(s): LP1UAG in the last 72 hours. No results for input(s): ASO in the last 72 hours. No results for input(s): CULTRESP in the last 72 hours. Recent Labs     08/26/20  1059   PROCAL 0.22*       Assessment:  · History of recent perforated appendix. Status post laparoscopic appendectomy 8/2/2020. Intraoperative cultures grew Klebsiella and alpha Streptococcus  · Postoperative ileus  · Antibiotic associated diarrhea. Possible C. difficile infection with colitis  · Leukocytosis associated to the above    Plan:    · Stool for C. difficile  · CT of the abdomen and pelvis with oral contrast  · Check cultures, baseline ESR, CRP  · Monitor labs  · Will follow with you    Thank you for having us see this patient in consultation. I will be discussing this case with the treating physicians.     Buck Dubin  12:33 PM  8/26/2020

## 2020-08-26 NOTE — CARE COORDINATION
Care Coordination: No return call from attending, no repeat k yesterday. No dc planned. Pt remains over 84 hours observation status planning for discharge to [facility].   Will review labs with attending and check dc disposition today    Sixto Massey

## 2020-08-26 NOTE — PROGRESS NOTES
Yumiko Shay MD, [address]                   Patient Name: Ira Bravo                   Age:  81 y.o. Gender:   female    CC: Extreme weakness inability to walk    HPI: This patient is new to my service. Her history was originally obtained from the emergency room and then confirmed with interview and examination. Apparently yesterday she was discharged from the hospital after 21 days hospitalization for an emergent appendectomy for perforated appendix. She was discharged on after her ride home was unable to get out of the car because of weakness. This morning she confirms the above history. She states she has been having anorexia but no vomiting no abdominal pain. She denies any fevers or chills however her record demonstrates that she had an elevated temperature last p.m. Initial review demonstrates her labs to be satisfactory except her hemoglobin is 7.2 however this is baseline at her discharge as well.   Electrolytes were normal and her sugar was low  Vitals were normal except her temperature one time last p.m. was 100.5  She had a chest x-ray upon admission and it demonstrated bilateral pleural effusions and contiguous atelectasis versus infiltrate      The record of her previous admission was reviewed  There is a detailed chronology  Her [facility] prior to discharge was 15/24 however this was on 8/4  Subsequently the patient had hematemesis and after cardiology review her aspirin and Plavix was held in spite of a TAVR  On 8/21 she was noted to have nausea overnight she was not able to keep anything down she had not had any gas or BMs since the day before and her belly was distended  Apparently she was discharged anyway    She was also followed by cardiology  She was seen on 8/21 and the plan was to hold the Plavix and aspirin for a total of 1 month then restart the Plavix only for the next 2 months    8/24  Patient is awake alert sitting up and eating in a chair  He has been evaluated by surgery and has been treated for AN ileus  He has been given something to promote her bowels  She has been evaluated by cardiology as well    8/25  Patient generally feels okay  She feels her appetite is not good  She has had accelerated bowel movements associated with the provocation provided by surgery  There is no indication of ileus any further  Her potassium and magnesium remain low  She has no specific cardiovascular symptoms  Cardiology has not changed her regimen  Surgery has signed off    8/26  Patient states she feels okay she has no new symptoms  She is not had any further diarrhea had one bowel movement this morning  Potassium is come up to 3.0  Unfortunately her white cell count is climbing  She has no cough or shortness of breath  X-rays did demonstrate atelectasis and pleural effusion  She denies any difficulty breathing  Her oxygen saturation remains good on room air  And there has been no fever    Past Medical History:   Diagnosis Date    Acute blood loss anemia 3/21/2018    Acute on chronic diastolic congestive heart failure ([address] 3/20/2018    Acute respiratory failure with hypoxia ([address] 3/20/2018    Aortic stenosis, severe 03/2018    Arthritis     Cellulitis 3/20/2018    Right and left lower extremity    Class 3 obesity in adult 3/22/2018    History of blood transfusion     Hypertension     Hypokalemia 3/21/2018    Mitral regurgitation 03/2018    Moderate aortic regurgitation 03/2018    Sepsis due to methicillin susceptible Staphylococcus aureus ([address] 3/22/2018       Past Surgical History:   Procedure Laterality Date    CARDIAC CATHETERIZATION  01/10/2020    Dr. Oilverio Schmitt  Left and Right Heart Catheterization    LAPAROSCOPIC APPENDECTOMY N/A 8/2/2020    APPENDECTOMY LAPAROSCOPIC performed by Fredo Alonso MD at [place] [address] ENDOSCOPY N/A 8/13/2020    EGD DIAGNOSTIC ONLY performed by Meera Lemon MD at [address] patient's medical records have been reviewed contingent on availability        Review of Systems:   · General: She currently feels much better  · She has no cardiovascular symptoms      Physical Examination:      Wt Readings from Last 3 Encounters:   08/23/20 179 lb (81.2 kg)   08/22/20 177 lb 4.8 oz (80.4 kg)   03/05/20 162 lb (73.5 kg)     Temp Readings from Last 3 Encounters:   08/26/20 98.8 °F (37.1 °C) (Infrared)   08/22/20 98 °F (36.7 °C) (Temporal)   08/02/20 96.6 °F (35.9 °C)     BP Readings from Last 3 Encounters:   08/26/20 119/72   08/22/20 (!) 121/58   08/13/20 132/61     Pulse Readings from Last 3 Encounters:   08/26/20 91   08/22/20 91   03/08/20 99       General appearance: Normal, awake, alert no distress. Eyes: Conjunctivae/cornea clear. [place]. Sclera non icteric. Conjunctiva somewhat pale  Mouth: Lips and tongue appear normal. Dentition noted  Neck:  Symmetric. No adenopathy. Chest: Even excursion but limited ventilatory excursion  Lungs: Clear to auscultation. No rhonchi, crackles or rales. Basilar hypoventilation  Heart: S1 > S2. Rhythm is regular and rate is normal. No gallop rub there is a 2/6 systolic murmur at the left base  Abdomen: Less protuberant soft bowel sounds are present  Extremities: Severe distal skin changes with edema  Musculoskeletal: As per HPI  Neuro:   · There is no focal neurologic sign or symptom  Mental status: Awake, alert, cognizant of person, place, time.     Patient appears capable of directing self care   Mood: Normal and appropriate affect  Gait & balance: Prior to her initial presentation she was ambulatory     Labs     CBC:   Lab Results   Component Value Date    WBC 15.6 08/26/2020    RBC 3.00 08/26/2020    HGB 8.2 08/26/2020    HCT 25.5 08/26/2020     08/26/2020    MCV 85.0 08/26/2020     BMP:    Lab Results   Component Value Date    NA 138 08/26/2020    K 3.0 08/26/2020    CL 97 08/26/2020    CO2 24 08/26/2020    BUN 18 08/26/2020    CREATININE 1.0 08/26/2020    GLUCOSE 81 08/26/2020    CALCIUM 8.2 08/26/2020     Hepatic Function Panel:    Lab Results   Component Value Date    ALKPHOS 188 08/22/2020    AST 17 08/22/2020    ALT 17 08/22/2020    PROT 5.7 08/22/2020    LABALBU 2.2 08/22/2020    BILITOT 0.2 08/22/2020     Magnesium:    Lab Results   Component Value Date    MG 1.6 08/26/2020     Cardiac Enzymes:   Lab Results   Component Value Date    TROPONINI <0.01 08/22/2020    TROPONINI <0.01 03/04/2020    TROPONINI <0.01 12/14/2018     LDH:    Lab Results   Component Value Date     03/06/2020     PT/INR:    Lab Results   Component Value Date    PROTIME 13.0 08/12/2020    INR 1.2 08/12/2020     BNP: No results for input(s): BNP in the last 72 hours. TSH:   Lab Results   Component Value Date    TSH 1.290 03/21/2018      Cardiac Injury Profile:   No results for input(s): CKTOTAL, CKMB, CKMBINDEX, TROPONINI in the last 72 hours.    Lipid Profile:   Lab Results   Component Value Date    TRIG 219 08/14/2020    HDL 45 03/21/2018    LDLCALC 85 03/21/2018    CHOL 161 03/21/2018      Hemoglobin A1C: No components found for: HGBA1C   U/A:   Lab Results   Component Value Date    LEUKOCYTESUR Negative 08/13/2020    PHUR 5.0 08/13/2020    WBCUA 0-1 08/13/2020    RBCUA 2-5 08/13/2020    BACTERIA MODERATE 08/13/2020    SPECGRAV 1.015 08/13/2020    BLOODU LARGE 08/13/2020    GLUCOSEU Negative 08/13/2020         ADMISSION SCHEDULED MEDS:   Current Facility-Administered Medications   Medication Dose Route Frequency Provider Last Rate Last Dose    potassium chloride (KLOR-CON M) extended release tablet 20 mEq  20 mEq Oral BID  Marcelo Washington MD   20 mEq at 08/26/20 5094    ferrous sulfate (IRON 325) tablet 325 mg  325 mg Oral Daily with breakfast Marcelo Washington MD   325 mg at 08/26/20 0835    iron polysaccharides (NIFEREX) capsule 150 mg  1 capsule Oral Daily Lula Andino MD   150 mg at 08/26/20 7042    NIFEdipine (PROCARDIA XL) extended release tablet 30 mg  30 mg Oral QAM Lula Andino MD   30 mg at 08/26/20 0835    pantoprazole (PROTONIX) tablet 40 mg  40 mg Oral BID AC Lula Andino MD   40 mg at 08/26/20 8189    spironolactone (ALDACTONE) tablet 25 mg  25 mg Oral Daily Lula Andino MD   25 mg at 08/26/20 0835    sucralfate (CARAFATE) tablet 1 g  1 g Oral 4x Daily Lula Andino MD   1 g at 08/26/20 0835    torsemide (DEMADEX) tablet 20 mg  20 mg Oral Daily Lula Andino MD   20 mg at 08/26/20 0835    sodium chloride flush 0.9 % injection 10 mL  10 mL Intravenous 2 times per day Lula Andino MD   10 mL at 08/26/20 0847    sodium chloride flush 0.9 % injection 10 mL  10 mL Intravenous PRN Lula Andino MD   10 mL at 08/25/20 1754    acetaminophen (TYLENOL) tablet 650 mg  650 mg Oral Q6H PRN Lula Andino MD        Or   [facility] acetaminophen (TYLENOL) suppository 650 mg  650 mg Rectal Q6H PRN Lula Andino MD        polyethylene glycol [facility]) packet 17 g  17 g Oral Daily PRN Lula Andino MD        promethazine (PHENERGAN) tablet 12.5 mg  12.5 mg Oral Q6H PRN Lula Andino MD   12.5 mg at 08/23/20 0906    enoxaparin (LOVENOX) injection 40 mg  40 mg Subcutaneous Daily Lula Andino MD   Stopped at 08/25/20 0856    enalapril (VASOTEC) tablet 10 mg  10 mg Oral Daily Lula Andino MD   10 mg at 08/26/20 2605    And    hydroCHLOROthiazide (HYDRODIURIL) tablet 25 mg  25 mg Oral Daily Lula Andino MD   25 mg at 08/26/20 0835    docusate sodium (COLACE) capsule 100 mg  100 mg Oral Daily Lula Andino MD   100 mg at 08/26/20 6000    senna (SENOKOT) tablet 8.6 mg  1 tablet Oral Nightly Lula Andino MD   8.6 mg at 08/24/20 2049    metoclopramide (REGLAN) injection 10 mg  10 mg Intravenous Q6H Alexis Elena MD   10 mg at 08/26/20 0615    polyethylene glycol (GLYCOLAX) packet 17 g 17 g Oral Daily Bear Marcano MD   17 g at 08/24/20 0809    sennosides-docusate sodium (SENOKOT-S) 8.6-50 MG tablet 2 tablet  2 tablet Oral Daily PRN Bear Marcano MD        bisacodyl (DULCOLAX) suppository 10 mg  10 mg Rectal Daily PRN Bear Marcano MD        ipratropium-albuterol (DUONEB) nebulizer solution 1 ampule  1 ampule Inhalation Q4H SMITHA Benito MD   1 ampule at 08/26/20 0825       Current  Infusions      Prn Meds  sodium chloride flush, acetaminophen **OR** acetaminophen, polyethylene glycol, promethazine **OR** [DISCONTINUED] ondansetron, sennosides-docusate sodium, bisacodyl    Radiology Review:  XR ABDOMEN (KUB) (SINGLE AP VIEW)   Final Result      Adynamic ileus. XR CHEST PORTABLE   Final Result      Bilateral pleural effusions and contiguous atelectasis or infiltrate.                   ASSESSMENT:  Active diagnoses treated at this admission:  · Increasing leukocytosis  · Extreme debility and weakness following prolonged hospitalization  · Postoperative anemia  · Anemia secondary to acute GI blood loss  · Bilateral atelectasis  · ileus in resolution  · Multiple electrolytic deficiencies    Problem list:  Patient Active Problem List   Diagnosis    Pneumonia    Acute respiratory failure with hypoxia (HCC)    Acute on chronic diastolic congestive heart failure (HCC)    Cellulitis    Hypokalemia    Acute blood loss anemia    Aortic stenosis, severe    Moderate aortic regurgitation    Mitral regurgitation    Sepsis due to methicillin susceptible Staphylococcus aureus (HCC)    Class 3 obesity in adult    Primary osteoarthritis of right knee    Non-pressure chronic ulcer of right ankle, limited to breakdown of skin ([address]    Non-pressure chronic ulcer of left ankle, limited to breakdown of skin ([address]    Lymphedema of both lower extremities    Venous ulcer of left lower extremity without varicose veins (HCC)    NORBERTO (acute kidney injury) ([address]    Anemia    HTN (hypertension)    Acute appendicitis    [facility]is [facility]    Hypomagnesemia    Atrial fibrillation, new onset (HCC)    Perforated appendicitis    Gastrointestinal hemorrhage    Gastric ulcer    Ileus (HCC)    General weakness       PLAN:  Although this patient appears to be clinically stable at this time her white cell count is increasing  There is no specific source of infection that can be seen at this time  She has been given incentive spirometry however it is not clear if she has been using it  Blood cultures are negative and the urine was clear  Her wound was not determined to be infected  Bowel function has been restored    At this time we will transition her to a general floor and request infectious disease to render an opinion          See  Orders  Car Colvin MD, Tom Maldonado, American Board of Internal Medicine  Diplomate, Geriatric Medicine, American Board of Internal Medicine  9:30 AM  8/26/2020

## 2020-08-26 NOTE — CARE COORDINATION
Care Coordination: Dr Olsen Fallow on unit rounding, okay for pt to dc to [facility] today.   Awaiting orders    Rip Ray

## 2020-08-26 NOTE — PROGRESS NOTES
Occupational Therapy  OCCUPATIONAL THERAPY INITIAL EVALUATION      Date:2020  Patient Name: Jhonny Cosby  MRN: [id number]  : 1935  Room: [address] [place] [address]-A    Referring Provider:  Sofia Seay MD     Evaluating OT: Fátima Rubio OTR/L #[id number]     AM-PAC Daily Activity Raw Score: 15    Recommended Adaptive Equipment:  TBD     Diagnosis: Weakness   Patient presented to the hospital for weakness; pt discharged earlier in the day, unable to ambulate due to weakness.   Previous hospitalization including 21 day stay, with emergent appendectomy for perforated appendix 20     Surgery:  emergent appendectomy for perforated appendix 20     Pertinent Medical History: HTN, acute respiratory failure with hypoxia, cellulitis, hypokalemia, aortic stenosis, acute on chronic diastolic CHF, mitral regurgitation, arthritis    Precautions:  Falls     Home Living: Pt lives with daughter in a 2 story home with 1st floor set-up;  3 OSIRIS and 1 hand rail   Bathroom setup: 1/2 bath on 1st floor; pt reports sponge bathing   Equipment owned: cane, rollator    Prior Level of Function: Independent  with ADLs , mod I with IADLs; ambulated with cane   Occupation: na    Pain Level: Pt denies pain this session    Cognition: A&O: 4/4; Follows 1-2 step directions   Memory:  fair   Sequencing:  fair   Problem solving:  fair   Judgement/safety:  Fair-     Functional Assessment:   Initial Eval Status  Date: 20 Treatment Status  Date: Short Term Goals = Long Term Goals  Treatment frequency: 1-4x/wk; PRN   Feeding Independent       Grooming Minimal Assist     Standing  Supervision   standing    UB Dressing Minimal Assist     Bryant dunne; assist around back and threading UE  Modified [place]    LB Dressing Maximal Assist   Minimal Assist    Bathing Maximal Assist  Minimal Assist    Toileting Dependent     Incontinent of BM; dependent for hygiene   Minimal Assist    Bed Mobility  Supine to sit: NT   Sit to supine: NT   Supine to sit: Stand by Assist    Sit to supine: Stand by Assist    Functional Transfers Moderate Assist   Stand by Assist    Functional Mobility Moderate Assist     Short ambulation tasks in room with w/w; cuing on posture, w/w management and safety; limited by fatigue   Stand by Assist    Balance Sitting:     Static:  SBA    Dynamic:SBA  Standing: min/mod A (at w/w; bilateral  vs unilateral support)     Activity Tolerance F-    Light activity; limited due to weakness, required frequent rest breaks  F+   Visual/  Perceptual Glasses: yes  wfl                  Hand dominance: right      Strength ROM Additional Info:    RUE  3+/5 Shoulder limited  Elbow wfl Fair  and fair FMC/dexterity noted during ADL tasks     LUE 3+/5 Shoulder limited  Elbow wfl fair  and fair FMC/dexterity noted during ADL tasks   Formal MMT deferred due to appendicectomy 8/2/20; however strength observed through functional activities  Hearing: wfl  Sensation: Pt denies numbness and tingling   Tone: wfl  Edema:none noted                             Comments: Upon arrival, patient seated in chair and agreeable to OT session. Pt demonstrating fair understanding of education/techniques, requiring additional training / education. At end of session, patient seated in chair with call light and phone within reach, all lines intact. Pt would benefit from continued skilled OT to increase safety,  independence and quality of life. Treatment:  OT services provided: Facilitation of functional transfers (various surfaces: bed, chair), standing tolerance tasks (addressing posture, balance and activity tolerance at w/w) and short functional ambulation tasks with w/w (cuing on posture, w/w management and safety) - skilled cuing on sequencing, hand placement, posture, body mechanics, energy conservation techniques and safety.   Therapist facilitated self-care retraining: UB/LB self-care tasks (robe, socks), toileting hygiene task and standing / seated grooming How much help for putting on and taking off regular lower body clothing?: A Lot  How much help for Bathing?: A Lot  How much help for Toileting?: Total  How much help for putting on and taking off regular upper body clothing?: A Little  How much help for taking care of personal grooming?: A Little  How much help for eating meals?: None  AM-PAC Inpatient Daily Activity Raw Score: 15  AM-PAC Inpatient ADL T-Scale Score : 34.69  ADL Inpatient CMS 0-100% Score: 56.46  ADL Inpatient CMS G-Code Modifier : CK       mod Evaluation +      Treatment Time In:1019            Treatment Time Out: 3423              Treatment Charges: Mins Units   Ther Ex  60756     Manual Therapy 78953     Thera Activities 59056 6    ADL/Home Mgt 57004 9 1   Neuro Re-ed 63677     Group Therapy      Orthotic manage/training  58726     Non-Billable Time     Total Timed Treatment 15 1             [facility] OTR/L #[id number]

## 2020-08-27 NOTE — PROGRESS NOTES
Comprehensive Nutrition Assessment    Type and Reason for Visit:  Reassess    Nutrition Recommendations/Plan: Continue Current Diet, Start Oral Nutrition Supplement    Nutrition Assessment:  Pt status remains unchanged w/ overall poor po intake during prolonged hospital stay s/p lap appy complicated by GIB/ ileus. Previous TPN. Current N/V noted. Will add Ensure Clear TID & continue to monitor. Malnutrition Assessment:  Malnutrition Status:  Insufficient data    Context:  Acute Illness     Findings of the 6 clinical characteristics of malnutrition:  Energy Intake:  50% or less of estimated energy requirements for 5 or more days  Weight Loss:  Unable to assess(d/t likely fluid-related wt gain)     Body Fat Loss:  Unable to assess(d/t lack of proper PPE)   Muscle Mass Loss:  Unable to assess  Fluid Accumulation:  No significant fluid accumulation   Strength:  Not Performed    Estimated Daily Nutrient Needs:  Energy (kcal):  7284-9147; Weight Used for Energy Requirements:  Current     Protein (g):  75-85; Weight Used for Protein Requirements:  Ideal(1.3-1.5)        Fluid (ml/day):  4450-6959; Weight Used for Fluid Requirements:  Current      Nutrition Related Findings:  A&Ox4, -I/O's 10L, +2 edema, hypoactive BS, noted post-op ileus resolved, large hernia,      Wounds:  Multiple, Open Wounds, Surgical Wound       Current Nutrition Therapies:    DIET CARDIAC; Anthropometric Measures:  · Height: 5' 5\" (165.1 cm)  · Current Body Weight: 179 lb (81.2 kg)(8/23 actual)   · Usual Body Weight: 163 lb (73.9 kg)(measured per EMR 8/2/20)     · Ideal Body Weight: 125 lbs; % Ideal Body Weight 143.2 %   · BMI: 29.8 BMI Categories: Overweight (BMI 25.0-29. 9)       Nutrition Diagnosis:   · Inadequate oral intake related to altered GI function(post op ileus) as evidenced by intake 0-25%, poor intake prior to admission      Nutrition Interventions:   Nutrition Education/Counseling:  Education not indicated   Coordination

## 2020-08-27 NOTE — PROGRESS NOTES
Brandt Dominguez NP with surgery notified Dr. Lacey Teixeira request the pt to be re-evaluated by surgery d/t pt c/o lower abd pain.

## 2020-08-27 NOTE — PROGRESS NOTES
Pt just vomited approx 250mL of liquid brown stool. Will contact surgical resident who just saw pt and is ordering a KUB.

## 2020-08-27 NOTE — PLAN OF CARE
Problem: Skin Integrity:  Goal: Will show no infection signs and symptoms  Description: Will show no infection signs and symptoms  Outcome: Met This Shift     Problem: Skin Integrity:  Goal: Absence of new skin breakdown  Description: Absence of new skin breakdown  Outcome: Met This Shift     Problem: Falls - Risk of:  Goal: Will remain free from falls  Description: Will remain free from falls  Outcome: Met This Shift

## 2020-08-27 NOTE — PROGRESS NOTES
Flushed urinary catheter with 40cc sterile saline x2 with approx 100cc return. No resistance met with flushes.

## 2020-08-27 NOTE — PROGRESS NOTES
Called by radiology regarding KUB results. Upon review, CT scan recommended to assess for inflammation in RLQ. CT scan ordered.     Minor Montes MD, FACS  8/27/2020  7:24 PM

## 2020-08-27 NOTE — PROGRESS NOTES
[address] [place] [address] [facility]  EVAN  Progress Note    SUBJECTIVE:  Chief Complaint   Patient presents with    Fatigue     dc 2 hours ago from hospital. pt is too weak to get out of the car and into the house    Leg Swelling     bilateral feet swelling     The patient still having lower abdominal pain. No diarrhea. No nausea or vomiting. No fevers. Review of systems:  As stated above in the chief complaint, otherwise negative. Medications:  Scheduled Meds:   potassium chloride  20 mEq Oral BID WC    ferrous sulfate  325 mg Oral Daily with breakfast    iron polysaccharides  1 capsule Oral Daily    NIFEdipine  30 mg Oral QAM    pantoprazole  40 mg Oral BID AC    spironolactone  25 mg Oral Daily    sucralfate  1 g Oral 4x Daily    torsemide  20 mg Oral Daily    sodium chloride flush  10 mL Intravenous 2 times per day    enoxaparin  40 mg Subcutaneous Daily    enalapril  10 mg Oral Daily    And    hydroCHLOROthiazide  25 mg Oral Daily    docusate sodium  100 mg Oral Daily    senna  1 tablet Oral Nightly    metoclopramide  10 mg Intravenous Q6H    polyethylene glycol  17 g Oral Daily    ipratropium-albuterol  1 ampule Inhalation Q4H WA     Continuous Infusions:  PRN Meds:sodium chloride flush, acetaminophen **OR** acetaminophen, polyethylene glycol, promethazine **OR** [DISCONTINUED] ondansetron, sennosides-docusate sodium, bisacodyl    OBJECTIVE:  BP (!) 113/54   Pulse 91   Temp 98.1 °F (36.7 °C) (Oral)   Resp 16   Ht 5' 5\" (1.651 m)   Wt 179 lb (81.2 kg)   SpO2 96%   BMI 29.79 kg/m²   Temp  Av.3 °F (36.8 °C)  Min: 98.1 °F (36.7 °C)  Max: 98.8 °F (37.1 °C)  Constitutional: The patient is awake, alert, and oriented. Sitting on the side of the bed. Daughter present. Skin: Warm and dry. No rashes were noted. HEENT: Round and reactive pupils. Moist mucous membranes. No ulcerations or thrush. Neck: Supple to movements.    Chest: No use of accessory muscles to

## 2020-08-28 PROBLEM — K55.1 MESENTERIC ARTERY STENOSIS (HCC): Chronic | Status: ACTIVE | Noted: 2020-01-01

## 2020-08-28 NOTE — PROCEDURES
situation. Patient identity confirmed: arm band  Time out: Immediately prior to procedure a \"time out\" was called to verify the correct patient, procedure, equipment, support staff and site/side marked as required. Indications: vascular access  Anesthesia: local infiltration    Anesthesia:  Local Anesthetic: lidocaine 1% without epinephrine  Anesthetic total: 5 mL  Preparation: skin prepped with 2% chlorhexidine  Skin prep agent dried: skin prep agent completely dried prior to procedure  Sterile barriers: all five maximum sterile barriers used - cap, mask, sterile gown, sterile gloves, and large sterile sheet  Hand hygiene: hand hygiene performed prior to central venous catheter insertion  Location details: right subclavian  Patient position: Trendelenburg  Catheter type: triple lumen  Catheter size: 7 Fr  Pre-procedure: landmarks identified  Ultrasound guidance: no  Number of attempts: 1  Successful placement: yes  Post-procedure: line sutured and dressing applied  Assessment: blood return through all ports,  free fluid flow,  placement verified by x-ray and no pneumothorax on x-ray  Patient tolerance: Patient tolerated the procedure well with no immediate complications  Comments: Dr. Marguerite Pierson was present for the procedure.            Conor Looney MD  8/28/2020

## 2020-08-28 NOTE — FLOWSHEET NOTE
Notified via lab of critical results of CO2, as well as a BGL of 16.  1 amp D50 administered per Dr Pineda [facility] order.

## 2020-08-28 NOTE — DISCHARGE INSTR - COC
Continuity of Care Form    Patient Name: Yevgeniy Everett   :  35  MRN:  [id number]    Admit date:  2020  Discharge date:  ***    Code Status Order: Full Code   Advance Directives:   [facility] Documentation     Date/Time Healthcare Directive Type of Healthcare Directive Copy in [facility] [address] Agent's Name Healthcare Agent's Phone Number    20 0033  No, patient does not have an advance directive for healthcare treatment -- -- -- -- --          Admitting Physician:  Vilma Manzanares MD  PCP: Heaven Dickinson MD    Discharging Nurse: [facility] Unit/Room#: 3810/3810-A  Discharging Unit Phone Number: ***    Emergency Contact:   Extended Emergency Contact Information  Primary Emergency Contact: [address] [facility],[address] of [address] [place] [address] Phone: [phone]  Relation: Child  [facility] [address] [facility] [facility] Phone: [phone]  Relation: Child    Past Surgical History:  Past Surgical History:   Procedure Laterality Date    CARDIAC CATHETERIZATION  01/10/2020    Dr. Doll   Left and Right Heart Catheterization    LAPAROSCOPIC APPENDECTOMY N/A 2020    APPENDECTOMY LAPAROSCOPIC performed by Ravindra Post MD at [facility] 139 N/A 2020    EGD DIAGNOSTIC ONLY performed by Myron Lowry MD at [facility] History: There is no immunization history on file for this patient.     Active Problems:  Patient Active Problem List   Diagnosis Code    Pneumonia J18.9    Acute respiratory failure with hypoxia (HCC) J96.01    Acute on chronic diastolic congestive heart failure (HCC) I50.33    Cellulitis L03.90    Hypokalemia E87.6    Acute blood loss anemia D62    Aortic stenosis, severe I35.0    Moderate aortic regurgitation I35.1    Mitral regurgitation I34.0    Sepsis due to methicillin susceptible Staphylococcus aureus (HCC) A41.01    Class 3 obesity in adult [id number]    Primary osteoarthritis of right knee M17.11    Non-pressure chronic ulcer of right ankle, limited to breakdown of skin ([address] L97.311    Non-pressure chronic ulcer of left ankle, limited to breakdown of skin ([address] L97.321    Lymphedema of both lower extremities I89.0    Venous ulcer of left lower extremity without varicose veins (HCC) I87.2, L97.929    NORBERTO (acute kidney injury) ([address] N17.9    Anemia due to GI blood loss D50.0    HTN (hypertension) I10    Acute appendicitis K35.80    Sepsis ([facility]) A41.9    Hypomagnesemia E83.42    Atrial fibrillation, new onset ([facility]) I48.91    Perforated appendicitis K35.32    Gastrointestinal hemorrhage K92.2    Gastric ulcer K25.9    Ileus ([facility]) K56.7    General weakness R53.1    Leukocytosis D72.829    Electrolyte disturbance E87.8       Isolation/Infection:   Isolation          No Isolation        Patient Infection Status     Infection Onset Added Last Indicated Last Indicated By Review Planned Expiration Resolved Resolved By    None active    Resolved    COVID-19 Rule Out 08/26/20 08/26/20 08/26/20 Covid-19 Ambulatory (Ordered)   08/27/20 Rule-Out Test Resulted    C-diff Rule Out 08/13/20 08/13/20 08/13/20 Clostridium difficile EIA (Ordered)   08/14/20 April Kirk RN    C-diff Rule Out 08/12/20 08/12/20 08/12/20 CLOSTRIDIUM DIFFICILE EIA (Ordered)   08/13/20 April Kirk RN    Discontinued by RN/physician.     COVID-19 Rule Out 07/15/20 07/15/20 07/15/20 Covid-19 Ambulatory (Ordered)   07/17/20 Rule-Out Test Resulted          Nurse Assessment:  Last Vital Signs: /65   Pulse 84   Temp 97.2 °F (36.2 °C) (Axillary)   Resp 24   Ht 5' 5\" (1.651 m)   Wt 179 lb (81.2 kg)   SpO2 99%   BMI 29.79 kg/m²     Last documented pain score (0-10 scale): Pain Level: 0(denies pain)  Last Weight:   Wt Readings from Last 1 Encounters:   08/23/20 179 lb (81.2 kg)     Mental Status:  {IP PT MENTAL STATUS:20030}    IV Access:  [facility] None 20 0029   Margins Attached edges 20 0029   Yellow%Wound Bed 100 20 2330   Number of days: 5        Elimination:  Continence:   · Bowel: {YES / AF:28440}  · Bladder: {YES / GHANSHYAM:31478}  Urinary Catheter: {Urinary Catheter:775418777}   Colostomy/Ileostomy/Ileal Conduit: {YES / RS:}       Date of Last BM: ***    Intake/Output Summary (Last 24 hours) at 2020 0801  Last data filed at 2020 0709  Gross per 24 hour   Intake 6457 ml   Output 1420 ml   Net 5037 ml     I/O last 3 completed shifts:   In: 0454 [P.O.:360; I.V.:2097; IV Piggyback:3000]  Out: 1448 [Urine:120; Emesis/NG output:1300]    Safety Concerns:     [address] Safety Concerns:363673068}    Impairments/Disabilities:      [address] Impairments/Disabilities:294995011}    Nutrition Therapy:  Current Nutrition Therapy:   [address] Diet List:625859101}    Routes of Feeding: {CHP DME Other Feedings:839331502}  Liquids: {Slp liquid thickness:25944}  Daily Fluid Restriction: {CHP DME Yes amt example:085319894}  Last Modified Barium Swallow with Video (Video Swallowing Test): {Done Not Done WSYX:971286800}    Treatments at the Time of Hospital Discharge:   Respiratory Treatments: ***  Oxygen Therapy:  {Therapy; copd oxygen:77684}  Ventilator:    { CC Vent WPMO:252534731}    Rehab Therapies: {THERAPEUTIC INTERVENTION:7554184033}  Weight Bearing Status/Restrictions: [address] [employer] Weight Bearin}  Other Medical Equipment (for information only, NOT a DME order):  {EQUIPMENT:980431916}  Other Treatments: ***    Patient's personal belongings (please select all that are sent with patient):  {CHP DME Belongings:815588633}    RN SIGNATURE:  {Esignature:089887466}    CASE MANAGEMENT/SOCIAL WORK SECTION    Inpatient Status Date: ***    Readmission Risk Assessment Score:  Readmission Risk              Risk of Unplanned Readmission:        32           Discharging to Facility/ Agency   · Name: [facility]  · Address:  · Phone:  · Fax:    Dialysis Facility (if applicable)   · Name:  · Address:  · Dialysis Schedule:  · Phone:  · Fax:    / signature: Electronically signed by VANESSA Torres on 8/28/2020 at 8:01 AM      PHYSICIAN SECTION    Prognosis: {Prognosis:3992938814}    Condition at Discharge: [place] Patient Condition:317602594}    Rehab Potential (if transferring to Rehab): {Prognosis:3070620649}    Recommended Labs or Other Treatments After Discharge: ***    Physician Certification: I certify the above information and transfer of Sejal [place]  is necessary for the continuing treatment of the diagnosis listed and that she requires [place] for less 30 days.      Update Admission H&P: [facility]}    PHYSICIAN SIGNATURE:  {Esignature:096993946}

## 2020-08-28 NOTE — SIGNIFICANT EVENT
Rapid Response Team Note  Date of event: 8/28/2020   Time of event: 89:35  Lynn Boys 81y.o. year old female   YOB: 1935   Admit date:  8/22/2020   Location: 3810/3810-A   Witnessed? : [x]Yes  [] No  Monitored? : [x]Yes  [] No  Code status: [x] Full  [] DNR-CCA  []DNR-CC  ______________________________________________________________________  Reason for RRT:    [] RR < 8     [x] RR > 28   [x] SpO2 <90%   [] HR < 40 bpm   [] HR > 130 bpm  [] SBP < 90 mmHg    [] SpO2 <90%   [] LOC   [] Seizures    [] Significant Bleeding Event    [] Other:     Subjective:   CTSP regarding the above event, Patient is in acute resp distress with abdominal breathing shallow and tachypneic while using accessory muscles. Spo2 78% on RA applied 3L N/C with no improvement. Applied 15 NRB with Spo2 only 82% increased NC to 6L. Patient was complaining of diffuse abdominal pain with no BM since this morning per nursing staff, NG tube placed in with >500 cc brownish drainage, her abdomin was distended and she winces on palpation priscilla. RLQ. She  was just discharged yesterday after her 3-week hospital course for perforated appendix s/p laparoscopic appendectomy on 8/2/2020.  Postop course was complicated by GI bleed requiring EGD on 8/13.  Found to have diffuse gastritis and duodenitis and placed on a PPI twice daily, Carafate, peptic ulcer diet.  Also complicated by prolonged ileus requiring TPN. KUB later this PM shows signs of jejunal dilatation, causes can be between partial small bowel obstruction versus a adynamic ileus. She had CT abd on 8/26 also concerns for mild diverticulitis.   Recommended repeat CT abdomin W oral contrast.        Objective:   Vital signs: Temp: 98.2/BP: 84/50/RR: 38/HR: 92  Initial Condition:  Conscious   [x] Yes  [] No     Breathing [x] Yes  [] No     Pulse  [x] Yes  [] No    Airway:   [] Open/ Clear     Intervention: [] None  [x] Pooled secretions     [] Suctioned  [] Stridor      [] Intubation    Lungs:   [] Symmetrical chest rise/ CTABL Intervention: [] None  [x] Use of accessory muscles    [] NIV (CPAP/BiPAP)  [] Cyanosis      [x] Nasal Oxygen/Mask  [] Wheezing       [x] ABG             [x] CXR  [] Other:     Circulation:   Rhythm:  [x] Sinus [] Other:  Intervention: [] None            [] IV Access  [] Peripheral              [] Central            [] EKG            [] Cardioversion            [] Defibrillation     Capillary Refill:  [] > 2 seconds [x] < 2 seconds    Neurologic:   [] NIHSS      [] Pupillary Response:     Response to pain:   [x] Yes  [] No  Follow commands:  [x] Yes  [] No  Facial asymmetry:  [] Yes  [x] No  Motor strength:  [x] Equal  [] Focal deficit:   Diagnostic Test:  Blood Sugar:  99 mg/dL  EKG:      ABG:      Lab Results   Component Value Date    PH 7.202 08/27/2020    PCO2 30.8 08/27/2020    PO2 169.4 08/27/2020    HCO3 11.8 08/27/2020    O2SAT 98.6 08/27/2020     Medication(s) Given:  []  Narcan (Naloxone)   []  Romazicon (Flumazenil)    []  Breathing Treatment    []  Steroids (Prednisone, Solu-Medrol)  []  Adenosine  [] Cardiac Medicines:     Infusion(s):   [] Normal Saline    Amount:  cc/hr      [] Lactate Ringers      [] Other:     Imaging:   [x] CXR:   [] Normal         [] Pneumothorax         [] Pulmonary Edema  [] Infiltrate          [] CT Head  [] Normal          [] ICB          [] [facility]          [] Other:     Laboratory Tests:     CBC with Differential:    Lab Results   Component Value Date    WBC 14.2 08/28/2020    RBC 2.99 08/28/2020    HGB 8.2 08/28/2020    HCT 27.1 08/28/2020     08/28/2020    MCV 90.6 08/28/2020    MCH 27.4 08/28/2020    MCHC 30.3 08/28/2020    RDW 17.7 08/28/2020    LYMPHOPCT 4.4 08/27/2020    MONOPCT 2.2 08/27/2020    MYELOPCT 0.9 08/13/2020    BASOPCT 0.6 08/27/2020    MONOSABS 0.41 08/27/2020    LYMPHSABS 0.81 08/27/2020    EOSABS 0.15 08/27/2020    BASOSABS 0.11 08/27/2020     CMP:    Lab Results   Component Value Date     08/28/2020    K 4.3 08/28/2020    CL 95 08/28/2020    CO2 9 08/28/2020    BUN 35 08/28/2020    CREATININE 1.6 08/28/2020    GFRAA 37 08/28/2020    LABGLOM 31 08/28/2020    GLUCOSE 13 08/28/2020    PROT 4.2 08/28/2020    LABALBU 1.8 08/28/2020    CALCIUM 8.0 08/28/2020    BILITOT 0.5 08/28/2020    ALKPHOS 145 08/28/2020    AST 1,935 08/28/2020     08/28/2020     Magnesium:    Lab Results   Component Value Date    MG 1.8 08/28/2020     Phosphorus:    Lab Results   Component Value Date    PHOS 5.6 08/28/2020     Troponin:    Lab Results   Component Value Date    TROPONINI 0.03 08/27/2020         Teams Assessment and Plan:         ?Partial small bowel obstruction versus a adynamic ileus versus diverticulitis, STAT CXR concerns for aspiration PNA, bloody NG suctioning also concerns for active GIB. Discussed with Dr Janak Barraza and surgical resident at bedside, they will admit patient to surgical ICU.      Disposition:  [] No transfer   [] Transfer to monitor floor  [x] Transfer to: [] MICU [] NICU [] CCU [x] SICU    Patients family updated: [] Yes  [] No   Discussed with:  [x] Critical Care Intensivist: Dr. Janak Barraza       [] Primary Care Provider: Chad Busby MD      [] Other:?    Dewain English   12:02 AM  Attending Physician:Dr Delacruz

## 2020-08-28 NOTE — FLOWSHEET NOTE
Pt will reach for lines, tubes, and equipment and fails to redirect to verbal commands. Restraints secured for patient safety.

## 2020-08-28 NOTE — PROGRESS NOTES
[facility]  PROGRESS NOTE  ATTENDING NOTE    CRITICAL CARE    Chief Complaint   Patient presents with    Fatigue     dc 2 hours ago from hospital. pt is too weak to get out of the car and into the house    Leg Swelling     bilateral feet swelling       HPI  Will Pelletier a 81 y. o. female who was just discharged yesterday after her 3-week hospital course for perforated appendix s/p laparoscopic appendectomy on 8/2/2020.  Postop course was complicated by GI bleed requiring EGD on 8/13.  Found to have diffuse gastritis and duodenitis and placed on a PPI twice daily, Carafate, peptic ulcer diet.  Also complicated by prolonged ileus requiring TPN.  Patient represents with weakness and persistent nausea. Patient denies nausesa and vomiting. She did have a BM the day of DC and was tolerating a diet. She was offered evaluation to go to a rehab facility but both her and her daughter did not wasn't her to go to a rehab facility and wanted home with Vasu . When she got home and was trying to get out of the car she was very weak and the daughter did not realize how weak she was and therefore brought her back to the hospital. Both her and her daughter would like her to go to a rehab now.      Patient Active Problem List   Diagnosis    Pneumonia    Acute respiratory failure with hypoxia ([address]    Acute on chronic diastolic congestive heart failure (HCC)    Cellulitis    Hypokalemia    Acute blood loss anemia    Aortic stenosis, severe    Moderate aortic regurgitation    Mitral regurgitation    Sepsis due to methicillin susceptible Staphylococcus aureus (HCC)    Class 3 obesity in adult    Primary osteoarthritis of right knee    Non-pressure chronic ulcer of right ankle, limited to breakdown of skin ([address]    Non-pressure chronic ulcer of left ankle, limited to breakdown of skin ([address]    Lymphedema of both lower extremities    Venous ulcer of left lower extremity without varicose veins ([address]  [facility]) [facility]    Anemia due to GI blood loss    HTN (hypertension)    Acute appendicitis    Sepsis ([facility]    Hypomagnesemia    Atrial fibrillation, new onset (HCC)    Perforated appendicitis    Gastrointestinal hemorrhage    Gastric ulcer    Ileus (HCC)    General weakness    Leukocytosis    Electrolyte disturbance       OVERNIGHT EVENTS:  Admitted to SICU, intubated, bronchoscopy    HOSPITAL COURSE:  8/27:  Admitted to SICU, bronchoscopy, intubated, right IJ CVC, art line  8/28:  HD cath for CVVHD    /65   Pulse 84   Temp 97.2 °F (36.2 °C) (Axillary)   Resp 24   Ht 5' 5\" (1.651 m)   Wt 179 lb (81.2 kg)   SpO2 99%   BMI 29.79 kg/m²   Physical Exam  Constitutional:       Appearance: She is ill-appearing. HENT:      Head: Normocephalic and atraumatic. Nose: Nose normal.      Mouth/Throat:      Mouth: Mucous membranes are dry. Eyes:      Conjunctiva/sclera: Conjunctivae normal.      Pupils: Pupils are equal, round, and reactive to light. Neck:      Musculoskeletal: Normal range of motion and neck supple. Cardiovascular:      Rate and Rhythm: Normal rate and regular rhythm. Pulses: Normal pulses. Heart sounds: Normal heart sounds. Pulmonary:      Effort: Pulmonary effort is normal.      Comments: Coarse BS bilaterally  Abdominal:      General: There is distension. Palpations: Abdomen is soft. Tenderness: There is abdominal tenderness (diffuse, severe). Skin:     General: Skin is dry. Comments: cool   Neurological:      Comments: Intubated, sedated   Psychiatric:         Mood and Affect: Mood normal.         Behavior: Behavior normal.         Thought Content: Thought content normal.         Judgment: Judgment normal.         Lines: Salinas:  yes - Continue salinas catheter for managing strict I and Os in this critically ill patient.     Central line:  yes - right IJ  PICC:  no    CAM-ICU:  negative  RASS:  RASS -2 (Light Sedation)    ASSESSMENT/PLAN:  1. Septic shock  --c/w pressors  --IVF  --TSH, cortisol check  --c/w antibiotics  2. Acute respiratory failure d/t aspiration  --Abx  --pulmonary toilet  --duonebs  3. Lactic acidosis  --c/w IVF  4. NORBERTO  --renal c/s  --CVVHD  --bicarb gtt  5. Acute blood loss anemia  --monitor      DVT/GI ppx--heparin, PPI    CC TIME:  I spent 50 min managing this patients critical issues which are a constant threat to life excluding time teaching and performing procedures.       Shaista Warner MD, MSc, FACS  8/28/2020  8:28 AM

## 2020-08-28 NOTE — PROGRESS NOTES
Surgical Intensive Care Unit  Daily Progress Note  Date of admission:  8/22/2020  Reason for ICU transfer:  Hypoxic respiratory failure    Subjective:  Pt intubated. GCS 11T. Winces to abdominal palpation    Hospital Course:  8/28: Developed acute hypoxic respiratory failure, intubated, placed right subclavian TLC, right radial A-line. Bronchoscopy revealed aspiration of gastric contents in both lungs. Blood cultures taken. On zosyn. ARF with anuria. Consulted nephro, plan for CVVHD. Physical Exam:  /65   Pulse 84   Temp 97.2 °F (36.2 °C) (Axillary)   Resp 24   Ht 5' 5\" (1.651 m)   Wt 179 lb (81.2 kg)   SpO2 99%   BMI 29.79 kg/m²     CONSTITUTIONAL:  Intubated  LUNGS:  Mechanical ventilation 16/550/8/40  CARDIOVASCULAR:  RR  ABDOMEN:  Tense, slightly distended, diffuse TTP. MUSCULOSKELETAL:  There is no redness, warmth, or swelling of the joints. Full range of motion noted. Motor strength is 5 out of 5 all extremities bilaterally. Tone is normal.    ASSESSMENT / PLAN:  · Neuro: Acute pain syndrome, pain control with fentanyl gtt and versed PRN. · CV: Septic shock, on Levo and Vaso, wean Levo as able. Maintain MAP >65. Monitor hemodynamics. · Pulm: Acute respiratory failure with possible aspiration pneumonia. Continue mechanical ventilation. Continue Zosyn. Monitor RR, SpO2>90%. · GI: Abdominal distention and pain, unknown etiology. Possible mesenteric ischemia vs perforation vs phlegmon. Plan for OR today for ex-lap, pending decision of family. Continue Zosyn. Perforated appendicitis s/p lap appy. · Renal: ARF with anuria. Given 5L LR, still anuric. Consulted nephrology for CVV. Place temp HD line. Hypocalcemia, replaced. · ID: Sepsis, unknown etiology, likely intra-abdominal process vs pnemonia. Continue Zosyn. Monitor for SIRS  · Endo: No acute issues with this system. Monitor glucose, maintain <180  · MSK: No acute issues with this system. PT/OT when able.   · HEME: Acute blood loss anemia 2/2 GIB. Monitor CBC. Bowel regime: Glycolax and Ducolax suppository. Pain control/Sedation: Tylenol, Versed, and fentanyl gtt. DVT prophylaxis: SCDs. GI: Protonix. Seaman: Keep in place for critical care monitoring of fluid balance.   CVC sites: Right subclavian TLC #1  Ancillary consults: Nephrology and IM  Family Update: As available     Code status:   Full Code    Electronically signed by Damian Alvarado MD on 8/28/20 at 7:04 AM EDT

## 2020-08-28 NOTE — FLOWSHEET NOTE
Attempting to warm patient through CVVHD machine, BareHugger, 5 blankets, and now through the Level One Fluid Warmer.

## 2020-08-28 NOTE — PLAN OF CARE
Problem: Skin Integrity:  Goal: Will show no infection signs and symptoms  Description: Will show no infection signs and symptoms  8/28/2020 0219 by Dede Nino RN  Outcome: Met This Shift  8/28/2020 0218 by Dede Nino RN  Outcome: Met This Shift  Goal: Absence of new skin breakdown  Description: Absence of new skin breakdown  8/28/2020 0219 by Dede Nino RN  Outcome: Met This Shift  8/28/2020 0218 by Dede Nino RN  Outcome: Met This Shift     Problem: Falls - Risk of:  Goal: Will remain free from falls  Description: Will remain free from falls  8/28/2020 0219 by Dede Nino RN  Outcome: Met This Shift  8/28/2020 0218 by Dede Nino RN  Outcome: Met This Shift  Goal: Absence of physical injury  Description: Absence of physical injury  8/28/2020 0219 by Dede Nino RN  Outcome: Met This Shift  8/28/2020 0218 by Dede Nino RN  Outcome: Met This Shift     Problem: Restraint Use - Nonviolent/Non-Self-Destructive Behavior:  Goal: Absence of restraint-related injury  Description: Absence of restraint-related injury  Outcome: Met This Shift

## 2020-08-28 NOTE — PLAN OF CARE
Problem: Restraint Use - Nonviolent/Non-Self-Destructive Behavior:  Goal: Absence of restraint indications  8/28/2020 1049 by Piper Muñoz RN  Outcome: Not Met This Shift  8/28/2020 0219 by Anita Machado RN  Outcome: Not Met This Shift  Goal: Absence of restraint-related injury  8/28/2020 1049 by Piper Muñoz RN  Outcome: Met This Shift  8/28/2020 0219 by Anita Machado RN  Outcome: Met This Shift   Plan of care discussed with patient/family. Patient/family incorporated into plan of care.

## 2020-08-28 NOTE — PROCEDURES
Nathalie Nieto is a 81 y.o. female patient. 1. General weakness    2. Anemia, unspecified type    3. Lower extremity edema    4. Congestive heart failure, unspecified HF chronicity, unspecified heart failure type [facility])      Past Medical History:   Diagnosis Date    Acute blood loss anemia 3/21/2018    Acute on chronic diastolic congestive heart failure ([address] 3/20/2018    Acute respiratory failure with hypoxia ([address] 3/20/2018    Aortic stenosis, severe 03/2018    Arthritis     Cellulitis 3/20/2018    Right and left lower extremity    Class 3 obesity in adult 3/22/2018    History of blood transfusion     Hypertension     Hypokalemia 3/21/2018    Mitral regurgitation 03/2018    Moderate aortic regurgitation 03/2018    Sepsis due to methicillin susceptible Staphylococcus aureus ([facility] 3/22/2018     Blood pressure 110/65, pulse 89, temperature 98.4 °F (36.9 °C), temperature source Axillary, resp. rate 29, height 5' 5\" (1.651 m), weight 179 lb (81.2 kg), SpO2 98 %. Intubation    Date/Time: 8/28/2020 12:54 AM  Performed by: Elise Cheng MD  Authorized by: Melody Fuller MD   Consent: The procedure was performed in an emergent situation. Patient identity confirmed: anonymous protocol, patient vented/unresponsive  Indications: respiratory distress  Intubation method: fiberoptic oral  Patient status: sedated  Sedatives: etomidate  Laryngoscope size: Vazquez 4  Tube size: 8.0 mm  Tube type: cuffed  Number of attempts: 1  Cricoid pressure: yes  Cords visualized: yes  Post-procedure assessment: chest rise and ETCO2 monitor  Breath sounds: equal  Cuff inflated: yes  ETT to lip: 24 cm  Tube secured with: ETT jamison  Chest x-ray interpreted by me. Chest x-ray findings: endotracheal tube in appropriate position  Patient tolerance: Patient tolerated the procedure well with no immediate complications  Comments: Gastric contents noted to aspirate during intubation. Suctioned immediatly.  Bronchoscopy performed immediatly after intubation. Dr. Cody Jacques present for intubation.            Patricia Amado MD  8/28/2020

## 2020-08-28 NOTE — FLOWSHEET NOTE
Patient arrived to SICU from OR. Daughter brought to bedside as notified to STAT take patient to CVL for aortogram with Dr Ortega [facility]. Unable to obtain assessment of patient completely, but did get to assess abdomen wound vac and seen it is intact and functional. No consent obtained at this time for CVL procedure, as this is emergent. Son on his way to hospital, as soon as possible. Daughter in waiting room. Emotional support provided at this time.

## 2020-08-28 NOTE — FLOWSHEET NOTE
CVVHD supplies ordered to be here STAT, in hopes to have set up and ready to run when patient returns from OR.

## 2020-08-28 NOTE — FLOWSHEET NOTE
Resident notified correction to max dose on epinephrine gtt. See MAR for new fluid bolus orders.  Lactic 17.7

## 2020-08-28 NOTE — PLAN OF CARE
Problem: Restraint Use - Nonviolent/Non-Self-Destructive Behavior:  Goal: Absence of restraint indications  8/28/2020 1049 by Eagle Shelton RN  Outcome: Completed     Problem: Restraint Use - Nonviolent/Non-Self-Destructive Behavior:  Goal: Absence of restraint-related injury  8/28/2020 1049 by Eagle Shelton RN  Outcome: Completed

## 2020-08-28 NOTE — OP NOTE
[facility]  OPERATIVE REPORT    Sarah Beth Kaur  [birth year]      DATE OF PROCEDURE: 8/28/2020     SURGEON: Jackie Bahena MD, MSc, FACS     ASSISTANT: YOSEF Lau DO, PGY-V, KIZZY Yeung, MS-4     PREOPERATIVE DIAGNOSIS:  Acute abdomen, free air. POSTOPERATIVE DIAGNOSIS: mesenteric ischemia with perforation of the proximal jejunum and cecum    OPERATION: exploratory laparotomy, small bowel resection (40cm of proximal jejunum), ileocecectomy     ANESTHESIA: GETA     ESTIMATED BLOOD LOSS: 11XZ     COMPLICATIONS: None    SPECIMEN:  TI and cecum; proximal jejunum    DRAINS:  Abtherra    HISTORY:  This is a 81 y. o.female who has had a prolonged course after lap appy for perforated appendicitis. She started having increased abdominal pain last night with emesis. NGT placed and she was brought now to the ICU and intubated due to aspiration. She had a CT scan which showed free air. After multiple family discussions, they consented for exploratory laparotomy. DESCRIPTION OF PROCEDURE: The patient was identified and the procedure was confirmed. Patient on scheduled zosyn, bilateral SCDs in place, upper Carley Hugger applied. She was prepped and draped in the standard surgical fashion. A midline laparotomy incision was made with #10blade scalpel. The abdomen entered with electrocautery. There was turbid free fluid. The small bowel was ischemic with areas of julio cesar necrosis and perforation in the proximal jejunum. This was resected and left in discontinuity with SARA 75mm blue load and the mesentery taken with the EndSeal.  The small bowel was run multiple times. There were no other areas of perforation. The right colon mobilized and an abscess cavity was encroached. Once it was opened there were succus coming from the staple line. The terminal ileum was taken with SARA 75mm stapler as well as the mid right colon. The mesentery taken with the EnSeal.  The remainder of the abdomen was examined. All organs appeared ischemic including stomach, liver, uterus, left colon. The SMA was not palpable. Intraoperative consultation was made with vascular surgery. Dr. Opal Bean came into the room and will take the patient for aortogram after the procedure. The abdomen was irrigated with saline and an Abtherra was placed in the usual fashion. Needle, sponge, and instrument counts were reported as correct x2. The patient tolerated the procedure and was transferred to the recovery area in satisfactory condition. Family was updated after the case.       Shavonne Baptiste MD, MSc, FACS  8/28/2020  11:13 AM

## 2020-08-28 NOTE — CONSULTS
Vascular Surgery Inpatient Consultation Note      Reason for Consultation:  Mesenteric ischemia    HISTORY OF PRESENT ILLNESS:                The patient is a 81 y.o. female who is admitted to the hospital for treatment of perforated appendicitis. She has prolonged post-operative course and developed acute abdomen. Found to have perforated bowel with diffuse ischemia. Vascular surgery is consulted intra-operatively for evaluation and treatment. IMPRESSION:  Ischemic bowel with perforation. RECOMMENDATIONS:  I discussed the patient with Dr. Shantanu Parada and the patient's son Dr. Jonesyoav Christophers. I reviewed the previous CAT scan images showing dense calcifications at the origins of the mesenteric vessels. Given her bowel perforation, a prosthetic bypass is not possible and I feel that the only option is transferring the patient to the angio suite for an aortogram and possible mesenteric angioplasty and stenting. Her ischemia may be beyond salvage however the patient's son would like us to at least try. He understands the gravity of the situation and her high mortality rate. 35 minutes critical care time spent treating this life-threatening condition.     Patient Active Problem List   Diagnosis Code    Pneumonia J18.9    Acute respiratory failure with hypoxia ([facility]) J96.01    Acute on chronic diastolic congestive heart failure ([facility]) I50.33    Cellulitis L03.90    Hypokalemia E87.6    Acute blood loss anemia D62    Aortic stenosis, severe I35.0    Moderate aortic regurgitation I35.1    Mitral regurgitation I34.0    Sepsis due to methicillin susceptible Staphylococcus aureus ([facility]) A41.01    Class 3 obesity in adult [id number]    Primary osteoarthritis of right knee M17.11    Non-pressure chronic ulcer of right ankle, limited to breakdown of skin ([facility] L97.311    Non-pressure chronic ulcer of left ankle, limited to breakdown of skin ([facility]) L97.321    Lymphedema of both lower extremities I89.0    Venous ulcer of left lower extremity without varicose veins ([facility]) I87.2, L97.929    NORBERTO (acute kidney injury) ([facility] N17.9    Anemia due to GI blood loss D50.0    HTN (hypertension) I10    Acute appendicitis K35.80    Sepsis ([facility]) A41.9    Hypomagnesemia E83.42    Atrial fibrillation, new onset ([facility]) I48.91    Perforated appendicitis K35.32    Gastrointestinal hemorrhage K92.2    Gastric ulcer K25.9    Ileus ([facility]) K56.7    General weakness R53.1    Leukocytosis D72.829    Electrolyte disturbance E87.8    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       Past Medical History:   Diagnosis Date    Acute blood loss anemia 3/21/2018    Acute on chronic diastolic congestive heart failure ([facility]) 3/20/2018    Acute respiratory failure with hypoxia ([facility]) 3/20/2018    Aortic stenosis, severe 03/2018    Arthritis     Cellulitis 3/20/2018    Right and left lower extremity    Class 3 obesity in adult 3/22/2018    History of blood transfusion     Hypertension     Hypokalemia 3/21/2018    Mitral regurgitation 03/2018    Moderate aortic regurgitation 03/2018    Sepsis due to methicillin susceptible Staphylococcus aureus ([facility] 3/22/2018        Past Surgical History:   Procedure Laterality Date    CARDIAC CATHETERIZATION  01/10/2020    Dr. Eliot Contreras  Left and Right Heart Catheterization    LAPAROSCOPIC APPENDECTOMY N/A 8/2/2020    APPENDECTOMY LAPAROSCOPIC performed by Ranjith Liang MD at [address] [place] [address] N/A 8/13/2020    EGD DIAGNOSTIC ONLY performed by Aimee Tuttle MD at [facility] ENDOSCOPY       Current Medications:    fentaNYL 5 mcg/ml in 0.9%  ml infusion 50 mcg/hr (08/28/20 1106)    norepinephrine 19 mcg/min (08/28/20 0952)    vasopressin (Septic Shock) infusion 0.04 Units/min (08/28/20 0952)    IV infusion builder 150 mL/hr (08/28/20 6988)    dextrose      dialysis builder      sodium chloride      IV infusion builder        midazolam, sodium chloride (Inhalant), glucose, dextrose, glucagon (rDNA), dextrose, sodium chloride, potassium chloride, magnesium sulfate, calcium gluconate **OR** calcium gluconate **OR** calcium gluconate **OR** calcium gluconate, sodium phosphate IVPB **OR** sodium phosphate IVPB **OR** sodium phosphate IVPB **OR** sodium phosphate IVPB, anticoagulant sodium citrate, sodium chloride flush, acetaminophen **OR** acetaminophen, polyethylene glycol    chlorhexidine  15 mL Mouth/Throat BID    polyvinyl alcohol  1 drop Both Eyes Q4H    And    artificial tears   Both Eyes Q4H    pantoprazole  40 mg Intravenous Daily    sodium chloride (PF)  10 mL Intravenous Daily    heparin (porcine)  5,000 Units Subcutaneous 3 times per day    dextrose        piperacillin-tazobactam  3.375 g Intravenous Q12H    And    sodium chloride   Intravenous Q12H    sodium chloride  250 mL Intravenous Once    sucralfate  1 g Oral 4x Daily    sodium chloride flush  10 mL Intravenous 2 times per day    ipratropium-albuterol  1 ampule Inhalation Q4H WA        Allergies:  Patient has no known allergies. Social History     Socioeconomic History    Marital status:       Spouse name: Not on file    Number of children: Not on file    Years of education: Not on file    Highest education level: Not on file   Occupational History    Not on file   Social Needs    Financial resource strain: Not on file    Food insecurity     Worry: Not on file     Inability: Not on file    Transportation needs     Medical: Not on file     Non-medical: Not on file   Tobacco Use    Smoking status: Never Smoker    Smokeless tobacco: Never Used   Substance and Sexual Activity    Alcohol use: No    Drug use: No    Sexual activity: Not on file   Lifestyle    Physical activity     Days per week: Not on file     Minutes per session: Not on file    Stress: Not on file   Relationships    Social connections     Talks on phone: Not on file     Gets together: Not on file     Attends Rastafari service: Not on file     Active member of club or organization: Not on file     Attends meetings of clubs or organizations: Not on file     Relationship status: Not on file    Intimate partner violence     Fear of current or ex partner: Not on file     Emotionally abused: Not on file     Physically abused: Not on file     Forced sexual activity: Not on file   Other Topics Concern    Not on file   Social History Narrative    Not on file        History reviewed. No pertinent family history. REVIEW OF SYSTEMS: Patient intubated in OR. Unable to contribute.     Eyes:      Blurred vision:  No [x]/Yes []               Diplopia:   No [x]/Yes []               Vision loss:       No [x]/Yes []   Ears, nose, throat:             Hearing loss:    No [x]/Yes []      Vertigo:   No [x]/Yes []                       Swallowing problem:  No [x]/Yes []               Nose bleeds:   No [x]/Yes []      Voice hoarseness:  No [x]/Yes []  Respiratory:             Cough:   No [x]/Yes []      Pleuritic chest pain:  No [x]/Yes []                        Dyspnea:   No [x]/Yes []      Wheezing:   No [x]/Yes []  Cardiovascular:             Angina:   No [x]/Yes []      Palpitations:   No [x]/Yes []          Claudication:    No [x]/Yes []      Leg swelling:   No [x]/Yes []  Gastrointestinal:             Nausea or vomiting:  No [x]/Yes []               Abdominal pain:  No [x]/Yes []                     Intestinal bleeding: No [x]/Yes []  Musculoskeletal:             Leg pain:   No [x]/Yes []      Back pain:   No [x]/Yes []                    Weakness:   No [x]/Yes []  Neurologic:             Numbness:   No [x]/Yes []      Paralysis:   No [x]/Yes []                       Headaches:   No [x]/Yes []  Hematologic, lymphatic:   Anemia:   No [x]/Yes []              Bleeding or bruising:  No [x]/Yes []              Fevers or chills: No [x]/Yes []  Endocrine:             Temp intolerance:   No [x]/Yes []                       Polydipsia, polyuria:  No [x]/Yes []  Skin:              Rash:    No [x]/Yes []      Ulcers:   No [x]/Yes []              Abnorm pigment: No [x]/Yes []  :              Frequency/urgency:  No [x]/Yes []      Hematuria:    No [x]/Yes []                      Incontinence:    No [x]/Yes []    PHYSICAL EXAM:  Vitals:    08/28/20 1112   BP: (!) 133/44   Pulse: 98   Resp: 16   Temp: 94.5 °F (34.7 °C)   SpO2: 100%     Physical exam deferred as patient was in operating room. The bowel appeared pale. The stomach also appeared pale. PULSE EXAM      Right      Left   Brachial     Radial     Femoral     Popliteal     Dorsalis Pedis     Posterior Tibial     (3=normal, 2=diminished, 1=barely palpable, 4=widened)      LABS:    Lab Results   Component Value Date    WBC 14.2 (H) 08/28/2020    HGB 8.2 (L) 08/28/2020    HCT 27.1 (L) 08/28/2020     08/28/2020    PROTIME 22.6 (H) 08/27/2020    INR 2.0 08/27/2020    K 3.9 08/28/2020    BUN 35 (H) 08/28/2020    CREATININE 1.6 (H) 08/28/2020       RADIOLOGY:    Previous CT scans reviewed.

## 2020-08-28 NOTE — PROCEDURES
Pat Dale is a 81 y.o. female patient. 1. General weakness    2. Anemia, unspecified type    3. Lower extremity edema    4. Congestive heart failure, unspecified HF chronicity, unspecified heart failure type ([address]    5. Anemia due to GI blood loss    6. Electrolyte disturbance    7. Acute respiratory failure with hypoxia (HCC)    8. Acute on chronic diastolic congestive heart failure ([address]    9. Hypokalemia    10. Acute blood loss anemia    11. Aortic stenosis, severe    12. Moderate aortic regurgitation    13. Primary osteoarthritis of right knee    14. Non-pressure chronic ulcer of right ankle, limited to breakdown of skin ([address]    15. Non-pressure chronic ulcer of left ankle, limited to breakdown of skin ([address]    16. Lymphedema of both lower extremities    17. Venous ulcer of left lower extremity without varicose veins (HCC)    18. NORBERTO (acute kidney injury) ([address]    19. Hypomagnesemia    20. Atrial fibrillation, new onset ([address]    21. Perforated appendicitis    22. Ileus [facility])      Past Medical History:   Diagnosis Date    Acute blood loss anemia 3/21/2018    Acute on chronic diastolic congestive heart failure ([address] 3/20/2018    Acute respiratory failure with hypoxia ([address] 3/20/2018    Aortic stenosis, severe 03/2018    Arthritis     Cellulitis 3/20/2018    Right and left lower extremity    Class 3 obesity in adult 3/22/2018    History of blood transfusion     Hypertension     Hypokalemia 3/21/2018    Mitral regurgitation 03/2018    Moderate aortic regurgitation 03/2018    Sepsis due to methicillin susceptible Staphylococcus aureus ([address] 3/22/2018     Blood pressure 110/65, pulse 89, temperature 98.4 °F (36.9 °C), temperature source Axillary, resp. rate 29, height 5' 5\" (1.651 m), weight 179 lb (81.2 kg), SpO2 98 %.     Insert Arterial Line    Date/Time: 8/28/2020 1:04 AM  Performed by: Tania Soares MD  Authorized by: Inocencio Woody MD   Consent: The procedure was performed in an emergent situation.   Patient identity confirmed: arm band  Indications: multiple ABGs, respiratory failure and hemodynamic monitoring  Location: right radial  Needle gauge: 20  Number of attempts: 2  Post-procedure: line sutured  Post-procedure CMS: normal  Patient tolerance: Patient tolerated the procedure well with no immediate complications  Comments: Dr. Monse Hayden was present for procedure          Brennon Krishnamurthy MD  8/28/2020

## 2020-08-28 NOTE — FLOWSHEET NOTE
Notified blood culture lab personnel that patient is currently in [address] and will notify when returned from there.

## 2020-08-28 NOTE — PROGRESS NOTES
[facility]  PROGRESS NOTE  ATTENDING NOTE    I had a discussion with both of Mrs. Kaur's children. I explained her NORBERTO and anuria and how she needs CVVHD. I explained that her abdomen is very tender on examination and with the rising lactate I'm concerned that she may have a process in her abdomen that is causing her to be very ill. I rereviewed her CT scan and there are new foci of free air in the RLQ in the mesentery and near small bowel that were not there on the CT scan from Aug 26. I reviewed this scan with radiology and they agree. I called her son,  Ferdinand Gomez and gave him this information. He talked to his colleagues and his sister and has agreed to surgical intervention. Will take patient to OR for exploratory laparotomy.     Oscar Emerson MD, MSc, FACS  8/28/2020  9:11 AM

## 2020-08-28 NOTE — PROGRESS NOTES
[address] [place] [address] [facility]  Progress Note    SUBJECTIVE:  Chief Complaint   Patient presents with    Fatigue     dc 2 hours ago from hospital. pt is too weak to get out of the car and into the house    Leg Swelling     bilateral feet swelling     Events noted. The patient had a fecaloid emesis yesterday. She was taken down to the SICU. She underwent a therapeutic bronchoscopy. She was taken to the OR and was found to have ischemic gut. She had a small bowel resection and had a wound VAC placed. She also had an aortogram with TPA at the [facility]. She is now on CVVHD. She is intubated and sedated. She is on 3 vasopressors. Review of systems:  As stated above in the chief complaint, otherwise negative.     Medications:  Scheduled Meds:   chlorhexidine  15 mL Mouth/Throat BID    polyvinyl alcohol  1 drop Both Eyes Q4H    And    artificial tears   Both Eyes Q4H    pantoprazole  40 mg Intravenous Daily    sodium chloride (PF)  10 mL Intravenous Daily    heparin (porcine)  5,000 Units Subcutaneous 3 times per day    dextrose        piperacillin-tazobactam  3.375 g Intravenous Q12H    And    sodium chloride   Intravenous Q12H    sodium chloride  250 mL Intravenous Once    EPINEPHrine PF        sucralfate  1 g Oral 4x Daily    sodium chloride flush  10 mL Intravenous 2 times per day    ipratropium-albuterol  1 ampule Inhalation Q4H WA     Continuous Infusions:   fentaNYL 5 mcg/ml in 0.9%  ml infusion 50 mcg/hr (08/28/20 1106)    norepinephrine 19 mcg/min (08/28/20 0952)    vasopressin (Septic Shock) infusion 0.04 Units/min (08/28/20 0952)    IV infusion builder 150 mL/hr (08/28/20 9761)    dextrose      dialysis builder 2,400 mL/hr at 08/28/20 1411    sodium chloride 100 mL/hr at 08/28/20 1351    IV infusion builder 50 mL/hr at 08/28/20 1412    EPINEPHrine infusion 10 mcg/min (08/28/20 1354)     PRN Meds:midazolam, sodium chloride (Inhalant), glucose, dextrose, glucagon (rDNA), dextrose, sodium chloride, potassium chloride, magnesium sulfate, calcium gluconate **OR** calcium gluconate **OR** calcium gluconate **OR** calcium gluconate, sodium phosphate IVPB **OR** sodium phosphate IVPB **OR** sodium phosphate IVPB **OR** sodium phosphate IVPB, anticoagulant sodium citrate, sodium chloride flush, acetaminophen **OR** acetaminophen, polyethylene glycol    OBJECTIVE:  BP (!) 101/27   Pulse 89   Temp 92.1 °F (33.4 °C)   Resp 20   Ht 5' 5\" (1.651 m)   Wt 179 lb (81.2 kg)   SpO2 94%   BMI 29.79 kg/m²   Temp  Av.3 °F (34.6 °C)  Min: 92.1 °F (33.4 °C)  Max: 98.4 °F (36.9 °C)  Constitutional: The patient is lying in bed in the SICU. She is intubated and sedated. Skin: Warm and dry. No rashes were noted. HEENT: Round pupils. Moist mucous membranes. No ulcerations or thrush. ET tube. NG tube. Neck: Supple to movements. Chest: No use of accessory muscles to breathe. Symmetrical expansion. No wheezing, crackles or rhonchi. Cardiovascular: S1 and S2 are rhythmic and regular. No murmurs appreciated. Abdomen: Absent bowel sounds. Midline. Abdominal wound with wound VAC  Extremities: Bilateral lower extremity venous stasis dermatitis and scarring. Lines: Left femoral temporary HD catheter 2020. Right radial arterial line 2020. Right subclavian TLC 2020.     Laboratory and Tests Review:  Lab Results   Component Value Date    WBC 18.2 (H) 2020    WBC 14.2 (H) 2020    WBC 18.6 (H) 2020    HGB 7.7 (L) 2020    HCT 25.4 (L) 2020    MCV 90.7 2020     2020     Lab Results   Component Value Date    NEUTROABS 12.64 (H) 2020    NEUTROABS 17.04 (H) 2020    NEUTROABS 16.52 (H) 2020     No results found for: [facility]  Lab Results   Component Value Date     (H) 2020    AST 1,935 (H) 2020    ALKPHOS 160 (H) 2020    BILITOT 0.5 2020     Lab Results   Component Value Date     08/28/2020    K 3.9 08/28/2020    K 4.3 08/28/2020    CL 93 08/28/2020    CO2 13 08/28/2020    BUN 33 08/28/2020    CREATININE 1.6 08/28/2020    CREATININE 1.6 08/28/2020    CREATININE 1.7 08/28/2020    GFRAA 37 08/28/2020    LABGLOM 31 08/28/2020    GLUCOSE 132 08/28/2020    PROT 3.8 08/28/2020    LABALBU 1.8 08/28/2020    CALCIUM 8.0 08/28/2020    BILITOT 0.5 08/28/2020    ALKPHOS 160 08/28/2020    AST 1,935 08/28/2020     08/28/2020     Lab Results   Component Value Date    CRP 17.1 (H) 08/27/2020    CRP 4.2 (H) 03/21/2018     Lab Results   Component Value Date    SEDRATE 85 (H) 08/27/2020     Radiology:  Reviewed    Microbiology:   Acute hepatitis panel: Nonreactive  SARS-CoV-2 8/26/2020: Not detected  Urine culture 8/26/2020 50,000 Candida albicans  Blood cultures 8/22/2020: Negative x2  Blood cultures 8/23/2020: Negative    Recent Labs     08/26/20  1059 08/27/20  2130   PROCAL 0.22* 0.87*       ASSESSMENT:  · Recent laparoscopic appendectomy  · Mesenteric ischemia with perforation of proximal jejunum and cecum. Status post exploratory laparotomy, small bowel resection, ileocecectomy 8/28/2020.   No intraoperative cultures apparently taken  · Status post aortogram with TPA in the [facility] 8/28/2020  · Leukocytosis associated to the above  · Acute kidney injury, now on CVVHD  · Acute respiratory failure  · Possible aspiration pneumonia  · Insignificant candiduria    PLAN:  · Continue Zosyn empirically, day 2  · Add Fluconazole  · Check final cultures    Case discussed with Dr. Kristy Blizzard, Dr. Donald Franco and Dr. Roxana Blunt  2:23 PM  8/28/2020

## 2020-08-28 NOTE — FLOWSHEET NOTE
PT taken STAT to OR. Consent in chart for blood and procedure. STAT type and screen sent to blood bank/lab. MG and Phos pending. Notified anesthesia of current gtts and prior to OR electrolyte replacements.

## 2020-08-28 NOTE — PROGRESS NOTES
PROGRESS NOTE       PATIENT PROBLEM LIST:  Active Problems:    Anemia due to GI blood loss    General weakness    Leukocytosis    Electrolyte disturbance  Resolved Problems:    * No resolved hospital problems. *      SUBJECTIVE:  Miri Ware states she feels extremely weak and short of breath  And right lower abdominal discomfort. and RRT was just completed and was not notified of its occurrence. Jerome Haney REVIEW OF SYSTEMS:  General ROS:   positive for - fatigue, malaise  and global worsening weakness. Psychological ROS:   positive for - anxiety and depression  Ophthalmic ROS:  positive for - decreased vision  And utilizes corrective lenses for visual acuity. ENT ROS: negative  Allergy and Immunology ROS: negative  Hematological and Lymphatic ROS:   positive for - leukocytosis and anemia  Endocrine: no heat or cold intolerance and no polyphagia, polydipsia, or polyuria  Respiratory ROS: positive for -   Intermittent shortness of breath  Cardiovascular ROS: positive for - dyspnea on exertion, irregular heartbeat, murmur and shortness of breath. Gastrointestinal ROS:  Has abdominal pain, change in bowel habits,  No black or bloody stools  Genito-Urinary ROS: no nocturia, dysuria, trouble voiding, frequency or hematuria  Musculoskeletal ROS: negative for- myalgias, arthralgias, or claudication  Neurological ROS: no TIA or stroke symptoms otherwise no significant change in symptoms or problems since yesterday as documented in previous progress notes.     SCHEDULED MEDICATIONS:   chlorhexidine  15 mL Mouth/Throat BID    polyvinyl alcohol  1 drop Both Eyes Q4H    And    artificial tears   Both Eyes Q4H    midazolam        midazolam  2 mg Intravenous Once    piperacillin-tazobactam  3.375 g Intravenous Q8H    And    sodium chloride   Intravenous Q8H    senna  2 tablet Oral Nightly    bisacodyl  10 mg Rectal Daily    magnesium citrate  296 mL Oral Once    oxymetazoline  2 spray Each Nostril Once    lidocaine Topical Once    midazolam        potassium chloride  20 mEq Oral BID WC    ferrous sulfate  325 mg Oral Daily with breakfast    iron polysaccharides  1 capsule Oral Daily    [Held by provider] NIFEdipine  30 mg Oral QAM    pantoprazole  40 mg Oral BID AC    [Held by provider] spironolactone  25 mg Oral Daily    sucralfate  1 g Oral 4x Daily    [Held by provider] torsemide  20 mg Oral Daily    sodium chloride flush  10 mL Intravenous 2 times per day    enoxaparin  40 mg Subcutaneous Daily    [Held by provider] enalapril  10 mg Oral Daily    And    [Held by provider] hydroCHLOROthiazide  25 mg Oral Daily    docusate sodium  100 mg Oral Daily    metoclopramide  10 mg Intravenous Q6H    polyethylene glycol  17 g Oral Daily    ipratropium-albuterol  1 ampule Inhalation Q4H WA       VITAL SIGNS:                                                                                                                          /65   Pulse 87   Temp 98.4 °F (36.9 °C) (Axillary)   Resp (!) 31   Ht 5' 5\" (1.651 m)   Wt 179 lb (81.2 kg)   SpO2 95%   BMI 29.79 kg/m²   No data found. OBJECTIVE:    HEENT: PERRL, EOM  Intact; sclera non-icteric, conjunctiva pink. Carotids are brisk in upstroke with normal contour. No carotid bruits. Normal jugular venous pulsation at 45°. No palpable cervical nor supraclavicular nodes. Thyroid not palpable. Trachea midline. Chest: Even excursion  Lungs:  Bilateral coarse expiratory rhonchi, no expiratory wheezes or rhonchi, no decreased tactile fremitus without inspiratory rales. Heart: Regular, irregular  rhythm; S1 > S2, no gallop, grade 2/6 systolic murmur second right and left intercostal spaces No clicks, rub, palpable thrills   or heaves. PMI nondisplaced, 5th intercostal space MCL. Abdomen: Soft,  Somewhat tender,  Mildly distended,  moderately protuberant, no masses or organomegaly. Bowel sounds  Markedly decreased  Extremities: Without clubbing, cyanosis or edema. 79-year-old female patient with a history of vomiting and abdominal pain and diarrhea. FINDINGS: Findings of acute perforated appendicitis. There are inflammatory changes in the appendix and in the periappendiceal fat planes with the areas of air outside the appendiceal lumen which are walled contained perforation by the akshat-appendical inflammatory reaction present. At the base of the appendix the appendix measures up to 12 mm in cross-section diameter. There is a component of small bowel obstruction with fecal-like contents in the small bowel some of the segments of the small bowel are dilated up to 3.6 cm . Digital is not precisely identified, does not appears to be direct relate with the appendicitis process. There is some whorling of the mesenteric vessels. The colon is decompressed. Uncomplicated extensive diverticulosis seen in the sigmoid colon. There is no free intraperitoneal air outside of the akshat-appendiceal spaces. There are normal size and the density for the liver and spleen. The multiple gallstones are seen in the gallbladder. The biliary tree and pancreatic ductal systems are not dilated. Pancreas has atrophy. There is a moderate to large size hiatal hernia. There is a nodule in the left adrenal gland which is stable back to the study of December 14, 2018. Kidneys have preserved size and cortical thickness, there is no renal calculus. There is no obstruction. The bladder has unremarkable appearance. The calcified myomatous changes are seen in the uterus. Left ovary has unremarkable appearance. The right ovary cannot be conspicuously identified as separate from adjacent bowel segments. There are unremarkable appearance for the bladder. Calcified atheroma changes are seen in the abdominal aorta with a more prominent plaque in the upper abdominal aorta where the origin of the celiac axis and SMA are located. There are areas of chronic pulmonary fibrosis in the bases are. These were seen previously. Degenerative changes are seen throughout the lumbar spine with the degenerative disc disease and facet hypertrophy. These are long-standing findings. Patient previous endovascular repair for the aortic root/aortic valve. 1. Acute perforated appendicitis as above described perforation is well contained in the periappendiceal spaces. 2. There is also a component of small bowel obstruction which etiology is not precisely determined that time the present study, does not appears to be directly related with the appendicitis. There is some whorling of the mesenteric vessels. 3. Multiple gallstones. . Preliminary report was given to Dr. Saul King, ER physician at the time the present study. ALERT:  ABNORMAL REPORT. Xr Abdomen (kub) (single Ap View)    Result Date: 2020  Patient MRN: [id number] : 1935 Age:  81 years Gender: Female Order Date: 2020 1:08 PM Exam: XR ABDOMEN (KUB) (SINGLE AP VIEW) Number of Images: 2 views Indication:   ok for portable ok for portable Comparison: None. Findings: The bowel gas pattern. Dilated loops of small bowel air-filled suggesting of adynamic ileus. There is a large calcific density seen in the right lower pelvis. . There is no evidence of any organomegaly. . The osseous structures of the abdomen and pelvis demonstrate osteopenia. Adynamic ileus. Xr Abdomen (kub) (single Ap View)    Result Date: 2020  Patient MRN:  [id number] : 1935 Age: 81 years Gender: Female Order Date:  2020 4:17 PM EXAM: XR ABDOMEN (KUB) (SINGLE AP VIEW) INDICATION:  distension distension  COMPARISON: CT the abdomen and pelvis, 2020 FINDINGS: There is gaseous distention of small bowel loops to maximum caliber of 5.5 cm. The extent of small bowel distention has increased as of the prior CT from 2020. Small amount of gas as well. Increased gaseous distention of small bowel loops. Obstruction cannot be excluded.     Xr Abdomen (kub) (single Ap LIVER: Unremarkable. BILIARY: Multiple gallstones present. PANCREAS: Unremarkable. SPLEEN: Unremarkable. ADRENALS:  Unremarkable. KIDNEYS:  Unremarkable. GI: There are roughly 2 borderline dilated distal small bowel loops without a pattern suggestive of small bowel obstruction. The contents of the lower GI tract are liquid indicating an underlying diarrheal illness. The possibility of an antibiotic induced diarrhea is raised. There is rather pronounced colonic diverticulosis especially in the sigmoid region. PELVIS: Calcified uterine fibroid noted MSK: No acute osseous findings. 1. No evidence of abdominal or pelvic abscess. 2. Liquid contents of the lower GI tract suggesting a developing with diarrheal illness which could potentially be antibiotic induced. 3. New small bilateral pleural effusions and adjacent atelectasis. 4. Hiatus hernia with a fluid-filled distal esophagus compatible with ongoing GE reflux. 5. Cholelithiasis. 6. Colonic diverticulosis. Xr Chest Portable    Result Date: 2020  Clinical indications: Cough. Shortness of breath. TECHNIQUE: Single frontal projection of the chest (1 view). COMPARISON: 2020. FINDINGS: The heart is enlarged. There is calcification within thoracic aorta. Acromioclavicular arthropathy. Bilateral pleural effusions and contiguous atelectasis or infiltrate. Chronic dislocation right shoulder. Left jugular central venous catheter is no longer present. The heart, lungs, mediastinum and regional skeleton are otherwise unremarkable. Bilateral pleural effusions and contiguous atelectasis or infiltrate. Xr Chest Portable    Result Date: 2020  Patient MRN: [id number] : 1935 Age:  81 years Gender: Female Order Date: 2020 11:05 AM Exam: XR CHEST PORTABLE Number of Images: 1 view Indication:   wbc wbc Comparison: 2020 Findings:  There is a central venous catheter noted the tip is in the superior vena cava there is no evidence to suggest 8/2/2020 at 0205 hours. FINDINGS:  A new nasogastric tube with tip in the proximal gastric body. Stomach is nondistended, minimal residual gas. No obvious free air. Mild gas distention of a couple mid abdomen small bowel loops. Mild gas distention of the proximal transverse colon. Partial inclusion of the lung bases. Shallow inspiration with moderate left basilar atelectasis. Old aortic valve stent. 1. New nasogastric tube in the gastric body. 2. Minimal small bowel ileus. 3. Left basilar atelectasis. EKG: See Report  Echo: See Report      IMPRESSIONS:  Active Problems:    Anemia due to GI blood loss    General weakness    Leukocytosis    Electrolyte disturbance  Resolved Problems:    * No resolved hospital problems. *      RECOMMENDATIONS:  Mrs. Isaac Garcia presently has lactic acidosis most likely from an intra-abdominal infection and possibly endocarditis. I would certainly assume the worse case scenario and continue treatment with antibiotics for a prolonged period of time as if she even has endocarditis she is not a candidate for redo intervention and thus would simply treat her medically at this point. She is presently being prepared to transfer to the surgical intensive care unit. I have spent more than  26 minutes face to face with Jhonny Cosby and reviewing notes and laboratory data, with greater than 50% of this time instructing and counseling the patient and her daughter face to face regarding my findings and recommendations and I have answered all questions as posed to me by Ms. Isaac Garcia as well as her daughter who is presently at her bedside. Nyla Horan, DO FACP,FACC,[facility]      NOTE:  This report was transcribed using voice recognition software.   Every effort was made to ensure accuracy; however, inadvertent computerized transcription errors may be present

## 2020-08-28 NOTE — FLOWSHEET NOTE
Patient will reach at lines and tubes needing to be redirected much of time. Attempting to provide calm environment and reorientation, but patient still assessed to be a risk of harm to self. Family aware for need of restraints. Will continue to monitor patient and assess for earliest removal capability.

## 2020-08-28 NOTE — CONSULTS
required a aortogram with TPA at the [facility]. Over the last 24 hours she has become anuric with severe lactic acidosis. We are consulted for management of NORBERTO. Past Medical History:        Diagnosis Date    Acute blood loss anemia 3/21/2018    Acute on chronic diastolic congestive heart failure ([address] 3/20/2018    Acute respiratory failure with hypoxia (HCC) 3/20/2018    Aortic stenosis, severe 03/2018    Arthritis     Cellulitis 3/20/2018    Right and left lower extremity    Class 3 obesity in adult 3/22/2018    History of blood transfusion     Hypertension     Hypokalemia 3/21/2018    Mitral regurgitation 03/2018    Moderate aortic regurgitation 03/2018    Sepsis due to methicillin susceptible Staphylococcus aureus ([facility] [address] 3/22/2018     Past Surgical History:        Procedure Laterality Date    CARDIAC CATHETERIZATION  01/10/2020    Dr. Ashley Dias  Left and Right Heart Catheterization    LAPAROSCOPIC APPENDECTOMY N/A 8/2/2020    APPENDECTOMY LAPAROSCOPIC performed by Kylie Lantigua MD at [facility] N/A 8/28/2020    LAPAROTOMY EXPLORATORY, POSS.  RESECTION, ABSARA performed by Kristie Fragoso MD at [address] N/A 8/13/2020    EGD DIAGNOSTIC ONLY performed by Ab Pierson MD at [address] [place] [address]     Current Medications:    Current Facility-Administered Medications: fentaNYL 5 mcg/ml in 0.9%  ml infusion, 25 mcg/hr, Intravenous, Continuous  norepinephrine (LEVOPHED) 16 mg in dextrose 5% 250 mL infusion, 2 mcg/min, Intravenous, Continuous  chlorhexidine (PERIDEX) 0.12 % solution 15 mL, 15 mL, Mouth/Throat, BID  polyvinyl alcohol (LIQUIFILM TEARS) 1.4 % ophthalmic solution 1 drop, 1 drop, Both Eyes, Q4H **AND** [place] Tears (LACRILUBE) 2-15-83 % opthalmic ointment, , Both Eyes, Q4H  pantoprazole (PROTONIX) injection 40 mg, 40 mg, Intravenous, Daily  sodium chloride (PF) 0.9 % injection 10 mL, 10 mL, Intravenous, Daily  vasopressin 20 Units in dextrose 5 Once  EPINEPHrine PF 1 MG/ML injection, , ,   EPINEPHrine (EPINEPHrine HCL) 5 mg in dextrose 5 % 250 mL infusion, 1 mcg/min, Intravenous, Continuous  [START ON 8/29/2020] fluconazole (DIFLUCAN) in 0.9 % sodium chloride IVPB 200 mg, 200 mg, Intravenous, Q24H  heparin 25,000 units in dextrose 5% 250 mL infusion, 12 Units/kg/hr, Intravenous, Continuous  0.9 % sodium chloride bolus, 1,000 mL, Intravenous, Once  sucralfate (CARAFATE) tablet 1 g, 1 g, Oral, 4x Daily  sodium chloride flush 0.9 % injection 10 mL, 10 mL, Intravenous, 2 times per day  sodium chloride flush 0.9 % injection 10 mL, 10 mL, Intravenous, PRN  acetaminophen (TYLENOL) tablet 650 mg, 650 mg, Oral, Q6H PRN **OR** acetaminophen (TYLENOL) suppository 650 mg, 650 mg, Rectal, Q6H PRN  polyethylene glycol (GLYCOLAX) packet 17 g, 17 g, Oral, Daily PRN  ipratropium-albuterol (DUONEB) nebulizer solution 1 ampule, 1 ampule, Inhalation, Q4H WA  Allergies:  Patient has no known allergies. Social History:    TOBACCO:   reports that she has never smoked. She has never used smokeless tobacco.  ETOH:   reports no history of alcohol use. Family History:   History reviewed. No pertinent family history.   REVIEW OF SYSTEMS:    Review of systems not obtained due to patient factors - intubation  PHYSICAL EXAM:      Vitals:    VITALS:  BP (!) 101/27   Pulse 99   Temp 92.1 °F (33.4 °C)   Resp 24   Ht 5' 5\" (1.651 m)   Wt 179 lb (81.2 kg)   SpO2 94%   BMI 29.79 kg/m²   24HR INTAKE/OUTPUT:    Intake/Output Summary (Last 24 hours) at 8/28/2020 1547  Last data filed at 8/28/2020 1500  Gross per 24 hour   Intake 58798 ml   Output 1479 ml   Net 9828 ml       Constitutional:  Sedated, intubated  HEENT:  Normocephalic, NGT in place, ETT secured, Pupils pinpoint on fentanyl gtt  Respiratory:  ETT to vent, diminished bases bilaterally, FiO2 100%, PEEP 8  Cardiovascular/Edema:  RRR, S1/S2, noted wide pulse pressure; no edema noted  Gastrointestinal:  Midabdominal incision with wound vac in place  Neurologic:  Sedated  Skin:  Warm, dry, pale  Other:  Right femoral temporary dialysis catheter in place    DATA:    CBC with Differential:    Lab Results   Component Value Date    WBC 18.2 08/28/2020    RBC 2.80 08/28/2020    HGB 7.7 08/28/2020    HCT 25.4 08/28/2020     08/28/2020    MCV 90.7 08/28/2020    MCH 27.5 08/28/2020    MCHC 30.3 08/28/2020    RDW 17.9 08/28/2020    NRBC 1.7 08/28/2020    METASPCT 5.2 08/28/2020    LYMPHOPCT 6.1 08/28/2020    PROMYELOPCT 1.0 08/28/2020    MONOPCT 5.2 08/28/2020    MYELOPCT 2.0 08/28/2020    BASOPCT 0.3 08/28/2020    MONOSABS 0.91 08/28/2020    LYMPHSABS 1.09 08/28/2020    EOSABS 0.00 08/28/2020    BASOSABS 0.00 08/28/2020     CMP:    Lab Results   Component Value Date     08/28/2020    K 3.9 08/28/2020    K 4.3 08/28/2020    CL 93 08/28/2020    CO2 13 08/28/2020    BUN 33 08/28/2020    CREATININE 1.6 08/28/2020    GFRAA 37 08/28/2020    LABGLOM 31 08/28/2020    GLUCOSE 132 08/28/2020    PROT 3.8 08/28/2020    LABALBU 1.8 08/28/2020    CALCIUM 8.0 08/28/2020    BILITOT 0.5 08/28/2020    ALKPHOS 160 08/28/2020    AST 4,269 08/28/2020    ALT 1,459 08/28/2020     Hepatic Function Panel:    Lab Results   Component Value Date    ALKPHOS 160 08/28/2020    ALT 1,459 08/28/2020    AST 4,269 08/28/2020    PROT 3.8 08/28/2020    BILITOT 0.5 08/28/2020    LABALBU 1.8 08/28/2020     Ionized Calcium:  No results found for: IONCA  Magnesium:    Lab Results   Component Value Date    MG 1.5 08/28/2020     Phosphorus:    Lab Results   Component Value Date    PHOS 6.0 08/28/2020     Radiology Review:      CT abdomen pelvis 8/28/2020   1. Overall partial improvement in the pattern of dilatation of the    jejunal segments. There is lesser degree of stranding of the    corresponding jejunal mesentery. .         2. The oral contrast has reached the colon since the CT scan of August 26.         3.  There is no indication for bowel complete or severe bowel obstruction presently.         4. Amorphous soft tissue density lateral to the cecum, anterior to the    surgical sutures of the recent appendectomy, represent a localized    phlegmon or inflammatory mass.         5. Development of an extensive a consolidation pneumonia bilaterally.         ALERT:  ABNORMAL REPORT. CXR 8/28/2020   Slight progression of opacity on the left, stable on the right. This    may relate to any combination of infiltrate and/or atelectasis. Possible left pleural effusion may be present as well.                    IMPRESSION/RECOMMENDATIONS:      Briefly, Mrs. Cathryn Puckett is a 81year old female with a PMH of HTN, HFpEF 55% with  severe AS s/p TAVR and OA, recently admitted 8/1/2020 with perforated appendicitis during which time she was admitted to SICU and underwent a laparoscopic appendectomy on 8/2. On 8/12 she had an episode of coffee ground emesis as well as dark stools. On 8/13 an EGD showed multiple gastric antral ulcers, distal duodenitis and a large hiatal hernia with vianey's ulcerations. She was started on TPN for the above as well as ileus. She continued to have hematemesis and an NGT was placed. Plavix and ASA (given for recent TAVR) were discontinued. TPN was stopped on 8/20. Her diet was advanced and she was discharged home with home health care on 8/22. She reported that she felt so weak she was unable to get out of the car when she got to her house. She presented back to the ER 2 hours after discharge on 8/22. Labs revealed anemia with elevated ProBNP and significant edema. CXR showed bilateral effusions vs infiltrates. She was given lasix, ceftriaxone and doxy prior to being admitted for CHF exacerbation vs PNA. She was transfused and tolerated a diet. ID was consulted on 8/26 for leukocytosis. On 8/27 she complained of worsening RLQ pain and she had an episode of emesis. An NGT was placed and drained > 500 mL of brown liquid.  An RRT was called that evening for hypoxia and she was transferred to SICU overnight into 8/28. She required intubation. Bronchoscopy revealed aspiration of gastric contents into both lungs. A CT of the abdomen showed free air. She was taken to the OR for exploratory laparotomy and a small bowel resection and ileocecectomy were performed with wound vac placement. She also required an aortogram with stenting at the superior mesenteric artery. Over the last 24 hours she has become anuric with severe lactic acidosis. We are consulted for management of NORBERTO. 1. NORBERTO stage III, established ischemic ATN 2/2 hemodynamic shock, anuric. To start CVVHD. 2. Acidemia, pH 7.247, with HAGMA 2/2 severe lactic acidosis and uremia, with respiratory compensation   3. Septic shock 2/2 mesenteric ischemia with bowel perforation, requiring epinephrine, vasopressin and norepinephrine   4. Hypocalcemia, ionized calcium 1.10, s/p supplementation, on continuous infusion with CVVHD  5. Hypomagnesemia 2/2 GI losses and lack of intake  6. Hyperphosphatemia 2/2 decreased GFR  7. Acute hypoxic respiratory failure 2/2 aspiration PNA, s/p intubation 8/28/2020  8. Mesenteric ischemia with perforation of proximal jejunum and cecum, s/p ex lap with SBR and ileocecectomy 8/28/2020, on zosyn and fluconazole per ID  9. 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 Transaminitis, shock liver  11. Aspiration PNA 2/2 emesis  12. Normocytic anemia 2/2 acute blood loss, s/p transfusion  13. Hypoalbuminemia/malnutrition, albumin 1.8    Plan:    · CVVHD, net positive 100 mL/hr   · Calcium chloride per protocol   · PRN electrolyte supplementation ordered  · Normal saline @ 100 mL/hr  · Continue to monitor labs  · CMP, ionized calcium, lactic acid Q6 hours  · Magnesium, Phos Q12      Thank you very much Dr. Daniel Chin for this consultation.     Electronically signed by KULDIP Pa CNP on 8/28/2020 at 2:37 PM

## 2020-08-28 NOTE — ANESTHESIA PRE PROCEDURE
08/28/20 0858    polyvinyl alcohol (LIQUIFILM TEARS) 1.4 % ophthalmic solution 1 drop  1 drop Both Eyes Q4H Edwin Becerra MD   1 drop at 08/28/20 0900    And    Akwa Tears (LACRILUBE) 2-15-83 % opthalmic ointment   Both Eyes Q4H Hawk Pompa MD        pantoprazole (PROTONIX) injection 40 mg  40 mg Intravenous Daily Edwin Becerra MD   40 mg at 08/28/20 0858    sodium chloride (PF) 0.9 % injection 10 mL  10 mL Intravenous Daily Edwin Becerra MD   10 mL at 08/28/20 0858    vasopressin 20 Units in dextrose 5 % 100 mL infusion  0.04 Units/min Intravenous Continuous Edwin Becerra MD 12 mL/hr at 08/28/20 0924 0.04 Units/min at 08/28/20 0924    midazolam (VERSED) injection 2 mg  2 mg Intravenous Q2H PRN Edwin Becerra MD   2 mg at 08/28/20 0433    sodium chloride (Inhalant) 3 % nebulizer solution 4 mL  4 mL Nebulization PRN Edwin Becerra MD   4 mL at 08/28/20 0518    sodium bicarbonate 150 mEq in dextrose 5 % 1,000 mL infusion   Intravenous Continuous Rosalie Krishnamurthy  mL/hr at 08/28/20 0747      lactated ringers bolus  1,000 mL Intravenous Once Griselda Mason MD 1,000 mL/hr at 08/28/20 0929 1,000 mL at 08/28/20 0929    heparin (porcine) injection 5,000 Units  5,000 Units Subcutaneous 3 times per day Rosalie Krishnamurthy MD   5,000 Units at 08/28/20 0858    dextrose 50 % solution        Stopped at 08/28/20 0855    glucose (GLUTOSE) 40 % oral gel 15 g  15 g Oral PRN Griselda Mason MD        dextrose 50 % IV solution  12.5 g Intravenous PRN Griselda Mason MD        glucagon (rDNA) injection 1 mg  1 mg Intramuscular PRN Griselda Mason MD        dextrose 5 % solution  100 mL/hr Intravenous PRN Griselda Mason MD        0.9 % sodium chloride bolus  1,000 mL Intravenous Once PRN Hortensia Crawford MD        prismaSATE BGK 4/0/1.2 5,000 mL solution   Dialysis Continuous Hortensia Crawford MD        0.9 % sodium chloride infusion   Intravenous Continuous Tanya Conway MD        potassium chloride 20 mEq/50 mL IVPB (Central Line)  20 mEq Intravenous PRN Hortensia Silva MD        magnesium sulfate 1 g in dextrose 5% 100 mL IVPB  1 g Intravenous PRN Hortensia Silva MD        calcium gluconate 1 g in dextrose 5% 100 mL IVPB  1 g Intravenous PRN Hortensia Crawford MD        Or    calcium gluconate 2 g in dextrose 5 % 100 mL IVPB  2 g Intravenous PRN Hortensia Crawford MD        Or    calcium gluconate 3 g in dextrose 5 % 100 mL IVPB  3 g Intravenous PRN Hortensia Crawford MD        Or    calcium gluconate 4 g in dextrose 5 % 100 mL IVPB  4 g Intravenous PRN Hortensia Crawford MD        sodium phosphate 6 mmol in dextrose 5 % 250 mL IVPB  6 mmol Intravenous PRN Hortensia Crawford MD        Or    sodium phosphate 12 mmol in dextrose 5 % 250 mL IVPB  12 mmol Intravenous PRN Hortensia Crawford MD        Or    sodium phosphate 18 mmol in dextrose 5 % 500 mL IVPB  18 mmol Intravenous PRN Hortensia Crawford MD        Or    sodium phosphate 24 mmol in dextrose 5 % 500 mL IVPB  24 mmol Intravenous PRN Hortensia Silva MD        anticoagulant sodium citrate 4 GM/100ML solution 0.12 g  3 mL Intracatheter Once PRN Tanya Conway MD        calcium chloride 8 g in dextrose 5 % and 0.9 % NaCl 1,000 mL infusion   Intravenous Continuous Hortensia Crawford MD        piperacillin-tazobactam (ZOSYN) 3.375 g in dextrose 5 % 100 mL IVPB extended infusion (mini-bag)  3.375 g Intravenous Q12H Buck Dubin, MD        And    Zosyn Flush NS   Intravenous Q12H Buck Dubin, MD        sucralfate (CARAFATE) tablet 1 g  1 g Oral 4x Daily Lula Andino MD   1 g at 08/28/20 0858    sodium chloride flush 0.9 % injection 10 mL  10 mL Intravenous 2 times per day Lula Andino MD   10 mL at 08/28/20 0831    sodium chloride flush 0.9 % injection 10 mL  10 mL Intravenous PRN Lula Andino MD   10 mL at 08/25/20 8280    acetaminophen (TYLENOL) tablet 650 mg  650 mg Oral Q6H PRN Pam Willard MD   650 mg at 08/27/20 7945    Or    acetaminophen (TYLENOL) suppository 650 mg  650 mg Rectal Q6H PRN Pam Willard MD        polyethylene glycol [facility]) packet 17 g  17 g Oral Daily PRN Pam Willard MD        ipratropium-albuterol (DUONEB) nebulizer solution 1 ampule  1 ampule Inhalation Q4H [place] Pam Willard MD   1 ampule at 08/28/20 0518       Allergies:  No Known Allergies    Problem List:    Patient Active Problem List   Diagnosis Code    Pneumonia J18.9    Acute respiratory failure with hypoxia ([facility] J96.01    Acute on chronic diastolic congestive heart failure ([facility]) I50.33    Cellulitis L03.90    Hypokalemia E87.6    Acute blood loss anemia D62    Aortic stenosis, severe I35.0    Moderate aortic regurgitation I35.1    Mitral regurgitation I34.0    Sepsis due to methicillin susceptible Staphylococcus aureus ([facility]) A41.01    Class 3 obesity in adult ENQ1626    Primary osteoarthritis of right knee M17.11    Non-pressure chronic ulcer of right ankle, limited to breakdown of skin ([facility] [address] L97.311    Non-pressure chronic ulcer of left ankle, limited to breakdown of skin ([address] L97.321    Lymphedema of both lower extremities I89.0    Venous ulcer of left lower extremity without varicose veins ([facility]) I87.2, L97.929    NORBERTO (acute kidney injury) ([address] N17.9    Anemia due to GI blood loss D50.0    HTN (hypertension) I10    Acute appendicitis K35.80    Sepsis ([address] A41.9    Hypomagnesemia E83.42    Atrial fibrillation, new onset ([facility]) I48.91    Perforated appendicitis K35.32    Gastrointestinal hemorrhage K92.2    Gastric ulcer K25.9    Ileus ([facility]) K56.7    General weakness R53.1    Leukocytosis D72.829    Electrolyte disturbance E87.8       Past Medical History:        Diagnosis Date    Acute blood loss anemia 3/21/2018    Acute on chronic diastolic congestive heart failure ([address] 3/20/2018    Acute respiratory failure with hypoxia ([facility]) 3/20/2018    Aortic stenosis, severe 03/2018    Arthritis     Cellulitis 3/20/2018    Right and left lower extremity    Class 3 obesity in adult 3/22/2018    History of blood transfusion     Hypertension     Hypokalemia 3/21/2018    Mitral regurgitation 03/2018    Moderate aortic regurgitation 03/2018    Sepsis due to methicillin susceptible Staphylococcus aureus ([facility] [address] 3/22/2018       Past Surgical History:        Procedure Laterality Date    CARDIAC CATHETERIZATION  01/10/2020    Dr. Lisa Landa  Left and Right Heart Catheterization    LAPAROSCOPIC APPENDECTOMY N/A 8/2/2020    APPENDECTOMY LAPAROSCOPIC performed by Deidre Hager MD at [address] N/A 8/13/2020    EGD DIAGNOSTIC ONLY performed by Hanh Kenney MD at [facility] History:    Social History     Tobacco Use    Smoking status: Never Smoker    Smokeless tobacco: Never Used   Substance Use Topics    Alcohol use: No                                Counseling given: Not Answered      Vital Signs (Current): There were no vitals filed for this visit.                                            BP Readings from Last 3 Encounters:   08/28/20 (!) 153/54   08/22/20 (!) 121/58   08/13/20 132/61       NPO Status:  > 8 HOURS                                                                               BMI:   Wt Readings from Last 3 Encounters:   08/23/20 179 lb (81.2 kg)   08/22/20 177 lb 4.8 oz (80.4 kg)   03/05/20 162 lb (73.5 kg)     There is no height or weight on file to calculate BMI.    CBC:   Lab Results   Component Value Date    WBC 14.2 08/28/2020    RBC 2.99 08/28/2020    HGB 8.2 08/28/2020    HCT 27.1 08/28/2020    MCV 90.6 08/28/2020    RDW 17.7 08/28/2020     08/28/2020       CMP:   Lab Results   Component Value Date     08/28/2020    K 4.3 08/28/2020    CL 95 08/28/2020    CO2 9 08/28/2020    BUN 35 08/28/2020    CREATININE 1.6 08/28/2020    GFRAA 37 08/28/2020    LABGLOM 31 08/28/2020    GLUCOSE 13 08/28/2020    PROT 4.2 08/28/2020    CALCIUM 8.0 08/28/2020    BILITOT 0.5 08/28/2020    ALKPHOS 145 08/28/2020    AST 1,935 08/28/2020     08/28/2020       POC Tests: No results for input(s): POCGLU, POCNA, POCK, POCCL, POCBUN, POCHEMO, POCHCT in the last 72 hours. Coags:   Lab Results   Component Value Date    PROTIME 22.6 08/27/2020    INR 2.0 08/27/2020    APTT 28.8 08/12/2020       HCG (If Applicable): No results found for: PREGTESTUR, PREGSERUM, HCG, HCGQUANT     ABGs:   Lab Results   Component Value Date    PO2ART 52.7 08/02/2020    SXX8UNR 41.6 08/02/2020    NFK5EUQ 20.4 08/02/2020        Type & Screen (If Applicable):  No results found for: LABABO, LABRH    Drug/Infectious Status (If Applicable):  No results found for: HIV, HEPCAB    COVID-19 Screening (If Applicable):   Lab Results   Component Value Date    COVID19 Not Detected 08/26/2020 8/12/20 CXR    Findings: There is a central venous catheter noted the tip is in the superior    vena cava there is no evidence to suggest pneumothorax    The heart is unremarkable    The lung fields demonstrate no significant pulmonary vascular    congestion and edema. The aorta is tortuous ectatic    There are findings throughout the lung fields which are likely chronic    There appears to be an anterior dislocation of the right shoulder    which appears chronic         Impression    Findings compatible with atherosclerotic disease    There is minimal infiltrate at both lung bases, at the right lung base    representing a change suspicious for pneumonia. At the lung base on    the left relatively stable.  There is likely a component of pneumonia    superimposed on chronic scarring and fibrosis                     8/11/2020  6:09 PM - Den, Mhy Incoming Ekg Results From [facility]     Component  Value  Ref Range & Units  Status  Collected  Lab    Ventricular Rate  79  BPM  Final  08/11/2020  7:45 AM  [facility]    Atrial Rate  79 BPM  Final  08/11/2020  7:45 AM  [facility]    P-R Interval  168  ms  Final  08/11/2020  7:45 AM  [facility]    QRS Duration  138  ms  Final  08/11/2020  7:45 AM  [facility]    Q-T Interval  442  ms  Final  08/11/2020  7:45 AM  [facility]    QTc Calculation (Bazett)  506  ms  Final  08/11/2020  7:45 AM  [facility]    P Axis  31  degrees  Final  08/11/2020  7:45 AM  [facility]    R Axis  -7  degrees  Final  08/11/2020  7:45 AM  [facility]    T Axis  112  degrees  Final  08/11/2020  7:45 AM  [facility]    Testing Performed By     Lab - Abbreviation  Name  Director  Address  Valid Date Range    360-[facility]  [facility]  Unknown  Unknown  04/18/16 0721-Present    Narrative & Impression     Normal sinus rhythm  Left bundle branch block  Abnormal ECG  When compared with ECG of 01-AUG-2020 22:23,  Significant changes have occurred  Confirmed by Liang Pimentel ([id number]) on 8/11/2020 6:09:51 PM       3/20/18 ECHO        Findings      Left Ventricle   Left ventricle mildly dilated. Proximal septal thickening. Normal LV segmental wall motion. Estimated left ventricular ejection fraction is 55±5%. <50% criteria for diastolic dysfunction. Right Ventricle   Normal right ventricular size and function. Left Atrium   The left atrium is mild-moderately dilated. The LAESV Index is <34ml/m2. Interatrial septum appears intact. Right Atrium   Right atrium is normal in size. Right atrial systolic collapse. Mitral Valve      Mild mitral regurgitation is present. Focal calcification mitral valve leaflets. Mild mitral annular calcification. Tricuspid Valve   The tricuspid valve appears structurally normal.   Unable to accurately assess RV systolic pressure. Physiologic and/or trace tricuspid regurgitation. Aortic Valve   The aortic valve is trileaflet. Decreased aortic valve leaflet excursion. The aortic valve appears severely sclerotic. Mild - moderate aortic regurgitation is noted.    Moderate to severe aortic stenosis is present. The aortic valve dimensionless index is 0.31 . Pulmonic Valve   Pulmonic valve is structurally normal.   Physiologic and/or trace pulmonic regurgitation present. Pericardial Effusion   No evidence of pericardial thickening/calcification present. No evidence of pericardial effusion. Aorta   Aortic root dimension within normal limits. Aorta appears sclerotic and/or calcified. Miscellaneous   Normal Inferior Vena Cava diameter and respiratory variation. Conclusions      Summary   Left ventricle mildly dilated. Proximal septal thickening. Normal LV segmental wall motion. Estimated left ventricular ejection fraction is 55±5%. <50% criteria for diastolic dysfunction. The LAESV Index is <34ml/m2. Normal right ventricular size and function. Mild mitral regurgitation is present. Mild - moderate aortic regurgitation is noted. Moderate to severe aortic stenosis is present. The aortic valve dimensionless index is 0.31 . Unable to accurately assess RV systolic pressure. Physiologic and/or trace tricuspid regurgitation   Physiologic and/or trace pulmonic regurgitation present. Technically good quality study. No comparison study available. Suggest clinical correlation. Signature      ----------------------------------------------------------------   Electronically signed by Stephany Israel DO(Interpreting   physician) on 03/20/2018 09:23 PM   ----------------------------------------------------------------      Anesthesia Evaluation  Patient summary reviewed and Nursing notes reviewed no history of anesthetic complications:   Airway: Mallampati: III  TM distance: <3 FB   Neck ROM: full  Comment: Intubated  Mouth opening: < 3 FB Dental:      Comment: Pt states had recent dental work prior to TAVR and teeth pulled, denies any loose teeth.  Poor dentition    Pulmonary:   (+) pneumonia:  decreased breath sounds,                            ROS comment: Intubated and ventilated due to aspiration   Cardiovascular:    (+) hypertension:, valvular problems/murmurs (recent [facility]R at [facility], july 17th 2020): MR, dysrhythmias (new onset AFIB): atrial fibrillation, CHF: diastolic, hyperlipidemia      ECG reviewed  Rhythm: irregular  Rate: normal  Echocardiogram reviewed         Beta Blocker:  Not on Beta Blocker      ROS comment: EF 55%  Septic shock  On Levophed and vasopressin     Neuro/Psych:   Negative Neuro/Psych ROS              GI/Hepatic/Renal:   (+) hiatal hernia, renal disease (NORBERTO. Anuria):,          ROS comment: R/O GI BLEED    S/P  EGD today 8/13/20    . Endo/Other:    (+) blood dyscrasia: anemia, arthritis:., electrolyte abnormalities, . Pt had no PAT visit        ROS comment: Cellulitis BLE   Abdominal:           Vascular: negative vascular ROS. Anesthesia Plan      general     ASA 4 - emergent                 Plan discussed with CRNA. Panchito Mckeon DO   8/28/2020      Pt. Examined, chart reviewed. Will proceed with emergent exploratory laparotomy, invasive monitors, possible blood products and postop vent. No family present to speak with. Pt. Is intubated, ventilated and sedated. Neurologic status cannot be assessed.     Panchito Mckeon DO  August 28, 2020  9:52 AM

## 2020-08-28 NOTE — PROCEDURES
Bronchoscopy Procedure Note   Date of Operation: 8/28/2020     Pre-op Diagnosis: Hypoxia, respiratory failure     Post-op Diagnosis: Same    Surgeon: Christine Schwartz MD, FACS    Assistants: Jet Browning MD, PhD, PGY-3    Anesthesia: General endotracheal anesthesia     Operation: Flexible fiberoptic bronchoscopy, therapeutic     Findings: gastric aspiration in both lungs, Left > Right, suctioned clear    Specimen: None    Complications: none       Description of Procedure: The patient was identified as Donna Davis and the procedure verified as Flexible Fiberoptic Bronchoscopy. The patient had aspirated and the procedure was performed on an emergent basis. Within the SICU, the patient was sedated right after patient given 10 mg of Etomidate for intubation and was not required to have any further medication. The bronchoscope was inserted through the patients ET tube. The bronchoscope was passed through the ET tube, which was noted to be in good position above the arnold. First the right main stem brochus was viewed. There were a moderate bile tinged gastric secretions were noted. The secretions were suctioned until clear. Irrigation with saline was undertaken to thin out secretions. The scope was slowly withdrawn and passed into the left mainstem bronchus. There were a mild amount of bile tinged gastric secretions were noted. Irrigation with saline was undertaken to thin out secretions. The bronchoscope was completely withdrawn. The patient tolerated the procedure well with no issues with saturations. Dr. Alysha Estrada was present for the entire procedure.      Plan: Wean FiO2 as tolerated       Electronically signed by Jet Browning on 8/28/2020 at 12:59 AM

## 2020-08-28 NOTE — PROGRESS NOTES
Patient transported to CT scan at 0815 with respiratory and two RNs, vitals stable throughout, return at [address] [place] [address]. Vitals stable.

## 2020-08-28 NOTE — PROCEDURES
situation. Risks and benefits: risks, benefits and alternatives were discussed  Procedure consent: procedure consent matches procedure scheduled  Site marked: the operative site was marked  Imaging studies: imaging studies available  Patient identity confirmed: arm band  Time out: Immediately prior to procedure a \"time out\" was called to verify the correct patient, procedure, equipment, support staff and site/side marked as required. Indications: vascular access    Sedation:  Patient sedated: yes  Sedatives: fentanyl    Preparation: skin prepped with 2% chlorhexidine  Skin prep agent dried: skin prep agent completely dried prior to procedure  Sterile barriers: all five maximum sterile barriers used - cap, mask, sterile gown, sterile gloves, and large sterile sheet  Hand hygiene: hand hygiene performed prior to central venous catheter insertion  Location details: right femoral  Patient position: flat  Catheter type: double lumen  Catheter size: 14 Fr  Pre-procedure: landmarks identified  Ultrasound guidance: yes  Sterile ultrasound techniques: sterile gel and sterile probe covers were used  Number of attempts: 1  Successful placement: yes  Post-procedure: line sutured and dressing applied  Assessment: blood return through all ports  Patient tolerance: Patient tolerated the procedure well with no immediate complications      Dr. Mandi Tayolr was present and immediately available for the entire procedure.     Oleg Radford MD  8/28/2020

## 2020-08-28 NOTE — CONSULTS
VASCULAR SURGERY  CONSULT NOTE  8/28/2020    Physician Consulted: Dr. Felicia Crowe  Reason for Consult: intraoperative bowel ischemia    REJI Nieto is a 81 y.o. female for whom vascular surgery was consulted intraoperatively for diffusely ischemic bowel during exploratory laparotomy. The patient has had a lengthy hospital stay over the last month. She underwent laparoscopic appendectomy on 8/2 for perforated appendicitis. Post-operatively her course was complicated by gastrointestinal bleeding resulting in EGD on 8/13 which illustrated diffuse gastritis and duodenitis but no ulceration or active bleeding. Additionally she was noted to have a prolonged ileus and ultimately was started on TPN. Ultimately after return of bowel function she took in PO but not much according to her daughter. She was discharged to home but was too weak to perform activities of daily living and was taken back to the hospital for evaluation for placement. Last night she decompensated and a RRT was called for acute respiratory distress. It was thought that she had an aspiration event - talking with the family, over the past few days she had been having worsening abdominal pain and nausea/vomiting. She was intubated and brought to the ICU for further evaluation and treatment. CT abdomen and pelvis shows free air and diffuse calcification of the aorta and its intraabdominal branches - notably the SMA. Given the free air and her worsening clinical status - lactate of ~15, anuria despite 5L resuscitative fluids - she was taken to the OR for exploratory laparotomy. There, ischemic bowel was noted diffusely. Vascular surgery was consulted for evaluation for angiography and possible intervention to establish arterial flow.       Past Medical History:   Diagnosis Date    Acute blood loss anemia 3/21/2018    Acute on chronic diastolic congestive heart failure ([address] 3/20/2018    Acute respiratory failure with hypoxia ([address] 3/20/2018    Aortic stenosis, severe 03/2018    Arthritis     Cellulitis 3/20/2018    Right and left lower extremity    Class 3 obesity in adult 3/22/2018    History of blood transfusion     Hypertension     Hypokalemia 3/21/2018    Mitral regurgitation 03/2018    Moderate aortic regurgitation 03/2018    Sepsis due to methicillin susceptible Staphylococcus aureus ([address] 3/22/2018       Past Surgical History:   Procedure Laterality Date    CARDIAC CATHETERIZATION  01/10/2020    Dr. Baker   Left and Right Heart Catheterization    LAPAROSCOPIC APPENDECTOMY N/A 8/2/2020    APPENDECTOMY LAPAROSCOPIC performed by Sofi Gabriel MD at [address] 8/13/2020    EGD DIAGNOSTIC ONLY performed by Lori De Jesus MD at [address] [facility]       Medications Prior to Admission:    Prior to Admission medications    Medication Sig Start Date End Date Taking? Authorizing Provider   sucralfate (CARAFATE) 1 GM tablet Take 1 tablet by mouth 4 times daily 8/20/20  Yes Shara Fried DO   pantoprazole (PROTONIX) 40 MG tablet Take 1 tablet by mouth 2 times daily (before meals) 8/20/20  Yes Shara Fried DO   ferrous sulfate (IRON 325) 325 (65 Fe) MG tablet Take 325 mg by mouth daily (with breakfast)   Yes Historical Provider, MD   clopidogrel (PLAVIX) 75 MG tablet Take 75 mg by mouth daily   Yes Historical Provider, MD   enalapril-hydroCHLOROthiazide (VASERETIC) 10-25 MG per tablet Take 1 tablet by mouth daily   Yes Historical Provider, MD   aspirin 81 MG EC tablet Take 81 mg by mouth daily   Yes Historical Provider, MD   Iron Combinations (NIFEREX) TABS Take 1 capsule by mouth Daily 3/8/20  Yes David Field DO   torsemide (DEMADEX) 20 MG tablet Take 1 tablet by mouth daily 3/28/18  Yes [address] [place] [address],        No Known Allergies    History reviewed. No pertinent family history.     Social History     Tobacco Use    Smoking status: Never Smoker    Smokeless tobacco: Never Used   Substance Use Topics    Alcohol use: No    Drug use: No         Review of Systems   Unable to obtain 2/2 patient status    PHYSICAL EXAM:    Vitals:    20 0950   BP: (!) 161/77   Pulse: 98   Resp: 27   Temp: 94.1 °F (34.5 °C)   SpO2: 95%       General Appearance:  Surrounded by life support devices  Skin:  abthera in place  Head/face:  ETT in place with gastric tube w/ bilious output  Eyes:  No gross abnormalities. Lungs:  Intubated, symmetric chest rise, ventilated  Heart:  Regular rate and rhythm per monitor  Abdomen:  abthera in place, unable to assess tenderness  Extremities: extremities cool to touch    LABS:  CBC  Recent Labs     20  0800   WBC 14.2*   HGB 8.2*   HCT 27.1*        BMP  Recent Labs     20  0800 20  1012     --    K 4.3 4.1   CL 95*  --    CO2 9*  --    BUN 35*  --    CREATININE 1.6*  --    CALCIUM 8.0*  --      Liver Function  Recent Labs     20  0800   BILITOT 0.5   AST 1,935*   *   ALKPHOS 145*   PROT 4.2*   LABALBU 1.8*     No results for input(s): LACTATE in the last 72 hours. Recent Labs     20  2130   INR 2.0       RADIOLOGY  Xr Abdomen (kub) (single Ap View)    Result Date: 2020  Patient MRN: [id number] : 1935 Age:  81 years Gender: Female Order Date: 2020 1:08 PM Exam: XR ABDOMEN (KUB) (SINGLE AP VIEW) Number of Images: 2 views Indication:   ok for portable ok for portable Comparison: None. Findings: The bowel gas pattern. Dilated loops of small bowel air-filled suggesting of adynamic ileus. There is a large calcific density seen in the right lower pelvis. . There is no evidence of any organomegaly. . The osseous structures of the abdomen and pelvis demonstrate osteopenia. Adynamic ileus. Xr Chest Portable    Result Date: 2020  Clinical indications: Cough. Shortness of breath. TECHNIQUE: Single frontal projection of the chest (1 view). COMPARISON: 2020. FINDINGS: The heart is enlarged.  There is calcification within thoracic aorta. Acromioclavicular arthropathy. Bilateral pleural effusions and contiguous atelectasis or infiltrate. Chronic dislocation right shoulder. Left jugular central venous catheter is no longer present. The heart, lungs, mediastinum and regional skeleton are otherwise unremarkable. Bilateral pleural effusions and contiguous atelectasis or infiltrate. ASSESSMENT:  81 y.o. female with ischemic bowel on exploratory laparotomy for whom vascular surgery was consulted intraoperatively. Plan for the following.     PLAN:  - Angiography with possible intervention of the abdominal aorta and its branches    Electronically signed by David Venegas MD on 8/28/20 at 11:10 AM EDT

## 2020-08-28 NOTE — PROGRESS NOTES
PROGRESS NOTE       PATIENT PROBLEM LIST:  Active Problems:    Anemia due to GI blood loss    General weakness    Leukocytosis    Electrolyte disturbance  Resolved Problems:    * No resolved hospital problems. *      SUBJECTIVE:  Rudolpho Setting states she feels extremely weak and short of breath  And right lower abdominal discomfort. and RRT was just completed and was not notified of its occurrence. Elia Lindquist REVIEW OF SYSTEMS:  General ROS:   positive for - fatigue, malaise  and global worsening weakness. Psychological ROS:   positive for - anxiety and depression  Ophthalmic ROS:  positive for - decreased vision  And utilizes corrective lenses for visual acuity. ENT ROS: negative  Allergy and Immunology ROS: negative  Hematological and Lymphatic ROS:   positive for - leukocytosis and anemia  Endocrine: no heat or cold intolerance and no polyphagia, polydipsia, or polyuria  Respiratory ROS: positive for -   Intermittent shortness of breath  Cardiovascular ROS: positive for - dyspnea on exertion, irregular heartbeat, murmur and shortness of breath. Gastrointestinal ROS:  Has abdominal pain, change in bowel habits,  No black or bloody stools  Genito-Urinary ROS: no nocturia, dysuria, trouble voiding, frequency or hematuria  Musculoskeletal ROS: negative for- myalgias, arthralgias, or claudication  Neurological ROS: no TIA or stroke symptoms otherwise no significant change in symptoms or problems since yesterday as documented in previous progress notes.     SCHEDULED MEDICATIONS:   chlorhexidine  15 mL Mouth/Throat BID    polyvinyl alcohol  1 drop Both Eyes Q4H    And    artificial tears   Both Eyes Q4H    midazolam        midazolam  2 mg Intravenous Once    pantoprazole  40 mg Intravenous Daily    sodium chloride (PF)  10 mL Intravenous Daily    piperacillin-tazobactam  3.375 g Intravenous Q8H    And    sodium chloride   Intravenous Q8H    senna  2 tablet Oral Nightly    bisacodyl  10 mg Rectal Daily    magnesium citrate  296 mL Oral Once    oxymetazoline  2 spray Each Nostril Once    lidocaine   Topical Once    midazolam        potassium chloride  20 mEq Oral BID WC    ferrous sulfate  325 mg Oral Daily with breakfast    iron polysaccharides  1 capsule Oral Daily    [Held by provider] NIFEdipine  30 mg Oral QAM    [Held by provider] spironolactone  25 mg Oral Daily    sucralfate  1 g Oral 4x Daily    [Held by provider] torsemide  20 mg Oral Daily    sodium chloride flush  10 mL Intravenous 2 times per day    enoxaparin  40 mg Subcutaneous Daily    [Held by provider] enalapril  10 mg Oral Daily    And    [Held by provider] hydroCHLOROthiazide  25 mg Oral Daily    docusate sodium  100 mg Oral Daily    metoclopramide  10 mg Intravenous Q6H    polyethylene glycol  17 g Oral Daily    ipratropium-albuterol  1 ampule Inhalation Q4H WA       VITAL SIGNS:                                                                                                                          /65   Pulse 89   Temp 98.4 °F (36.9 °C) (Axillary)   Resp 29   Ht 5' 5\" (1.651 m)   Wt 179 lb (81.2 kg)   SpO2 98%   BMI 29.79 kg/m²   No data found. OBJECTIVE:    HEENT: PERRL, EOM  Intact; sclera non-icteric, conjunctiva pink. Carotids are brisk in upstroke with normal contour. No carotid bruits. Normal jugular venous pulsation at 45°. No palpable cervical nor supraclavicular nodes. Thyroid not palpable. Trachea midline. Chest: Even excursion  Lungs:  Bilateral coarse expiratory rhonchi, no expiratory wheezes or rhonchi, no decreased tactile fremitus without inspiratory rales. Heart: Regular, irregular  rhythm; S1 > S2, no gallop, grade 2/6 systolic murmur second right and left intercostal spaces No clicks, rub, palpable thrills   or heaves. PMI nondisplaced, 5th intercostal space MCL. Abdomen: Soft,  Somewhat tender,  Mildly distended,  moderately protuberant, no masses or organomegaly.   Bowel sounds  Markedly decreased  Extremities: Without clubbing, cyanosis or edema. Pulses present 1+ upper extermities bilaterally; present 1+ DP and present 1+ PT bilaterally.      Data:   Scheduled Meds: Reviewed  Continuous Infusions:    fentaNYL 5 mcg/ml in 0.9%  ml infusion      norepinephrine      lactated ringers 120 mL/hr at 20       Intake/Output Summary (Last 24 hours) at 2020  Last data filed at 2020 1940  Gross per 24 hour   Intake 480 ml   Output 1150 ml   Net -670 ml     CBC:   Recent Labs     20  0543 20  0426 20  04220   WBC 14.0* 15.6* 18.9* 18.6*   HGB 8.0* 8.2* 8.7* 10.1*   HCT 24.9* 25.5* 26.7* 32.0*    366 390 436     BMP:  Recent Labs     20  0544 20  0426 20  0424 20    138 140 142  --    K 2.6* 3.0* 2.8* 3.9 3.70    97* 97* 96*  --    CO2 23 24 26 13*  --    BUN 13 18 23 37*  --    CREATININE 0.9 1.0 1.0 1.7*  --    LABGLOM 60 53 53 29  --      ABGs:   Lab Results   Component Value Date    PH 7.202 2020    PO2 169.4 2020    PCO2 30.8 2020     INR:   Recent Labs     20   INR 2.0     PRO-BNP:   Lab Results   Component Value Date    PROBNP 6,987 (H) 2020    PROBNP 1,713 (H) 2020      TSH:   Lab Results   Component Value Date    TSH 1.290 2018      Cardiac Injury Profile:   Recent Labs     20   TROPONINI 0.03      Lipid Profile:   Lab Results   Component Value Date    TRIG 219 2020    HDL 45 2018    LDLCALC 85 2018    CHOL 161 2018      Hemoglobin A1C: No components found for: HGBA1C     RAD:   Ct Abdomen Pelvis Wo Contrast Additional Contrast? None    Result Date: 2020  Patient MRN:  [id number] : 1935 Age: 81 years Gender: Female Order Date:  2020 11:45 PM TECHNIQUE/NUMBER OF IMAGES/COMPARISON/CLINICAL HISTORY: CT abdomen pelvis no contrast Axial images obtained sagittal and coronal reconstructions Through 4 8 images Comparison March 7 study. The 72-year-old female patient with a history of vomiting and abdominal pain and diarrhea. FINDINGS: Findings of acute perforated appendicitis. There are inflammatory changes in the appendix and in the periappendiceal fat planes with the areas of air outside the appendiceal lumen which are walled contained perforation by the akshat-appendical inflammatory reaction present. At the base of the appendix the appendix measures up to 12 mm in cross-section diameter. There is a component of small bowel obstruction with fecal-like contents in the small bowel some of the segments of the small bowel are dilated up to 3.6 cm . Digital is not precisely identified, does not appears to be direct relate with the appendicitis process. There is some whorling of the mesenteric vessels. The colon is decompressed. Uncomplicated extensive diverticulosis seen in the sigmoid colon. There is no free intraperitoneal air outside of the akshat-appendiceal spaces. There are normal size and the density for the liver and spleen. The multiple gallstones are seen in the gallbladder. The biliary tree and pancreatic ductal systems are not dilated. Pancreas has atrophy. There is a moderate to large size hiatal hernia. There is a nodule in the left adrenal gland which is stable back to the study of December 14, 2018. Kidneys have preserved size and cortical thickness, there is no renal calculus. There is no obstruction. The bladder has unremarkable appearance. The calcified myomatous changes are seen in the uterus. Left ovary has unremarkable appearance. The right ovary cannot be conspicuously identified as separate from adjacent bowel segments. There are unremarkable appearance for the bladder. Calcified atheroma changes are seen in the abdominal aorta with a more prominent plaque in the upper abdominal aorta where the origin of the celiac axis and SMA are located.  There are areas of chronic pulmonary fibrosis in the bases are. These were seen previously. Degenerative changes are seen throughout the lumbar spine with the degenerative disc disease and facet hypertrophy. These are long-standing findings. Patient previous endovascular repair for the aortic root/aortic valve. 1. Acute perforated appendicitis as above described perforation is well contained in the periappendiceal spaces. 2. There is also a component of small bowel obstruction which etiology is not precisely determined that time the present study, does not appears to be directly related with the appendicitis. There is some whorling of the mesenteric vessels. 3. Multiple gallstones. . Preliminary report was given to Dr. Tone Keenan, ER physician at the time the present study. ALERT:  ABNORMAL REPORT. Xr Abdomen (kub) (single Ap View)    Result Date: 2020  Patient MRN: [id number] : 1935 Age:  81 years Gender: Female Order Date: 2020 1:08 PM Exam: XR ABDOMEN (KUB) (SINGLE AP VIEW) Number of Images: 2 views Indication:   ok for portable ok for portable Comparison: None. Findings: The bowel gas pattern. Dilated loops of small bowel air-filled suggesting of adynamic ileus. There is a large calcific density seen in the right lower pelvis. . There is no evidence of any organomegaly. . The osseous structures of the abdomen and pelvis demonstrate osteopenia. Adynamic ileus. Xr Abdomen (kub) (single Ap View)    Result Date: 2020  Patient MRN:  [id number] : 1935 Age: 81 years Gender: Female Order Date:  2020 4:17 PM EXAM: XR ABDOMEN (KUB) (SINGLE AP VIEW) INDICATION:  distension distension  COMPARISON: CT the abdomen and pelvis, 2020 FINDINGS: There is gaseous distention of small bowel loops to maximum caliber of 5.5 cm. The extent of small bowel distention has increased as of the prior CT from 2020. Small amount of gas as well. Increased gaseous distention of small bowel loops.  Obstruction cannot be excluded. Xr Abdomen (kub) (single Ap View)    Result Date: 2020  Patient MRN: [id number] : 1935 Age:  81 years Gender: Female Order Date: 2020 7:01 PM Exam: XR ABDOMEN (KUB) (SINGLE AP VIEW) Number of Images: 2 views Indication:   eval bowel distension eval bowel distension Comparison: Prior study from 2020 is available. Findings: The bowel gas pattern is normal. The nasogastric tube with the tip in the body of the stomach. There appears to be an esophageal stent seen at the edge of the study. There is a calcified density with lucent center seen in the right lower pelvis. Mild osteopenia of the osseous structure. . The osseous structures of the abdomen and pelvis demonstrate multilevel osteoarthritic changes disease of the lumbar spine. .     NON-SPECIFIC GAS PATTERN. Ct Abdomen Pelvis W Iv Contrast Additional Contrast? None    Result Date: 2020  Patient MRN:  [id number] : 1935 Age: 81 years Gender: Female Order Date:  2020 11:47 AM EXAM: CT ABDOMEN PELVIS W IV CONTRAST NUMBER OF IMAGES \ views:  5 INDICATION: Ongoing abdominal pelvic pain after perforated appendicitis sp  laparoscopic appendectomy, eval for abscess COMPARISON: 2020 TECHNIQUE: Axial computerized tomography sections of the abdomen and pelvis with sagittal and coronal MPR reconstructions were obtained from the top of the diaphragm to the pelvis. Contrast dose: 110 ml. Isovue 370 intravenously injected. Scanning performed post IV contrast administration. Low-dose CT  acquisition technique included one of following options; 1 . Automated exposure control, 2. Adjustment of MA and or KV according to patient's size or 3. Use of iterative reconstruction. FINDINGS: THORACIC BASE: There are small bilateral pleural effusions as well as probable interstitial fibrosis and there is somewhat increasing atelectasis at the posterior lower lobes.  There is a hiatus hernia with a fluid filled esophagus compatible with on going gastroesophageal reflux. LIVER: Unremarkable. BILIARY: Multiple gallstones present. PANCREAS: Unremarkable. SPLEEN: Unremarkable. ADRENALS:  Unremarkable. KIDNEYS:  Unremarkable. GI: There are roughly 2 borderline dilated distal small bowel loops without a pattern suggestive of small bowel obstruction. The contents of the lower GI tract are liquid indicating an underlying diarrheal illness. The possibility of an antibiotic induced diarrhea is raised. There is rather pronounced colonic diverticulosis especially in the sigmoid region. PELVIS: Calcified uterine fibroid noted MSK: No acute osseous findings. 1. No evidence of abdominal or pelvic abscess. 2. Liquid contents of the lower GI tract suggesting a developing with diarrheal illness which could potentially be antibiotic induced. 3. New small bilateral pleural effusions and adjacent atelectasis. 4. Hiatus hernia with a fluid-filled distal esophagus compatible with ongoing GE reflux. 5. Cholelithiasis. 6. Colonic diverticulosis. Xr Chest Portable    Result Date: 2020  Clinical indications: Cough. Shortness of breath. TECHNIQUE: Single frontal projection of the chest (1 view). COMPARISON: 2020. FINDINGS: The heart is enlarged. There is calcification within thoracic aorta. Acromioclavicular arthropathy. Bilateral pleural effusions and contiguous atelectasis or infiltrate. Chronic dislocation right shoulder. Left jugular central venous catheter is no longer present. The heart, lungs, mediastinum and regional skeleton are otherwise unremarkable. Bilateral pleural effusions and contiguous atelectasis or infiltrate. Xr Chest Portable    Result Date: 2020  Patient MRN: [id number] : 1935 Age:  81 years Gender: Female Order Date: 2020 11:05 AM Exam: XR CHEST PORTABLE Number of Images: 1 view Indication:   wbc wbc Comparison: 2020 Findings:  There is a central venous catheter noted the tip is in the superior vena cava there is no evidence to suggest pneumothorax The heart is unremarkable The lung fields demonstrate no significant pulmonary vascular congestion and edema. The aorta is tortuous ectatic There are findings throughout the lung fields which are likely chronic There appears to be an anterior dislocation of the right shoulder which appears chronic    Findings compatible with atherosclerotic disease There is minimal infiltrate at both lung bases, at the right lung base representing a change suspicious for pneumonia. At the lung base on the left relatively stable. There is likely a component of pneumonia superimposed on chronic scarring and fibrosis     Xr Chest Portable    Result Date: 2020  Patient MRN:  [id number] : 1935 Age: 81 years Gender: Female Order Date:  2020 2:00 AM TECHNIQUE/NUMBER OF IMAGES/COMPARISON/CLINICAL HISTORY: Chest AP 1 imaging 2 views of History Central venous line placement. FINDINGS: Right internal jugular central venous catheter tip in SVC. The no pneumothorax on the right on the left-sided. No sizable infiltrates or consolidations are seen. Discrete atelectasis in the base. The heart has normal size. Left internal jugular central venous catheter tip in SVC. No pneumothorax on the right or on the left. Xr Chest Abdomen Ng Placement    Result Date: 2020  Patient MRN:  [id number] : 1935 Age: 81 years Gender: Female Order Date:  2020 10:12 PM EXAM: XR CHEST ABDOMEN NG PLACEMENT COMPARISON: August 3, 2020 INDICATION:  ng placement ng placement FINDINGS: Nasogastric tube courses below the level of the diaphragm and appears in the expected location of the stomach. Satisfactory position of nasogastric tube.      Xr Chest Abdomen Ng Placement    Result Date: 8/3/2020  Patient MRN:  [id number] : 1935 Age: 81 years Gender: Female Order Date:  8/3/2020 9:29 AM EXAM: XR CHEST ABDOMEN NG PLACEMENT 0938 hours INDICATION:  NG tube placement COMPARISON: Portable chest 3/4/2020. Portable chest 8/2/2020 at 0205 hours. FINDINGS:  A new nasogastric tube with tip in the proximal gastric body. Stomach is nondistended, minimal residual gas. No obvious free air. Mild gas distention of a couple mid abdomen small bowel loops. Mild gas distention of the proximal transverse colon. Partial inclusion of the lung bases. Shallow inspiration with moderate left basilar atelectasis. Old aortic valve stent. 1. New nasogastric tube in the gastric body. 2. Minimal small bowel ileus. 3. Left basilar atelectasis. EKG: See Report  Echo: See Report      IMPRESSIONS:  Active Problems:    Anemia due to GI blood loss    General weakness    Leukocytosis    Electrolyte disturbance  Resolved Problems:    * No resolved hospital problems. *      RECOMMENDATIONS:  Mrs. Gregg Recinos presently has lactic acidosis most likely from an intra-abdominal infection and possibly endocarditis. I would certainly assume the worse case scenario and continue treatment with antibiotics for a prolonged period of time as if she even has endocarditis she is not a candidate for redo intervention and thus would simply treat her medically at this point. She is presently being prepared to transfer to the surgical intensive care unit. I have spent more than  26 minutes face to face with Lynn Dixon and reviewing notes and laboratory data, with greater than 50% of this time instructing and counseling the patient and her daughter face to face regarding my findings and recommendations and I have answered all questions as posed to me by Ms. Gregg Recinos as well as her daughter who is presently at her bedside. Kaity Zhang, DO FACP,FACC,[facility]      NOTE:  This report was transcribed using voice recognition software.   Every effort was made to ensure accuracy; however, inadvertent computerized transcription errors may be present

## 2020-08-29 NOTE — PROCEDURES
Randall Nieto is a 81 y.o. female patient. 1. General weakness    2. Anemia, unspecified type    3. Lower extremity edema    4. Congestive heart failure, unspecified HF chronicity, unspecified heart failure type ([address]    5. Anemia due to GI blood loss    6. Electrolyte disturbance    7. Acute respiratory failure with hypoxia (HCC)    8. Acute on chronic diastolic congestive heart failure ([address]    9. Hypokalemia    10. Acute blood loss anemia    11. Aortic stenosis, severe    12. Moderate aortic regurgitation    13. Primary osteoarthritis of right knee    14. Non-pressure chronic ulcer of right ankle, limited to breakdown of skin ([address]    15. Non-pressure chronic ulcer of left ankle, limited to breakdown of skin ([address]    16. Lymphedema of both lower extremities    17. Venous ulcer of left lower extremity without varicose veins (HCC)    18. NORBERTO (acute kidney injury) ([address]    19. Hypomagnesemia    20. Atrial fibrillation, new onset ([address]    21. Perforated appendicitis    22. Ileus [facility])      Past Medical History:   Diagnosis Date    Acute blood loss anemia 3/21/2018    Acute on chronic diastolic congestive heart failure ([address] 3/20/2018    Acute respiratory failure with hypoxia ([address] 3/20/2018    Aortic stenosis, severe 03/2018    Arthritis     Cellulitis 3/20/2018    Right and left lower extremity    Class 3 obesity in adult 3/22/2018    History of blood transfusion     Hypertension     Hypokalemia 3/21/2018    Mitral regurgitation 03/2018    Moderate aortic regurgitation 03/2018    Sepsis due to methicillin susceptible Staphylococcus aureus ([address] 3/22/2018     Blood pressure (!) 134/40, pulse 97, temperature 95 °F (35 °C), resp. rate 22, height 5' 5\" (1.651 m), weight 179 lb (81.2 kg), SpO2 (!) 76 %. Zoll Temperature Catheter    Date/Time: 8/29/2020 12:38 AM  Performed by: Gianfranco Atkinson MD  Authorized by: Gianfranco Atkinson MD   Consent: Written consent obtained.   Risks and benefits: risks, benefits and alternatives were discussed  Consent given by: power of   Patient identity confirmed: arm band  Time out: Immediately prior to procedure a \"time out\" was called to verify the correct patient, procedure, equipment, support staff and site/side marked as required. Indications: vascular access  Preparation: skin prepped with 2% chlorhexidine  Skin prep agent dried: skin prep agent completely dried prior to procedure  Sterile barriers: all five maximum sterile barriers used - cap, mask, sterile gown, sterile gloves, and large sterile sheet  Hand hygiene: hand hygiene performed prior to central venous catheter insertion  Location details: left internal jugular  Patient position: reverse Trendelenburg  Procedural supplies: Zoll quatro.   Catheter size: 9.5 Fr  Pre-procedure: landmarks identified  Ultrasound guidance: yes  Sterile ultrasound techniques: sterile gel and sterile probe covers were used  Number of attempts: 1  Successful placement: yes  Post-procedure: line sutured and dressing applied  Assessment: blood return through all ports,  free fluid flow,  placement verified by x-ray and no pneumothorax on x-ray  Comments: Dr. Melita Mascorro was present for the procedure          Jeet Giraldo MD  8/29/2020

## 2020-08-29 NOTE — PROGRESS NOTES
[address] [place] [address] [facility]  [facility]  Progress Note    SUBJECTIVE:  Chief Complaint   Patient presents with    Fatigue     dc 2 hours ago from hospital. pt is too weak to get out of the car and into the house    Leg Swelling     bilateral feet swelling     Patient still in the CVICU. She is still on CVVHD. She is on 3 pressors. Review of systems:  As stated above in the chief complaint, otherwise negative.     Medications:  Scheduled Meds:   insulin lispro  0-12 Units Subcutaneous Q4H    albumin human  25 g Intravenous Q6H    chlorhexidine  15 mL Mouth/Throat BID    polyvinyl alcohol  1 drop Both Eyes Q4H    And    artificial tears   Both Eyes Q4H    pantoprazole  40 mg Intravenous Daily    sodium chloride (PF)  10 mL Intravenous Daily    piperacillin-tazobactam  3.375 g Intravenous Q12H    And    sodium chloride   Intravenous Q12H    sodium chloride  250 mL Intravenous Once    fluconazole  200 mg Intravenous Q24H    sucralfate  1 g Oral 4x Daily    sodium chloride flush  10 mL Intravenous 2 times per day    ipratropium-albuterol  1 ampule Inhalation Q4H WA     Continuous Infusions:   fentaNYL 5 mcg/ml in 0.9%  ml infusion 10 mcg/hr (08/28/20 2227)    norepinephrine 29.973 mcg/min (08/29/20 0640)    vasopressin (Septic Shock) infusion 0.04 Units/min (08/29/20 0430)    IV infusion builder 250 mL/hr at 08/29/20 0638    dextrose      dialysis builder 2,400 mL/hr at 08/29/20 0642    sodium chloride 100 mL/hr at 08/29/20 0832    IV infusion builder 60 mL/hr at 08/28/20 2348    EPINEPHrine infusion 7 mcg/min (08/29/20 0829)    heparin (porcine) 9 Units/kg/hr (08/29/20 0420)     PRN Meds:midazolam, sodium chloride (Inhalant), glucose, dextrose, glucagon (rDNA), dextrose, potassium chloride, magnesium sulfate, calcium gluconate **OR** calcium gluconate **OR** calcium gluconate **OR** calcium gluconate, sodium phosphate IVPB **OR** sodium phosphate IVPB **OR** sodium 08/28/2020    CREATININE 1.6 08/28/2020    GFRAA >60 08/29/2020    LABGLOM >60 08/29/2020    GLUCOSE 204 08/29/2020    PROT 3.0 08/29/2020    LABALBU 1.4 08/29/2020    CALCIUM 6.5 08/29/2020    BILITOT 0.7 08/29/2020    ALKPHOS 180 08/29/2020    AST 4,320 08/29/2020    ALT 1,453 08/29/2020     Lab Results   Component Value Date    CRP 17.1 (H) 08/27/2020    CRP 4.2 (H) 03/21/2018     Lab Results   Component Value Date    SEDRATE 85 (H) 08/27/2020     Radiology:  Reviewed    Microbiology:   Acute hepatitis panel: Nonreactive  SARS-CoV-2 8/26/2020: Not detected  Urine culture 8/26/2020 50,000 Candida albicans  Blood cultures 8/22/2020: Negative x2  Blood cultures 8/23/2020: Negative    Recent Labs     08/26/20  1059 08/27/20  2130   PROCAL 0.22* 0.87*       ASSESSMENT:  · Recent laparoscopic appendectomy  · Mesenteric ischemia with perforation of proximal jejunum and cecum. Status post exploratory laparotomy, small bowel resection, ileocecectomy 8/28/2020.   No intraoperative cultures apparently taken  · Status post aortogram with TPA in the S[facility] 8/28/2020  · Leukocytosis associated to the above  · Acute kidney injury, now on CVVHD  · Acute respiratory failure  · Possible aspiration pneumonia  · Insignificant candiduria  · Prognosis is poor    PLAN:  · Continue Zosyn and Fluconazole  · Family planning on withdrawing care    Case discussed with Dr. Abhijeet King  9:01 AM  8/29/2020

## 2020-08-29 NOTE — PROGRESS NOTES
Hospitalist Progress Note      Synopsis: Mrs. Sharon Gibbons is an 80 yo female with a hx of HTN, HFpEF 55% with severe AS s/p TAVR and OA recently admitted 8/1 with perforated appendicitis during which she went to SICU and underwent appendectomy on 8/2. Pt developed coffee ground emesis and dark stools and underwent EGD which showed multiple gastric antral ulcers, distal duodenitis and a large hiatal hernia with vianey's ulcerations. She was NPO and placed on TPN for ileus. Hematemesis continued and NGT placed. Pt was later able to tolerate diet and she was discharged home. Back to the ED 2 hours after discharge and labs showed anemia with elevated BNP and significant edema. She was diuresed and started on antibiotics for possible pneumonia. ID was consulted on 8/26 for leukocytosis. On 8/27 she complained of worsening RLQ pain and she had an episode of emesis. An NGT was placed and drained > 500 mL of brown liquid. An RRT was called that evening for hypoxia and she was transferred to SICU overnight into 8/28. She required intubation. Bronchoscopy revealed aspiration of gastric contents into both lungs. A CT of the abdomen showed free air. She was taken to the OR for exploratory laparotomy and a small bowel resection and ileocecectomy were performed with wound vac placement. She also required an aortogram with stenting at the superior mesenteric artery. Over the last 24 hours she has become anuric with severe lactic acidosis. She is now on 3 vasopressors, with multiorgan failure. Family meeting planned for this afternoon to discuss goals of care. Subjective  No family at bedside. She is nonresponsive. She was significantly hypoxic overnight despite FiO2 of 100%.      Exam:  BP (!) 142/41   Pulse 110   Temp 97 °F (36.1 °C) (Bladder)   Resp 24   Ht 5' 5\" (1.651 m)   Wt 181 lb 1.6 oz (82.1 kg)   SpO2 (!) 83%   BMI 30.14 kg/m²     Constitutional:  Sedated, intubated  HEENT:  Normocephalic, NGT in place, ETT secured, Pupils pinpoint on fentanyl gtt  Respiratory:  ETT to vent, diminished bases bilaterally, FiO2 100%, PEEP 8  Cardiovascular/Edema:  RRR, S1/S2, noted wide pulse pressure; no edema noted  Gastrointestinal:  Midabdominal incision with wound vac in place  Neurologic:  Sedated  Skin:  Warm, dry, pale  Other:  Right femoral temporary dialysis catheter in place       Medications:  Reviewed    Infusion Medications    fentaNYL 5 mcg/ml in 0.9%  ml infusion 10 mcg/hr (08/28/20 2227)    norepinephrine 29.973 mcg/min (08/29/20 0640)    vasopressin (Septic Shock) infusion 0.04 Units/min (08/29/20 0939)    IV infusion builder 250 mL/hr at 08/29/20 1034    dextrose      dialysis builder 2,400 mL/hr at 08/29/20 1000    sodium chloride 100 mL/hr at 08/29/20 0832    IV infusion builder 70 mL/hr at 08/29/20 0938    EPINEPHrine infusion 7 mcg/min (08/29/20 0829)    heparin (porcine) Stopped (08/29/20 1141)     Scheduled Medications    insulin lispro  0-12 Units Subcutaneous Q4H    albumin human  25 g Intravenous Q6H    chlorhexidine  15 mL Mouth/Throat BID    polyvinyl alcohol  1 drop Both Eyes Q4H    And    artificial tears   Both Eyes Q4H    pantoprazole  40 mg Intravenous Daily    sodium chloride (PF)  10 mL Intravenous Daily    piperacillin-tazobactam  3.375 g Intravenous Q12H    And    sodium chloride   Intravenous Q12H    sodium chloride  250 mL Intravenous Once    fluconazole  200 mg Intravenous Q24H    sucralfate  1 g Oral 4x Daily    sodium chloride flush  10 mL Intravenous 2 times per day    ipratropium-albuterol  1 ampule Inhalation Q4H WA     PRN Meds: midazolam, sodium chloride (Inhalant), glucose, dextrose, glucagon (rDNA), dextrose, potassium chloride, magnesium sulfate, calcium gluconate **OR** calcium gluconate **OR** calcium gluconate **OR** calcium gluconate, sodium phosphate IVPB **OR** sodium phosphate IVPB **OR** sodium phosphate IVPB **OR** sodium phosphate IVPB, sodium chloride flush, acetaminophen **OR** acetaminophen, polyethylene glycol    I/O    Intake/Output Summary (Last 24 hours) at 8/29/2020 1326  Last data filed at 8/29/2020 1300  Gross per 24 hour   Intake 00732 ml   Output 8112 ml   Net 11396 ml       Labs:   Recent Labs     08/28/20  0800 08/28/20  1350 08/29/20  0440   WBC 14.2* 18.2* 17.3*   HGB 8.2* 7.7* 10.4*   HCT 27.1* 25.4* 33.2*    226 142       Recent Labs     08/28/20  0930  08/28/20 2005 08/29/20 0200 08/29/20 0440 08/29/20  0820   NA  --    < > 137 135  --  134   K  --    < > 4.2 4.1  --  4.2   CL  --    < > 97* 91*  --  94*   CO2  --    < > 13* 16*  --  12*   BUN  --    < > 19 14  --  10   CREATININE  --    < > 1.0 0.8  --  0.6   CALCIUM  --    < > 7.0* 6.5*  --  6.7*   PHOS 6.0*  --  3.8  --  3.0  --     < > = values in this interval not displayed. Recent Labs     08/28/20 2005 08/29/20 0200 08/29/20  0820   PROT 3.7* 3.0* 3.1*   ALKPHOS 188* 180* 249*   ALT 1,665* 1,453* 1,841*   AST 5,356* 4,320* 5,390*   BILITOT 0.7 0.7 1.2       Recent Labs     08/27/20  2130   INR 2.0       Recent Labs     08/27/20  2130   TROPONINI 0.03       Chronic labs:  Lab Results   Component Value Date    CHOL 161 03/21/2018    TRIG 219 (H) 08/14/2020    HDL 45 03/21/2018    LDLCALC 85 03/21/2018    TSH 0.818 08/28/2020    INR 2.0 08/27/2020    LABA1C 5.4 03/20/2018       Radiology:  Imaging studies reviewed today.     ASSESSMENT:  Septic Shock  Mesenteric ischemia with perforation of proximal jejunum and cecum s/p exploratory laparotomy, small bowel resection, ileocecectomy s/p aortogram with tPA in the [facility]  NORBERTO  Acute respiratory failure  Acute blood loss anemia   Acute encephalopathy  Shock liver     PLAN:  Continue current management pending family meeting  Prognosis grave  Continue CVVHD per nephrology  Continue antibiotics        Diet: Diet NPO Effective Now  Code Status: DNR-CCA  PT/OT Eval Status:   As needed  DVT Prophylaxis:   heparin  Recommended

## 2020-08-29 NOTE — PROGRESS NOTES
Department of Internal Medicine  Nephrology Consult Note    Events reviewed. SUBJECTIVE: We are following Mrs. Jorge Roblero for NORBERTO on CRRT. Remains intubated.     PHYSICAL EXAM:      Vitals:    VITALS:  BP (!) 142/41   Pulse 105   Temp 96.8 °F (36 °C) (Bladder)   Resp 25   Ht 5' 5\" (1.651 m)   Wt 181 lb 1.6 oz (82.1 kg)   SpO2 100%   BMI 30.14 kg/m²   24HR INTAKE/OUTPUT:      Intake/Output Summary (Last 24 hours) at 8/29/2020 1058  Last data filed at 8/29/2020 1000  Gross per 24 hour   Intake 10321 ml   Output 6854 ml   Net 74580 ml       Constitutional:  Sedated, intubated  HEENT:  Normocephalic, NGT in place, ETT secured, Pupils pinpoint on fentanyl gtt  Respiratory:  ETT to vent, diminished bases bilaterally, FiO2 100%, PEEP 8  Cardiovascular/Edema:  RRR, S1/S2, noted wide pulse pressure; no edema noted  Gastrointestinal:  Midabdominal incision with wound vac in place  Neurologic:  Sedated  Skin:  Warm, dry, pale  Other:  Right femoral temporary dialysis catheter in place    Scheduled Meds:   insulin lispro  0-12 Units Subcutaneous Q4H    albumin human  25 g Intravenous Q6H    chlorhexidine  15 mL Mouth/Throat BID    polyvinyl alcohol  1 drop Both Eyes Q4H    And    artificial tears   Both Eyes Q4H    pantoprazole  40 mg Intravenous Daily    sodium chloride (PF)  10 mL Intravenous Daily    piperacillin-tazobactam  3.375 g Intravenous Q12H    And    sodium chloride   Intravenous Q12H    sodium chloride  250 mL Intravenous Once    fluconazole  200 mg Intravenous Q24H    sucralfate  1 g Oral 4x Daily    sodium chloride flush  10 mL Intravenous 2 times per day    ipratropium-albuterol  1 ampule Inhalation Q4H WA     Continuous Infusions:   fentaNYL 5 mcg/ml in 0.9%  ml infusion 10 mcg/hr (08/28/20 2227)    norepinephrine 29.973 mcg/min (08/29/20 0640)    vasopressin (Septic Shock) infusion 0.04 Units/min (08/29/20 0939)    IV infusion builder 250 mL/hr at 08/29/20 4611    dextrose  dialysis builder 2,400 mL/hr at 08/29/20 0642    sodium chloride 100 mL/hr at 08/29/20 0832    IV infusion builder 70 mL/hr at 08/29/20 0938    EPINEPHrine infusion 7 mcg/min (08/29/20 0829)    heparin (porcine) 9 Units/kg/hr (08/29/20 0420)     PRN Meds:.midazolam, sodium chloride (Inhalant), glucose, dextrose, glucagon (rDNA), dextrose, potassium chloride, magnesium sulfate, calcium gluconate **OR** calcium gluconate **OR** calcium gluconate **OR** calcium gluconate, sodium phosphate IVPB **OR** sodium phosphate IVPB **OR** sodium phosphate IVPB **OR** sodium phosphate IVPB, sodium chloride flush, acetaminophen **OR** acetaminophen, polyethylene glycol    DATA:    CBC with Differential:    Lab Results   Component Value Date    WBC 17.3 08/29/2020    RBC 3.66 08/29/2020    HGB 10.4 08/29/2020    HCT 33.2 08/29/2020     08/29/2020    MCV 90.7 08/29/2020    MCH 28.4 08/29/2020    MCHC 31.3 08/29/2020    RDW 17.0 08/29/2020    NRBC 2.6 08/29/2020    METASPCT 6.1 08/29/2020    LYMPHOPCT 0.9 08/29/2020    PROMYELOPCT 0.9 08/29/2020    MONOPCT 3.5 08/29/2020    MYELOPCT 0.9 08/29/2020    BASOPCT 0.6 08/29/2020    MONOSABS 0.69 08/29/2020    LYMPHSABS 0.17 08/29/2020    EOSABS 0.00 08/29/2020    BASOSABS 0.00 08/29/2020     CMP:    Lab Results   Component Value Date     08/29/2020    K 4.2 08/29/2020    K 4.3 08/28/2020    CL 94 08/29/2020    CO2 12 08/29/2020    BUN 10 08/29/2020    CREATININE 0.6 08/29/2020    GFRAA >60 08/29/2020    LABGLOM >60 08/29/2020    GLUCOSE 170 08/29/2020    PROT 3.1 08/29/2020    LABALBU 1.3 08/29/2020    CALCIUM 6.7 08/29/2020    BILITOT 1.2 08/29/2020    ALKPHOS 249 08/29/2020    AST 5,390 08/29/2020    ALT 1,841 08/29/2020     Hepatic Function Panel:    Lab Results   Component Value Date    ALKPHOS 249 08/29/2020    ALT 1,841 08/29/2020    AST 5,390 08/29/2020    PROT 3.1 08/29/2020    BILITOT 1.2 08/29/2020    LABALBU 1.3 08/29/2020     Ionized Calcium:  No results found for: IONCA  Magnesium:    Lab Results   Component Value Date    MG 1.8 08/29/2020     Phosphorus:    Lab Results   Component Value Date    PHOS 3.0 08/29/2020     Radiology Review:      CT abdomen pelvis 8/28/2020   1. Overall partial improvement in the pattern of dilatation of the    jejunal segments. There is lesser degree of stranding of the    corresponding jejunal mesentery. .         2. The oral contrast has reached the colon since the CT scan of August 26.         3. There is no indication for bowel complete or severe bowel    obstruction presently.         4. Amorphous soft tissue density lateral to the cecum, anterior to the    surgical sutures of the recent appendectomy, represent a localized    phlegmon or inflammatory mass.         5. Development of an extensive a consolidation pneumonia bilaterally.         ALERT:  ABNORMAL REPORT. CXR 8/28/2020   Slight progression of opacity on the left, stable on the right. This    may relate to any combination of infiltrate and/or atelectasis. Possible left pleural effusion may be present as well.                    BRIEF SUMMARY OF INITIAL CONSULT:    Briefly, Mrs. Neha Hennessy is a 81year old female with a PMH of HTN, HFpEF 55% with  severe AS s/p TAVR and OA, recently admitted 8/1/2020 with perforated appendicitis during which time she was admitted to SICU and underwent a laparoscopic appendectomy on 8/2. On 8/12 she had an episode of coffee ground emesis as well as dark stools. On 8/13 an EGD showed multiple gastric antral ulcers, distal duodenitis and a large hiatal hernia with vianey's ulcerations. She was started on TPN for the above as well as ileus. She continued to have hematemesis and an NGT was placed. Plavix and ASA (given for recent TAVR) were discontinued. TPN was stopped on 8/20. Her diet was advanced and she was discharged home with home health care on 8/22.  She reported that she felt so weak she was unable to get out of the car when she got to her house. She presented back to the ER 2 hours after discharge on 8/22. Labs revealed anemia with elevated ProBNP and significant edema. CXR showed bilateral effusions vs infiltrates. She was given lasix, ceftriaxone and doxy prior to being admitted for CHF exacerbation vs PNA. She was transfused and tolerated a diet. ID was consulted on 8/26 for leukocytosis. On 8/27 she complained of worsening RLQ pain and she had an episode of emesis. An NGT was placed and drained > 500 mL of brown liquid. An RRT was called that evening for hypoxia and she was transferred to SICU overnight into 8/28. She required intubation. Bronchoscopy revealed aspiration of gastric contents into both lungs. A CT of the abdomen showed free air. She was taken to the OR for exploratory laparotomy and a small bowel resection and ileocecectomy were performed with wound vac placement. She also required an aortogram with stenting at the superior mesenteric artery. Over the last 24 hours she has become anuric with severe lactic acidosis. We are consulted for management of NORBERTO. IMPRESSION/RECOMMENDATIONS:      1. NORBERTO stage III, established ischemic ATN 2/2 hemodynamic shock, anuric. Started on renal replacement therapy, CVVHD. 2. Acidemia, pH 7.220, with HAGMA 2/2 severe lactic acidosis and uremia, with respiratory compensation   3. Septic shock 2/2 mesenteric ischemia with bowel perforation, requiring epinephrine, vasopressin and norepinephrine   4. Hypocalcemia, ionized calcium 0.94, 2/2 calcium removal by CRRT, on continuous infusion with CVVHD  5. Hypomagnesemia 2/2 GI losses and lack of intake and removal by CRT  6. Acute hypoxic respiratory failure 2/2 aspiration PNA, s/p intubation 8/28/2020  7. Mesenteric ischemia with perforation of proximal jejunum and cecum, s/p ex lap with SBR and ileocecectomy 8/28/2020, on zosyn and fluconazole per ID  8.  Superior mesenteric artery calcification s/p aortogram with stenting 8/28/2020  9. Transaminitis, shock liver  10. Aspiration PNA 2/2 emesis  11. Normocytic anemia 2/2 acute blood loss, s/p transfusion  12.  Hypoalbuminemia/malnutrition, albumin 1.8    Plan:    · Continue CVVHD, net positive 100 mL/hr   · Calcium chloride per protocol   · PRN electrolyte supplementation ordered  · Continue to monitor labs  · CMP, ionized calcium, lactic acid Q6 hours  · Magnesium, Phos Q12    Electronically signed by Tanya Conway MD on 8/29/2020 at 10:58 AM

## 2020-08-29 NOTE — PLAN OF CARE
Problem: Falls - Risk of:  Goal: Will remain free from falls  Description: Will remain free from falls  8/29/2020 0859 by Mena Owens RN  Outcome: Met This Shift     Problem: Falls - Risk of:  Goal: Absence of physical injury  Description: Absence of physical injury  8/29/2020 0859 by Mena Owens RN  Outcome: Met This Shift  8/29/2020 0114 by Priyanka Garcia RN  Outcome: Met This Shift     Discussed plan of care with patient/family. Patient/family incorporated into plan of care.

## 2020-08-29 NOTE — PLAN OF CARE
Problem: Falls - Risk of:  Goal: Will remain free from falls  Description: Will remain free from falls  8/29/2020 1639 by Kimberly Quesada RN  Outcome: Met This Shift  8/29/2020 0859 by Kimberly Quesada RN  Outcome: Met This Shift     Problem: Falls - Risk of:  Goal: Absence of physical injury  Description: Absence of physical injury  8/29/2020 1639 by Kimberly Quesada RN  Outcome: Met This Shift  8/29/2020 0859 by Kimberly Quesada RN  Outcome: Met This Shift     Discussed plan of care with patient/family. Patient/family incorporated into plan of care.

## 2020-08-29 NOTE — PLAN OF CARE
Problem: Skin Integrity:  Goal: Absence of new skin breakdown  Description: Absence of new skin breakdown  Outcome: Met This Shift     Problem: Falls - Risk of:  Goal: Will remain free from falls  Description: Will remain free from falls  Outcome: Met This Shift  Goal: Absence of physical injury  Description: Absence of physical injury  Outcome: Met This Shift

## 2020-08-30 NOTE — PROGRESS NOTES
dialysis builder 2,400 mL/hr at 08/30/20 0652    IV infusion builder 85 mL/hr at 08/30/20 0432    EPINEPHrine infusion Stopped (08/29/20 1826)    heparin (porcine) 0.985 Units/kg/hr (08/30/20 0655)     PRN Meds:.midazolam, sodium chloride (Inhalant), glucose, dextrose, glucagon (rDNA), dextrose, potassium chloride, magnesium sulfate, calcium gluconate **OR** calcium gluconate **OR** calcium gluconate **OR** calcium gluconate, sodium phosphate IVPB **OR** sodium phosphate IVPB **OR** sodium phosphate IVPB **OR** sodium phosphate IVPB, sodium chloride flush, acetaminophen **OR** acetaminophen, polyethylene glycol    DATA:    CBC with Differential:    Lab Results   Component Value Date    WBC 29.4 08/30/2020    RBC 2.46 08/30/2020    HGB 7.0 08/30/2020    HCT 22.1 08/30/2020    PLT 34 08/30/2020    MCV 89.8 08/30/2020    MCH 28.5 08/30/2020    MCHC 31.7 08/30/2020    RDW 18.0 08/30/2020    NRBC 5.4 08/30/2020    METASPCT 0.9 08/30/2020    LYMPHOPCT 0.9 08/30/2020    PROMYELOPCT 0.9 08/29/2020    MONOPCT 0.9 08/30/2020    MYELOPCT 4.3 08/30/2020    BASOPCT 0.2 08/30/2020    MONOSABS 0.29 08/30/2020    LYMPHSABS 0.29 08/30/2020    EOSABS 0.00 08/30/2020    BASOSABS 0.00 08/30/2020     CMP:    Lab Results   Component Value Date     08/30/2020    K 4.1 08/30/2020    K 4.3 08/28/2020    CL 93 08/30/2020    CO2 18 08/30/2020    BUN 6 08/30/2020    CREATININE 0.5 08/30/2020    GFRAA >60 08/30/2020    LABGLOM >60 08/30/2020    GLUCOSE 173 08/30/2020    PROT 3.0 08/30/2020    LABALBU 2.2 08/30/2020    CALCIUM 7.5 08/30/2020    BILITOT 2.2 08/30/2020    ALKPHOS 273 08/30/2020    AST 2,770 08/30/2020    ALT 1,196 08/30/2020     Hepatic Function Panel:    Lab Results   Component Value Date    ALKPHOS 273 08/30/2020    ALT 1,196 08/30/2020    AST 2,770 08/30/2020    PROT 3.0 08/30/2020    BILITOT 2.2 08/30/2020    LABALBU 2.2 08/30/2020     Ionized Calcium:  No results found for: IONCA  Magnesium:    Lab Results back to the ER 2 hours after discharge on 8/22. Labs revealed anemia with elevated ProBNP and significant edema. CXR showed bilateral effusions vs infiltrates. She was given lasix, ceftriaxone and doxy prior to being admitted for CHF exacerbation vs PNA. She was transfused and tolerated a diet. ID was consulted on 8/26 for leukocytosis. On 8/27 she complained of worsening RLQ pain and she had an episode of emesis. An NGT was placed and drained > 500 mL of brown liquid. An RRT was called that evening for hypoxia and she was transferred to SICU overnight into 8/28. She required intubation. Bronchoscopy revealed aspiration of gastric contents into both lungs. A CT of the abdomen showed free air. She was taken to the OR for exploratory laparotomy and a small bowel resection and ileocecectomy were performed with wound vac placement. She also required an aortogram with stenting at the superior mesenteric artery. Over the last 24 hours she has become anuric with severe lactic acidosis. We are consulted for management of NORBERTO. IMPRESSION/RECOMMENDATIONS:      1. NORBERTO stage III, established ischemic ATN 2/2 hemodynamic shock, anuric. Started on renal replacement therapy, CVVHD. 2. Acidemia, pH 7.320, with HAGMA 2/2 severe lactic acidosis and uremia, with respiratory compensation. Lactic acid levels are still quite elevated. 3. Septic shock 2/2 mesenteric ischemia with bowel perforation, requiring epinephrine, vasopressin and norepinephrine   4. Hypocalcemia, ionized calcium 0.94, 2/2 calcium removal by CRRT, on continuous infusion with CVVHD  5. Hypophosphatemia, secondary to phosphate removal with CVVH  6. Hypomagnesemia 2/2 GI losses and lack of intake and removal by CRT  7. Acute hypoxic respiratory failure 2/2 aspiration PNA, s/p intubation 8/28/2020  8. Mesenteric ischemia with perforation of proximal jejunum and cecum, s/p ex lap with SBR and ileocecectomy 8/28/2020, on zosyn and fluconazole per ID  9.  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 Shock liver, LFTs decreasing  11. Aspiration PNA 2/2 emesis  12. Normocytic anemia 2/2 acute blood loss, s/p transfusion  13.  Hypoalbuminemia/malnutrition, albumin 1.8    Plan:    · Continue CVVHD, net positive 100 mL/hr   · Calcium chloride infusion per protocol   · PRN electrolyte supplementation    · Continue to monitor labs      Electronically signed by Daron Bowers MD on 8/30/2020 at 8:14 AM

## 2020-08-30 NOTE — PROGRESS NOTES
[facility]  PROGRESS NOTE  ATTENDING NOTE     CRITICAL CARE          Chief Complaint   Patient presents with    Fatigue       dc 2 hours ago from hospital. pt is too weak to get out of the car and into the house    Leg Swelling       bilateral feet swelling        HPI  Janet Domingo a 81 y. o. female who was just discharged yesterday after her 3-week hospital course for perforated appendix s/p laparoscopic appendectomy on 8/2/2020.  Postop course was complicated by GI bleed requiring EGD on 8/13.  Found to have diffuse gastritis and duodenitis and placed on a PPI twice daily, Carafate, peptic ulcer diet.  Also complicated by prolonged ileus requiring TPN.  Patient represents with weakness and persistent nausea. Patient denies nausesa and vomiting. She did have a BM the day of DC and was tolerating a diet. She was offered evaluation to go to a rehab facility but both her and her daughter did not wasn't her to go to a rehab facility and wanted home with Vasu .  When she got home and was trying to get out of the car she was very weak and the daughter did not realize how weak she was and therefore brought her back to the hospital. Both her and her daughter would like her to go to a rehab now.          Patient Active Problem List   Diagnosis    Pneumonia    Acute respiratory failure with hypoxia ([address]    Acute on chronic diastolic congestive heart failure ([address]    Cellulitis    Hypokalemia    Acute blood loss anemia    Aortic stenosis, severe    Moderate aortic regurgitation    Mitral regurgitation    Sepsis due to methicillin susceptible Staphylococcus aureus ([address]    Class 3 obesity in adult    Primary osteoarthritis of right knee    Non-pressure chronic ulcer of right ankle, limited to breakdown of skin ([address]    Non-pressure chronic ulcer of left ankle, limited to breakdown of skin ([address]    Lymphedema of both lower extremities    Venous ulcer of left lower extremity without varicose veins (HCC)    NORBERTO (acute kidney injury) ([address]    Anemia due to GI blood loss    HTN (hypertension)    Acute appendicitis    Sepsis ([address]    Hypomagnesemia    Atrial fibrillation, new onset ([address]    Perforated appendicitis    Gastrointestinal hemorrhage    Gastric ulcer    Ileus (HCC)    General weakness    Leukocytosis    Electrolyte disturbance        OVERNIGHT EVENTS:  Up and down on epinephrine, received 2U PRBC, zoll placed to warm patient     HOSPITAL COURSE:  8/27:  Admitted to SICU, bronchoscopy, intubated, right IJ CVC, art line  8/28:  HD cath for CVVHD; 2U PRBC; Zoll placed; epinephrine     /65   Pulse 84   Temp 97.2 °F (36.2 °C) (Axillary)   Resp 24   Ht 5' 5\" (1.651 m)   Wt 179 lb (81.2 kg)   SpO2 99%   BMI 29.79 kg/m²   Physical Exam  Constitutional:       Appearance: She is ill-appearing. HENT:      Head: Normocephalic and atraumatic. Nose: Nose normal.      Mouth/Throat:      Mouth: Mucous membranes are dry. Eyes:      Conjunctiva/sclera: Conjunctivae normal.      Pupils: Pupils are equal, round, and reactive to light. Neck:      Musculoskeletal: Normal range of motion and neck supple. Cardiovascular:      Rate and Rhythm: Normal rate and regular rhythm. Pulses: Normal pulses. Heart sounds: Normal heart sounds. Pulmonary:      Effort: Pulmonary effort is normal.      Comments: Coarse BS bilaterally  Abdominal:      General: There is distension. Palpations: Abdomen is soft. Tenderness: There is abdominal tenderness (diffuse, severe). Skin:     General: Skin is dry. Comments: cool   Neurological:      Comments: Intubated, sedated   Psychiatric:         Mood and Affect: Mood normal.         Behavior: Behavior normal.         Thought Content: Thought content normal.         Judgment: Judgment normal.            Lines: Salinas:  yes - Continue salinas catheter for managing strict I and Os in this critically ill patient. Central line:  yes - right IJ  PICC:  no     CAM-ICU:  negative  RASS:  RASS -2 (Light Sedation)     ASSESSMENT/PLAN:  1. Septic shock  --c/w pressors  --IVF  --TSH, cortisol check  --c/w antibiotics  2. Acute respiratory failure d/t aspiration  --Abx  --pulmonary toilet  --duonebs  3. Lactic acidosis  --c/w IVF  4. NORBERTO  --renal c/s  --CVVHD  --bicarb gtt  5. Acute blood loss anemia  --monitor        DVT/GI ppx--heparin, PPI    I called Dr. Purvi Ontiveros this morning and met with his sister this morning. I met with both of them this afternoon. I explained that she is not improving. If she has another operation I don't think she'll survive. It is time to make her comfortable and consider comfort measures. I explained we are not clearing the acidosis from her system so she probably had more bowel that perforated or it is all frankly ischemic now. The epinephrine was weaned off and they do not want it restarted. They want one more day and then they will wean off more. I explained that we can stop vasopressin, but once they start taking off the levophed that will be it. I explained we can't slowly take it off either. I went over comfort measures. Nursing has spent a lot of time with them today as well. Beatris's [birth year] so this is weighing heavily on her since this will not be a good memory. They have decided to meet 8/30 at 2pm to consider withdrawing care.     Joaquin Mills

## 2020-08-30 NOTE — PROGRESS NOTES
[facility] released patient.  home called and notified. Dr. Cale Chamberlain returned call and stated to notify Dr. Nam Camarillo to sign death certificate. Dr. Nam Camarillo notified and will sign.

## 2020-08-30 NOTE — PROGRESS NOTES
[address] [place] [address] [facility]  [facility]  Progress Note    SUBJECTIVE:  Chief Complaint   Patient presents with    Fatigue     dc 2 hours ago from hospital. pt is too weak to get out of the car and into the house    Leg Swelling     bilateral feet swelling     The patient is still in the CVICU. She is still intubated and sedated. She is down to 2 vasopressors. On CVVHD. Review of systems:  As stated above in the chief complaint, otherwise negative.     Medications:  Scheduled Meds:   insulin lispro  0-12 Units Subcutaneous Q4H    albumin human  25 g Intravenous Q6H    chlorhexidine  15 mL Mouth/Throat BID    polyvinyl alcohol  1 drop Both Eyes Q4H    And    artificial tears   Both Eyes Q4H    pantoprazole  40 mg Intravenous Daily    sodium chloride (PF)  10 mL Intravenous Daily    piperacillin-tazobactam  3.375 g Intravenous Q12H    And    sodium chloride   Intravenous Q12H    sodium chloride  250 mL Intravenous Once    fluconazole  200 mg Intravenous Q24H    sucralfate  1 g Oral 4x Daily    sodium chloride flush  10 mL Intravenous 2 times per day    ipratropium-albuterol  1 ampule Inhalation Q4H WA     Continuous Infusions:   fentaNYL 5 mcg/ml in 0.9%  ml infusion 25 mcg/hr (08/30/20 0545)    norepinephrine 30 mcg/min (08/30/20 0759)    vasopressin (Septic Shock) infusion 0.04 Units/min (08/30/20 1050)    IV infusion builder 250 mL/hr at 08/30/20 0652    dextrose      dialysis builder 2,400 mL/hr at 08/30/20 0900    IV infusion builder 85 mL/hr at 08/30/20 0432    EPINEPHrine infusion Stopped (08/29/20 1826)    heparin (porcine) 0.985 Units/kg/hr (08/30/20 0655)     PRN Meds:midazolam, sodium chloride (Inhalant), glucose, dextrose, glucagon (rDNA), dextrose, potassium chloride, magnesium sulfate, calcium gluconate **OR** calcium gluconate **OR** calcium gluconate **OR** calcium gluconate, sodium phosphate IVPB **OR** sodium phosphate IVPB **OR** sodium phosphate IVPB **OR** sodium phosphate IVPB, sodium chloride flush, acetaminophen **OR** acetaminophen, polyethylene glycol    OBJECTIVE:  BP (!) 142/41   Pulse 109   Temp 95.4 °F (35.2 °C) (Core)   Resp 16   Ht 5' 5\" (1.651 m)   Wt 181 lb 1.6 oz (82.1 kg)   SpO2 100%   BMI 30.14 kg/m²   Temp  Av °F (36.1 °C)  Min: 95.4 °F (35.2 °C)  Max: 97.9 °F (36.6 °C)  Constitutional: The patient is lying in bed in the SICU. She is intubated and sedated. On CVVHD. On warming blanket. Skin: Warm and dry. Lower extremities are extremely mottled. Unroofed blisters on both medial thighs. HEENT: Round pupils. Moist mucous membranes. No ulcerations or thrush. ET tube. NG tube. Neck: Supple to movements. Chest: No use of accessory muscles to breathe. Symmetrical expansion. No wheezing, crackles or rhonchi. Cardiovascular: S1 and S2 are rhythmic and regular. No murmurs appreciated. Abdomen: Absent bowel sounds. Midline abdominal wound with wound VAC  Extremities: Bilateral lower extremity venous stasis dermatitis and scarring. Lines: Left femoral temporary HD catheter 2020. Right radial arterial line 2020. Right subclavian TLC 2020. Left IJ Zoll catheter 2020. Patito Wong     Laboratory and Tests Review:  Lab Results   Component Value Date    WBC 29.4 (H) 2020    WBC 17.3 (H) 2020    WBC 18.2 (H) 2020    HGB 7.0 (L) 2020    HCT 22.1 (L) 2020    MCV 89.8 2020    PLT 34 (L) 2020     Lab Results   Component Value Date    NEUTROABS 28.22 (H) 2020    NEUTROABS 16.44 (H) 2020    NEUTROABS 16.20 (H) 2020     No results found for: [facility]  Lab Results   Component Value Date    ALT 1,036 (H) 2020    AST 2,228 (H) 2020    ALKPHOS 263 (H) 2020    BILITOT 2.6 (H) 2020     Lab Results   Component Value Date     2020    K 4.1 2020    K 4.3 2020    CL 90 2020    CO2 17 2020    BUN 5 2020    CREATININE 0.4 08/30/2020    CREATININE 0.5 08/30/2020    CREATININE 0.5 08/29/2020    GFRAA >60 08/30/2020    LABGLOM >60 08/30/2020    GLUCOSE 232 08/30/2020    PROT 3.0 08/30/2020    LABALBU 2.3 08/30/2020    CALCIUM 8.3 08/30/2020    BILITOT 2.6 08/30/2020    ALKPHOS 263 08/30/2020    AST 2,228 08/30/2020    ALT 1,036 08/30/2020     Lab Results   Component Value Date    CRP 17.1 (H) 08/27/2020    CRP 4.2 (H) 03/21/2018     Lab Results   Component Value Date    SEDRATE 85 (H) 08/27/2020     Radiology:  Reviewed    Microbiology:   Acute hepatitis panel: Nonreactive  SARS-CoV-2 8/26/2020: Not detected  Urine culture 8/26/2020 50,000 Candida albicans  Blood cultures 8/22/2020: Negative x2  Blood cultures 8/23/2020: Negative x2    Recent Labs     08/27/20  2130   PROCAL 0.87*       ASSESSMENT:  · Recent laparoscopic appendectomy  · Mesenteric ischemia with perforation of proximal jejunum and cecum. Status post exploratory laparotomy, small bowel resection, ileocecectomy 8/28/2020. No intraoperative cultures were taken  · Status post aortogram with TPA in the SMA 8/28/2020  · Leukocytosis associated to the above  · Acute kidney injury, now on CVVHD  · Acute respiratory failure  · Possible aspiration pneumonia. Underwent bronchoscopy. No cultures taken  · Insignificant candiduria  · Multisystem organ failure.   Prognosis is poor    PLAN:  · Continue Zosyn and Fluconazole  · Family decided to wait another day before contemplating withdrawal of care    Spoke with marco antonio Hair  11:04 AM  8/30/2020

## 2020-08-30 NOTE — PLAN OF CARE
Problem: Skin Integrity:  Goal: Will show no infection signs and symptoms  Description: Will show no infection signs and symptoms  Outcome: Met This Shift  Goal: Absence of new skin breakdown  Description: Absence of new skin breakdown  Outcome: Met This Shift     Problem: Falls - Risk of:  Goal: Will remain free from falls  Description: Will remain free from falls  8/30/2020 0127 by Guillermina Russell RN  Outcome: Met This Shift  8/29/2020 1639 by Torrance Cushing, RN  Outcome: Met This Shift  Goal: Absence of physical injury  Description: Absence of physical injury  8/30/2020 0127 by Guillermina Russell RN  Outcome: Met This Shift  8/29/2020 1639 by Torrance Cushing, RN  Outcome: Met This Shift

## 2020-08-30 NOTE — SIGNIFICANT EVENT
Was called to the room by nursing staff   Patient noted to be in Asystole on monitor/EKG    Upon arrival patient was found to have:   Absent pulse in all 4 extremities   Rhythm strip asystole   Absent breath sounds on auscultation   Absent heart sounds on auscultaion   Carotid pulses absent   Pupil reflexes absent   No gag reflex present   No movement to pain stimulation, no movement upon sternal rub   No movement with verbal stimulation    The patient was pronounced dead at 1537 on 8/30/2020. Dr. Bebo Wong notified of event.     Flora Foster DO     Electronically signed by Flora Foster DO on 8/30/2020 at 3:49 PM

## 2020-08-30 NOTE — PROGRESS NOTES
In last hour of life patient received sodium bicarb drip 250cc, fentanyl 5cc, vasopressin 12cc, and levophed 28.1cc. Patient did not receive any normal saline bolus's within last hour of life. [facility] notified, and hold placed on patient at this time.

## 2020-08-30 NOTE — PROGRESS NOTES
Dr. Adriano Bassett, Dr. Quinn Brock, Dr. Rocky Mortimer, and Dr. Faiza Tinsley notified of patients passing. Call placed and message left for Dr. Acuna Form to inform. Awaiting call back. [facility] called and message left, awaiting return call from representative.

## 2020-08-30 NOTE — PROGRESS NOTES
Hospitalist Progress Note      PCP: Gilberto Godwin MD    Date of Admission: 8/22/2020    Chief Complaint: Septic shock    Hospital Course: The patient is admitted to surgical intensive care because of septic shock due to perforated appendix, patient had surgery, her course was complicated by aspiration pneumonia and acute hypoxic respiratory failure, patient was intubated and currently patient requiring 3 pressors and full support from the vent. Patient had small bowel resection and ileocolectomy, and wound VAC is placed, patient also required stenting for the superior mesenteric artery. The patient became anuric and has severe lactic acidosis, has liver shock and acute kidney failure. Family meeting was done yesterday and awaiting family decision if they will decide to withdraw support.     Subjective: Patient vented and sedated    Medications:  Reviewed    Infusion Medications    fentaNYL 5 mcg/ml in 0.9%  ml infusion 25 mcg/hr (08/30/20 0545)    norepinephrine 30 mcg/min (08/30/20 0759)    vasopressin (Septic Shock) infusion 0.04 Units/min (08/30/20 0223)    IV infusion builder 250 mL/hr at 08/30/20 0652    dextrose      dialysis builder 2,400 mL/hr at 08/30/20 0652    IV infusion builder 85 mL/hr at 08/30/20 0432    EPINEPHrine infusion Stopped (08/29/20 1826)    heparin (porcine) 0.985 Units/kg/hr (08/30/20 8872)     Scheduled Medications    insulin lispro  0-12 Units Subcutaneous Q4H    albumin human  25 g Intravenous Q6H    chlorhexidine  15 mL Mouth/Throat BID    polyvinyl alcohol  1 drop Both Eyes Q4H    And    artificial tears   Both Eyes Q4H    pantoprazole  40 mg Intravenous Daily    sodium chloride (PF)  10 mL Intravenous Daily    piperacillin-tazobactam  3.375 g Intravenous Q12H    And    sodium chloride   Intravenous Q12H    sodium chloride  250 mL Intravenous Once    fluconazole  200 mg Intravenous Q24H    sucralfate  1 g Oral 4x Daily    sodium chloride flush 10 mL Intravenous 2 times per day    ipratropium-albuterol  1 ampule Inhalation Q4H WA     PRN Meds: midazolam, sodium chloride (Inhalant), glucose, dextrose, glucagon (rDNA), dextrose, potassium chloride, magnesium sulfate, calcium gluconate **OR** calcium gluconate **OR** calcium gluconate **OR** calcium gluconate, sodium phosphate IVPB **OR** sodium phosphate IVPB **OR** sodium phosphate IVPB **OR** sodium phosphate IVPB, sodium chloride flush, acetaminophen **OR** acetaminophen, polyethylene glycol      Intake/Output Summary (Last 24 hours) at 8/30/2020 0946  Last data filed at 8/30/2020 0900  Gross per 24 hour   Intake 69626 ml   Output 9112 ml   Net 2007 ml       Exam:    BP (!) 142/41   Pulse 130   Temp 95.5 °F (35.3 °C) (Core)   Resp 16   Ht 5' 5\" (1.651 m)   Wt 181 lb 1.6 oz (82.1 kg)   SpO2 100%   BMI 30.14 kg/m²     General appearance: Patient vented and sedated. HEENT: Head atraumatic, patient intubated, NG tube in place. No icterus. Neck: Trachea in the midline. Respiratory: Harsh vent respiratory sounds audible bilaterally, scattered rhonchi anteriorly bilaterally, symmetrical.  No wheezing. Cardiovascular: Regular rate and rhythm with normal O1/B1, 2/6 systolic murmur sternal border, no gallop appreciated. Abdomen: Abdominal distention, with mid abdominal incision and wound VAC. Abdomen with diminished bowel sounds. Musculoskeletal: Patient sedated   skin: Lower extremities with evidence of peripheral vascular disease and diminished pulses bilaterally. No open wounds. Temporary dialysis catheter right femoral(patient is receiving CVVHD)  Neurologic: Patient intubated, vented and sedated.       Labs:   Recent Labs     08/28/20  1350 08/29/20  0440 08/30/20  0630   WBC 18.2* 17.3* 29.4*   HGB 7.7* 10.4* 7.0*   HCT 25.4* 33.2* 22.1*    142 34*     Recent Labs     08/29/20  0440  08/29/20  1625 08/29/20  2100 08/30/20  0200 08/30/20  0630 08/30/20  0755   NA  --    < >  -- 133 134  --  131*   K  --    < >  --  4.3 4.1  --  4.1   CL  --    < >  --  92* 93*  --  90*   CO2  --    < >  --  16* 18*  --  17*   BUN  --    < >  --  6* 6*  --  5*   CREATININE  --    < >  --  0.5 0.5  --  0.4*   CALCIUM  --    < >  --  7.4* 7.5*  --  8.3*   PHOS 3.0  --  2.1*  --   --  1.9*  --     < > = values in this interval not displayed. Recent Labs     08/29/20  2100 08/30/20  0200 08/30/20  0755   AST 3,645* 2,770* 2,228*   ALT 1,470* 1,196* 1,036*   BILITOT 2.0* 2.2* 2.6*   ALKPHOS 274* 273* 263*     Recent Labs     08/27/20  2130   INR 2.0     Recent Labs     08/27/20  2130   TROPONINI 0.03       Assessment/Plan:  Septic shock  Mesenteric ischemia, received TPA in the superior mesenteric artery August 28. Acute respiratory failure  Liver shock  Thrombocytopenia. 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 Continue CVVHD H  Medications reviewed and discussed with [facility] staff. Prognosis is poor.     DVT Prophylaxis: Heparin  Diet: Diet NPO Effective Now  Code Status: DNR-CCA          Sandie Angel MD

## 2020-08-30 NOTE — PROGRESS NOTES
PROGRESS NOTE       PATIENT PROBLEM LIST:  Active Problems:    Anemia due to GI blood loss    General weakness    Leukocytosis    Electrolyte disturbance    Mesenteric artery stenosis (HCC)  Resolved Problems:    * No resolved hospital problems. *      SUBJECTIVE:  Sarah Beth Gomez remains intubated and sedated on multiple intravenous drips. He is presently receiving dialysis as well. .. REVIEW OF SYSTEMS:  Unable to accurately obtain secondary to patient's unconscious critical state    SCHEDULED MEDICATIONS:   insulin lispro  0-12 Units Subcutaneous Q4H    albumin human  25 g Intravenous Q6H    chlorhexidine  15 mL Mouth/Throat BID    polyvinyl alcohol  1 drop Both Eyes Q4H    And    artificial tears   Both Eyes Q4H    pantoprazole  40 mg Intravenous Daily    sodium chloride (PF)  10 mL Intravenous Daily    piperacillin-tazobactam  3.375 g Intravenous Q12H    And    sodium chloride   Intravenous Q12H    sodium chloride  250 mL Intravenous Once    fluconazole  200 mg Intravenous Q24H    sucralfate  1 g Oral 4x Daily    sodium chloride flush  10 mL Intravenous 2 times per day    ipratropium-albuterol  1 ampule Inhalation Q4H WA       VITAL SIGNS:                                                                                                                          BP (!) 142/41   Pulse 111   Temp 97.9 °F (36.6 °C) (Bladder)   Resp 16   Ht 5' 5\" (1.651 m)   Wt 181 lb 1.6 oz (82.1 kg)   SpO2 99%   BMI 30.14 kg/m²   Patient Vitals for the past 96 hrs (Last 3 readings):   Weight   08/29/20 0600 181 lb 1.6 oz (82.1 kg)     OBJECTIVE:    HEENT: PERRL, EOM  Intact; sclera non-icteric, conjunctiva pink. Carotids are brisk in upstroke with normal contour. No carotid bruits. Normal jugular venous pulsation at 45°. No palpable cervical nor supraclavicular nodes. Thyroid not palpable. Trachea midline.   Chest: Even excursion  Lungs:  Bilateral coarse expiratory rhonchi, no expiratory wheezes or rhonchi, no decreased tactile fremitus without inspiratory rales. Heart: Regular, irregular  rhythm; S1 > S2, no gallop, grade 2/6 systolic murmur second right and left intercostal spaces No clicks, rub, palpable thrills   or heaves. PMI nondisplaced, 5th intercostal space MCL. Abdomen: Soft,  Somewhat tender,  Mildly distended,  moderately protuberant, no masses or organomegaly. Bowel sounds  Markedly decreased  Extremities: Without clubbing, cyanosis or edema. Pulses present 1+ upper extermities bilaterally; present 1+ DP and present 1+ PT bilaterally.      Data:   Scheduled Meds: Reviewed  Continuous Infusions:    fentaNYL 5 mcg/ml in 0.9%  ml infusion 10 mcg/hr (08/28/20 2227)    norepinephrine 30 mcg/min (08/29/20 1542)    vasopressin (Septic Shock) infusion 0.04 Units/min (08/29/20 1743)    IV infusion builder 250 mL/hr at 08/29/20 1854    dextrose      dialysis builder 2,400 mL/hr at 08/29/20 1817    IV infusion builder 80 mL/hr at 08/29/20 1426    EPINEPHrine infusion Stopped (08/29/20 1826)    heparin (porcine) 6 Units/kg/hr (08/29/20 1245)       Intake/Output Summary (Last 24 hours) at 8/27/2020 2035  Last data filed at 8/27/2020 1940  Gross per 24 hour   Intake 480 ml   Output 1150 ml   Net -670 ml     CBC:   Recent Labs     08/27/20  0424 08/27/20  2130 08/28/20  0800 08/28/20  1350 08/29/20  0440   WBC 18.9* 18.6* 14.2* 18.2* 17.3*   HGB 8.7* 10.1* 8.2* 7.7* 10.4*   HCT 26.7* 32.0* 27.1* 25.4* 33.2*    436 287 226 142     BMP:  Recent Labs     08/28/20  0550 08/28/20  0800 08/28/20  1012 08/28/20  1051 08/28/20  1350 08/28/20  2005 08/29/20  0200 08/29/20  0820 08/29/20  1400    138  --   --  139 137 135 134 136   K 4.3 4.3 4.1 3.9 3.9 4.2 4.1 4.2 4.2   CL 98 95*  --   --  93* 97* 91* 94* 96*   CO2 8* 9*  --   --  13* 13* 16* 12* 13*   BUN 37* 35*  --   --  33* 19 14 10 8   CREATININE 1.7* 1.6*  --   --  1.6* 1.0 0.8 0.6 0.5   LABGLOM 29 31  --   --  31 53 >60 >60 >60     ABGs:   Lab Results   Component Value Date    PH 7.287 2020    PO2 84.3 2020    PCO2 30.3 2020     INR:   Recent Labs     20   INR 2.0     PRO-BNP:   Lab Results   Component Value Date    PROBNP 6,987 (H) 2020    PROBNP 1,713 (H) 2020      TSH:   Lab Results   Component Value Date    TSH 0.818 2020      Cardiac Injury Profile:   Recent Labs     20   TROPONINI 0.03      Lipid Profile:   Lab Results   Component Value Date    TRIG 219 2020    HDL 45 2018    LDLCALC 85 2018    CHOL 161 2018      Hemoglobin A1C: No components found for: HGBA1C     RAD:   Ct Abdomen Pelvis Wo Contrast Additional Contrast? None    Result Date: 2020  Patient MRN:  [id number] : 1935 Age: 81 years Gender: Female Order Date:  2020 11:45 PM TECHNIQUE/NUMBER OF IMAGES/COMPARISON/CLINICAL HISTORY: CT abdomen pelvis no contrast Axial images obtained sagittal and coronal reconstructions Through 4 8 images Comparison  study. The 81-year-old female patient with a history of vomiting and abdominal pain and diarrhea. FINDINGS: Findings of acute perforated appendicitis. There are inflammatory changes in the appendix and in the periappendiceal fat planes with the areas of air outside the appendiceal lumen which are walled contained perforation by the akshat-appendical inflammatory reaction present. At the base of the appendix the appendix measures up to 12 mm in cross-section diameter. There is a component of small bowel obstruction with fecal-like contents in the small bowel some of the segments of the small bowel are dilated up to 3.6 cm . Digital is not precisely identified, does not appears to be direct relate with the appendicitis process. There is some whorling of the mesenteric vessels. The colon is decompressed. Uncomplicated extensive diverticulosis seen in the sigmoid colon. There is no free intraperitoneal air outside of the akshat-appendiceal spaces. There are normal size and the density for the liver and spleen. The multiple gallstones are seen in the gallbladder. The biliary tree and pancreatic ductal systems are not dilated. Pancreas has atrophy. There is a moderate to large size hiatal hernia. There is a nodule in the left adrenal gland which is stable back to the study of 2018. Kidneys have preserved size and cortical thickness, there is no renal calculus. There is no obstruction. The bladder has unremarkable appearance. The calcified myomatous changes are seen in the uterus. Left ovary has unremarkable appearance. The right ovary cannot be conspicuously identified as separate from adjacent bowel segments. There are unremarkable appearance for the bladder. Calcified atheroma changes are seen in the abdominal aorta with a more prominent plaque in the upper abdominal aorta where the origin of the celiac axis and SMA are located. There are areas of chronic pulmonary fibrosis in the bases are. These were seen previously. Degenerative changes are seen throughout the lumbar spine with the degenerative disc disease and facet hypertrophy. These are long-standing findings. Patient previous endovascular repair for the aortic root/aortic valve. 1. Acute perforated appendicitis as above described perforation is well contained in the periappendiceal spaces. 2. There is also a component of small bowel obstruction which etiology is not precisely determined that time the present study, does not appears to be directly related with the appendicitis. There is some whorling of the mesenteric vessels. 3. Multiple gallstones. . Preliminary report was given to Dr. Teofilo Jung, ER physician at the time the present study. ALERT:  ABNORMAL REPORT.     Xr Abdomen (kub) (single Ap View)    Result Date: 2020  Patient MRN: [id number] : 1935 Age:  81 years Gender: Female Order Date: 2020 1:08 PM Exam: XR ABDOMEN (KUB) (SINGLE AP VIEW) Number of Images: 2 views Indication:   ok for portable ok for portable Comparison: None. Findings: The bowel gas pattern. Dilated loops of small bowel air-filled suggesting of adynamic ileus. There is a large calcific density seen in the right lower pelvis. . There is no evidence of any organomegaly. . The osseous structures of the abdomen and pelvis demonstrate osteopenia. Adynamic ileus. Xr Abdomen (kub) (single Ap View)    Result Date: 2020  Patient MRN:  [id number] : 1935 Age: 81 years Gender: Female Order Date:  2020 4:17 PM EXAM: XR ABDOMEN (KUB) (SINGLE AP VIEW) INDICATION:  distension distension  COMPARISON: CT the abdomen and pelvis, 2020 FINDINGS: There is gaseous distention of small bowel loops to maximum caliber of 5.5 cm. The extent of small bowel distention has increased as of the prior CT from 2020. Small amount of gas as well. Increased gaseous distention of small bowel loops. Obstruction cannot be excluded. Xr Abdomen (kub) (single Ap View)    Result Date: 2020  Patient MRN: [id number] : 1935 Age:  81 years Gender: Female Order Date: 2020 7:01 PM Exam: XR ABDOMEN (KUB) (SINGLE AP VIEW) Number of Images: 2 views Indication:   eval bowel distension eval bowel distension Comparison: Prior study from 2020 is available. Findings: The bowel gas pattern is normal. The nasogastric tube with the tip in the body of the stomach. There appears to be an esophageal stent seen at the edge of the study. There is a calcified density with lucent center seen in the right lower pelvis. Mild osteopenia of the osseous structure. . The osseous structures of the abdomen and pelvis demonstrate multilevel osteoarthritic changes disease of the lumbar spine. .     NON-SPECIFIC GAS PATTERN.      Ct Abdomen Pelvis W Iv Contrast Additional Contrast? None    Result Date: 2020  Patient MRN:  [id number] : 1935 Age: 81 years Gender: Female Order Date:  2020 11:47 AM EXAM: CT ABDOMEN PELVIS W IV CONTRAST NUMBER OF IMAGES \ views:  5 INDICATION: Ongoing abdominal pelvic pain after perforated appendicitis sp 8/2 laparoscopic appendectomy, eval for abscess COMPARISON: August 1, 2020 TECHNIQUE: Axial computerized tomography sections of the abdomen and pelvis with sagittal and coronal MPR reconstructions were obtained from the top of the diaphragm to the pelvis. Contrast dose: 110 ml. Isovue 370 intravenously injected. Scanning performed post IV contrast administration. Low-dose CT  acquisition technique included one of following options; 1 . Automated exposure control, 2. Adjustment of MA and or KV according to patient's size or 3. Use of iterative reconstruction. FINDINGS: THORACIC BASE: There are small bilateral pleural effusions as well as probable interstitial fibrosis and there is somewhat increasing atelectasis at the posterior lower lobes. There is a hiatus hernia with a fluid filled esophagus compatible with on going gastroesophageal reflux. LIVER: Unremarkable. BILIARY: Multiple gallstones present. PANCREAS: Unremarkable. SPLEEN: Unremarkable. ADRENALS:  Unremarkable. KIDNEYS:  Unremarkable. GI: There are roughly 2 borderline dilated distal small bowel loops without a pattern suggestive of small bowel obstruction. The contents of the lower GI tract are liquid indicating an underlying diarrheal illness. The possibility of an antibiotic induced diarrhea is raised. There is rather pronounced colonic diverticulosis especially in the sigmoid region. PELVIS: Calcified uterine fibroid noted MSK: No acute osseous findings. 1. No evidence of abdominal or pelvic abscess. 2. Liquid contents of the lower GI tract suggesting a developing with diarrheal illness which could potentially be antibiotic induced. 3. New small bilateral pleural effusions and adjacent atelectasis. 4. Hiatus hernia with a fluid-filled distal esophagus compatible with ongoing GE reflux. 5. Cholelithiasis.  6. Colonic diverticulosis. Xr Chest Portable    Result Date: 2020  Clinical indications: Cough. Shortness of breath. TECHNIQUE: Single frontal projection of the chest (1 view). COMPARISON: 2020. FINDINGS: The heart is enlarged. There is calcification within thoracic aorta. Acromioclavicular arthropathy. Bilateral pleural effusions and contiguous atelectasis or infiltrate. Chronic dislocation right shoulder. Left jugular central venous catheter is no longer present. The heart, lungs, mediastinum and regional skeleton are otherwise unremarkable. Bilateral pleural effusions and contiguous atelectasis or infiltrate. Xr Chest Portable    Result Date: 2020  Patient MRN: [id number] : 1935 Age:  81 years Gender: Female Order Date: 2020 11:05 AM Exam: XR CHEST PORTABLE Number of Images: 1 view Indication:   wbc wbc Comparison: 2020 Findings: There is a central venous catheter noted the tip is in the superior vena cava there is no evidence to suggest pneumothorax The heart is unremarkable The lung fields demonstrate no significant pulmonary vascular congestion and edema. The aorta is tortuous ectatic There are findings throughout the lung fields which are likely chronic There appears to be an anterior dislocation of the right shoulder which appears chronic    Findings compatible with atherosclerotic disease There is minimal infiltrate at both lung bases, at the right lung base representing a change suspicious for pneumonia. At the lung base on the left relatively stable. There is likely a component of pneumonia superimposed on chronic scarring and fibrosis     Xr Chest Portable    Result Date: 2020  Patient MRN:  [id number] : 1935 Age: 81 years Gender: Female Order Date:  2020 2:00 AM TECHNIQUE/NUMBER OF IMAGES/COMPARISON/CLINICAL HISTORY: Chest AP 1 imaging 2 views of History Central venous line placement. FINDINGS: Right internal jugular central venous catheter tip in SVC. The no pneumothorax on the right on the left-sided. No sizable infiltrates or consolidations are seen. Discrete atelectasis in the base. The heart has normal size. Left internal jugular central venous catheter tip in SVC. No pneumothorax on the right or on the left. Xr Chest Abdomen Ng Placement    Result Date: 2020  Patient MRN:  [id number] : 1935 Age: 81 years Gender: Female Order Date:  2020 10:12 PM EXAM: XR CHEST ABDOMEN NG PLACEMENT COMPARISON: August 3, 2020 INDICATION:  ng placement ng placement FINDINGS: Nasogastric tube courses below the level of the diaphragm and appears in the expected location of the stomach. Satisfactory position of nasogastric tube. Xr Chest Abdomen Ng Placement    Result Date: 8/3/2020  Patient MRN:  [id number] : 1935 Age: 81 years Gender: Female Order Date:  8/3/2020 9:29 AM EXAM: XR CHEST ABDOMEN NG PLACEMENT 0938 hours INDICATION:  NG tube placement  COMPARISON: Portable chest 3/4/2020. Portable chest 2020 at 0205 hours. FINDINGS:  A new nasogastric tube with tip in the proximal gastric body. Stomach is nondistended, minimal residual gas. No obvious free air. Mild gas distention of a couple mid abdomen small bowel loops. Mild gas distention of the proximal transverse colon. Partial inclusion of the lung bases. Shallow inspiration with moderate left basilar atelectasis. Old aortic valve stent. 1. New nasogastric tube in the gastric body. 2. Minimal small bowel ileus. 3. Left basilar atelectasis. EKG: See Report  Echo: See Report      IMPRESSIONS:  Active Problems:    Anemia due to GI blood loss    General weakness    Leukocytosis    Electrolyte disturbance    Mesenteric artery stenosis (HCC)  Resolved Problems:    * No resolved hospital problems. *      RECOMMENDATIONS:  Mrs. Laurence Prajapati remains critically ill however she is with the help of pressor agents maintaining her systolic blood pressure.   She is also receiving dialysis and I have clearly explained to her daughter Beatris that if her blood pressure does not respond to medical therapy then certainly at that point we will need to reconsider her status. Presently continue all medications with the hope that her abdominal organ perfusion remain stable and that she does not suffer any further consequences of his enteric ischemia and/or infarction. I have spent more than 30 critical care minutes face to face with Jhonny Cosby and reviewing notes and laboratory data, with greater than 50% of this time instructing and counseling her daughter face to face regarding my findings and recommendations and I have answered all questions as posed to me by Ms. Kaur's daughter who is present at her bedside. Gilma Otto, DO FACP,FACC,FSCAI      NOTE:  This report was transcribed using voice recognition software.   Every effort was made to ensure accuracy; however, inadvertent computerized transcription errors may be present

## 2020-08-30 NOTE — PROGRESS NOTES
[facility]  PROGRESS NOTE  ATTENDING NOTE     CRITICAL CARE          Chief Complaint   Patient presents with    Fatigue       dc 2 hours ago from hospital. pt is too weak to get out of the car and into the house    Leg Swelling       bilateral feet swelling        HPI  Janet Domingo a 81 y. o. female who was just discharged yesterday after her 3-week hospital course for perforated appendix s/p laparoscopic appendectomy on 8/2/2020.  Postop course was complicated by GI bleed requiring EGD on 8/13.  Found to have diffuse gastritis and duodenitis and placed on a PPI twice daily, Carafate, peptic ulcer diet.  Also complicated by prolonged ileus requiring TPN.  Patient represents with weakness and persistent nausea. Patient denies nausesa and vomiting. She did have a BM the day of DC and was tolerating a diet. She was offered evaluation to go to a rehab facility but both her and her daughter did not wasn't her to go to a rehab facility and wanted home with Vasu .  When she got home and was trying to get out of the car she was very weak and the daughter did not realize how weak she was and therefore brought her back to the hospital. Both her and her daughter would like her to go to a rehab now.          Patient Active Problem List   Diagnosis    Pneumonia    Acute respiratory failure with hypoxia ([address]    Acute on chronic diastolic congestive heart failure ([address]    Cellulitis    Hypokalemia    Acute blood loss anemia    Aortic stenosis, severe    Moderate aortic regurgitation    Mitral regurgitation    Sepsis due to methicillin susceptible Staphylococcus aureus ([address]    Class 3 obesity in adult    Primary osteoarthritis of right knee    Non-pressure chronic ulcer of right ankle, limited to breakdown of skin ([address]    Non-pressure chronic ulcer of left ankle, limited to breakdown of skin ([address]    Lymphedema of both lower extremities    Venous ulcer of left lower extremity without

## 2020-08-31 LAB — BLOOD CULTURE, ROUTINE: NORMAL

## 2020-08-31 NOTE — ADT AUTH CERT
Systemic or Infectious Condition [facility] - Care Day 7 (8/28/2020) by Christpoher Ortiz RN         Review Status  Review Entered    Completed  8/31/2020 08:36        Criteria Review       Care Day: 7 Care Date: 8/28/2020 Level of Care: Intermediate Care    Guideline Day 3    Level Of Care    ( ) * Activity level acceptable    Clinical Status    (X) * Hemodynamic stability    8/31/2020 8:36 AM EDT by Lyudmila Mueller      T 98.4; P 87; RR 31; /65; PULSE OX 78 % VENTILATOR    ( ) * Respiratory status acceptable    ( ) * Neurologic status acceptable    ( ) * Temperature status acceptable    ( ) * No infection, or status acceptable    ( ) * No neutropenia, or status acceptable    ( ) * Special infection or injury situations absent    ( ) * Electrolyte status acceptable    ( ) * General Discharge Criteria met    Interventions    ( ) * Intake acceptable    ( ) * No inpatient interventions needed    * Milestone    Additional Notes    8/28/2020    MEDICATIONS: 0.9 NS IV BOLUS 1000 ML X1, ZOSYN IV Q8H THEN Q12H, LIQUIFILM TEARS Q4H, LACRIBLUBE Q4H, CALCIUM GLUCONATE IV X1, PERIDEX MT 2 TIMES NEGRO, DIFLUCAN IV X1, HEPARIN SC 3 TIMES PER DAY, DUONEB Q4H WA, LR IV BOLUS 1000 ML X8, REGLAN IV Q6H, VERSED IV X2, PROTONIX IV DAILY, CARAFATE PO 4 TIMES DAILY, 0.9 NS IV  ML/HR, CALCIUM CHLORIDE IV CONTINUOUS, EPINEPHRINE IV CONTINUOUS, FENTANYL IV CONTINUOUS, HEPARIN IV CONTINUOUS, LEVOPHED IV CONTINUOUS, PRISMASATE DIALYSIS CONTINUOUS, SODIUM BICARBONATE IV CONTINUOUS, VASOPRESSIN IV CONTINUOUS, CALCIUM GLUCONATE IV X2, DEXTROSE 50% 25 G IV X1, MAGNESIUM SULFATE IV X2       LABS:    8/28/2020 01:33    pH, Blood Gas: 7.254 (L)    PCO2: 27.6 (L)    pO2: 214.9 (H)    HCO3: 11.9 (L)    Base Excess: -13.8 (L)    O2 Sat: 99.3 (H)    tHb (est): 10.1 (L)    O2Hb: 98.4 (H)       8/28/2020 05:50    CO2: 8 (LL)    BUN: 37 (H)    Creatinine: 1.7 (H)    Anion Gap: 35 (H)    Calcium, Ion: 1.12 (L)    Lactic Acid: 15.2 (HH)    Glucose: 16 (LL) Calcium: 8.2 (L)    Phosphorus: 5.6 (H)    Ammonia: 64.0 (H)    Cortisol: 90.00 (H)    Acetaminophen Level: <5.0 (L)       8/28/2020 05:59    pH, Blood Gas: 7.241 (L)    PCO2: 22.1 (L)    pO2: 106.8 (H)    HCO3: 9.3 (L)    Base Excess: -16.3 (L)    tHb (est): 11.2 (L)       8/28/2020 08:00    Chloride: 95 (L)    CO2: 9 (LL)    BUN: 35 (H)    Creatinine: 1.6 (H)    Anion Gap: 34 (H)    Glucose: 13 (LL)    Calcium: 8.0 (L)    Total Protein: 4.2 (L)    Albumin: 1.8 (L)    Alk Phos: 145 (H)    ALT: 643 (H)    AST: 1,935 (H)    WBC: 14.2 (H)    RBC: 2.99 (L)    Hemoglobin Quant: 8.2 (L)    Hematocrit: 27.1 (L)    Neutrophils %: 83.0 (H)    Lymphocyte %: 7.0 (L)    Neutrophils Absolute: 12.64 (H)    Lymphocytes Absolute: 0.99 (L)    Metamyelocytes Relative: 4.0 (H)       METER GLUCOSE: 189, 112, 101, 100       8/28/2020 09:30    Magnesium: 1.5 (L)    Phosphorus: 6.0 (H)       8/28/2020 10:12    pH, Blood Gas: 6.981 (LL)    Base Excess: -18.4 (L)    HCT (EST): 22.0 (L)    pCO2, Arterial: 50.5 (H)    pO2, Arterial: 103.3 (H)    HCO3, Arterial: 11.9 (L)       8/28/2020 10:51    pH, Blood Gas: 7.077 (LL)    Base Excess: -13.6 (L)    O2 Sat: 99.3 (H)    HCT (EST): 25.0 (L)    pCO2, Arterial: 53.5 (H)    pO2, Arterial: 207.0 (H)    HCO3, Arterial: 15.8 (L)       8/28/2020 13:50    Chloride: 93 (L)    CO2: 13 (L)    BUN: 33 (H)    Creatinine: 1.6 (H)    Anion Gap: 33 (H)    Calcium, Ion: 1.10 (L)    Lactic Acid: 17.7 (HH)    Glucose: 132 (H)    Calcium: 8.0 (L)    Total Protein: 3.8 (L)    Albumin: 1.8 (L)    Alk Phos: 160 (H)    ALT: 1,459 (H)    AST: 4,269 (H)    WBC: 18.2 (H)    RBC: 2.80 (L)    Hemoglobin Quant: 7.7 (L)    Hematocrit: 25.4 (L)    Neutrophils %: 83.5 (H)    Lymphocyte %: 6.1 (L)    Neutrophils Absolute: 16.20 (H)    Lymphocytes Absolute: 1.09 (L)    Eosinophils Absolute: 0.00 (L)    Metamyelocytes Relative: 5.2 (H)       8/28/2020 14:33    pH, Blood Gas: 7.247 (L)    PCO2: 28.3 (L)    HCO3: 12.0 (L)    Base Hypoxia, respiratory failure         Post-op Diagnosis: Same         Anesthesia: General endotracheal anesthesia         Operation: Flexible fiberoptic bronchoscopy, therapeutic         Findings: gastric aspiration in both lungs, Left > Right, suctioned clear       * ** CRITICAL CARE * **    Constitutional:         Appearance: She is ill-appearing. HENT:           Mouth/Throat:       Mouth: Mucous membranes are dry. Pulmonary:       Comments: Coarse BS bilaterally    Abdominal:       General: There is distension.       Tenderness: There is abdominal tenderness (diffuse, severe). Skin:       General: Skin is dry.       Comments: cool    Neurological:       Comments: Intubated, sedated       ASSESSMENT/PLAN:    1.  Septic shock    --c/w pressors    --IVF    --TSH, cortisol check    --c/w antibiotics    2.  Acute respiratory failure d/t aspiration    --Abx    --pulmonary toilet    --duonebs    3.  Lactic acidosis    --c/w IVF    4.  NORBERTO    --renal c/s    --CVVHD    --bicarb gtt    5.  Acute blood loss anemia    --monitor         * ** OP NOTE * **    DATE OF PROCEDURE: 8/28/2020          PREOPERATIVE DIAGNOSIS:  Acute abdomen, free air.         POSTOPERATIVE DIAGNOSIS: mesenteric ischemia with perforation of the proximal jejunum and cecum         OPERATION: exploratory laparotomy, small bowel resection (40cm of proximal jejunum), ileocecectomy         * ** GENERAL SURGERY * **    ASSESSMENT:    81 y.o. female with ischemic bowel on exploratory laparotomy for whom vascular surgery was consulted intraoperatively. Plan for the following.         PLAN:    - Angiography with possible intervention of the abdominal aorta and its branches         * ** VASCULAR * **    IMPRESSION:  Ischemic bowel with perforation.        RECOMMENDATIONS:  I discussed the patient with Dr. Belén Hernandez and the patient's son Dr. Beecher Peabody reviewed the previous CAT scan images showing dense calcifications at the origins of the mesenteric (NOT GIVEN X1, GIVEN X3), PROCARDIA XL PO EVERY MORNING, PROTONIX PO 2 TIMES DAILY BEFORE MEALS, GLYCOLAX PO DAILY, POTASSIUM CHLORIDE 10 MEQIV X3, SENOKOT PO NIGHTLY, ALDACTONE PO DAILY, CARAFATE PO 4 TIMES DAILY, DEMADEX PO DAILY, LR IV AT 75 ML/HR       LABS:    8/24/2020 02:02    Hemoglobin Quant: 7.8 (L)    Hematocrit: 24.8 (L)       8/24/2020 04:59    Potassium: 2.7 (LL)    CO2: 19 (L)    Anion Gap: 17 (H)    Magnesium: 1.5 (L)    Glucose: 62 (L)    Calcium: 8.0 (L)    RBC: 2.92 (L)    Hemoglobin Quant: 8.1 (L)    Hematocrit: 25.6 (L)    Neutrophils %: 85.8 (H)    Lymphocyte %: 7.5 (L)    Neutrophils Absolute: 9.80 (H)    Lymphocytes Absolute: 0.86 (L)       * ** GENERAL SURGERY * **    Assessment/Plan: 80 yo s/p lap appendectomy with protracted course from ileus and then UGIB, recent TAVR was on DAPT off now due to GI bleeding from  severe gastroduodenitis with large hiatal hernia           - At this point she can eat and drink as tolerates, continue pro motility agents and PPI/carafate    - ok for d/c when has a bed. * ** CARDIOLOGY * **    IMPRESSIONS:    Active Problems:      General weakness         RECOMMENDATIONS:    Continue present medical regimen, however, if Mrs. Collazo Bradford continues to demonstrate ongoing blood loss  I would consider appropriate endoscopy.  Once again she will need to be placed [facility] on antiplatelet agents as soon as we are assured that there is no further active bleeding and signs of significant healing.

## 2020-09-01 LAB
BLOOD BANK DISPENSE STATUS: NORMAL
BLOOD BANK DISPENSE STATUS: NORMAL
BLOOD BANK PRODUCT CODE: NORMAL
BLOOD BANK PRODUCT CODE: NORMAL
BPU ID: NORMAL
BPU ID: NORMAL
DESCRIPTION BLOOD BANK: NORMAL
DESCRIPTION BLOOD BANK: NORMAL

## 2020-09-02 LAB
BLOOD CULTURE, ROUTINE: NORMAL
CULTURE, BLOOD 2: NORMAL

## 2020-11-01 NOTE — DISCHARGE SUMMARY
Hospitalist Discharge Summary    Patient ID: Jeancarlos Shafer   Patient :   Patient's PCP: Claus Velasquez MD    Admit Date: 2020   Admitting Physician: Shasta Howard MD    Discharge Date:  10/31/2020   Discharge Physician: Shantanu Nicholson MD   Discharge Condition:   Discharge Disposition: [facility] course in brief:The patient is admitted to surgical intensive care because of septic shock due to perforated appendix, patient had surgery, her course was complicated by aspiration pneumonia and acute hypoxic respiratory failure, patient was intubated and currently patient requiring 3 pressors and full support from the vent. Patient had small bowel resection and ileocolectomy, and wound VAC is placed, patient also required stenting for the superior mesenteric artery. The patient became anuric and has severe lactic acidosis, has liver shock and acute kidney failure. Family decided to withdraw life support. (Please refer to daily progress notes for a comprehensive review of the hospitalization by requesting medical records)          Consults:   IP CONSULT TO INTERNAL MEDICINE  IP CONSULT TO SOCIAL WORK  IP CONSULT TO GENERAL SURGERY  IP CONSULT TO CARDIOLOGY  IP CONSULT TO SOCIAL WORK  IP CONSULT TO SOCIAL WORK  IP CONSULT TO INFECTIOUS DISEASES  IP CONSULT TO CARDIOLOGY  IP CONSULT TO DIETITIAN  IP CONSULT TO NEPHROLOGY    Discharge Diagnoses:        Discharge Instructions / Follow up:    No future appointments. Continued appropriate risk factor modification of blood pressure, diabetes and serum lipids will remain essential to reducing risk of future atherosclerotic development    Activity: activity as tolerated    Significant labs:  CBC:   No results for input(s): WBC, RBC, HGB, HCT, MCV, RDW, PLT in the last 72 hours. BMP: No results for input(s): NA, K, CL, CO2, BUN, CREATININE, MG, PHOS in the last 72 hours.     Invalid input(s): CA  LFT:  No results for input(s): PROT, ALB, ALKPHOS, ALT, AST, BILITOT, AMYLASE, LIPASE in the last 72 hours. PT/INR: No results for input(s): INR, APTT in the last 72 hours. BNP: No results for input(s): BNP in the last 72 hours. Hgb A1C:   Lab Results   Component Value Date    LABA1C 5.4 03/20/2018     Folate and B12:   Lab Results   Component Value Date    UKLBHMMV16 862 03/05/2020   ,   Lab Results   Component Value Date    FOLATE 8.0 03/05/2020     Thyroid Studies:   Lab Results   Component Value Date    TSH 0.818 08/28/2020       Urinalysis:    Lab Results   Component Value Date    NITRU Negative 08/13/2020    WBCUA 0-1 08/13/2020    BACTERIA MODERATE 08/13/2020    RBCUA 2-5 08/13/2020    BLOODU LARGE 08/13/2020    SPECGRAV 1.015 08/13/2020    GLUCOSEU Negative 08/13/2020       Imaging:  No results found. Discharge Medications:      Medication List      ASK your doctor about these medications    aspirin 81 MG EC tablet     clopidogrel 75 MG tablet  Commonly known as:  PLAVIX     enalapril-hydroCHLOROthiazide 10-25 MG per tablet  Commonly known as:  VASERETIC     ferrous sulfate 325 (65 Fe) MG tablet  Commonly known as:  IRON 325     Niferex Tabs  Take 1 capsule by mouth Daily     pantoprazole 40 MG tablet  Commonly known as:  PROTONIX  Take 1 tablet by mouth 2 times daily (before meals)     sucralfate 1 GM tablet  Commonly known as:  CARAFATE  Take 1 tablet by mouth 4 times daily     torsemide 20 MG tablet  Commonly known as:  DEMADEX  Take 1 tablet by mouth daily            Time Spent on discharge is more than 45 minutes in the examination, evaluation, counseling and review of medications and discharge plan.    +++++++++++++++++++++++++++++++++++++++++++++++++  Minor Go MD  [facility] [place] Physician - 2020 [facility], [place]  +++++++++++++++++++++++++++++++++++++++++++++++++  NOTE: This report was transcribed using voice recognition software.  Every effort was made to ensure accuracy; however, inadvertent computerized transcription errors may be present.

## 2021-07-29 NOTE — CARE COORDINATION
Care Coordination: awaiting attending for further plan of care in coordination with ID. Plan was to discharge to [facility] ([facility])  Pt remains observation with send out covid for facility. Checking with physican to change to IPE status as Day 4 observation, remains hypokalemic  2.8, sed rate 85  Wbc increasing to 18.9.    Ct scan of abd:  Dilated proximal small bowel loops and collapsed small bowel loops and    colon concerning for low-grade partial small bowel obstruction and/or    ileus.         Diverticulosis of the left hemicolon with mild thickening of the    sigmoid colon likely spasm and/or mild uncomplicated diverticulitis.         CHF/fluid overload.           Will follow    Michelle Gent Repeat

## 2021-11-23 NOTE — PROGRESS NOTES
Day 6 Propofol  Current Rate: 20 mcg/kg/min (down from 50)    Recent Labs     11/21/21  0403 11/23/21  0418   TRIG 455* 257*     Re-check TG 3-4 days, unless propofol rate increases      BEREKET Cochran, Gerald MONSIVAIS, BCPS, BCCCP 11/23/2021 8:17 AM to determine / Unknown  -- Refer to Clinical Documentation Reviewer    PROVIDER RESPONSE TEXT:    Acute onset a fib related to sepsis due to acute perforated pancreatitis    Query created by: Luis Foss on 8/5/2020 10:57 AM      QUERY TEXT:    Pt admitted with Acute Perforated Appendicitis. If possible, please document   in progress notes and discharge summary further specificity regarding the   acuity and type of anemia:    The medical record reflects the following:  Risk Factors: lap appy 8/2- blood loss minimal, HTN  Clinical Indicators: 8/1 h/h  10.9/34.2-8/5 h/h 7.0/23.0, 8/5 b/p   95/52-baseline b/p 122//54  Treatment: IVFbolus, IVF, labs and monitoring  Thank You, Emmy Morales RN BSN CDE  contact number [phone]  Options provided:  -- Anemia due to acute blood loss  -- Anemia due to chronic blood loss  -- Anemia due to postoperative blood loss  -- Dilutional anemia  -- Other - I will add my own diagnosis  -- Disagree - Not applicable / Not valid  -- Disagree - Clinically unable to determine / Unknown  -- Refer to Clinical Documentation Reviewer    PROVIDER RESPONSE TEXT:    Anemia due to acute blood loss from surgery as well as dilutional with   postoperative resuscitation.     Query created by: Luis Foss on 8/5/2020 11:07 AM      Electronically signed by:  Vilma Calles DO 8/5/2020 6:37 PM

## 2022-06-28 NOTE — PROCEDURES
Chaya Kaur  [birth year]    Cardiovascular Lab    DATE OF PROCEDURE: 8/28/2020     PHYSICIAN: Jae Rivers M.D.     ASSISTANT:      PRE-PROCEDURE DIAGNOSIS: Ischemic bowel, mesenteric artery occlusion. POST-PROCEDURE DIAGNOSIS: Same    PROCEDURE: Right transfemoral superior mesenteric artery catheterization, mesenteric arteriogram with flush aortogram, superior mesenteric artery stenting. ANESTHESIA: General endotracheal.     ESTIMATED BLOOD LOSS: Minimal     COMPLICATIONS: None    DESCRIPTION OF PROCEDURE: The patient was identified and the procedure was confirmed. The groins were prepped and draped in the usual sterile fashion. Lidocaine 1% for local anesthesia. The right common femoral artery was percutaneously entered and a 4-[place] citizen sheath was inserted. A pigtail catheter was inserted into the abdominal aorta. A lateral abdominal aortogram was obtained that showed a patent abdominal aorta and iliac arteries bilaterally. The renal arteries were visualized. Dense calcifications were seen in the anterior aorta. No filling was seen of the celiac artery. A faint filling of contrast was seen in the superior mesenteric artery. The inferior mesenteric artery was seen filling the arc of [place]. The catheter was exchanged for a 6 [place] citizen sheath and HANNA guide catheter. A Glidewire advantage was used to traverse the mesenteric artery stenosis but an arteriogram showed the catheter to be in a dissection plane. Multiple attempts were made to pass the wire into the true lumen and this was finally achieved. The origin was ballooned to 3 mm to allow passage of a stent. A 5 mm x 20 mm balloon mounted stent was deployed through the origin of the superior mesenteric artery and a completion arteriogram showed filling of the native lumen with partial filling of a dissection plane distally. There was flow seen into the mesenteric branches but no flow was seen filling the celiac artery branches.     The sheath was removed and an Angio-Seal device was used to close the arteriotomy. A sterile pressure dressing was applied to the puncture site. The patient tolerated the procedure and was transferred to the SICU. Detail Level: Zone Detail Level: Generalized Detail Level: Simple

## 2023-07-12 NOTE — PROGRESS NOTES
Spoke with Dr. Josephine Jeff regarding Dr. Melo Purple order to re-evaluate pt. Order obtained and placed to transfer pt back to telemetry bed. Time-based billing (NON-critical care)

## (undated) DEVICE — STERILE POLYISOPRENE POWDER-FREE SURGICAL GLOVES: Brand: PROTEXIS

## (undated) DEVICE — Z DUP USE 2257490 ADHESIVE SKIN CLSRE 036ML TPCL 2CTL CNCRLTE HIGH VSCSTY DRMB

## (undated) DEVICE — CUTTER ENDOSCP L340MM LIN ARTC SGL STROKE FIRING ENDOPATH

## (undated) DEVICE — NEEDLE HYPO 25GA L1.5IN BLU POLYPR HUB S STL REG BVL STR

## (undated) DEVICE — BLADE SURG NO10 S STL STR DISP GLASSVAN

## (undated) DEVICE — TROCAR: Brand: KII FIOS FIRST ENTRY

## (undated) DEVICE — Z DISCONTINUED NO SUB IDED BAG SPEC RETRV M C240ML MOUTH 7.3MM L17CM SHFT 10MM NYL EZEE

## (undated) DEVICE — PUMP SUC IRR TBNG L10FT W/ HNDPC ASSEMB STRYKEFLOW 2

## (undated) DEVICE — SOLUTION IV IRRIG POUR BRL 0.9% SODIUM CHL 2F7124

## (undated) DEVICE — DOUBLE BASIN SET: Brand: MEDLINE INDUSTRIES, INC.

## (undated) DEVICE — SWAB SPEC COLL SHFT L5.25IN POLYUR FOAM TIP SFT DBL MEDIA

## (undated) DEVICE — SOLUTION IV IRRIG WATER 1000ML POUR BRL 2F7114

## (undated) DEVICE — DRAPE THER FLUID WARMING 66X44 IN FLAT SLUSH DBL DISC ORS

## (undated) DEVICE — Device

## (undated) DEVICE — SYRINGE 20ML LL S/C 50

## (undated) DEVICE — DRAIN SURG L0.75IN TRCR

## (undated) DEVICE — INTENDED FOR TISSUE SEPARATION, AND OTHER PROCEDURES THAT REQUIRE A SHARP SURGICAL BLADE TO PUNCTURE OR CUT.: Brand: BARD-PARKER ® STAINLESS STEEL BLADES

## (undated) DEVICE — SYRINGE MED 50ML LUERSLIP TIP

## (undated) DEVICE — GOWN,SIRUS,FABRNF,XL,20/CS: Brand: MEDLINE

## (undated) DEVICE — INSUFFLATION TUBING SET WITH FILTER, FUNNEL CONNECTOR AND LUER LOCK: Brand: JOSNOE MEDICAL INC

## (undated) DEVICE — SURGICAL PROCEDURE PACK BASIC

## (undated) DEVICE — CANNULA NSL ORAL AD FOR CAPNOFLEX CO2 O2 AIRLFE

## (undated) DEVICE — INSUFFLATION NEEDLE TO ESTABLISH PNEUMOPERITONEUM.: Brand: INSUFFLATION NEEDLE

## (undated) DEVICE — PATIENT RETURN ELECTRODE, SINGLE-USE, CONTACT QUALITY MONITORING, ADULT, WITH 9FT CORD, FOR PATIENTS WEIGING OVER 33LBS. (15KG): Brand: MEGADYNE

## (undated) DEVICE — BITEBLOCK 54FR W/ DENT RIM BLOX

## (undated) DEVICE — RELOAD STPL SZ 0 L45MM DIA3.5MM 0DEG STD REG TISS BLU TI

## (undated) DEVICE — DRIP REDUCTION MANIFOLD

## (undated) DEVICE — SET INSTRUMENT LAP I

## (undated) DEVICE — GAUZE,SPONGE,POST-OP,4X3,STRL,LF: Brand: MEDLINE

## (undated) DEVICE — VALVE SUCTION AIR H2O SET ORCA POD + DISP

## (undated) DEVICE — TIBURON GENERAL ENDOSCOPY DRAPE: Brand: CONVERTORS

## (undated) DEVICE — TROCARS: Brand: KII® BALLOON BLUNT TIP SYSTEM

## (undated) DEVICE — APPLICATOR MEDICATED 26 CC SOLUTION HI LT ORNG CHLORAPREP

## (undated) DEVICE — DRESSING KIT OPN ABD ABTHERA SENSATRAC

## (undated) DEVICE — NEEDLE CLOSURE OMNICLOSE

## (undated) DEVICE — TOTAL TRAY, DB, 100% SILI FOLEY, 16FR 10: Brand: MEDLINE

## (undated) DEVICE — SET INSTRUMENT LAP II

## (undated) DEVICE — SET ENDO INSTR LAPAROSCOPIC STGENLAP

## (undated) DEVICE — MIC* SAFETY PERCUTANEOUS ENDOSCOPIC GASTROSTOMY PEG KIT - 20 FR - PULL: Brand: MIC PEG TUBE

## (undated) DEVICE — KIT,ANTI FOG,W/SPONGE & FLUID,SOFT PACK: Brand: MEDLINE

## (undated) DEVICE — SHEET, T, LAPAROTOMY, STERILE: Brand: MEDLINE

## (undated) DEVICE — RELOAD STPL L75MM OPN H3.8MM CLS 1.5MM WIRE DIA0.2MM REG

## (undated) DEVICE — CAMERA STRYKER 1488 HD GEN

## (undated) DEVICE — TOWEL,OR,DSP,ST,BLUE,STD,6/PK,12PK/CS: Brand: MEDLINE

## (undated) DEVICE — SYRINGE MED 10ML LUERLOCK TIP W/O SFTY DISP

## (undated) DEVICE — TROCAR: Brand: KII® SLEEVE

## (undated) DEVICE — Z INACTIVE USE 2660664 SOLUTION IRRIG 3000ML 0.9% SOD CHL USP UROMATIC PLAS CONT

## (undated) DEVICE — SCISSORS ENDOSCP DIA5MM CRV MPLR CAUT W/ RATCH HNDL

## (undated) DEVICE — SEALER TISS L20CM DIA13MM ADV BPLR L CRV JAW OPN APPRCH

## (undated) DEVICE — FORCEPS LAP DIA5MM BCOCK FEN TIP ROT NONARTICULATING LOK

## (undated) DEVICE — SURGICAL PROCEDURE PACK TRAUM

## (undated) DEVICE — STAPLER INT L75MM CUT LN L73MM STPL LN L77MM BLU B FRM 8

## (undated) DEVICE — TROCARS: Brand: KII® SHIELDED BLADED ACCESS SYSTEM

## (undated) DEVICE — CONTAINER VACUTAINER ANAER CULTURE SWAB

## (undated) DEVICE — SCISSOR REPROCESS SURG ENDOSHEAR  5MM

## (undated) DEVICE — CIAGLIA BLUE RHINO PERCUTANEOUS TRACHEOSTOMY INTRODUCER TRAY: Brand: CIAGLIA BLUE RHINO